# Patient Record
Sex: MALE | Race: WHITE | NOT HISPANIC OR LATINO | Employment: OTHER | URBAN - METROPOLITAN AREA
[De-identification: names, ages, dates, MRNs, and addresses within clinical notes are randomized per-mention and may not be internally consistent; named-entity substitution may affect disease eponyms.]

---

## 2022-06-25 ENCOUNTER — HOSPITAL ENCOUNTER (EMERGENCY)
Facility: HOSPITAL | Age: 77
Discharge: HOME | End: 2022-06-25
Attending: EMERGENCY MEDICINE | Admitting: EMERGENCY MEDICINE
Payer: COMMERCIAL

## 2022-06-25 ENCOUNTER — APPOINTMENT (EMERGENCY)
Dept: RADIOLOGY | Facility: HOSPITAL | Age: 77
End: 2022-06-25
Payer: COMMERCIAL

## 2022-06-25 VITALS
HEIGHT: 70 IN | SYSTOLIC BLOOD PRESSURE: 154 MMHG | DIASTOLIC BLOOD PRESSURE: 70 MMHG | RESPIRATION RATE: 17 BRPM | OXYGEN SATURATION: 100 % | HEART RATE: 61 BPM | BODY MASS INDEX: 34.36 KG/M2 | TEMPERATURE: 98.4 F | WEIGHT: 240 LBS

## 2022-06-25 DIAGNOSIS — M25.519 SHOULDER PAIN: Primary | ICD-10-CM

## 2022-06-25 PROCEDURE — 73030 X-RAY EXAM OF SHOULDER: CPT

## 2022-06-25 PROCEDURE — 99284 EMERGENCY DEPT VISIT MOD MDM: CPT | Performed by: EMERGENCY MEDICINE

## 2022-06-25 PROCEDURE — 99283 EMERGENCY DEPT VISIT LOW MDM: CPT

## 2022-06-25 RX ORDER — CYCLOBENZAPRINE HCL 10 MG
10 TABLET ORAL 2 TIMES DAILY PRN
Qty: 20 TABLET | Refills: 0 | Status: SHIPPED | OUTPATIENT
Start: 2022-06-25

## 2022-06-25 NOTE — ED PROVIDER NOTES
History  Chief Complaint   Patient presents with    Neck Pain     Pt c/o neck pain that radiates into left shoulder and arm      HPI  66 yo M presents with left shoulder pain that radiates down arm and into neck  Started about 3 weeks ago and has not improved  No weakness, numbers, fevers  Denies trauma  He reports unfortunately his son recently , so he was lifting heavy objects trying to clean out his son's house  Has tried tylenol and icyhot without improvement  None       Past Medical History:   Diagnosis Date    Diabetes mellitus ([address] )     Hypertension        History reviewed  No pertinent surgical history  History reviewed  No pertinent family history  I have reviewed and agree with the history as documented  E-Cigarette/Vaping     E-Cigarette/Vaping Substances     Social History     Tobacco Use    Smoking status: Former Smoker    Smokeless tobacco: Never Used       Review of Systems   Constitutional: Negative for chills and fever  HENT: Negative for dental problem and ear pain  Eyes: Negative for pain and redness  Respiratory: Negative for cough and shortness of breath  Cardiovascular: Negative for chest pain and palpitations  Gastrointestinal: Negative for abdominal pain and nausea  Endocrine: Negative for polydipsia and polyphagia  Genitourinary: Negative for dysuria and frequency  Musculoskeletal: Positive for arthralgias and neck pain  Negative for joint swelling  Skin: Negative for color change and rash  Neurological: Negative for dizziness and headaches  Psychiatric/Behavioral: Negative for behavioral problems and confusion  All other systems reviewed and are negative  Physical Exam  Physical Exam  Vitals and nursing note reviewed  Constitutional:       General: He is not in acute distress  HENT:      Head: Normocephalic and atraumatic        Right Ear: External ear normal       Left Ear: External ear normal       Nose: Nose normal    Eyes: General: No scleral icterus  Cardiovascular:      Rate and Rhythm: Normal rate  Pulmonary:      Effort: Pulmonary effort is normal  No respiratory distress  Abdominal:      General: There is no distension  Musculoskeletal:         General: No deformity  Normal range of motion  Comments: L trapezius TTP, decreased ROM L shoulder, n/v intact distally   Skin:     Findings: No rash  Neurological:      General: No focal deficit present  Mental Status: He is alert  Gait: Gait normal    Psychiatric:         Mood and Affect: Mood normal          Vital Signs  ED Triage Vitals [06/25/22 1246]   Temperature Pulse Respirations Blood Pressure SpO2   98 4 °F (36 9 °C) 61 17 154/70 100 %      Temp src Heart Rate Source Patient Position - Orthostatic VS BP Location FiO2 (%)   -- Monitor Sitting Left arm --      Pain Score       --           Vitals:    06/25/22 1246   BP: 154/70   Pulse: 61   Patient Position - Orthostatic VS: Sitting         Visual Acuity      ED Medications  Medications - No data to display    Diagnostic Studies  Results Reviewed     None                 XR shoulder 2+ views LEFT    (Results Pending)              Procedures  Procedures         ED Course                               SBIRT 20yo+    Flowsheet Row Most Recent Value   SBIRT (25 yo +)    In order to provide better care to our patients, we are screening all of our patients for alcohol and drug use  Would it be okay to ask you these screening questions? Yes Filed at: 06/25/2022 1311   Initial Alcohol Screen: US AUDIT-C     1  How often do you have a drink containing alcohol? 0 Filed at: 06/25/2022 1311   2  How many drinks containing alcohol do you have on a typical day you are drinking? 0 Filed at: 06/25/2022 1311   3a  Male UNDER 65: How often do you have five or more drinks on one occasion? 0 Filed at: 06/25/2022 1311   3b  FEMALE Any Age, or MALE 65+: How often do you have 4 or more drinks on one occassion?  0 Filed at: 06/25/2022 1311   Audit-C Score 0 Filed at: 06/25/2022 1311   ANDRE: How many times in the past year have you    Used an illegal drug or used a prescription medication for non-medical reasons? Never Filed at: 06/25/2022 1311                    [facility]  Patient presents with shoulder pain radiating to neck  XR shows no acute fracture or dislocation per my read  Discussed voltaren gel, muscle relaxant and orthopedic follow up  Patient declines sling  Disposition  Final diagnoses:   Shoulder pain     Time reflects when diagnosis was documented in both MDM as applicable and the Disposition within this note     Time User Action Codes Description Comment    6/25/2022  1:36 PM Theta Kenneth Add [R11 [id number]] Shoulder pain       ED Disposition     ED Disposition   Discharge    Condition   Stable    Date/Time   Sat Jun 25, 2022  1:36 PM    Comment   Nikki Bowers discharge to home/self care  Follow-up Information     Follow up With Specialties Details Why Contact Info Additional [id number] [facility] Schedule an appointment as soon as possible for a visit  for orthopedic follow up [address] [facility] [address], [address], [place], [place], [address] [facility]          Patient's Medications   Discharge Prescriptions    CYCLOBENZAPRINE (FLEXERIL) 10 MG TABLET    Take 1 tablet (10 mg total) by mouth 2 (two) times a day as needed for muscle spasms       Start Date: 6/25/2022 End Date: --       Order Dose: 10 mg       Quantity: 20 tablet    Refills: 0    DICLOFENAC SODIUM (VOLTAREN) 1 %    Apply 2 g topically 4 (four) times a day       Start Date: 6/25/2022 End Date: --       Order Dose: 2 g       Quantity: 50 g    Refills: 0       No discharge procedures on file      PDMP Review     None          ED Provider  Electronically Signed by           Tisha Schneider MD  06/25/22 3643

## 2024-03-28 ENCOUNTER — APPOINTMENT (EMERGENCY)
Dept: CT IMAGING | Facility: HOSPITAL | Age: 79
DRG: 291 | End: 2024-03-28
Payer: COMMERCIAL

## 2024-03-28 ENCOUNTER — APPOINTMENT (EMERGENCY)
Dept: RADIOLOGY | Facility: HOSPITAL | Age: 79
DRG: 291 | End: 2024-03-28
Payer: COMMERCIAL

## 2024-03-28 ENCOUNTER — APPOINTMENT (EMERGENCY)
Dept: VASCULAR ULTRASOUND | Facility: HOSPITAL | Age: 79
DRG: 291 | End: 2024-03-28
Payer: COMMERCIAL

## 2024-03-28 ENCOUNTER — HOSPITAL ENCOUNTER (INPATIENT)
Facility: HOSPITAL | Age: 79
LOS: 6 days | Discharge: HOME | DRG: 291 | End: 2024-04-03
Attending: EMERGENCY MEDICINE | Admitting: FAMILY MEDICINE
Payer: COMMERCIAL

## 2024-03-28 DIAGNOSIS — I50.9 ACUTE CHF (CONGESTIVE HEART FAILURE) (HCC): ICD-10-CM

## 2024-03-28 DIAGNOSIS — R06.00 ACUTE DYSPNEA: Primary | ICD-10-CM

## 2024-03-28 DIAGNOSIS — R09.02 HYPOXIA: ICD-10-CM

## 2024-03-28 DIAGNOSIS — N17.9 AKI (ACUTE KIDNEY INJURY) (HCC): ICD-10-CM

## 2024-03-28 DIAGNOSIS — I50.9 NEW ONSET OF CONGESTIVE HEART FAILURE (HCC): ICD-10-CM

## 2024-03-28 DIAGNOSIS — J90 BILATERAL PLEURAL EFFUSION: ICD-10-CM

## 2024-03-28 DIAGNOSIS — R74.8 CARDIAC ENZYMES ELEVATED: ICD-10-CM

## 2024-03-28 PROBLEM — J96.01 ACUTE RESPIRATORY FAILURE WITH HYPOXIA (HCC): Status: ACTIVE | Noted: 2024-03-28

## 2024-03-28 PROBLEM — Z79.4 TYPE 2 DIABETES MELLITUS, WITH LONG-TERM CURRENT USE OF INSULIN (HCC): Status: ACTIVE | Noted: 2024-03-28

## 2024-03-28 PROBLEM — I10 HTN (HYPERTENSION): Status: ACTIVE | Noted: 2024-03-28

## 2024-03-28 PROBLEM — E11.9 TYPE 2 DIABETES MELLITUS, WITH LONG-TERM CURRENT USE OF INSULIN (HCC): Status: ACTIVE | Noted: 2024-03-28

## 2024-03-28 PROBLEM — Z85.46 HISTORY OF PROSTATE CANCER: Status: ACTIVE | Noted: 2024-03-28

## 2024-03-28 PROBLEM — E03.9 HYPOTHYROID: Status: ACTIVE | Noted: 2024-03-28

## 2024-03-28 PROBLEM — F41.9 ANXIETY: Status: ACTIVE | Noted: 2024-03-28

## 2024-03-28 PROBLEM — R79.89 ELEVATED TROPONIN: Status: ACTIVE | Noted: 2024-03-28

## 2024-03-28 LAB
2HR DELTA HS TROPONIN: 6 NG/L
4HR DELTA HS TROPONIN: 9 NG/L
ALBUMIN SERPL BCP-MCNC: 4.2 G/DL (ref 3.5–5)
ALP SERPL-CCNC: 54 U/L (ref 34–104)
ALT SERPL W P-5'-P-CCNC: 16 U/L (ref 7–52)
ANION GAP SERPL CALCULATED.3IONS-SCNC: 9 MMOL/L (ref 4–13)
APTT PPP: 37 SECONDS (ref 23–37)
AST SERPL W P-5'-P-CCNC: 15 U/L (ref 13–39)
ATRIAL RATE: 70 BPM
ATRIAL RATE: 72 BPM
BASE EX.OXY STD BLDV CALC-SCNC: 53.1 % (ref 60–80)
BASE EXCESS BLDV CALC-SCNC: -2.4 MMOL/L
BASOPHILS # BLD AUTO: 0.01 THOUSANDS/ÂΜL (ref 0–0.1)
BASOPHILS NFR BLD AUTO: 0 % (ref 0–1)
BILIRUB DIRECT SERPL-MCNC: 0.11 MG/DL (ref 0–0.2)
BILIRUB SERPL-MCNC: 0.6 MG/DL (ref 0.2–1)
BNP SERPL-MCNC: 785 PG/ML (ref 0–100)
BUN SERPL-MCNC: 39 MG/DL (ref 5–25)
CALCIUM SERPL-MCNC: 9.4 MG/DL (ref 8.4–10.2)
CARDIAC TROPONIN I PNL SERPL HS: 89 NG/L
CARDIAC TROPONIN I PNL SERPL HS: 95 NG/L
CARDIAC TROPONIN I PNL SERPL HS: 98 NG/L
CHLORIDE SERPL-SCNC: 106 MMOL/L (ref 96–108)
CO2 SERPL-SCNC: 24 MMOL/L (ref 21–32)
CREAT SERPL-MCNC: 1.47 MG/DL (ref 0.6–1.3)
D DIMER PPP FEU-MCNC: 1.79 UG/ML FEU
EOSINOPHIL # BLD AUTO: 0.1 THOUSAND/ÂΜL (ref 0–0.61)
EOSINOPHIL NFR BLD AUTO: 1 % (ref 0–6)
ERYTHROCYTE [DISTWIDTH] IN BLOOD BY AUTOMATED COUNT: 15.8 % (ref 11.6–15.1)
FLUAV RNA RESP QL NAA+PROBE: NEGATIVE
FLUBV RNA RESP QL NAA+PROBE: NEGATIVE
GFR SERPL CREATININE-BSD FRML MDRD: 44 ML/MIN/1.73SQ M
GLUCOSE SERPL-MCNC: 175 MG/DL (ref 65–140)
GLUCOSE SERPL-MCNC: 179 MG/DL (ref 65–140)
GLUCOSE SERPL-MCNC: 208 MG/DL (ref 65–140)
HCO3 BLDV-SCNC: 23.6 MMOL/L (ref 24–30)
HCT VFR BLD AUTO: 34.5 % (ref 36.5–49.3)
HGB BLD-MCNC: 11.1 G/DL (ref 12–17)
IMM GRANULOCYTES # BLD AUTO: 0.02 THOUSAND/UL (ref 0–0.2)
IMM GRANULOCYTES NFR BLD AUTO: 0 % (ref 0–2)
INR PPP: 0.99 (ref 0.84–1.19)
LACTATE SERPL-SCNC: 1.4 MMOL/L (ref 0.5–2)
LYMPHOCYTES # BLD AUTO: 0.43 THOUSANDS/ÂΜL (ref 0.6–4.47)
LYMPHOCYTES NFR BLD AUTO: 6 % (ref 14–44)
MAGNESIUM SERPL-MCNC: 2.2 MG/DL (ref 1.9–2.7)
MCH RBC QN AUTO: 32 PG (ref 26.8–34.3)
MCHC RBC AUTO-ENTMCNC: 32.2 G/DL (ref 31.4–37.4)
MCV RBC AUTO: 99 FL (ref 82–98)
MONOCYTES # BLD AUTO: 0.53 THOUSAND/ÂΜL (ref 0.17–1.22)
MONOCYTES NFR BLD AUTO: 8 % (ref 4–12)
NEUTROPHILS # BLD AUTO: 5.89 THOUSANDS/ÂΜL (ref 1.85–7.62)
NEUTS SEG NFR BLD AUTO: 85 % (ref 43–75)
NRBC BLD AUTO-RTO: 0 /100 WBCS
O2 CT BLDV-SCNC: 9.1 ML/DL
P AXIS: 67 DEGREES
P AXIS: 74 DEGREES
PCO2 BLDV: 45.5 MM HG (ref 42–50)
PH BLDV: 7.33 [PH] (ref 7.3–7.4)
PLATELET # BLD AUTO: 344 THOUSANDS/UL (ref 149–390)
PMV BLD AUTO: 9.9 FL (ref 8.9–12.7)
PO2 BLDV: 30.7 MM HG (ref 35–45)
POTASSIUM SERPL-SCNC: 4.8 MMOL/L (ref 3.5–5.3)
PR INTERVAL: 292 MS
PR INTERVAL: 310 MS
PROCALCITONIN SERPL-MCNC: 0.14 NG/ML
PROT SERPL-MCNC: 7.4 G/DL (ref 6.4–8.4)
PROTHROMBIN TIME: 13.7 SECONDS (ref 11.6–14.5)
QRS AXIS: -11 DEGREES
QRS AXIS: -18 DEGREES
QRSD INTERVAL: 68 MS
QRSD INTERVAL: 88 MS
QT INTERVAL: 396 MS
QT INTERVAL: 406 MS
QTC INTERVAL: 433 MS
QTC INTERVAL: 438 MS
RBC # BLD AUTO: 3.47 MILLION/UL (ref 3.88–5.62)
RSV RNA RESP QL NAA+PROBE: NEGATIVE
SARS-COV-2 RNA RESP QL NAA+PROBE: NEGATIVE
SODIUM SERPL-SCNC: 139 MMOL/L (ref 135–147)
T WAVE AXIS: 78 DEGREES
T WAVE AXIS: 87 DEGREES
TSH SERPL DL<=0.05 MIU/L-ACNC: 4.01 UIU/ML (ref 0.45–4.5)
VENTRICULAR RATE: 70 BPM
VENTRICULAR RATE: 72 BPM
WBC # BLD AUTO: 6.98 THOUSAND/UL (ref 4.31–10.16)

## 2024-03-28 PROCEDURE — 71275 CT ANGIOGRAPHY CHEST: CPT

## 2024-03-28 PROCEDURE — 84145 PROCALCITONIN (PCT): CPT | Performed by: EMERGENCY MEDICINE

## 2024-03-28 PROCEDURE — 80048 BASIC METABOLIC PNL TOTAL CA: CPT | Performed by: EMERGENCY MEDICINE

## 2024-03-28 PROCEDURE — 80076 HEPATIC FUNCTION PANEL: CPT | Performed by: EMERGENCY MEDICINE

## 2024-03-28 PROCEDURE — 93005 ELECTROCARDIOGRAM TRACING: CPT

## 2024-03-28 PROCEDURE — 82948 REAGENT STRIP/BLOOD GLUCOSE: CPT

## 2024-03-28 PROCEDURE — 94640 AIRWAY INHALATION TREATMENT: CPT

## 2024-03-28 PROCEDURE — 85379 FIBRIN DEGRADATION QUANT: CPT | Performed by: EMERGENCY MEDICINE

## 2024-03-28 PROCEDURE — 84484 ASSAY OF TROPONIN QUANT: CPT | Performed by: EMERGENCY MEDICINE

## 2024-03-28 PROCEDURE — 83880 ASSAY OF NATRIURETIC PEPTIDE: CPT | Performed by: EMERGENCY MEDICINE

## 2024-03-28 PROCEDURE — 99285 EMERGENCY DEPT VISIT HI MDM: CPT | Performed by: EMERGENCY MEDICINE

## 2024-03-28 PROCEDURE — 85610 PROTHROMBIN TIME: CPT | Performed by: EMERGENCY MEDICINE

## 2024-03-28 PROCEDURE — 99285 EMERGENCY DEPT VISIT HI MDM: CPT

## 2024-03-28 PROCEDURE — 93971 EXTREMITY STUDY: CPT

## 2024-03-28 PROCEDURE — 99223 1ST HOSP IP/OBS HIGH 75: CPT | Performed by: FAMILY MEDICINE

## 2024-03-28 PROCEDURE — 87040 BLOOD CULTURE FOR BACTERIA: CPT | Performed by: EMERGENCY MEDICINE

## 2024-03-28 PROCEDURE — 83735 ASSAY OF MAGNESIUM: CPT | Performed by: EMERGENCY MEDICINE

## 2024-03-28 PROCEDURE — 0241U HB NFCT DS VIR RESP RNA 4 TRGT: CPT | Performed by: EMERGENCY MEDICINE

## 2024-03-28 PROCEDURE — 85730 THROMBOPLASTIN TIME PARTIAL: CPT | Performed by: EMERGENCY MEDICINE

## 2024-03-28 PROCEDURE — 82805 BLOOD GASES W/O2 SATURATION: CPT | Performed by: EMERGENCY MEDICINE

## 2024-03-28 PROCEDURE — 96374 THER/PROPH/DIAG INJ IV PUSH: CPT

## 2024-03-28 PROCEDURE — 36415 COLL VENOUS BLD VENIPUNCTURE: CPT | Performed by: EMERGENCY MEDICINE

## 2024-03-28 PROCEDURE — 71045 X-RAY EXAM CHEST 1 VIEW: CPT

## 2024-03-28 PROCEDURE — 93010 ELECTROCARDIOGRAM REPORT: CPT | Performed by: INTERNAL MEDICINE

## 2024-03-28 PROCEDURE — 93971 EXTREMITY STUDY: CPT | Performed by: SURGERY

## 2024-03-28 PROCEDURE — 84443 ASSAY THYROID STIM HORMONE: CPT | Performed by: FAMILY MEDICINE

## 2024-03-28 PROCEDURE — 83605 ASSAY OF LACTIC ACID: CPT | Performed by: EMERGENCY MEDICINE

## 2024-03-28 PROCEDURE — 85025 COMPLETE CBC W/AUTO DIFF WBC: CPT | Performed by: EMERGENCY MEDICINE

## 2024-03-28 RX ORDER — FUROSEMIDE 10 MG/ML
40 INJECTION INTRAMUSCULAR; INTRAVENOUS 2 TIMES DAILY
Status: DISCONTINUED | OUTPATIENT
Start: 2024-03-29 | End: 2024-03-30

## 2024-03-28 RX ORDER — PRAVASTATIN SODIUM 80 MG/1
80 TABLET ORAL
Status: DISCONTINUED | OUTPATIENT
Start: 2024-03-28 | End: 2024-03-28

## 2024-03-28 RX ORDER — PANTOPRAZOLE SODIUM 40 MG/1
40 TABLET, DELAYED RELEASE ORAL DAILY
COMMUNITY

## 2024-03-28 RX ORDER — INSULIN DEGLUDEC 100 U/ML
20 INJECTION, SOLUTION SUBCUTANEOUS DAILY
COMMUNITY

## 2024-03-28 RX ORDER — PANTOPRAZOLE SODIUM 40 MG/1
40 TABLET, DELAYED RELEASE ORAL
Status: DISCONTINUED | OUTPATIENT
Start: 2024-03-29 | End: 2024-04-03 | Stop reason: HOSPADM

## 2024-03-28 RX ORDER — POTASSIUM CHLORIDE 750 MG/1
10 TABLET, EXTENDED RELEASE ORAL DAILY
Status: DISCONTINUED | OUTPATIENT
Start: 2024-03-29 | End: 2024-03-29

## 2024-03-28 RX ORDER — LEVOTHYROXINE SODIUM 0.05 MG/1
50 TABLET ORAL
Status: DISCONTINUED | OUTPATIENT
Start: 2024-03-29 | End: 2024-04-03 | Stop reason: HOSPADM

## 2024-03-28 RX ORDER — ASPIRIN 81 MG/1
324 TABLET, CHEWABLE ORAL ONCE
Status: COMPLETED | OUTPATIENT
Start: 2024-03-28 | End: 2024-03-28

## 2024-03-28 RX ORDER — ASPIRIN 81 MG/1
81 TABLET, CHEWABLE ORAL DAILY
COMMUNITY

## 2024-03-28 RX ORDER — LOSARTAN POTASSIUM 50 MG/1
50 TABLET ORAL DAILY
COMMUNITY
End: 2024-04-03

## 2024-03-28 RX ORDER — HEPARIN SODIUM 5000 [USP'U]/ML
5000 INJECTION, SOLUTION INTRAVENOUS; SUBCUTANEOUS EVERY 8 HOURS SCHEDULED
Status: DISCONTINUED | OUTPATIENT
Start: 2024-03-28 | End: 2024-04-03 | Stop reason: HOSPADM

## 2024-03-28 RX ORDER — INSULIN LISPRO 100 [IU]/ML
1-6 INJECTION, SOLUTION INTRAVENOUS; SUBCUTANEOUS
Status: DISCONTINUED | OUTPATIENT
Start: 2024-03-28 | End: 2024-04-03 | Stop reason: HOSPADM

## 2024-03-28 RX ORDER — PIOGLITAZONEHYDROCHLORIDE 45 MG/1
45 TABLET ORAL
COMMUNITY

## 2024-03-28 RX ORDER — INSULIN LISPRO 100 [IU]/ML
1-5 INJECTION, SOLUTION INTRAVENOUS; SUBCUTANEOUS
Status: DISCONTINUED | OUTPATIENT
Start: 2024-03-28 | End: 2024-04-03 | Stop reason: HOSPADM

## 2024-03-28 RX ORDER — LATANOPROST 50 UG/ML
1 SOLUTION/ DROPS OPHTHALMIC
Status: DISCONTINUED | OUTPATIENT
Start: 2024-03-28 | End: 2024-04-03 | Stop reason: HOSPADM

## 2024-03-28 RX ORDER — METOPROLOL SUCCINATE 100 MG/1
100 TABLET, EXTENDED RELEASE ORAL 2 TIMES DAILY
COMMUNITY

## 2024-03-28 RX ORDER — GLIPIZIDE 10 MG/1
10 TABLET ORAL
COMMUNITY

## 2024-03-28 RX ORDER — AMLODIPINE BESYLATE 10 MG/1
10 TABLET ORAL DAILY
COMMUNITY
End: 2024-04-03

## 2024-03-28 RX ORDER — EZETIMIBE 10 MG/1
10 TABLET ORAL DAILY
Status: DISCONTINUED | OUTPATIENT
Start: 2024-03-29 | End: 2024-04-03 | Stop reason: HOSPADM

## 2024-03-28 RX ORDER — SIMVASTATIN 40 MG
40 TABLET ORAL
COMMUNITY

## 2024-03-28 RX ORDER — GABAPENTIN 300 MG/1
300 CAPSULE ORAL 3 TIMES DAILY
Status: DISCONTINUED | OUTPATIENT
Start: 2024-03-28 | End: 2024-03-30

## 2024-03-28 RX ORDER — FUROSEMIDE 10 MG/ML
40 INJECTION INTRAMUSCULAR; INTRAVENOUS ONCE
Status: COMPLETED | OUTPATIENT
Start: 2024-03-28 | End: 2024-03-28

## 2024-03-28 RX ORDER — PRAVASTATIN SODIUM 80 MG/1
80 TABLET ORAL
Status: DISCONTINUED | OUTPATIENT
Start: 2024-03-28 | End: 2024-04-03 | Stop reason: HOSPADM

## 2024-03-28 RX ORDER — LEVOTHYROXINE SODIUM 0.05 MG/1
50 TABLET ORAL DAILY
COMMUNITY

## 2024-03-28 RX ORDER — INSULIN GLARGINE 100 [IU]/ML
20 INJECTION, SOLUTION SUBCUTANEOUS EVERY MORNING
Status: DISCONTINUED | OUTPATIENT
Start: 2024-03-29 | End: 2024-04-03 | Stop reason: HOSPADM

## 2024-03-28 RX ORDER — EZETIMIBE 10 MG/1
10 TABLET ORAL DAILY
COMMUNITY

## 2024-03-28 RX ORDER — METOPROLOL TARTRATE 50 MG/1
50 TABLET, FILM COATED ORAL EVERY 12 HOURS SCHEDULED
Status: DISCONTINUED | OUTPATIENT
Start: 2024-03-28 | End: 2024-04-03 | Stop reason: HOSPADM

## 2024-03-28 RX ORDER — MELATONIN
1000 DAILY
COMMUNITY

## 2024-03-28 RX ORDER — LATANOPROST 50 UG/ML
1 SOLUTION/ DROPS OPHTHALMIC
COMMUNITY

## 2024-03-28 RX ORDER — ACETAMINOPHEN 325 MG/1
975 TABLET ORAL EVERY 8 HOURS PRN
Status: DISCONTINUED | OUTPATIENT
Start: 2024-03-28 | End: 2024-03-29

## 2024-03-28 RX ADMIN — METOPROLOL TARTRATE 50 MG: 50 TABLET, FILM COATED ORAL at 21:47

## 2024-03-28 RX ADMIN — HEPARIN SODIUM 5000 UNITS: 5000 INJECTION INTRAVENOUS; SUBCUTANEOUS at 21:47

## 2024-03-28 RX ADMIN — DICLOFENAC SODIUM 2 G: 10 GEL TOPICAL at 23:36

## 2024-03-28 RX ADMIN — ASPIRIN 324 MG: 81 TABLET, CHEWABLE ORAL at 16:58

## 2024-03-28 RX ADMIN — INSULIN LISPRO 1 UNITS: 100 INJECTION, SOLUTION INTRAVENOUS; SUBCUTANEOUS at 21:48

## 2024-03-28 RX ADMIN — PRAVASTATIN SODIUM 80 MG: 80 TABLET ORAL at 23:36

## 2024-03-28 RX ADMIN — FUROSEMIDE 40 MG: 10 INJECTION, SOLUTION INTRAMUSCULAR; INTRAVENOUS at 17:01

## 2024-03-28 RX ADMIN — IOHEXOL 100 ML: 350 INJECTION, SOLUTION INTRAVENOUS at 16:12

## 2024-03-28 RX ADMIN — LATANOPROST 1 DROP: 50 SOLUTION OPHTHALMIC at 23:36

## 2024-03-28 RX ADMIN — IPRATROPIUM BROMIDE 0.5 MG: 0.5 SOLUTION RESPIRATORY (INHALATION) at 17:00

## 2024-03-28 RX ADMIN — ALBUTEROL SULFATE 5 MG: 2.5 SOLUTION RESPIRATORY (INHALATION) at 17:00

## 2024-03-28 RX ADMIN — INSULIN LISPRO 1 UNITS: 100 INJECTION, SOLUTION INTRAVENOUS; SUBCUTANEOUS at 18:48

## 2024-03-28 NOTE — ED PROVIDER NOTES
History  Chief Complaint   Patient presents with    Anxiety     Pt presents with concerns that he has been having frequent panic attacks over the last 3 days, denies history of the same. Pt presents dyspneic and hypoxic at 83%, and tearful regarding the recent death of his son.     Shortness of Breath     Patient is an 79-year-old male with past medical history of hypertension, hyperlipidemia, hypothyroidism, type 2 diabetes, GERD, prostate cancer 5 years ago status post radiation treatment in remission, presents to the emergency department for several days of dyspnea and anxiety.  Patient states that over the past few days he has been feeling extremely short of breath especially with exertion.  He states he feels like he is hyperventilating and then becomes very anxious and panicked about this.  Patient denies ever feeling this way before.  He denies any recent illness such as coughing, URI symptoms, fevers or chills.  She denies any chest pain, palpitations, abdominal pain, back pain.  He reports occasional nausea but no vomiting.  Denies any diarrhea, constipation, blood per rectum or melena, urinary symptoms, skin rash or color change, focal neurologic deficits.  Patient does report about 1 week ago, injuring his left leg while getting out of a car and did notice some swelling and bruising to the left calf.  He states he feels as though both of his legs are more swollen than normal but slightly worse on the left side.  He denies any known history of asthma or COPD and reports he quit smoking 50 years ago.  He denies any known cardiac history such as ACS, CHF.  Denies history of venous thromboembolism.  Patient's son recently passed away and he does admit to some anxiety from that but reports the last few days were much worse than any anxiety he has ever felt.  Patient was noted to be hypoxic in triage at 83% as well as tachypneic.  He was immediately placed on nasal cannula oxygen and brought to exam room where  I evaluated him on arrival.      History provided by:  Patient and spouse   used: No    Anxiety  Associated symptoms: anxiety    Associated symptoms: no abdominal pain, no chest pain and no headaches    Shortness of Breath  Associated symptoms: no abdominal pain, no chest pain, no cough, no ear pain, no fever, no headaches, no neck pain, no rash, no sore throat, no vomiting and no wheezing        Prior to Admission Medications   Prescriptions Last Dose Informant Patient Reported? Taking?   Diclofenac Sodium (VOLTAREN) 1 %   No No   Sig: Apply 2 g topically 4 (four) times a day   cyclobenzaprine (FLEXERIL) 10 mg tablet   No No   Sig: Take 1 tablet (10 mg total) by mouth 2 (two) times a day as needed for muscle spasms      Facility-Administered Medications: None       Past Medical History:   Diagnosis Date    Diabetes mellitus (HCC)     Hypertension        History reviewed. No pertinent surgical history.    History reviewed. No pertinent family history.  I have reviewed and agree with the history as documented.    E-Cigarette/Vaping     E-Cigarette/Vaping Substances     Social History     Tobacco Use    Smoking status: Former    Smokeless tobacco: Never       Review of Systems   Constitutional:  Negative for chills and fever.   HENT:  Negative for congestion, ear pain, rhinorrhea and sore throat.    Respiratory:  Positive for shortness of breath. Negative for cough, chest tightness and wheezing.    Cardiovascular:  Positive for leg swelling. Negative for chest pain and palpitations.   Gastrointestinal:  Positive for nausea. Negative for abdominal pain, blood in stool, constipation, diarrhea and vomiting.   Genitourinary:  Negative for dysuria, flank pain, frequency and hematuria.   Musculoskeletal:  Negative for back pain, neck pain and neck stiffness.   Skin:  Negative for color change, pallor, rash and wound.   Allergic/Immunologic: Negative for immunocompromised state.   Neurological:  Negative  for dizziness, syncope, weakness, light-headedness, numbness and headaches.   Hematological:  Negative for adenopathy. Does not bruise/bleed easily.   Psychiatric/Behavioral:  Negative for confusion and decreased concentration. The patient is nervous/anxious.    All other systems reviewed and are negative.      Physical Exam  Physical Exam  Vitals and nursing note reviewed.   Constitutional:       General: He is in acute distress.      Appearance: Normal appearance. He is well-developed. He is obese. He is not ill-appearing, toxic-appearing or diaphoretic.   HENT:      Head: Normocephalic and atraumatic.      Right Ear: External ear normal.      Left Ear: External ear normal.      Nose: Nose normal.      Mouth/Throat:      Mouth: Mucous membranes are moist.      Pharynx: Oropharynx is clear. No oropharyngeal exudate.   Eyes:      Extraocular Movements: Extraocular movements intact.      Conjunctiva/sclera: Conjunctivae normal.   Neck:      Vascular: No JVD.   Cardiovascular:      Rate and Rhythm: Normal rate and regular rhythm.      Pulses: Normal pulses.      Heart sounds: Normal heart sounds. No murmur heard.     No friction rub. No gallop.   Pulmonary:      Breath sounds: No wheezing, rhonchi or rales.      Comments: O2 sat noted to be 83% in triage on room air.  Patient was tachypneic with accessory muscle use.  He was immediately placed on nasal cannula oxygen 2 L with improvement to the high 80s.  Patient increased to 4 L nasal cannula and O2 sat 92%.  Patient mildly tachypneic and mild accessory muscle use.  He has decreased air movement throughout.  He is able to speak in full sentences.  Abdominal:      General: There is no distension.      Palpations: Abdomen is soft.      Tenderness: There is no abdominal tenderness. There is no guarding or rebound.   Musculoskeletal:         General: Normal range of motion.      Cervical back: Normal range of motion and neck supple. No rigidity.      Right lower leg:  Edema present.      Left lower leg: Edema present.      Comments: 2+ pitting edema bilateral lower legs.  Mild left calf swelling and tenderness.   Skin:     General: Skin is warm and dry.      Coloration: Skin is not pale.      Findings: No erythema or rash.   Neurological:      General: No focal deficit present.      Mental Status: He is alert and oriented to person, place, and time.      Cranial Nerves: No cranial nerve deficit.      Sensory: No sensory deficit.      Motor: No weakness.   Psychiatric:         Mood and Affect: Mood normal.         Behavior: Behavior normal.         Vital Signs  ED Triage Vitals   Temperature Pulse Respirations Blood Pressure SpO2   03/28/24 1452 03/28/24 1451 03/28/24 1452 03/28/24 1451 03/28/24 1451   (!) 97.4 °F (36.3 °C) 75 22 164/70 (!) 83 %      Temp Source Heart Rate Source Patient Position - Orthostatic VS BP Location FiO2 (%)   03/28/24 1452 03/28/24 1451 03/28/24 1713 03/28/24 1451 --   Tympanic Monitor Sitting Left arm       Pain Score       03/28/24 1713       No Pain         Vitals:    03/28/24 1451 03/28/24 1452 03/28/24 1713 03/28/24 1801   BP: 164/70  116/59 146/70   BP Location: Left arm  Right arm Right arm   Pulse: 75  71 78   Resp:  22 20 18   Temp:  (!) 97.4 °F (36.3 °C)     TempSrc:  Tympanic     SpO2: (!) 83% 94% 94% 96%          Visual Acuity      ED Medications  Medications   furosemide (LASIX) injection 40 mg (has no administration in time range)   heparin (porcine) subcutaneous injection 5,000 Units (has no administration in time range)   potassium chloride (Klor-Con M10) CR tablet 10 mEq (has no administration in time range)   insulin lispro (HumALOG/ADMELOG) 100 units/mL subcutaneous injection 1-6 Units (has no administration in time range)   insulin lispro (HumALOG/ADMELOG) 100 units/mL subcutaneous injection 1-5 Units (has no administration in time range)   levothyroxine tablet 50 mcg (has no administration in time range)   metoprolol tartrate  (LOPRESSOR) tablet 50 mg (has no administration in time range)   pantoprazole (PROTONIX) EC tablet 40 mg (has no administration in time range)   aspirin (ECOTRIN LOW STRENGTH) EC tablet 81 mg (has no administration in time range)   ipratropium (ATROVENT) 0.02 % inhalation solution 0.5 mg (0.5 mg Nebulization Given 3/28/24 1700)   albuterol inhalation solution 5 mg (5 mg Nebulization Given 3/28/24 1700)   iohexol (OMNIPAQUE) 350 MG/ML injection (MULTI-DOSE) 100 mL (100 mL Intravenous Given 3/28/24 1612)   aspirin chewable tablet 324 mg (324 mg Oral Given 3/28/24 1658)   furosemide (LASIX) injection 40 mg (40 mg Intravenous Given 3/28/24 1701)       Diagnostic Studies  Results Reviewed       Procedure Component Value Units Date/Time    HS Troponin I 2hr [[id number]]  (Abnormal) Collected: 03/28/24 1722    Lab Status: Final result Specimen: Blood from Arm, Right Updated: 03/28/24 1757     hs TnI 2hr 95 ng/L      Delta 2hr hsTnI 6 ng/L     HS Troponin 0hr (reflex protocol) [[id number]]     Lab Status: No result Specimen: Blood     Platelet count [[id number]]     Lab Status: No result Specimen: Blood     TSH, 3rd generation with Free T4 reflex [[id number]]     Lab Status: No result Specimen: Blood     Blood culture #2 [[id number]] Collected: 03/28/24 1635    Lab Status: In process Specimen: Blood from Hand, Right Updated: 03/28/24 1637    FLU/RSV/COVID - if FLU/RSV clinically relevant [[id number]]  (Normal) Collected: 03/28/24 1540    Lab Status: Final result Specimen: Nares from Nose Updated: 03/28/24 1624     SARS-CoV-2 Negative     INFLUENZA A PCR Negative     INFLUENZA B PCR Negative     RSV PCR Negative    Narrative:      FOR PEDIATRIC PATIENTS - copy/paste COVID Guidelines URL to browser: [email or web address]    SARS-CoV-2 assay is a Nucleic Acid Amplification assay intended for the  qualitative detection of nucleic acid from SARS-CoV-2 in nasopharyngeal  swabs.  Results are for the presumptive identification of SARS-CoV-2 RNA.    Positive results are indicative of infection with SARS-CoV-2, the virus  causing COVID-19, but do not rule out bacterial infection or co-infection  with other viruses. Laboratories within the [place] and its  territories are required to report all positive results to the appropriate  public health authorities. Negative results do not preclude SARS-CoV-2  infection and should not be used as the sole basis for treatment or other  patient management decisions. Negative results must be combined with  clinical observations, patient history, and epidemiological information.  This test has not been FDA cleared or approved.    This test has been authorized by FDA under an Emergency Use Authorization  (EUA). This test is only authorized for the duration of time the  declaration that circumstances exist justifying the authorization of the  emergency use of an in vitro diagnostic tests for detection of SARS-CoV-2  virus and/or diagnosis of COVID-19 infection under section 564(b)(1) of  the Act, 21 U.S.C. 360bbb-3(b)(1), unless the authorization is terminated  or revoked sooner. The test has been validated but independent review by FDA  and CLIA is pending.    Test performed using [employer] GeneXpert: This RT-PCR assay targets N2,  a region unique to SARS-CoV-2. A conserved region in the E-gene was chosen  for pan-Sarbecovirus detection which includes SARS-CoV-2.    According to CMS-2020-01-R, this platform meets the definition of high-throughput technology.    Lactic acid, plasma (w/reflex if result > 2.0) [[id number]]  (Normal) Collected: 03/28/24 1554    Lab Status: Final result Specimen: Blood from Arm, Right Updated: 03/28/24 1618     LACTIC ACID 1.4 mmol/L     Narrative:      Result may be elevated if tourniquet was used during collection.    HS Troponin I 4hr [[id number]]     Lab Status: No result Specimen: Blood     Blood culture #1 [[id number]]  Collected: 03/28/24 1554    Lab Status: In process Specimen: Blood from Arm, Right Updated: 03/28/24 1558    Procalcitonin [[id number]]  (Normal) Collected: 03/28/24 1518    Lab Status: Final result Specimen: Blood from Arm, Left Updated: 03/28/24 1555     Procalcitonin 0.14 ng/ml     B-Type Natriuretic Peptide(BNP) [[id number]]  (Abnormal) Collected: 03/28/24 1518    Lab Status: Final result Specimen: Blood from Arm, Left Updated: 03/28/24 1553      pg/mL     HS Troponin 0hr (reflex protocol) [[id number]]  (Abnormal) Collected: 03/28/24 1518    Lab Status: Final result Specimen: Blood from Arm, Left Updated: 03/28/24 1552     hs TnI 0hr 89 ng/L     Basic metabolic panel [[id number]]  (Abnormal) Collected: 03/28/24 1518    Lab Status: Final result Specimen: Blood from Arm, Left Updated: 03/28/24 1547     Sodium 139 mmol/L      Potassium 4.8 mmol/L      Chloride 106 mmol/L      CO2 24 mmol/L      ANION GAP 9 mmol/L      BUN 39 mg/dL      Creatinine 1.47 mg/dL      Glucose 175 mg/dL      Calcium 9.4 mg/dL      eGFR 44 ml/min/1.73sq m     Narrative:      National Kidney Disease Foundation guidelines for Chronic Kidney Disease (CKD):     Stage 1 with normal or high GFR (GFR > 90 mL/min/1.73 square meters)    Stage 2 Mild CKD (GFR = 60-89 mL/min/1.73 square meters)    Stage 3A Moderate CKD (GFR = 45-59 mL/min/1.73 square meters)    Stage 3B Moderate CKD (GFR = 30-44 mL/min/1.73 square meters)    Stage 4 Severe CKD (GFR = 15-29 mL/min/1.73 square meters)    Stage 5 End Stage CKD (GFR <15 mL/min/1.73 square meters)  Note: GFR calculation is accurate only with a steady state creatinine    Hepatic function panel [[id number]]  (Normal) Collected: 03/28/24 1518    Lab Status: Final result Specimen: Blood from Arm, Left Updated: 03/28/24 1547     Total Bilirubin 0.60 mg/dL      Bilirubin, Direct 0.11 mg/dL      Alkaline Phosphatase 54 U/L      AST 15 U/L      ALT 16 U/L      Total Protein 7.4 g/dL      Albumin 4.2 g/dL      Magnesium [[id number]]  (Normal) Collected: 03/28/24 1518    Lab Status: Final result Specimen: Blood from Arm, Left Updated: 03/28/24 1547     Magnesium 2.2 mg/dL     D-Dimer [[id number]]  (Abnormal) Collected: 03/28/24 1518    Lab Status: Final result Specimen: Blood from Arm, Left Updated: 03/28/24 1544     D-Dimer, Quant 1.79 ug/ml FEU     Narrative:      In the evaluation for possible pulmonary embolism, in the appropriate (Well's Score of 4 or less) patient, the age adjusted d-dimer cutoff for this patient can be calculated as:    Age x 0.01 (in ug/mL) for Age-adjusted D-dimer exclusion threshold for a patient over 50 years.    Protime-INR [[id number]]  (Normal) Collected: 03/28/24 1518    Lab Status: Final result Specimen: Blood from Arm, Left Updated: 03/28/24 1541     Protime 13.7 seconds      INR 0.99    APTT [[id number]]  (Normal) Collected: 03/28/24 1518    Lab Status: Final result Specimen: Blood from Arm, Left Updated: 03/28/24 1541     PTT 37 seconds     Blood gas, venous [[id number]]  (Abnormal) Collected: 03/28/24 1518    Lab Status: Final result Specimen: Blood from Arm, Left Updated: 03/28/24 1537     pH, Kaden 7.333     pCO2, Kaden 45.5 mm Hg      pO2, Kaden 30.7 mm Hg      HCO3, Kaden 23.6 mmol/L      Base Excess, Kaden -2.4 mmol/L      O2 Content, Kaden 9.1 ml/dL      O2 HGB, VENOUS 53.1 %     CBC and differential [[id number]]  (Abnormal) Collected: 03/28/24 1518    Lab Status: Final result Specimen: Blood from Arm, Left Updated: 03/28/24 1523     WBC 6.98 Thousand/uL      RBC 3.47 Million/uL      Hemoglobin 11.1 g/dL      Hematocrit 34.5 %      MCV 99 fL      MCH 32.0 pg      MCHC 32.2 g/dL      RDW 15.8 %      MPV 9.9 fL      Platelets 344 Thousands/uL      nRBC 0 /100 WBCs      Neutrophils Relative 85 %      Immature Grans % 0 %      Lymphocytes Relative 6 %      Monocytes Relative 8 %      Eosinophils Relative 1 %      Basophils Relative 0 %      Neutrophils Absolute 5.89 Thousands/µL      Absolute Immature  Grans 0.02 Thousand/uL      Absolute Lymphocytes 0.43 Thousands/µL      Absolute Monocytes 0.53 Thousand/µL      Eosinophils Absolute 0.10 Thousand/µL      Basophils Absolute 0.01 Thousands/µL                    CTA ED chest PE study   Final Result by Nixon Liu MD (03/28 1635)      1.  No filling defect to suggest acute or chronic pulmonary embolism in the entire pulmonary arterial system.   2.  Small bilateral pleural effusions, right greater than left, with bibasilar atelectasis.      The study was marked in EPIC for immediate notification.      Workstation performed: [id number]         XR chest 1 view portable   ED Interpretation by Maritza Cummings DO (03/28 1531)   Small right pleural effusion.  Cardiomegaly.  Mild pulmonary vascular congestion.      VAS lower limb venous duplex study, unilateral/limited    (Results Pending)              Procedures  ECG 12 Lead Documentation Only    Date/Time: 3/28/2024 3:11 PM    Performed by: Maritza Cummings DO  Authorized by: Maritza Cummings DO    ECG reviewed by me, the ED Provider: yes    Patient location:  ED  Previous ECG:     Previous ECG:  Unavailable  Rate:     ECG rate:  70    ECG rate assessment: normal    Rhythm:     Rhythm: sinus rhythm and A-V block      Rhythm comment:  1st deg AV block  Ectopy:     Ectopy: none    QRS:     QRS axis:  Normal    QRS intervals:  Normal  Conduction:     Conduction: abnormal      Abnormal conduction: 1st degree    ST segments:     ST segments:  Normal  T waves:     T waves: normal    Comments:      Low voltage QRS complex.  ECG 12 Lead Documentation Only    Date/Time: 3/28/2024 5:16 PM    Performed by: Maritza Cummings DO  Authorized by: Maritza Cummings DO    ECG reviewed by me, the ED Provider: yes    Patient location:  ED  Previous ECG:     Previous ECG:  Compared to current    Similarity:  No change  Rate:     ECG rate:  73    ECG rate assessment: normal    Rhythm:      Rhythm: sinus rhythm and A-V block      Rhythm comment:  1st deg AV block  Ectopy:     Ectopy: none    QRS:     QRS axis:  Normal    QRS intervals:  Normal  Conduction:     Conduction: abnormal      Abnormal conduction: 1st degree    ST segments:     ST segments:  Normal  T waves:     T waves: normal    Comments:      Low voltage QRS complex.           ED Course  ED Course as of 03/28/24 1804   u Mar 28, 2024   1551 D-Dimer, Quant(!): 1.79   1554 hs TnI 0hr(!): 89   1554 BNP(!): 785   1554 Creatinine(!): 1.47   1554 GFR, Calculated: 44  Renal function stable from 1 month ago.    1600 Vascular tech reported that venous duplex was negative for acute DVT/SVT in left leg.   1642 hs TnI 0hr(!): 89  Patient denies chest pain and I suspect troponin elevation is likely due to demand ischemia, possibly from new onset CHF.  CTA chest shows bilateral pleural effusion but no PE.  Will recommend admission at this time given the dyspnea, new hypoxia and elevated cardiac enzymes.   1647 Updated patient and he is agreeable to workup thus far.  He did state that about 1 week ago he had a 20-minute episode of chest pain that had resolved and did not recur.  There is possibility patient did have an acute MI and now is in heart failure.  Patient will need further cardiac evaluation.             HEART Risk Score      Flowsheet Row Most Recent Value   Heart Score Risk Calculator    History 0 Filed at: 03/28/2024 1804   ECG 0 Filed at: 03/28/2024 1804   Age 2 Filed at: 03/28/2024 1804   Risk Factors 2 Filed at: 03/28/2024 1804   Troponin 2 Filed at: 03/28/2024 1804   HEART Score 6 Filed at: 03/28/2024 1804                  PERC Rule for PE      Flowsheet Row Most Recent Value   PERC Rule for PE    Age >=50 1 Filed at: 03/28/2024 1551   HR >=100 0 Filed at: 03/28/2024 1551   O2 Sat on room air < 95% 1 Filed at: 03/28/2024 1551   History of PE or DVT 0 Filed at: 03/28/2024 1551   Recent trauma or surgery 1 Filed at: 03/28/2024 1551    Hemoptysis 0 Filed at: 03/28/2024 1551   Exogenous estrogen 0 Filed at: 03/28/2024 1551   Unilateral leg swelling 1 Filed at: 03/28/2024 1551   PERC Rule for PE Results 4 Filed at: 03/28/2024 1551                    Wells' Criteria for PE      Flowsheet Row Most Recent Value   Wells' Criteria for PE    Clinical signs and symptoms of DVT 3 Filed at: 03/28/2024 1551   PE is primary diagnosis or equally likely 3 Filed at: 03/28/2024 1551   HR >100 0 Filed at: 03/28/2024 1551   Immobilization at least 3 days or Surgery in the previous 4 weeks 0 Filed at: 03/28/2024 1551   Previous, objectively diagnosed PE or DVT 0 Filed at: 03/28/2024 1551   Hemoptysis 0 Filed at: 03/28/2024 1551   Malignancy with treatment within 6 months or palliative 0 Filed at: 03/28/2024 1551   Walter' Criteria Total 6 Filed at: 03/28/2024 1551                  Medical Decision Making  79-year-old male presents to the ED for several days of anxiety, dyspnea.  Patient significantly hypoxic on arrival which was likely causing his dyspnea and thus his anxiety symptoms.  Differential includes new onset heart failure, pleural effusion, pneumothorax, acute PE, pneumonia.  Will evaluate with cardiac and infectious labs, D-dimer, chest x-ray.  Given decreased air movement throughout will trial albuterol/Atrovent breathing treatment to see if he gets symptomatic relief.  Will keep patient on nasal cannula oxygen at least 4 L to maintain O2 sat above 92%.    Amount and/or Complexity of Data Reviewed  Labs: ordered. Decision-making details documented in ED Course.  Radiology: ordered and independent interpretation performed. Decision-making details documented in ED Course.  ECG/medicine tests: ordered and independent interpretation performed. Decision-making details documented in ED Course.    Risk  OTC drugs.  Prescription drug management.  Decision regarding hospitalization.             Disposition  Final diagnoses:   Acute dyspnea   New onset of  congestive heart failure (HCC)   Cardiac enzymes elevated   Hypoxia   Bilateral pleural effusion     Time reflects when diagnosis was documented in both MDM as applicable and the Disposition within this note       Time User Action Codes Description Comment    3/28/2024  4:55 PM Maritza Cummings [R06.00] Acute dyspnea     3/28/2024  4:55 PM Maritza Cummings Add [I50.9] New onset of congestive heart failure (HCC)     3/28/2024  4:55 PM Maritza Cummings [R74.8] Cardiac enzymes elevated     3/28/2024  4:56 PM Maritza Cummings Add [R09.02] Hypoxia     3/28/2024  4:56 PM Maritza Cummings [J90] Bilateral pleural effusion           ED Disposition       ED Disposition   Admit    Condition   Stable    Date/Time   Thu Mar 28, 2024 4796    Comment   Case was discussed with CLAUDIA and the patient's admission status was agreed to be Admission Status: inpatient status to the service of Dr. Salomon .               Follow-up Information    None         Patient's Medications   Discharge Prescriptions    No medications on file       No discharge procedures on file.    PDMP Review       None            ED Provider  Electronically Signed by             Maritza Cummings DO  03/28/24 4223

## 2024-03-29 ENCOUNTER — APPOINTMENT (INPATIENT)
Dept: NON INVASIVE DIAGNOSTICS | Facility: HOSPITAL | Age: 79
DRG: 291 | End: 2024-03-29
Payer: COMMERCIAL

## 2024-03-29 LAB
ANION GAP SERPL CALCULATED.3IONS-SCNC: 7 MMOL/L (ref 4–13)
AORTIC ROOT: 3.8 CM
APICAL FOUR CHAMBER EJECTION FRACTION: 51 %
ASCENDING AORTA: 3.4 CM
ATRIAL RATE: 73 BPM
BASOPHILS # BLD AUTO: 0.02 THOUSANDS/ÂΜL (ref 0–0.1)
BASOPHILS NFR BLD AUTO: 0 % (ref 0–1)
BSA FOR ECHO PROCEDURE: 2.34 M2
BUN SERPL-MCNC: 39 MG/DL (ref 5–25)
CALCIUM SERPL-MCNC: 9 MG/DL (ref 8.4–10.2)
CHLORIDE SERPL-SCNC: 107 MMOL/L (ref 96–108)
CO2 SERPL-SCNC: 26 MMOL/L (ref 21–32)
CREAT SERPL-MCNC: 1.59 MG/DL (ref 0.6–1.3)
E WAVE DECELERATION TIME: 155 MS
E/A RATIO: 1.34
EOSINOPHIL # BLD AUTO: 0.11 THOUSAND/ÂΜL (ref 0–0.61)
EOSINOPHIL NFR BLD AUTO: 2 % (ref 0–6)
ERYTHROCYTE [DISTWIDTH] IN BLOOD BY AUTOMATED COUNT: 15.7 % (ref 11.6–15.1)
FRACTIONAL SHORTENING: 30 (ref 28–44)
GFR SERPL CREATININE-BSD FRML MDRD: 40 ML/MIN/1.73SQ M
GLUCOSE SERPL-MCNC: 104 MG/DL (ref 65–140)
GLUCOSE SERPL-MCNC: 119 MG/DL (ref 65–140)
GLUCOSE SERPL-MCNC: 123 MG/DL (ref 65–140)
GLUCOSE SERPL-MCNC: 151 MG/DL (ref 65–140)
GLUCOSE SERPL-MCNC: 200 MG/DL (ref 65–140)
HCT VFR BLD AUTO: 33.8 % (ref 36.5–49.3)
HGB BLD-MCNC: 10.8 G/DL (ref 12–17)
IMM GRANULOCYTES # BLD AUTO: 0.02 THOUSAND/UL (ref 0–0.2)
IMM GRANULOCYTES NFR BLD AUTO: 0 % (ref 0–2)
INTERVENTRICULAR SEPTUM IN DIASTOLE (PARASTERNAL SHORT AXIS VIEW): 1.2 CM
INTERVENTRICULAR SEPTUM: 1.2 CM (ref 0.6–1.1)
LA/AORTA RATIO 2D: 1.21
LAAS-AP2: 18.3 CM2
LAAS-AP4: 22.2 CM2
LEFT ATRIUM SIZE: 4.6 CM
LEFT ATRIUM VOLUME (MOD BIPLANE): 60 ML
LEFT ATRIUM VOLUME INDEX (MOD BIPLANE): 25.6 ML/M2
LEFT INTERNAL DIMENSION IN SYSTOLE: 3.5 CM (ref 2.1–4)
LEFT VENTRICULAR INTERNAL DIMENSION IN DIASTOLE: 5 CM (ref 3.5–6)
LEFT VENTRICULAR POSTERIOR WALL IN END DIASTOLE: 1.3 CM
LEFT VENTRICULAR STROKE VOLUME: 67 ML
LVSV (TEICH): 67 ML
LYMPHOCYTES # BLD AUTO: 0.4 THOUSANDS/ÂΜL (ref 0.6–4.47)
LYMPHOCYTES NFR BLD AUTO: 7 % (ref 14–44)
MAGNESIUM SERPL-MCNC: 2.3 MG/DL (ref 1.9–2.7)
MCH RBC QN AUTO: 32.3 PG (ref 26.8–34.3)
MCHC RBC AUTO-ENTMCNC: 32 G/DL (ref 31.4–37.4)
MCV RBC AUTO: 101 FL (ref 82–98)
MITRAL REGURGITATION PEAK VELOCITY: 4.76 M/S
MITRAL VALVE REGURGITANT PEAK GRADIENT: 91 MMHG
MONOCYTES # BLD AUTO: 0.6 THOUSAND/ÂΜL (ref 0.17–1.22)
MONOCYTES NFR BLD AUTO: 11 % (ref 4–12)
MV PEAK A VEL: 0.76 M/S
MV PEAK E VEL: 102 CM/S
MV STENOSIS PRESSURE HALF TIME: 45 MS
MV VALVE AREA P 1/2 METHOD: 4.89
NEUTROPHILS # BLD AUTO: 4.47 THOUSANDS/ÂΜL (ref 1.85–7.62)
NEUTS SEG NFR BLD AUTO: 80 % (ref 43–75)
NRBC BLD AUTO-RTO: 0 /100 WBCS
P AXIS: 69 DEGREES
PLATELET # BLD AUTO: 324 THOUSANDS/UL (ref 149–390)
PMV BLD AUTO: 9.7 FL (ref 8.9–12.7)
POTASSIUM SERPL-SCNC: 5 MMOL/L (ref 3.5–5.3)
PR INTERVAL: 338 MS
QRS AXIS: -21 DEGREES
QRSD INTERVAL: 74 MS
QT INTERVAL: 390 MS
QTC INTERVAL: 429 MS
RBC # BLD AUTO: 3.34 MILLION/UL (ref 3.88–5.62)
RIGHT VENTRICLE ID DIMENSION: 3.3 CM
SL CV LV EF: 55
SL CV PED ECHO LEFT VENTRICLE DIASTOLIC VOLUME (MOD BIPLANE) 2D: 119 ML
SL CV PED ECHO LEFT VENTRICLE SYSTOLIC VOLUME (MOD BIPLANE) 2D: 52 ML
SODIUM SERPL-SCNC: 140 MMOL/L (ref 135–147)
T WAVE AXIS: 80 DEGREES
TRICUSPID ANNULAR PLANE SYSTOLIC EXCURSION: 3 CM
VENTRICULAR RATE: 73 BPM
WBC # BLD AUTO: 5.62 THOUSAND/UL (ref 4.31–10.16)

## 2024-03-29 PROCEDURE — 85025 COMPLETE CBC W/AUTO DIFF WBC: CPT | Performed by: FAMILY MEDICINE

## 2024-03-29 PROCEDURE — C8929 TTE W OR WO FOL WCON,DOPPLER: HCPCS

## 2024-03-29 PROCEDURE — 93306 TTE W/DOPPLER COMPLETE: CPT | Performed by: INTERNAL MEDICINE

## 2024-03-29 PROCEDURE — 99233 SBSQ HOSP IP/OBS HIGH 50: CPT | Performed by: STUDENT IN AN ORGANIZED HEALTH CARE EDUCATION/TRAINING PROGRAM

## 2024-03-29 PROCEDURE — 80048 BASIC METABOLIC PNL TOTAL CA: CPT | Performed by: FAMILY MEDICINE

## 2024-03-29 PROCEDURE — 97167 OT EVAL HIGH COMPLEX 60 MIN: CPT

## 2024-03-29 PROCEDURE — 83735 ASSAY OF MAGNESIUM: CPT | Performed by: FAMILY MEDICINE

## 2024-03-29 PROCEDURE — 99222 1ST HOSP IP/OBS MODERATE 55: CPT | Performed by: INTERNAL MEDICINE

## 2024-03-29 PROCEDURE — 97163 PT EVAL HIGH COMPLEX 45 MIN: CPT

## 2024-03-29 PROCEDURE — 82948 REAGENT STRIP/BLOOD GLUCOSE: CPT

## 2024-03-29 PROCEDURE — 93010 ELECTROCARDIOGRAM REPORT: CPT | Performed by: INTERNAL MEDICINE

## 2024-03-29 RX ORDER — ACETAMINOPHEN 10 MG/ML
1000 INJECTION, SOLUTION INTRAVENOUS ONCE
Status: COMPLETED | OUTPATIENT
Start: 2024-03-29 | End: 2024-03-29

## 2024-03-29 RX ORDER — LIDOCAINE 50 MG/G
2 PATCH TOPICAL DAILY
Status: DISCONTINUED | OUTPATIENT
Start: 2024-03-29 | End: 2024-04-03 | Stop reason: HOSPADM

## 2024-03-29 RX ADMIN — METOPROLOL TARTRATE 50 MG: 50 TABLET, FILM COATED ORAL at 21:17

## 2024-03-29 RX ADMIN — ACETAMINOPHEN 1000 MG: 10 INJECTION INTRAVENOUS at 14:24

## 2024-03-29 RX ADMIN — LIDOCAINE 5% 2 PATCH: 700 PATCH TOPICAL at 14:55

## 2024-03-29 RX ADMIN — LATANOPROST 1 DROP: 50 SOLUTION OPHTHALMIC at 21:19

## 2024-03-29 RX ADMIN — INSULIN LISPRO 1 UNITS: 100 INJECTION, SOLUTION INTRAVENOUS; SUBCUTANEOUS at 21:18

## 2024-03-29 RX ADMIN — PERFLUTREN 0.6 ML/MIN: 6.52 INJECTION, SUSPENSION INTRAVENOUS at 11:45

## 2024-03-29 RX ADMIN — LEVOTHYROXINE SODIUM 50 MCG: 0.05 TABLET ORAL at 05:09

## 2024-03-29 RX ADMIN — FUROSEMIDE 40 MG: 10 INJECTION, SOLUTION INTRAMUSCULAR; INTRAVENOUS at 12:41

## 2024-03-29 RX ADMIN — HEPARIN SODIUM 5000 UNITS: 5000 INJECTION INTRAVENOUS; SUBCUTANEOUS at 21:18

## 2024-03-29 RX ADMIN — GABAPENTIN 300 MG: 300 CAPSULE ORAL at 21:17

## 2024-03-29 RX ADMIN — HEPARIN SODIUM 5000 UNITS: 5000 INJECTION INTRAVENOUS; SUBCUTANEOUS at 14:17

## 2024-03-29 RX ADMIN — INSULIN GLARGINE 10 UNITS: 100 INJECTION, SOLUTION SUBCUTANEOUS at 08:45

## 2024-03-29 RX ADMIN — PRAVASTATIN SODIUM 80 MG: 80 TABLET ORAL at 18:11

## 2024-03-29 RX ADMIN — PANTOPRAZOLE SODIUM 40 MG: 40 TABLET, DELAYED RELEASE ORAL at 18:14

## 2024-03-29 RX ADMIN — FUROSEMIDE 40 MG: 10 INJECTION, SOLUTION INTRAMUSCULAR; INTRAVENOUS at 22:36

## 2024-03-29 RX ADMIN — METOPROLOL TARTRATE 50 MG: 50 TABLET, FILM COATED ORAL at 08:44

## 2024-03-29 RX ADMIN — ASPIRIN 81 MG: 81 TABLET, COATED ORAL at 08:45

## 2024-03-29 RX ADMIN — GABAPENTIN 300 MG: 300 CAPSULE ORAL at 18:11

## 2024-03-29 RX ADMIN — HEPARIN SODIUM 5000 UNITS: 5000 INJECTION INTRAVENOUS; SUBCUTANEOUS at 05:09

## 2024-03-29 RX ADMIN — EZETIMIBE 10 MG: 10 TABLET ORAL at 08:45

## 2024-03-29 NOTE — H&P
"[facility]  H&P  Name: Trent Ocasio 79 y.o. male I MRN: [id number]  Unit/Bed#: ED 12 I Date of Admission: 3/28/2024   Date of Service: 3/28/2024 I Hospital Day: 0      Assessment/Plan   * Acute CHF (congestive heart failure) (HCC)  Assessment & Plan  Wt Readings from Last 3 Encounters:   03/28/24 118 kg (260 lb 6.4 oz)   06/25/22 109 kg (240 lb)     Patient presents with severe shortness of breath, leg edema.  Found to have elevated BNP, elevated troponin  Elevated D-dimer, CTA done PE ruled out  Status post 40 IV Lasix in the ER  Continue with IV 40 Lasix twice daily  Strict I's and O's  Echocardiogram to determine the type of CHF  Cardiology consultation  2 L fluid restriction, 2 g salt restriction  Nutrition consult          Acute respiratory failure with hypoxia (HCC)  Assessment & Plan  Patient found to be on 83% on room air, with started on 4 L in the ER and saturations increased in the 90s  VBG reviewed, does not show acidosis.  Bicarb is only mildly low which is not significant at this time  Continue to wean oxygen as possible  Underlying etiology is congestive heart failure exacerbation    Elevated troponin  Assessment & Plan  89-95-98, continue to trend to peak.  Trend appears flat.  No active chest pain  EKG shows Sinus rhythm, first-degree AV block with FL interval of 338, nonspecific T wave changes in V1  Cardiology has been consulted for further input    Type 2 diabetes mellitus, with long-term current use of insulin ([facility])  Assessment & Plan  Lab Results   Component Value Date    HGBA1C 7.3 (H) 02/16/2024       Recent Labs     03/28/24  1838   POCGLU 179*       Blood Sugar Average: Last 72 hrs:  (P) 179    Hold p.o. home meds  Sliding scale insulin  Carb controlled diet    Hypothyroid  Assessment & Plan  Check TSH and free T4  Continue Synthroid    HTN (hypertension)  Assessment & Plan  /67   Pulse 74   Temp (!) 97.4 °F (36.3 °C) (Tympanic)   Resp 17   Ht 5' 10\" " (1.778 m)   Wt 118 kg (260 lb 6.4 oz)   SpO2 91%   BMI 37.36 kg/m²   Stable pressures  Takes 100 mg metoprolol twice daily at home.  Reduced to 50 mg twice daily at this time  Continue IV Lasix 40 twice daily  Holding off on other blood pressure medications from his home medication list    Anxiety  Assessment & Plan  Currently stable  Atarax as needed    History of prostate cancer  Assessment & Plan  .  History of radiation treatment 5 years ago  Recommend outpatient follow-up         VTE Pharmacologic Prophylaxis:    Heparin  Code Status: Level 1 - Full Code   Discussion with family:  Discussed with patient's wife Awilda sitting at bedside.  Detailed assessment and plan and further planning has been discussed with patient's wife.  She verbalized understanding.     Anticipated Length of Stay: Patient will be admitted on an inpatient basis with an anticipated length of stay of greater than 2 midnights secondary to likely CHF exacerbation which will require further cardiology workup and IV medications.  Close cardiac monitoring.  Troponin monitoring as well.    Total Time Spent on Date of Encounter in care of patient: 75 mins. This time was spent on one or more of the following: performing physical exam; counseling and coordination of care; obtaining or reviewing history; documenting in the medical record; reviewing/ordering tests, medications or procedures; communicating with other healthcare professionals and discussing with patient's family/caregivers.    Chief Complaint: Severe shortness of breath, leg edema and fatigue    History of Present Illness:  Trent Ocasio is a 79 y.o. male with a PMH of prostate cancer, diabetes mellitus type 2, anxiety, hypertension, hypothyroidism who presents with worsening shortness of breath during the last 2 to 3 days, worsening lower extremity edema, denies orthopnea.  Found to be hypoxic in the ER 83% on room air.  Does complain of dyspnea on exertion and also at rest but  it resolves after some time of resting.  Denies any chest pain, nausea vomiting, diarrhea, abdominal pain.  Does have occasional lightheadedness.  No syncopal episodes.    Review of Systems:  Review of Systems   Constitutional:  Negative for chills and fever.   HENT:  Negative for ear pain and sore throat.    Eyes:  Negative for pain and visual disturbance.   Respiratory:  Positive for shortness of breath. Negative for cough.    Cardiovascular:  Positive for leg swelling. Negative for chest pain and palpitations.   Gastrointestinal:  Negative for abdominal pain and vomiting.   Genitourinary:  Negative for dysuria and hematuria.   Musculoskeletal:  Negative for arthralgias and back pain.   Skin:  Negative for color change and rash.   Neurological:  Positive for weakness and light-headedness. Negative for seizures and syncope.   Psychiatric/Behavioral:  Negative for agitation and behavioral problems. The patient is nervous/anxious.    All other systems reviewed and are negative.      Past Medical and Surgical History:   Past Medical History:   Diagnosis Date    Diabetes mellitus (HCC)     Hypertension        History reviewed. No pertinent surgical history.    Meds/Allergies:  Prior to Admission medications    Medication Sig Start Date End Date Taking? Authorizing Provider   cyclobenzaprine (FLEXERIL) 10 mg tablet Take 1 tablet (10 mg total) by mouth 2 (two) times a day as needed for muscle spasms 6/25/22   Dee Mendoza MD   Diclofenac Sodium (VOLTAREN) 1 % Apply 2 g topically 4 (four) times a day 6/25/22   Dee Mendoza MD     I have reviewed home medications with patient personally.    Allergies:   Allergies   Allergen Reactions    Canagliflozin GI Intolerance     Yeast infection  Yeast infection      Lisinopril Other (See Comments)    Lovastatin GI Intolerance    Pravastatin GI Intolerance       Social History:  Marital Status: /Civil Union     Substance Use History:   Social History     Substance and  "Sexual Activity   Alcohol Use None     Social History     Tobacco Use   Smoking Status Former   Smokeless Tobacco Never     Social History     Substance and Sexual Activity   Drug Use Not on file       Family History:  History reviewed. No pertinent family history.    Physical Exam:     Vitals:   Blood Pressure: 140/67 (03/28/24 1900)  Pulse: 74 (03/28/24 1900)  Temperature: (!) 97.4 °F (36.3 °C) (03/28/24 1452)  Temp Source: Tympanic (03/28/24 1452)  Respirations: 17 (03/28/24 1900)  Height: 5' 10\" (177.8 cm) (03/28/24 1845)  Weight - Scale: 118 kg (260 lb 6.4 oz) (03/28/24 1845)  SpO2: 91 % (03/28/24 1900)    Physical Exam General- Awake, alert and oriented x 3, looks comfortable  HEENT- Normocephalic, atraumatic, oral mucosa- moist  Neck- Supple, No carotid bruit, no JVD  CVS- Normal S1/ S2, Regular rate and rhythm, No murmur, +2 pitting pedal edema edema  Respiratory system-diminished bilateral ankles, no crackles audible  Abdomen- Soft, Non distended, no tenderness, Bowel sound- present 4 quads  Genitourinary- No suprapubic tenderness, No CVA tenderness  Skin- No new bruise or rash  Musculoskeletal- No gross deformity  Psych- No acute psychosis  CNS- CN II- XII grossly intact, No acute focal neurologic deficit noted      Additional Data:     Lab Results:  Results from last 7 days   Lab Units 03/28/24  1518   WBC Thousand/uL 6.98   HEMOGLOBIN g/dL 11.1*   HEMATOCRIT % 34.5*   PLATELETS Thousands/uL 344   NEUTROS PCT % 85*   LYMPHS PCT % 6*   MONOS PCT % 8   EOS PCT % 1     Results from last 7 days   Lab Units 03/28/24  1518   SODIUM mmol/L 139   POTASSIUM mmol/L 4.8   CHLORIDE mmol/L 106   CO2 mmol/L 24   BUN mg/dL 39*   CREATININE mg/dL 1.47*   ANION GAP mmol/L 9   CALCIUM mg/dL 9.4   ALBUMIN g/dL 4.2   TOTAL BILIRUBIN mg/dL 0.60   ALK PHOS U/L 54   ALT U/L 16   AST U/L 15   GLUCOSE RANDOM mg/dL 175*     Results from last 7 days   Lab Units 03/28/24  1518   INR  0.99     Results from last 7 days   Lab Units " 03/28/24  1838   POC GLUCOSE mg/dl 179*         Results from last 7 days   Lab Units 03/28/24  1554 03/28/24  1518   LACTIC ACID mmol/L 1.4  --    PROCALCITONIN ng/ml  --  0.14       Lines/Drains:  Invasive Devices       Peripheral Intravenous Line  Duration             Peripheral IV 03/28/24 Left Antecubital <1 day                        Imaging: Reviewed radiology reports from this admission including: CT AP  CTA ED chest PE study   Final Result by Nixon Liu MD (03/28 1635)      1.  No filling defect to suggest acute or chronic pulmonary embolism in the entire pulmonary arterial system.   2.  Small bilateral pleural effusions, right greater than left, with bibasilar atelectasis.      The study was marked in EPIC for immediate notification.      Workstation performed: [id number]         VAS lower limb venous duplex study, unilateral/limited   Final Result by Kalee You MD (03/28 1814)      XR chest 1 view portable   ED Interpretation by Maritza Cummings DO (03/28 1531)   Small right pleural effusion.  Cardiomegaly.  Mild pulmonary vascular congestion.          EKG and Other Studies Reviewed on Admission:   EKG:  Sinus rhythm, first-degree AV block, nonspecific T wave changes noted.    ** Please Note: This note has been constructed using a voice recognition system. **

## 2024-03-29 NOTE — NURSING NOTE
Pt resting between care. Currently on 6 L n.c. Continues with Dyspnea with exertion. Desaturates to the 80s with slight movement such as turning to the side. Unable to complete a standing weight on pt this AM due to GAONA.   Pt rhythm primarily NSR with 1 degree AV block. H/e pt was noted to have intermittent episodes of second degree type 1 and second degree type 2 heart block overnight while sleeping. Second degree type 2 would develop when HR would briefly dip into the 30s and 40s. Pt also noted to have intermittent episodes of a fib overnight. No prior hx of irregular HR per pt and wife. Metoprolol decreased to 50 mg this Admission but pt did take 100 mg metoprolol PO, 24 hour tablet, in the AM. On call provider, Arelis Gleason Made aware. Per provider, since pt is asymptomatic and HR does recover, continue to monitor on tele. Awaiting cardiology consult.

## 2024-03-29 NOTE — PLAN OF CARE
Problem: PAIN - ADULT  Goal: Verbalizes/displays adequate comfort level or baseline comfort level  Description: Interventions:  - Encourage patient to monitor pain and request assistance  - Assess pain using appropriate pain scale  - Administer analgesics based on type and severity of pain and evaluate response  - Implement non-pharmacological measures as appropriate and evaluate response  - Consider cultural and social influences on pain and pain management  - Notify physician/advanced practitioner if interventions unsuccessful or patient reports new pain  Outcome: Progressing     Problem: SAFETY ADULT  Goal: Patient will remain free of falls  Description: INTERVENTIONS:  - Educate patient/family on patient safety including physical limitations  - Instruct patient to call for assistance with activity   - Consult OT/PT to assist with strengthening/mobility   - Keep Call bell within reach  - Keep bed low and locked with side rails adjusted as appropriate  - Keep care items and personal belongings within reach  - Initiate and maintain comfort rounds  - Make Fall Risk Sign visible to staff  - Offer Toileting every 2 Hours, in advance of need  - Initiate/Maintain 2alarm  - Obtain necessary fall risk management equipment: 2  - Apply yellow socks and bracelet for high fall risk patients  - Consider moving patient to room near nurses station  Outcome: Progressing  Goal: Maintain or return to baseline ADL function  Description: INTERVENTIONS:  -  Assess patient's ability to carry out ADLs; assess patient's baseline for ADL function and identify physical deficits which impact ability to perform ADLs (bathing, care of mouth/teeth, toileting, grooming, dressing, etc.)  - Assess/evaluate cause of self-care deficits   - Assess range of motion  - Assess patient's mobility; develop plan if impaired  - Assess patient's need for assistive devices and provide as appropriate  - Encourage maximum independence but intervene and  supervise when necessary  - Involve family in performance of ADLs  - Assess for home care needs following discharge   - Consider OT consult to assist with ADL evaluation and planning for discharge  - Provide patient education as appropriate  Outcome: Progressing  Goal: Maintains/Returns to pre admission functional level  Description: INTERVENTIONS:  - Perform AM-PAC 6 Click Basic Mobility/ Daily Activity assessment daily.  - Set and communicate daily mobility goal to care team and patient/family/caregiver.   - Collaborate with rehabilitation services on mobility goals if consulted  - Perform Range of Motion 2 times a day.  - Reposition patient every 2 hours.  - Dangle patient 2 times a day  - Stand patient 2 times a day  - Ambulate patient 2 times a day  - Out of bed to chair 2 times a day   - Out of bed for meals 2 times a day  - Out of bed for toileting  - Record patient progress and toleration of activity level   Outcome: Progressing     Problem: DISCHARGE PLANNING  Goal: Discharge to home or other facility with appropriate resources  Description: INTERVENTIONS:  - Identify barriers to discharge w/patient and caregiver  - Arrange for needed discharge resources and transportation as appropriate  - Identify discharge learning needs (meds, wound care, etc.)  - Arrange for interpretive services to assist at discharge as needed  - Refer to Case Management Department for coordinating discharge planning if the patient needs post-hospital services based on physician/advanced practitioner order or complex needs related to functional status, cognitive ability, or social support system  Outcome: Progressing     Problem: Knowledge Deficit  Goal: Patient/family/caregiver demonstrates understanding of disease process, treatment plan, medications, and discharge instructions  Description: Complete learning assessment and assess knowledge base.  Interventions:  - Provide teaching at level of understanding  - Provide teaching via  preferred learning methods  Outcome: Progressing     Problem: CARDIOVASCULAR - ADULT  Goal: Maintains optimal cardiac output and hemodynamic stability  Description: INTERVENTIONS:  - Monitor I/O, vital signs and rhythm  - Monitor for S/S and trends of decreased cardiac output  - Administer and titrate ordered vasoactive medications to optimize hemodynamic stability  - Assess quality of pulses, skin color and temperature  - Assess for signs of decreased coronary artery perfusion  - Instruct patient to report change in severity of symptoms  Outcome: Progressing  Goal: Absence of cardiac dysrhythmias or at baseline rhythm  Description: INTERVENTIONS:  - Continuous cardiac monitoring, vital signs, obtain 12 lead EKG if ordered  - Administer antiarrhythmic and heart rate control medications as ordered  - Monitor electrolytes and administer replacement therapy as ordered  Outcome: Progressing     Problem: RESPIRATORY - ADULT  Goal: Achieves optimal ventilation and oxygenation  Description: INTERVENTIONS:  - Assess for changes in respiratory status  - Assess for changes in mentation and behavior  - Position to facilitate oxygenation and minimize respiratory effort  - Oxygen administered by appropriate delivery if ordered  - Initiate smoking cessation education as indicated  - Encourage broncho-pulmonary hygiene including cough, deep breathe, Incentive Spirometry  - Assess the need for suctioning and aspirate as needed  - Assess and instruct to report SOB or any respiratory difficulty  - Respiratory Therapy support as indicated  Outcome: Progressing     Problem: Prexisting or High Potential for Compromised Skin Integrity  Goal: Skin integrity is maintained or improved  Description: INTERVENTIONS:  - Identify patients at risk for skin breakdown  - Assess and monitor skin integrity  - Assess and monitor nutrition and hydration status  - Monitor labs   - Assess for incontinence   - Turn and reposition patient  - Assist with  mobility/ambulation  - Relieve pressure over bony prominences  - Avoid friction and shearing  - Provide appropriate hygiene as needed including keeping skin clean and dry  - Evaluate need for skin moisturizer/barrier cream  - Collaborate with interdisciplinary team   - Patient/family teaching  - Consider wound care consult   Outcome: Progressing     Problem: METABOLIC, FLUID AND ELECTROLYTES - ADULT  Goal: Electrolytes maintained within normal limits  Description: INTERVENTIONS:  - Monitor labs and assess patient for signs and symptoms of electrolyte imbalances  - Administer electrolyte replacement as ordered  - Monitor response to electrolyte replacements, including repeat lab results as appropriate  - Instruct patient on fluid and nutrition as appropriate  Outcome: Progressing  Goal: Fluid balance maintained  Description: INTERVENTIONS:  - Monitor labs   - Monitor I/O and WT  - Instruct patient on fluid and nutrition as appropriate  - Assess for signs & symptoms of volume excess or deficit  Outcome: Progressing  Goal: Glucose maintained within target range  Description: INTERVENTIONS:  - Monitor Blood Glucose as ordered  - Assess for signs and symptoms of hyperglycemia and hypoglycemia  - Administer ordered medications to maintain glucose within target range  - Assess nutritional intake and initiate nutrition service referral as needed  Outcome: Progressing

## 2024-03-29 NOTE — CASE MANAGEMENT
Case Management Assessment & Discharge Planning Note    Patient name Trent Ocasio  Location /-01 MRN [id number]  : 1945 Date 3/29/2024       Current Admission Date: 3/28/2024  Current Admission Diagnosis:Acute CHF (congestive heart failure) (HCC)   Patient Active Problem List    Diagnosis Date Noted    Acute CHF (congestive heart failure) (HCC) 2024    Acute respiratory failure with hypoxia (HCC) 2024    History of prostate cancer 2024    Anxiety 2024    HTN (hypertension) 2024    Hypothyroid 2024    Type 2 diabetes mellitus, with long-term current use of insulin (HCC) 2024    Elevated troponin 2024      LOS (days): 1  Geometric Mean LOS (GMLOS) (days): 3.9  Days to GMLOS:3.1     OBJECTIVE:    Risk of Unplanned Readmission Score: 14.79         Current admission status: Inpatient       Preferred Pharmacy:   [id number] - [place] - [address]  [address]  [place] [id number]  Phone: [phone] Fax: [phone]    Primary Care Provider: No primary care provider on file.    Primary Insurance: [facility]  Secondary Insurance:     ASSESSMENT:  Active Health Care Proxies    There are no active Health Care Proxies on file.                 Readmission Root Cause  30 Day Readmission: No    Patient Information  Admitted from:: Home  Mental Status: Alert  During Assessment patient was accompanied by: Spouse  Assessment information provided by:: Patient, Spouse  Primary Caregiver: Self  Support Systems: Spouse/significant other  County of Residence: [place]  What [facility] do you live in?: [place]  Home entry access options. Select all that apply.: Stairs  Number of steps to enter home.: 2  Do the steps have railings?: No  Type of Current Residence: [facility]  Living Arrangements: Lives w/ Spouse/significant other  Is patient a ?: Yes  Is patient active with [facility] ( [facility])?: No    Activities of  Daily Living Prior to Admission  Functional Status: Independent  Completes ADLs independently?: Yes  Ambulates independently?: Yes  Does patient use assisted devices?: No  Does patient currently own DME?: No  Does patient have a history of Outpatient Therapy (PT/OT)?: Yes  Does the patient have a history of Short-Term Rehab?: No  Does patient have a history of HHC?: No  Does patient currently have HHC?: No         Patient Information Continued  Income Source: SSI/SSD  Does patient have prescription coverage?: Yes  Does patient receive dialysis treatments?: No  Does patient have a history of substance abuse?: No  Does patient have a history of Mental Health Diagnosis?: No    PHQ 2/9 Screening   Reviewed PHQ 2/9 Depression Screening Score?: No    Means of Transportation  Means of Transport to Appts:: Drives Self      Social Determinants of Health (SDOH)      Flowsheet Row Most Recent Value   Housing Stability    In the last 12 months, was there a time when you were not able to pay the mortgage or rent on time? N   In the last 12 months, how many places have you lived? 1   In the last 12 months, was there a time when you did not have a steady place to sleep or slept in a shelter (including now)? N   Transportation Needs    In the past 12 months, has lack of transportation kept you from medical appointments or from getting medications? no   In the past 12 months, has lack of transportation kept you from meetings, work, or from getting things needed for daily living? No   Food Insecurity    Within the past 12 months, you worried that your food would run out before you got the money to buy more. Never true   Within the past 12 months, the food you bought just didn't last and you didn't have money to get more. Never true   Utilities    In the past 12 months has the electric, gas, oil, or water company threatened to shut off services in your home? No            DISCHARGE DETAILS:    Discharge planning discussed with::  Patient & spouse at the bedside  Freedom of Choice: Yes  Comments - Freedom of Choice: FOC maintained in discussion regarding discharge planning. Patient is alert oriented and competent to make decisions, wife at bedside with him. Introduced self and role, explained role of CM in arranging services such as DME, STR, HHC, and providing community resource information. Discussed current living situation and needs at discharge. Patient is IPTA. CM offered HHC, STR, DME, PCP, OP CM, community resources. Patient declined. Does not use DME. Pt is aware and encouraged to seek CM for any questions or concerns. CM continues to follow.  CM contacted family/caregiver?: Yes  Were Treatment Team discharge recommendations reviewed with patient/caregiver?: Yes  Did patient/caregiver verbalize understanding of patient care needs?: Yes  Were patient/caregiver advised of the risks associated with not following Treatment Team discharge recommendations?: Yes    Contacts  Patient Contacts: KAJAL CLEANING (Spouse)  [phone] (Mobile)  Relationship to Patient:: Family  Contact Method: In Person  Reason/Outcome: Continuity of Care, Emergency Contact, Discharge Planning    Requested Home Health Care         Is the patient interested in HHC at discharge?: No    DME Referral Provided  Referral made for DME?: No    Other Referral/Resources/Interventions Provided:  Interventions: Declines resources  Referral Comments: CM will continue to follow for needs.    Would you like to participate in our [facility] service program?  : No - Declined       Discharge Destination Plan:: Home  Transport at Discharge : Family, Self

## 2024-03-29 NOTE — PLAN OF CARE
Problem: OCCUPATIONAL THERAPY ADULT  Goal: Performs self-care activities at highest level of function for planned discharge setting.  See evaluation for individualized goals.  Description: Treatment Interventions: ADL retraining, Functional transfer training, UE strengthening/ROM, Endurance training, Patient/family training, Compensatory technique education, Activityengagement, Energy conservation          See flowsheet documentation for full assessment, interventions and recommendations.   Note: Limitation: Decreased ADL status, Decreased UE strength, Decreased endurance, Decreased self-care trans, Decreased high-level ADLs, Decreased Safe judgement during ADL  Prognosis: Good  Assessment: Patient is a 79 y.o. male seen for OT evaluation s/p admit to [facility]  on 3/28/2024 w/Acute CHF (congestive heart failure) (HCC). Commorbidities affecting patient's functional performance at time of assessment include: acute respiratory failure c hypoxia, anxiety, HTN, hypothyroid, DM2, and hx of prostate cancer. Orders placed for OT evaluation and treatment and up as tolerated. Performed at least two patient identifiers during session including name and wristband.  Prior to admission, Patient reporting being independent with ADLs, ambulatory with SPC, and lives with wife. Personal factors affecting patient at time of initial evaluation include: steps to enter, difficulty performing ADLs, and difficulty performing IADLs. Upon evaluation, patient requires supervision and minimal  assist for UB ADLs, moderate assist for LB ADLs, transfers and functional ambulation in room and bathroom with minimal  assist, with the use of Rolling Walker.  Patient is oriented x 4.  Occupational performance is affected by the following deficits: decreased functional reach, decreased muscle strength, dynamic sit/ stand balance deficit with poor standing tolerance time for self care and functional mobility, decreased activity  tolerance, decreased safety awareness, and delayed righting and equilibrium reactions. Patient to benefit from continued Occupational Therapy treatment while in the hospital to address deficits as defined above and maximize level of functional independence with ADLs and functional mobility. Occupational Performance areas to address include: grooming , bathing/ shower, dressing, toilet hygiene, transfer to all surfaces, functional ambulation, functional mobility, medication routine/ management, IADLS: Household maintenance, IADLs: safety procedures, and IADLs: meal prep/ clean up. From OT standpoint, recommendation at time of d/c would be Level II (moderate resource intensity).     Rehab Resource Intensity Level, OT: II (Moderate Resource Intensity)     Coretta LAO, OTR/L

## 2024-03-29 NOTE — ED NOTES
Pts wife alerted this RN about patients O2 saturation. Upon arrival to patients room pts O2 saturation at 82%. Nasal canula not placed in pts nose appropriately and pt was in low in bed. Cannula fixed and pt boosted and repositioned in bed. O2 increased to 5L. Pt directed to take deep breaths. Respiratory distress not apparent. Provider and floor charge made aware. Pt O2 increased to 92%     Poornima Guzman RN  03/28/24 2035

## 2024-03-29 NOTE — OCCUPATIONAL THERAPY NOTE
Occupational Therapy Evaluation      Trent Ocasio    3/29/2024    Patient Active Problem List   Diagnosis    Acute CHF (congestive heart failure) (HCC)    Acute respiratory failure with hypoxia (HCC)    History of prostate cancer    Anxiety    HTN (hypertension)    Hypothyroid    Type 2 diabetes mellitus, with long-term current use of insulin (HCC)    Elevated troponin       Past Medical History:   Diagnosis Date    Diabetes mellitus (HCC)     Hypertension         03/29/24 0759   OT Last Visit   OT Visit Date 03/29/24   Note Type   Note type Evaluation   Additional Comments Pt agreeable to OT eval. Upon arrival pt supine in bed with HOB elevated   Pain Assessment   Pain Assessment Tool 0-10   Pain Score No Pain   Restrictions/Precautions   Weight Bearing Precautions Per Order No   Other Precautions O2;Fall Risk;Chair Alarm;Bed Alarm;Telemetry  (5 L O2 via NC- not used at baseline)   Home Living   Type of Home House   Home Layout Two level;Bed/bath upstairs;Stairs to enter without rails  (2 SHIVAM via garage; 13 steps to 2nd floor)   Bathroom Shower/Tub Walk-in shower   Bathroom Toilet Standard   Bathroom Equipment   (no DME reported)   Bathroom Accessibility Accessible   Home Equipment Cane  (utilizes SPC in house)   Prior Function   Level of [place] Independent with ADLs;Independent with functional mobility;Needs assistance with IADLS   Lives With Spouse   Receives Help From Family   IADLs Family/Friend/Other provides meals;Independent with driving;Independent with medication management   Falls in the last 6 months 0   Vocational Retired   Lifestyle   Autonomy Patient reporting being independent with ADLs, ambulatory with SPC, and lives with wife   Reciprocal Relationships Supportive wife   Service to Others Retired   General   Family/Caregiver Present Yes  (Wife)   ADL   Eating Assistance 6  Modified independent   Grooming Assistance 5  Supervision/Setup   UB Bathing Assistance 5  Supervision/Setup   LB  "Bathing Assistance 3  Moderate Assistance   UB Dressing Assistance 4  Minimal Assistance   LB Dressing Assistance 3  Moderate Assistance   Toileting Assistance  3  Moderate Assistance   Additional Comments ADL levels based on functional performance during OT eval.   Bed Mobility   Supine to Sit 5  Supervision   Additional items Assist x 1;HOB elevated;Bedrails;Increased time required;Verbal cues   Sit to Supine   (DNT: pt seated OOB in recliner at end of session)   Additional Comments Pt reported mild \"whooziness\" with transitional movements. Provided increase rest before progression   Transfers   Sit to Stand 4  Minimal assistance   Additional items Assist x 1;Bedrails;Increased time required;Verbal cues   Stand to Sit 4  Minimal assistance   Additional items Assist x 1;Armrests;Increased time required;Verbal cues   Functional Mobility   Functional Mobility 4  Minimal assistance   Additional Comments Pt ambulated to recliner. Pt grossly unsteady and SpO2 decreased to 86% on 5 L O2. Encouraged PLB.   Additional items Rolling walker   Balance   Static Sitting Fair +   Dynamic Sitting Fair   Static Standing Fair -   Dynamic Standing Poor +   Activity Tolerance   Activity Tolerance Patient limited by fatigue   Medical Staff Made Aware Pt seen as a co-eval with PT due to the patient's co-morbidities and clinically unstable presentation indicated by chart review.   RUE Assessment   RUE Assessment WFL  (grossly 4-/5 MMT)   LUE Assessment   LUE Assessment WFL  (grossly 4-/5 MMT)   Hand Function   Gross Motor Coordination Functional   Fine Motor Coordination Functional   Cognition   Overall Cognitive Status WFL   Arousal/Participation Alert;Responsive;Cooperative   Attention Within functional limits   Orientation Level Oriented X4   Memory Within functional limits   Following Commands Follows all commands and directions without difficulty   Comments Pt noted to have impaired insight to deficits and decreased judgment/safety " awareness   Assessment   Limitation Decreased ADL status;Decreased UE strength;Decreased endurance;Decreased self-care trans;Decreased high-level ADLs;Decreased Safe judgement during ADL   Prognosis Good   Assessment Patient is a 79 y.o. male seen for OT evaluation s/p admit to [facility]  on 3/28/2024 w/Acute CHF (congestive heart failure) (HCC). Commorbidities affecting patient's functional performance at time of assessment include: acute respiratory failure c hypoxia, anxiety, HTN, hypothyroid, DM2, and hx of prostate cancer. Orders placed for OT evaluation and treatment and up as tolerated. Performed at least two patient identifiers during session including name and wristband.  Prior to admission, Patient reporting being independent with ADLs, ambulatory with SPC, and lives with wife. Personal factors affecting patient at time of initial evaluation include: steps to enter, difficulty performing ADLs, and difficulty performing IADLs. Upon evaluation, patient requires supervision and minimal  assist for UB ADLs, moderate assist for LB ADLs, transfers and functional ambulation in room and bathroom with minimal  assist, with the use of Rolling Walker.  Patient is oriented x 4.  Occupational performance is affected by the following deficits: decreased functional reach, decreased muscle strength, dynamic sit/ stand balance deficit with poor standing tolerance time for self care and functional mobility, decreased activity tolerance, decreased safety awareness, and delayed righting and equilibrium reactions. Patient to benefit from continued Occupational Therapy treatment while in the hospital to address deficits as defined above and maximize level of functional independence with ADLs and functional mobility. Occupational Performance areas to address include: grooming , bathing/ shower, dressing, toilet hygiene, transfer to all surfaces, functional ambulation, functional mobility, medication routine/  management, IADLS: Household maintenance, IADLs: safety procedures, and IADLs: meal prep/ clean up. From OT standpoint, recommendation at time of d/c would be Level II (moderate resource intensity).   Goals   Patient Goals to go home   Plan   Treatment Interventions ADL retraining;Functional transfer training;UE strengthening/ROM;Endurance training;Patient/family training;Compensatory technique education;Activityengagement;Energy conservation   Goal Expiration Date 04/13/24   OT Treatment Day 0   OT Frequency 3-5x/wk   Discharge Recommendation   Rehab Resource Intensity Level, OT II (Moderate Resource Intensity)   AM-PAC Daily Activity Inpatient   Lower Body Dressing 2   Bathing 2   Toileting 2   Upper Body Dressing 3   Grooming 3   Eating 4   Daily Activity Raw Score 16   Daily Activity Standardized Score (Calc for Raw Score >=11) 35.96   AM-PAC Applied Cognition Inpatient   Following a Speech/Presentation 4   Understanding Ordinary Conversation 4   Taking Medications 3   Remembering Where Things Are Placed or Put Away 3   Remembering List of 4-5 Errands 3   Taking Care of Complicated Tasks 3   Applied Cognition Raw Score 20   Applied Cognition Standardized Score 41.76   Modified Flynn Scale   Modified [place] Scale 4   End of Consult   Patient Position at End of Consult Bedside chair;Bed/Chair alarm activated;All needs within reach   Nurse Communication Nurse aware of consult     GOALS:    *ADL transfers with (I) for inc'd independence with ADLs/purposeful tasks    *UB ADL with (I) for inc'd independence with self cares    *LB ADL with (I) using AE prn for inc'd independence with self cares    *Toileting with (I) for clothing management and hygiene for return to PLOF with personal care    *Increase stand tolerance x 5  m for inc'd tolerance with standing purposeful tasks    *Participate in 10m UE therex to increase overall stamina/activity tolerance for purposeful tasks    *Bed mobility- (I) for inc'd independence  to manage own comfort and initiate EOB & OOB purposeful tasks    *Patient will verbalize 3 safety awareness/ principles to prevent falls in the home setting.     *Patient will verbalize and demonstrate use of energy conservation/deep breathing techniques and work simplification skills during functional activities with no verbal cues.     *Patient will increase OOB/sitting tolerance to 2-4 hours per day to increase participation in self-care and leisure tasks with no s/s of exertion.      *Pt will demonstrate use of long handled AE during 100% of tx sessions for increased ADL safety and independence following D/C     Coretta Mas, TASHIA, OTR/L

## 2024-03-29 NOTE — CONSULTS
Consultation - Cardiology   Trent Ocasio 79 y.o. male MRN: [id number]  Unit/Bed#: -01 Encounter: [id number]  03/29/24  9:06 AM    Assessment/ Plan:    Acute HFpEF  Echo pending.  Last echo 3/2022 with EF 60 to 65%, grade 1 DD.  BNP: 785  CXR: Small to moderate bilateral layered pleural effusions.   Intake/Output: + None recorded /-1130: -1130  Weight: 262 pounds ( Bed Scale). Dry weight: Approximately 253 pounds per chart review  Appears hypervolemic on exam  Medication Regiment Includes:   Diuretic: Continue Lasix 40 mg IV twice daily (not on p.o. diuretic at home)  Recommend low-sodium diet, daily weights, strict I's and O's.  Recommend continuing to monitor and replete electrolytes as needed.  Nutrition consult placed  Patient will likely need further ischemic evaluation with Pharmacologic MPI    2.  Acute hypoxic respiratory failure    3.  Elevated troponin  Troponins 89-95-98  EKG without acute ischemic changes, patient denies chest pain.  Likely nonischemic myocardial injury in the setting of acute HFpEF    4.  Documented history of CAD  Per cardiology notes in 2003.  Unable to view details recent documentation of the same  Continue aspirin, Zetia, statin, Lopressor    5. Hypertension  Continue Lopressor.  Home amlodipine and losartan currently on hold, BP stable.  Continue to monitor BPs closely.    6.  Hyperlipidemia  Continue Zetia, statin    7.  Type II DM    8.  CKD    9.  History of CVA    10.  Anemia    History of Present Illness   Physician Requesting Consult: Rose Marie Sánchez DO    Reason for Consult / Principal Problem: Acute dyspnea    HPI: Trent Ocasio is a 79 y.o. year old male with past medical history of remote documentation of CAD, hypertension, hyperlipidemia, type II DM, CKD, obesity, former smoker, hypothyroidism, history of CVA, history of prostate cancer, history of rheumatic fever as a child who presents with shortness of breath.  States he has had baseline stable dyspnea  on exertion that occurs when climbing on steps or walking uphill, but over the past 3 to 4 days his shortness of breath has worsened.  He additionally endorses anxiety, panic attacks, weakness, fatigue over the past 3 to 4 days.  States the worsening of his shortness of breath was the first symptom he noticed.  He additionally endorses lower extremity edema over the past few days.  He denies orthopnea or PND.  He denies chest pain/pressure.  He states he occasionally eats cold cuts, but he denies any recent increase in salt intake.    He was noted to be hypoxic to 83% upon presentation to the ER.  He has been started on IV Lasix 40 mg twice daily.  States he is starting to feel better.  He has been placed on nasal cannula and has been maintaining sats in the 90s.    He does have a documented history of CAD, however patient does not recall this diagnosis.  States he believes he had a stress test over 20 years ago that he was told was okay at that time.  He endorses history of rheumatic fever as a child.    He does endorse a history of smoking and states he quit about 50 years ago.  Per chart review, does not follow with cardiology outpatient.    Inpatient consult to Cardiology  Consult performed by: Paris Redman PA-C  Consult ordered by: Chai Salomon MD          EKG: Sinus with first-degree AV block, low voltage QRS  Tele: Episodic bradycardia overnight    Review of Systems   Constitutional:  Positive for fatigue. Negative for diaphoresis and fever.   HENT:  Negative for ear pain and sore throat.    Eyes:  Negative for pain and redness.   Respiratory:  Positive for shortness of breath. Negative for cough.    Cardiovascular:  Positive for leg swelling. Negative for chest pain and palpitations.   Gastrointestinal:  Negative for abdominal pain, nausea and vomiting.   Genitourinary:  Negative for dysuria.   Skin:  Negative for color change and rash.   Neurological:  Positive for weakness. Negative for dizziness,  "syncope and light-headedness.   Hematological:  Does not bruise/bleed easily.   Psychiatric/Behavioral:  Negative for agitation. The patient is nervous/anxious.    All other systems reviewed and are negative.      Historical Information   Past Medical History:   Diagnosis Date    Diabetes mellitus (HCC)     Hypertension      History reviewed. No pertinent surgical history.  Social History     Substance and Sexual Activity   Alcohol Use Not Currently     Social History     Substance and Sexual Activity   Drug Use Never     Social History     Tobacco Use   Smoking Status Former   Smokeless Tobacco Never       Family History: History reviewed. No pertinent family history.    Meds/Allergies   all current active meds have been reviewed  Allergies   Allergen Reactions    Canagliflozin GI Intolerance     Yeast infection  Yeast infection      Lisinopril Other (See Comments)    Lovastatin GI Intolerance       Objective   Vitals: Blood pressure 150/77, pulse 82, temperature (!) 97.3 °F (36.3 °C), resp. rate 18, height 5' 10\" (1.778 m), weight 119 kg (262 lb 5.6 oz), SpO2 92%., Body mass index is 37.64 kg/m².,   Orthostatic Blood Pressures      Flowsheet Row Most Recent Value   Blood Pressure 150/77 filed at 2024 0813   Patient Position - Orthostatic VS Sitting filed at 2024 0813            Systolic (24hrs), Av , Min:116 , Max:164     Diastolic (24hrs), Av, Min:59, Max:78        Intake/Output Summary (Last 24 hours) at 3/29/2024 0906  Last data filed at 3/29/2024 0226  Gross per 24 hour   Intake --   Output 1130 ml   Net -1130 ml       Invasive Devices       Peripheral Intravenous Line  Duration             Peripheral IV 24 Left Antecubital <1 day                        Physical Exam:    GEN: Alert and oriented x 3, in no acute distress.  Well appearing and well nourished.   HEENT: Sclera anicteric, conjunctivae pink, mucous membranes moist. Oropharynx clear.   NECK: Supple, no significant JVD. " Trachea midline, no thyromegaly.   HEART: Regular rhythm, normal S1 and S2, no murmurs, clicks, gallops or rubs. PMI nondisplaced, no thrills.   LUNGS: Breathing comfortably on NC. Decreased breath sounds to B/L bases.  No wheezes, rales, rhonchi appreciated.  No increased work of breathing or signs of respiratory distress.   ABDOMEN: Soft, nontender, non-distended.   EXTREMITIES: 2+ pitting edema to B/L LE. Skin warm and well perfused, no clubbing, cyanosis.  NEURO: No focal findings. Normal speech. Mood and affect normal.   SKIN: Normal without suspicious lesions on exposed skin.      Lab Results:     Troponins:   Results from last 7 days   Lab Units 03/28/24  1903 03/28/24  1722 03/28/24  1518   HS TNI 0HR ng/L  --   --  89*   HS TNI 2HR ng/L  --  95*  --    HS TNI 4HR ng/L 98*  --   --    HSTNI D4 ng/L 9  --   --        CBC with diff:   Results from last 7 days   Lab Units 03/29/24  0432 03/28/24  1518   WBC Thousand/uL 5.62 6.98   HEMOGLOBIN g/dL 10.8* 11.1*   HEMATOCRIT % 33.8* 34.5*   MCV fL 101* 99*   PLATELETS Thousands/uL 324 344   RBC Million/uL 3.34* 3.47*   MCH pg 32.3 32.0   MCHC g/dL 32.0 32.2   RDW % 15.7* 15.8*   MPV fL 9.7 9.9   NRBC AUTO /100 WBCs 0 0         CMP:   Results from last 7 days   Lab Units 03/29/24  0432 03/28/24  1518   POTASSIUM mmol/L 5.0 4.8   CHLORIDE mmol/L 107 106   CO2 mmol/L 26 24   BUN mg/dL 39* 39*   CREATININE mg/dL 1.59* 1.47*   CALCIUM mg/dL 9.0 9.4   AST U/L  --  15   ALT U/L  --  16   ALK PHOS U/L  --  54   EGFR ml/min/1.73sq m 40 44

## 2024-03-29 NOTE — UTILIZATION REVIEW
Initial Clinical Review    Admission: Date/Time/Statement:   Admission Orders (From admission, onward)       Ordered        03/28/24 1714  INPATIENT ADMISSION  Once                          Orders Placed This Encounter   Procedures    INPATIENT ADMISSION     Standing Status:   Standing     Number of Occurrences:   1     Order Specific Question:   Level of Care     Answer:   Med Surg [16]     Order Specific Question:   Bed request comments     Answer:   tele     Order Specific Question:   Estimated length of stay     Answer:   More than 2 Midnights     Order Specific Question:   Certification     Answer:   I certify that inpatient services are medically necessary for this patient for a duration of greater than two midnights. See H&P and MD Progress Notes for additional information about the patient's course of treatment.     ED Arrival Information       Expected   -    Arrival   3/28/2024 14:43    Acuity   Emergent              Means of arrival   Walk-In    Escorted by   Spouse    Service   Hospitalist    Admission type   Emergency              Arrival complaint   Panic Attacks             Chief Complaint   Patient presents with    Anxiety     Pt presents with concerns that he has been having frequent panic attacks over the last 3 days, denies history of the same. Pt presents dyspneic and hypoxic at 83%, and tearful regarding the recent death of his son.     Shortness of Breath       Initial Presentation: 79 y.o. male to the ED from home with complaints of anxiety, frequent panic attacks, dyspnea. Admitted to inpatient for acute CHF, acute respiratory failure with hypoxia, elevated troponin. H/O prostate cancer, diabetes mellitus type 2, anxiety, hypertension, hypothyroidism.  Arrives tachypneic, hypoxic with pulse ox 83 % on room air.  Placed on 4 LNC.  VBG show only mildly low bicarb.  Noted to have D-dimer 1.79.  Given IV lasix in the ED.  CTA ruled out PED.  Cardiology consult.  Check ECHO.     Date: 3/29   Day  2: Desaturation with exertion in to the 80s on 6LNC.  NSR with episodes of second degree heart block while sleeping, heart rate in 30s-40s.  Also with intermittent episodes of afib overnight. Asymptomatic.     ED Triage Vitals   Temperature Pulse Respirations Blood Pressure SpO2   03/28/24 1452 03/28/24 1451 03/28/24 1452 03/28/24 1451 03/28/24 1451   (!) 97.4 °F (36.3 °C) 75 22 164/70 (!) 83 %      Temp Source Heart Rate Source Patient Position - Orthostatic VS BP Location FiO2 (%)   03/28/24 1452 03/28/24 1451 03/28/24 1713 03/28/24 1451 --   Tympanic Monitor Sitting Left arm       Pain Score       03/28/24 1713       No Pain          Wt Readings from Last 1 Encounters:   03/29/24 119 kg (262 lb)     Additional Vital Signs: ital Signs (last 2 days)    Date/Time Temp Pulse Resp BP MAP (mmHg) SpO2 Calculated FIO2 (%) - Nasal Cannula Nasal Cannula O2 Flow Rate (L/min) O2 Device Patient Position - Orthostatic VS   03/29/24 12:44:46 -- 76 -- 139/85 103 93 % 42 5.5 L/min Nasal cannula Sitting   03/29/24 1100 -- 64 18 119/63 82 93 % 42 5.5 L/min Nasal cannula Sitting   03/29/24 10:46:37 98.2 °F (36.8 °C) 66 -- 119/63 82 97 % -- -- -- --   03/29/24 0900 -- 73 18 126/60 -- -- -- -- -- Sitting   03/29/24 08:13:56 -- 82 18 150/77 101 92 % 42 5.5 L/min Nasal cannula Sitting   03/29/24 07:43:53 97.3 °F (36.3 °C) Abnormal  73 -- 149/69 96 92 % -- -- -- --   03/29/24 02:26:40 -- 69 18 149/64 92 94 % -- -- -- --   03/28/24 21:51:11 97.9 °F (36.6 °C) 84 22 146/78 101 90 % -- -- -- --   03/28/24 2000 -- 76 24 Abnormal  -- -- 92 % 40 5 L/min Nasal cannula --   03/28/24 1900 -- 74 17 140/67 97 91 % 32 3 L/min Nasal cannula --   03/28/24 1859 -- 77 20 145/65 94 91 % 36 4 L/min Nasal cannula Lying   03/28/24 1845 -- 84 33 Abnormal  -- -- 86 % Abnormal   -- -- Nasal cannula --   SpO2: Pt. had O2 on was moving from sitting to edge of bed and then standing to get wt. then back to sitting at edge of bed. at 03/28/24 1845 03/28/24 1830 --  72 22 145/65 94 97 % 32 3 L/min Nasal cannula Sitting   03/28/24 1801 -- 78 18 146/70 101 96 % -- -- None (Room air) Sitting   03/28/24 1713 -- 71 20 116/59 79 94 % 32 3 L/min Nasal cannula Sitting   03/28/24 1600 -- -- -- -- -- -- -- -- Nasal cannula --   03/28/24 1452 97.4 °F (36.3 °C) Abnormal  -- 22 -- -- 94 % 28 2 L/min Nasal cannula --   03/28/24 1451 -- 75 -- 164/70 -- 83 % Abnormal  -- -- None (Room air) --     Pertinent Labs/Diagnostic Test Results:   3/29 ECHO:   Interpretation Summary         Left Ventricle: Left ventricular cavity size is normal. Wall thickness is mildly increased. The left ventricular ejection fraction is 55%. Systolic function is normal. Wall motion is normal. Diastolic function is normal.    Right Ventricle: Right ventricular cavity size is normal. Systolic function is normal.    Pericardium: There is a trivial pericardial effusion.  3/28 EKG: Narrative & Impression    Sinus rhythm with 1st degree A-V block  Low voltage QRS  Cannot rule out Anterior infarct (cited on or before 28-MAR-2024)  Abnormal ECG     CTA ED chest PE study   Final Result by Nixon Liu MD (03/28 1635)      1.  No filling defect to suggest acute or chronic pulmonary embolism in the entire pulmonary arterial system.   2.  Small bilateral pleural effusions, right greater than left, with bibasilar atelectasis.      The study was marked in EPIC for immediate notification.      Workstation performed: [id number]         VAS lower limb venous duplex study, unilateral/limited   mpression:  RIGHT LOWER LIMB LIMITED:  Evaluation shows no evidence of thrombus in the common femoral vein.  Doppler evaluation shows a normal response to augmentation maneuvers.     LEFT LOWER LIMB:  No evidence of acute or chronic deep vein thrombosis  No evidence of superficial thrombophlebitis noted.  Doppler evaluation shows a normal response to augmentation maneuvers.  Popliteal, posterior tibial and anterior tibial arterial  Doppler waveform's are  triphasic.    (03/28 1814)      XR chest 1 view portable   ED Interpretation by Maritza Cummings DO (03/28 1531)   Small right pleural effusion.  Cardiomegaly.  Mild pulmonary vascular congestion.      Final Result by Dar Andres MD (03/29 0807)      Small to moderate bilateral layered pleural effusions.      Enlarged cardiac silhouette.      Findings concur with the preliminary report by the referring clinician already in PACS and/or our electronic record EPIC.      Workstation performed: [id number]           Results from last 7 days   Lab Units 03/28/24  1540   SARS-COV-2  Negative     Results from last 7 days   Lab Units 03/29/24  0432 03/28/24  1518   WBC Thousand/uL 5.62 6.98   HEMOGLOBIN g/dL 10.8* 11.1*   HEMATOCRIT % 33.8* 34.5*   PLATELETS Thousands/uL 324 344   NEUTROS ABS Thousands/µL 4.47 5.89         Results from last 7 days   Lab Units 03/29/24  0432 03/28/24  1518   SODIUM mmol/L 140 139   POTASSIUM mmol/L 5.0 4.8   CHLORIDE mmol/L 107 106   CO2 mmol/L 26 24   ANION GAP mmol/L 7 9   BUN mg/dL 39* 39*   CREATININE mg/dL 1.59* 1.47*   EGFR ml/min/1.73sq m 40 44   CALCIUM mg/dL 9.0 9.4   MAGNESIUM mg/dL 2.3 2.2     Results from last 7 days   Lab Units 03/28/24  1518   AST U/L 15   ALT U/L 16   ALK PHOS U/L 54   TOTAL PROTEIN g/dL 7.4   ALBUMIN g/dL 4.2   TOTAL BILIRUBIN mg/dL 0.60   BILIRUBIN DIRECT mg/dL 0.11     Results from last 7 days   Lab Units 03/29/24  1104 03/29/24  0745 03/28/24  2144 03/28/24  1838   POC GLUCOSE mg/dl 151* 104 208* 179*     Results from last 7 days   Lab Units 03/29/24  0432 03/28/24  1518   GLUCOSE RANDOM mg/dL 119 175*     Results from last 7 days   Lab Units 03/28/24  1518   PH GIUSEPPE  7.333   PCO2 GIUSEPPE mm Hg 45.5   PO2 GIUSEPPE mm Hg 30.7*   HCO3 GIUSEPPE mmol/L 23.6*   BASE EXC GIUSEPPE mmol/L -2.4   O2 CONTENT GIUSEPPE ml/dL 9.1   O2 HGB, VENOUS % 53.1*             Results from last 7 days   Lab Units 03/28/24  1903 03/28/24  1722 03/28/24  1518   HS TNI  0HR ng/L  --   --  89*   HS TNI 2HR ng/L  --  95*  --    HSTNI D2 ng/L  --  6  --    HS TNI 4HR ng/L 98*  --   --    HSTNI D4 ng/L 9  --   --      Results from last 7 days   Lab Units 03/28/24  1518   D-DIMER QUANTITATIVE ug/ml FEU 1.79*     Results from last 7 days   Lab Units 03/28/24  1518   PROTIME seconds 13.7   INR  0.99   PTT seconds 37     Results from last 7 days   Lab Units 03/28/24  1518   TSH 3RD GENERATON uIU/mL 4.007     Results from last 7 days   Lab Units 03/28/24  1518   PROCALCITONIN ng/ml 0.14     Results from last 7 days   Lab Units 03/28/24  1554   LACTIC ACID mmol/L 1.4             Results from last 7 days   Lab Units 03/28/24  1518   BNP pg/mL 785*     Results from last 7 days   Lab Units 03/28/24  1540   INFLUENZA A PCR  Negative   INFLUENZA B PCR  Negative   RSV PCR  Negative     Results from last 7 days   Lab Units 03/28/24  1635 03/28/24  1554   BLOOD CULTURE  Received in Microbiology Lab. Culture in Progress. Received in Microbiology Lab. Culture in Progress.       ED Treatment:   Medication Administration from 03/28/2024 1443 to 03/28/2024 2019         Date/Time Order Dose Route Action Comments     03/28/2024 1700 EDT ipratropium (ATROVENT) 0.02 % inhalation solution 0.5 mg 0.5 mg Nebulization Given --     03/28/2024 1700 EDT albuterol inhalation solution 5 mg 5 mg Nebulization Given --     03/28/2024 1658 EDT aspirin chewable tablet 324 mg 324 mg Oral Given --     03/28/2024 1701 EDT furosemide (LASIX) injection 40 mg 40 mg Intravenous Given --     03/28/2024 1848 EDT insulin lispro (HumALOG/ADMELOG) 100 units/mL subcutaneous injection 1-6 Units 1 Units Subcutaneous Given --          Past Medical History:   Diagnosis Date    Diabetes mellitus (HCC)     Hypertension          Admitting Diagnosis: Panic attack [F41.0]  Cardiac enzymes elevated [R74.8]  Hypoxia [R09.02]  Bilateral pleural effusion [J90]  Acute dyspnea [R06.00]  New onset of congestive heart failure (HCC) [I50.9]  Age/Sex:  79 y.o. male  Admission Orders:  Scheduled Medications:  acetaminophen, 1,000 mg, Intravenous, Once  aspirin, 81 mg, Oral, Daily  ezetimibe, 10 mg, Oral, Daily  furosemide, 40 mg, Intravenous, BID  gabapentin, 300 mg, Oral, TID  heparin (porcine), 5,000 Units, Subcutaneous, Q8H VALERIE  insulin glargine, 20 Units, Subcutaneous, QAM  insulin lispro, 1-5 Units, Subcutaneous, HS  insulin lispro, 1-6 Units, Subcutaneous, TID AC  latanoprost, 1 drop, Both Eyes, HS  levothyroxine, 50 mcg, Oral, Early Morning  lidocaine, 2 patch, Topical, Daily  metoprolol tartrate, 50 mg, Oral, Q12H VALERIE  pantoprazole, 40 mg, Oral, Early Morning  pravastatin, 80 mg, Oral, Daily With Dinner      Continuous IV Infusions:     PRN Meds:  Diclofenac Sodium, 2 g, Topical, TID PRN        IP CONSULT TO NUTRITION SERVICES  IP CONSULT TO CARDIOLOGY  IP CONSULT TO NUTRITION SERVICES    Network Utilization Review Department  ATTENTION: Please call with any questions or concerns to [phone] and carefully listen to the prompts so that you are directed to the right person. All voicemails are confidential.   For Discharge needs, contact Care Management DC Support Team at [phone] opt. [phone]  Send all requests for admission clinical reviews, approved or denied determinations and any other requests to dedicated fax number below belonging to the campus where the patient is receiving treatment. List of dedicated fax numbers for the Facilities:  FACILITY NAME UR FAX NUMBER   ADMISSION DENIALS (Administrative/Medical Necessity) [phone]   DISCHARGE SUPPORT TEAM (NETWORK) [phone]   PARENT CHILD HEALTH (Maternity/NICU/Pediatrics) [phone]   [facility] [phone]   [facility] [phone]   [facility] [phone]   [facility] [phone]   [facility] [phone]   [facility] [phone]    [facility] [phone]   [facility] [phone]   [facility] [phone]   [facility] [phone]   [facility] [phone]   [facility] [phone]

## 2024-03-29 NOTE — PROGRESS NOTES
"[facility]  Progress Note  Name: Trent Ocasio I  MRN: [id number]  Unit/Bed#: [facility] I Date of Admission: 3/28/2024   Date of Service: 3/29/2024 I Hospital Day: 1    Assessment/Plan   Elevated troponin  Assessment & Plan  89-95-98, continue to trend to peak.  Trend appears flat.  No active chest pain, suspect 2/2 to acute CHF  EKG shows Sinus rhythm, first-degree AV block with KY interval of 338, nonspecific T wave changes in V1  Cardiology consulted, appreciate recommendations    Type 2 diabetes mellitus, with long-term current use of insulin (HCC)  Assessment & Plan  Lab Results   Component Value Date    HGBA1C 7.3 (H) 02/16/2024       Recent Labs     03/28/24  1838 03/28/24  2144 03/29/24  0745   POCGLU 179* 208* 104         Blood Sugar Average: Last 72 hrs:  (P) [id number]    Hold p.o. home meds  Continue home insulin regimen of lantus 20 U qhs  Sliding scale insulin  Carb controlled diet    Hypothyroid  Assessment & Plan  TSH WNL  Continue Synthroid    HTN (hypertension)  Assessment & Plan  /77   Pulse 82   Temp (!) 97.3 °F (36.3 °C)   Resp 18   Ht 5' 10\" (1.778 m)   Wt 119 kg (262 lb 5.6 oz) Comment: Pt sob and unable to stand  SpO2 92%   BMI 37.64 kg/m²   Stable pressures  Takes 100 mg metoprolol twice daily at home.  Reduced to 50 mg twice daily at this time  Continue IV Lasix 40 twice daily  Holding off on other blood pressure medications from his home medication list    Anxiety  Assessment & Plan  Currently stable  Atarax as needed    History of prostate cancer  Assessment & Plan  .  History of radiation treatment 5 years ago  Recommend outpatient follow-up    Acute respiratory failure with hypoxia (HCC)  Assessment & Plan  Patient found to be on 83% on room air, with started on 4 L in the ER and saturations increased in the 90s, currently on 5 L NC  VBG reviewed, does not show acidosis.  Bicarb is only mildly low which is not significant at this " time  Continue to wean oxygen as possible  Underlying etiology is congestive heart failure exacerbation    * Acute CHF (congestive heart failure) (HCC)  Assessment & Plan  Wt Readings from Last 3 Encounters:   03/29/24 119 kg (262 lb 5.6 oz)   06/25/22 109 kg (240 lb)     Patient presents with severe shortness of breath, leg edema.  Found to have elevated BNP, elevated troponin  Elevated D-dimer, CTA done PE ruled out  Status post 40 IV Lasix in the ER  Continue with IV 40 Lasix twice daily  Strict I's and O's  Echocardiogram pending  Cardiology consultation  2 L fluid restriction  Nutrition consult                     VTE Pharmacologic Prophylaxis:   Moderate Risk (Score 3-4) - Pharmacological DVT Prophylaxis Ordered: heparin.    Mobility:   Basic Mobility Inpatient Raw Score: 17  JH-HLM Goal: 5: Stand one or more mins  JH-HLM Achieved: 4: Move to chair/commode  JH-HLM Goal NOT achieved. Continue with multidisciplinary rounding and encourage appropriate mobility to improve upon JH-HLM goals.    Patient Centered Rounds: I performed bedside rounds with nursing staff today.   Discussions with Specialists or Other Care Team Provider: CM    Education and Discussions with Family / Patient:  PT.     Total Time Spent on Date of Encounter in care of patient: 60 mins. This time was spent on one or more of the following: performing physical exam; counseling and coordination of care; obtaining or reviewing history; documenting in the medical record; reviewing/ordering tests, medications or procedures; communicating with other healthcare professionals and discussing with patient's family/caregivers.    Current Length of Stay: 1 day(s)  Current Patient Status: Inpatient   Certification Statement: The patient will continue to require additional inpatient hospital stay due to CHF exacerbation, cardiology consultation  Discharge Plan: Anticipate discharge in 48-72 hrs to discharge location to be determined pending rehab  evaluations.    Code Status: Level 1 - Full Code    Subjective:   Patient reports ongoing shortness of breath, does feel a lot better compared to when he came in.  Denies nausea, vomiting.  Has noted increased lower extremity swelling for the past couple days.    Objective:     Vitals:   Temp (24hrs), Av.7 °F (36.5 °C), Min:97.3 °F (36.3 °C), Max:98.2 °F (36.8 °C)    Temp:  [97.3 °F (36.3 °C)-98.2 °F (36.8 °C)] 98.2 °F (36.8 °C)  HR:  [64-84] 64  Resp:  [17-33] 18  BP: (116-164)/(59-78) 119/63  SpO2:  [83 %-97 %] 93 %  Body mass index is 37.59 kg/m².     Input and Output Summary (last 24 hours):     Intake/Output Summary (Last 24 hours) at 3/29/2024 1213  Last data filed at 3/29/2024 0226  Gross per 24 hour   Intake --   Output 1130 ml   Net -1130 ml       Physical Exam:   Physical Exam  Constitutional:       General: He is not in acute distress.     Appearance: He is well-developed. He is obese. He is not diaphoretic.   HENT:      Head: Normocephalic and atraumatic.      Mouth/Throat:      Pharynx: No oropharyngeal exudate.   Eyes:      General: No scleral icterus.     Extraocular Movements: Extraocular movements intact.      Conjunctiva/sclera: Conjunctivae normal.   Neck:      Vascular: No JVD.      Trachea: No tracheal deviation.   Cardiovascular:      Rate and Rhythm: Normal rate and regular rhythm.      Heart sounds: No murmur heard.     No friction rub. No gallop.   Pulmonary:      Effort: No respiratory distress.      Breath sounds: No stridor. No wheezing.      Comments: 5L   Abdominal:      General: There is no distension.      Palpations: Abdomen is soft. There is no mass.      Tenderness: There is no abdominal tenderness. There is no right CVA tenderness or left CVA tenderness.   Musculoskeletal:         General: No tenderness.      Right lower leg: Edema present.      Left lower leg: Edema present.      Comments: L > R   Skin:     General: Skin is warm and dry.      Coloration: Skin is not pale.       Findings: No erythema.   Neurological:      Mental Status: He is oriented to person, place, and time.   Psychiatric:         Behavior: Behavior normal.         Thought Content: Thought content normal.          Additional Data:     Labs:  Results from last 7 days   Lab Units 03/29/24  0432   WBC Thousand/uL 5.62   HEMOGLOBIN g/dL 10.8*   HEMATOCRIT % 33.8*   PLATELETS Thousands/uL 324   NEUTROS PCT % 80*   LYMPHS PCT % 7*   MONOS PCT % 11   EOS PCT % 2     Results from last 7 days   Lab Units 03/29/24  0432 03/28/24  1518   SODIUM mmol/L 140 139   POTASSIUM mmol/L 5.0 4.8   CHLORIDE mmol/L 107 106   CO2 mmol/L 26 24   BUN mg/dL 39* 39*   CREATININE mg/dL 1.59* 1.47*   ANION GAP mmol/L 7 9   CALCIUM mg/dL 9.0 9.4   ALBUMIN g/dL  --  4.2   TOTAL BILIRUBIN mg/dL  --  0.60   ALK PHOS U/L  --  54   ALT U/L  --  16   AST U/L  --  15   GLUCOSE RANDOM mg/dL 119 175*     Results from last 7 days   Lab Units 03/28/24  1518   INR  0.99     Results from last 7 days   Lab Units 03/29/24  1104 03/29/24  0745 03/28/24  2144 03/28/24  1838   POC GLUCOSE mg/dl 151* 104 208* 179*         Results from last 7 days   Lab Units 03/28/24  1554 03/28/24  1518   LACTIC ACID mmol/L 1.4  --    PROCALCITONIN ng/ml  --  0.14       Lines/Drains:  Invasive Devices       Peripheral Intravenous Line  Duration             Peripheral IV 03/28/24 Left Antecubital <1 day                      Telemetry:  Telemetry Orders (From admission, onward)               24 Hour Telemetry Monitoring  Continuous x 24 Hours (Telem)        Question:  Reason for 24 Hour Telemetry  Answer:  Decompensated CHF- and any one of the following: continuous diuretic infusion or total diuretic dose >200 mg daily, associated electrolyte derangement (I.e. K < 3.0), ionotropic drip (continuous infusion), hx of ventricular arrhythmia, or new EF < 35%                     Telemetry Reviewed: Normal Sinus Rhythm  Indication for Continued Telemetry Use: No indication for continued use.  Will discontinue.              Imaging: No pertinent imaging reviewed.    Recent Cultures (last 7 days):   Results from last 7 days   Lab Units 03/28/24  1635 03/28/24  1554   BLOOD CULTURE  Received in Microbiology Lab. Culture in Progress. Received in Microbiology Lab. Culture in Progress.       Last 24 Hours Medication List:   Current Facility-Administered Medications   Medication Dose Route Frequency Provider Last Rate    acetaminophen  1,000 mg Intravenous Once Rose Marie Sánchez DO      aspirin  81 mg Oral Daily Chai Salomon MD      Diclofenac Sodium  2 g Topical TID PRN Arelis Gleason PA-C      ezetimibe  10 mg Oral Daily Arelis Gleason PA-C      furosemide  40 mg Intravenous BID Chai Salomon MD      gabapentin  300 mg Oral TID Arelis Gleason PA-C      heparin (porcine)  5,000 Units Subcutaneous Q8H VALERIE Chai Salomon MD      insulin glargine  20 Units Subcutaneous QAM rAelis Gleason PA-C      insulin lispro  1-5 Units Subcutaneous HS Chai Salomon MD      insulin lispro  1-6 Units Subcutaneous TID AC Chai Salomon MD      latanoprost  1 drop Both Eyes HS Arelis Gleason PA-C      levothyroxine  50 mcg Oral Early Morning Chai Salomon MD      lidocaine  2 patch Topical Daily Rose Marie Sánchez DO      metoprolol tartrate  50 mg Oral Q12H [facility] Chai Salomon MD      pantoprazole  40 mg Oral Early Morning Chai Salomon MD      potassium chloride  10 mEq Oral Daily Chai Salomon MD      pravastatin  80 mg Oral Daily With Dinner Arelis Gleason PA-C          Today, Patient Was Seen By: Rose Marie Sánchez DO    **Please Note: This note may have been constructed using a voice recognition system.**

## 2024-03-29 NOTE — PLAN OF CARE
Problem: CARDIOVASCULAR - ADULT  Goal: Maintains optimal cardiac output and hemodynamic stability  Description: INTERVENTIONS:  - Monitor I/O, vital signs and rhythm  - Monitor for S/S and trends of decreased cardiac output  - Administer and titrate ordered vasoactive medications to optimize hemodynamic stability  - Assess quality of pulses, skin color and temperature  - Assess for signs of decreased coronary artery perfusion  - Instruct patient to report change in severity of symptoms  Outcome: Progressing  Goal: Absence of cardiac dysrhythmias or at baseline rhythm  Description: INTERVENTIONS:  - Continuous cardiac monitoring, vital signs, obtain 12 lead EKG if ordered  - Administer antiarrhythmic and heart rate control medications as ordered  - Monitor electrolytes and administer replacement therapy as ordered  Outcome: Progressing     Problem: RESPIRATORY - ADULT  Goal: Achieves optimal ventilation and oxygenation  Description: INTERVENTIONS:  - Assess for changes in respiratory status  - Assess for changes in mentation and behavior  - Position to facilitate oxygenation and minimize respiratory effort  - Oxygen administered by appropriate delivery if ordered  - Initiate smoking cessation education as indicated  - Encourage broncho-pulmonary hygiene including cough, deep breathe, Incentive Spirometry  - Assess the need for suctioning and aspirate as needed  - Assess and instruct to report SOB or any respiratory difficulty  - Respiratory Therapy support as indicated  Outcome: Progressing     Problem: PAIN - ADULT  Goal: Verbalizes/displays adequate comfort level or baseline comfort level  Description: Interventions:  - Encourage patient to monitor pain and request assistance  - Assess pain using appropriate pain scale  - Administer analgesics based on type and severity of pain and evaluate response  - Implement non-pharmacological measures as appropriate and evaluate response  - Consider cultural and social  influences on pain and pain management  - Notify physician/advanced practitioner if interventions unsuccessful or patient reports new pain  Outcome: Progressing     Problem: DISCHARGE PLANNING  Goal: Discharge to home or other facility with appropriate resources  Description: INTERVENTIONS:  - Identify barriers to discharge w/patient and caregiver  - Arrange for needed discharge resources and transportation as appropriate  - Identify discharge learning needs (meds, wound care, etc.)  - Arrange for interpretive services to assist at discharge as needed  - Refer to Case Management Department for coordinating discharge planning if the patient needs post-hospital services based on physician/advanced practitioner order or complex needs related to functional status, cognitive ability, or social support system  Outcome: Progressing     Problem: Knowledge Deficit  Goal: Patient/family/caregiver demonstrates understanding of disease process, treatment plan, medications, and discharge instructions  Description: Complete learning assessment and assess knowledge base.  Interventions:  - Provide teaching at level of understanding  - Provide teaching via preferred learning methods  Outcome: Progressing

## 2024-03-29 NOTE — PHYSICAL THERAPY NOTE
PT Evaluation (16min)  (8:00-8:16)    Past Medical History:   Diagnosis Date    Diabetes mellitus (HCC)     Hypertension          03/29/24 0816   PT Last Visit   PT Visit Date 03/29/24   Note Type   Note type Evaluation   Pain Assessment   Pain Assessment Tool 0-10   Pain Score No Pain   Restrictions/Precautions   Weight Bearing Precautions Per Order No   Other Precautions Chair Alarm;Bed Alarm;Telemetry;O2;Fall Risk   Home Living   Type of Home House   Home Layout Two level;Bed/bath upstairs;Stairs to enter without rails  (2 SHIVAM via garage; 13 steps to 2nd floor)   Bathroom Shower/Tub Walk-in shower   Bathroom Toilet Standard   Bathroom Accessibility Accessible   Home Equipment Cane   Prior Function   Level of [place] Independent with ADLs;Independent with functional mobility;Needs assistance with IADLS  (ambulates c SPC prn)   Lives With Spouse   Receives Help From Family   IADLs Family/Friend/Other provides meals;Independent with driving;Independent with medication management   Falls in the last 6 months 0   Vocational Retired   General   Additional Pertinent History pt presents to [place] c CHF. PT consulted for mobility + d/c planning.   Family/Caregiver Present Yes  (pt's wife)   Cognition   Orientation Level Oriented X4   RUE Assessment   RUE Assessment WFL   LUE Assessment   LUE Assessment WFL   RLE Assessment   RLE Assessment WFL  (4-/5)   LLE Assessment   LLE Assessment WFL  (4-/5)   Coordination   Sensation WFL   Bed Mobility   Supine to Sit 5  Supervision   Additional items Assist x 1;HOB elevated;Bedrails;Increased time required;Verbal cues   Transfers   Sit to Stand 4  Minimal assistance   Additional items Assist x 1;Verbal cues;Increased time required   Stand to Sit 4  Minimal assistance   Additional items Assist x 1;Armrests;Verbal cues;Increased time required   Ambulation/Elevation   Gait pattern Forward Flexion;Narrow BILLIE;Decreased foot clearance   Gait Assistance 4  Minimal assist  "  Additional items Assist x 1;Verbal cues   Assistive Device Rolling walker   Distance 5' bed>chair; limited 2* fatigue/SOB   Stair Management Assistance Not tested   Balance   Static Sitting Fair +   Dynamic Sitting Fair   Static Standing Fair -   Dynamic Standing Poor +   Ambulatory Poor +   Endurance Deficit   Endurance Deficit Yes   Endurance Deficit Description SpO2 86-90% throughout session on 5L O2.   Activity Tolerance   Activity Tolerance Patient limited by fatigue   Medical Staff Made Aware OT-Coretta (co-eval 2* pt's multiple co-morbidities + mobility deficits requiring (A) to complete mobility tasks safely).   Nurse Made Aware RN Katy   Assessment   Prognosis Good   Problem List Decreased strength;Decreased endurance;Impaired balance;Decreased mobility   Assessment pt is an 78y/o m who presents to [facility] c CHF. PMH significant for DM + HTN. at baseline, pt mod (I) c functional mobility c SPC. resides c spouse in 2 story home c 2 SHIVAM + 13 steps to 2nd floor. currently requires min (A)x1 to complete OOB mobility tasks 2* deficits in strength, balance, gait quality, + activity tolerance noted in PT exam above. Barthel Index 55/100, Modified [place] 4. ambulated 5' bed>chair c RW min (A)x1 limited 2* fatigue/SOB. SpO2 86-90% on 5L O2 throughout session c pt in no apparent distress. would benefit from skilled PT to maximize functional mobility + improve quality of life. PT eval of high complexity 2* unstable med status c pt requiring ongoing medical management 2* CHF. pt c multiple co-morbidities + mobility deficits requiring (A) to complete mobility tasks safely. resides in 2 story home c 13 steps to access bedroom. pt currently on 5L O2 which pt does not use at baseline.   Barriers to Discharge Inaccessible home environment   Goals   Patient Goals \"to get up the stairs at home\".   STG Expiration Date 04/08/24   Short Term Goal #1 1. increase strength 1/2 grade to improve overall functional mobility, 2. perform " bed mobility mod (I) to decrease caregiver burden, 2. perform transfers mod (I) to safely perform ADLs, 3. ambulate 150' mod (I) c least restrictive AD to safely navigate home environment + SpO2 >92% on RA, 5. negotiate 13 stairs mod (I) to safely access 2nd floor of home c SpO2 >92% on RA   Plan   Treatment/Interventions Functional transfer training;LE strengthening/ROM;Elevations;Therapeutic exercise;Endurance training;Patient/family training;Equipment eval/education;Bed mobility;Gait training;Spoke to nursing;OT   PT Frequency 3-5x/wk   Discharge Recommendation   Rehab Resource Intensity Level, PT II (Moderate Resource Intensity)   Equipment Recommended Walker   Walker Package Recommended Wheeled walker   Change/add to Walker Package? No   AM-PAC Basic Mobility Inpatient   Turning in Flat Bed Without Bedrails 3   Lying on Back to Sitting on Edge of Flat Bed Without Bedrails 3   Moving Bed to Chair 3   Standing Up From Chair Using Arms 3   Walk in Room 3   Climb 3-5 Stairs With Railing 2   Basic Mobility Inpatient Raw Score 17   Basic Mobility Standardized Score 39.67   [facility] Highest Level Of Mobility   -[facility] Goal 5: Stand one or more mins   -[facility] Achieved 4: Move to chair/commode   Modified [place] Scale   Modified [place] Scale 4   Barthel Index   Feeding 10   Bathing 0   Grooming Score 5   Dressing Score 5   Bladder Score 10   Bowels Score 10   Toilet Use Score 5   Transfers (Bed/Chair) Score 10   Mobility (Level Surface) Score 0   Stairs Score 0   Barthel Index Score 55   End of Consult   Patient Position at End of Consult Bedside chair;Bed/Chair alarm activated;All needs within reach     Breanne Estrada DPT

## 2024-03-29 NOTE — PLAN OF CARE
Problem: PHYSICAL THERAPY ADULT  Goal: Performs mobility at highest level of function for planned discharge setting.  See evaluation for individualized goals.  Description: Treatment/Interventions: Functional transfer training, LE strengthening/ROM, Elevations, Therapeutic exercise, Endurance training, Patient/family training, Equipment eval/education, Bed mobility, Gait training, Spoke to nursing, OT  Equipment Recommended: Walker       See flowsheet documentation for full assessment, interventions and recommendations.  Note: Prognosis: Good  Problem List: Decreased strength, Decreased endurance, Impaired balance, Decreased mobility  Assessment: pt is an 80y/o m who presents to [facility] c CHF. PMH significant for DM + HTN. at baseline, pt mod (I) c functional mobility c SPC. resides c spouse in 2 story home c 2 SHIVAM + 13 steps to 2nd floor. currently requires min (A)x1 to complete OOB mobility tasks 2* deficits in strength, balance, gait quality, + activity tolerance noted in PT exam above. Barthel Index 55/100, Modified [place] 4. ambulated 5' bed>chair c RW min (A)x1 limited 2* fatigue/SOB. SpO2 86-90% on 5L O2 throughout session c pt in no apparent distress. would benefit from skilled PT to maximize functional mobility + improve quality of life. PT eval of high complexity 2* unstable med status c pt requiring ongoing medical management 2* CHF. pt c multiple co-morbidities + mobility deficits requiring (A) to complete mobility tasks safely. resides in 2 story home c 13 steps to access bedroom. pt currently on 5L O2 which pt does not use at baseline.  Barriers to Discharge: Inaccessible home environment     Rehab Resource Intensity Level, PT: II (Moderate Resource Intensity)    See flowsheet documentation for full assessment.

## 2024-03-30 ENCOUNTER — APPOINTMENT (INPATIENT)
Dept: RADIOLOGY | Facility: HOSPITAL | Age: 79
DRG: 291 | End: 2024-03-30
Payer: COMMERCIAL

## 2024-03-30 PROBLEM — N17.9 ACUTE-ON-CHRONIC KIDNEY INJURY  (HCC): Status: ACTIVE | Noted: 2024-03-30

## 2024-03-30 PROBLEM — N18.9 ACUTE-ON-CHRONIC KIDNEY INJURY  (HCC): Status: ACTIVE | Noted: 2024-03-30

## 2024-03-30 LAB
ANION GAP SERPL CALCULATED.3IONS-SCNC: 9 MMOL/L (ref 4–13)
ANION GAP SERPL CALCULATED.3IONS-SCNC: 9 MMOL/L (ref 4–13)
BACTERIA UR QL AUTO: ABNORMAL /HPF
BASE EX.OXY STD BLDV CALC-SCNC: 52.2 % (ref 60–80)
BASE EX.OXY STD BLDV CALC-SCNC: 55.3 % (ref 60–80)
BASE EX.OXY STD BLDV CALC-SCNC: 73.4 % (ref 60–80)
BASE EXCESS BLDV CALC-SCNC: -0.7 MMOL/L
BASE EXCESS BLDV CALC-SCNC: -1.9 MMOL/L
BASE EXCESS BLDV CALC-SCNC: -4.8 MMOL/L
BILIRUB UR QL STRIP: NEGATIVE
BUN SERPL-MCNC: 49 MG/DL (ref 5–25)
BUN SERPL-MCNC: 55 MG/DL (ref 5–25)
CALCIUM SERPL-MCNC: 9.4 MG/DL (ref 8.4–10.2)
CALCIUM SERPL-MCNC: 9.8 MG/DL (ref 8.4–10.2)
CHLORIDE SERPL-SCNC: 101 MMOL/L (ref 96–108)
CHLORIDE SERPL-SCNC: 105 MMOL/L (ref 96–108)
CLARITY UR: CLEAR
CO2 SERPL-SCNC: 26 MMOL/L (ref 21–32)
CO2 SERPL-SCNC: 28 MMOL/L (ref 21–32)
COLOR UR: ABNORMAL
CREAT SERPL-MCNC: 1.98 MG/DL (ref 0.6–1.3)
CREAT SERPL-MCNC: 2.07 MG/DL (ref 0.6–1.3)
ERYTHROCYTE [DISTWIDTH] IN BLOOD BY AUTOMATED COUNT: 15.7 % (ref 11.6–15.1)
GFR SERPL CREATININE-BSD FRML MDRD: 29 ML/MIN/1.73SQ M
GFR SERPL CREATININE-BSD FRML MDRD: 30 ML/MIN/1.73SQ M
GLUCOSE SERPL-MCNC: 133 MG/DL (ref 65–140)
GLUCOSE SERPL-MCNC: 135 MG/DL (ref 65–140)
GLUCOSE SERPL-MCNC: 136 MG/DL (ref 65–140)
GLUCOSE SERPL-MCNC: 182 MG/DL (ref 65–140)
GLUCOSE SERPL-MCNC: 208 MG/DL (ref 65–140)
GLUCOSE SERPL-MCNC: 225 MG/DL (ref 65–140)
GLUCOSE UR STRIP-MCNC: NEGATIVE MG/DL
HCO3 BLDV-SCNC: 23.4 MMOL/L (ref 24–30)
HCO3 BLDV-SCNC: 26.8 MMOL/L (ref 24–30)
HCO3 BLDV-SCNC: 27.4 MMOL/L (ref 24–30)
HCT VFR BLD AUTO: 36.4 % (ref 36.5–49.3)
HGB BLD-MCNC: 11.3 G/DL (ref 12–17)
HGB UR QL STRIP.AUTO: NEGATIVE
KETONES UR STRIP-MCNC: NEGATIVE MG/DL
LEUKOCYTE ESTERASE UR QL STRIP: ABNORMAL
MAGNESIUM SERPL-MCNC: 2.4 MG/DL (ref 1.9–2.7)
MCH RBC QN AUTO: 32.2 PG (ref 26.8–34.3)
MCHC RBC AUTO-ENTMCNC: 31 G/DL (ref 31.4–37.4)
MCV RBC AUTO: 104 FL (ref 82–98)
NITRITE UR QL STRIP: NEGATIVE
NON-SQ EPI CELLS URNS QL MICRO: ABNORMAL /HPF
O2 CT BLDV-SCNC: 11.8 ML/DL
O2 CT BLDV-SCNC: 8.6 ML/DL
O2 CT BLDV-SCNC: 9.2 ML/DL
PCO2 BLDV: 58 MM HG (ref 42–50)
PCO2 BLDV: 62.3 MM HG (ref 42–50)
PCO2 BLDV: 66.6 MM HG (ref 42–50)
PH BLDV: 7.22 [PH] (ref 7.3–7.4)
PH BLDV: 7.22 [PH] (ref 7.3–7.4)
PH BLDV: 7.26 [PH] (ref 7.3–7.4)
PH UR STRIP.AUTO: 5 [PH]
PLATELET # BLD AUTO: 316 THOUSANDS/UL (ref 149–390)
PMV BLD AUTO: 9.7 FL (ref 8.9–12.7)
PO2 BLDV: 30.3 MM HG (ref 35–45)
PO2 BLDV: 30.8 MM HG (ref 35–45)
PO2 BLDV: 44.8 MM HG (ref 35–45)
POTASSIUM SERPL-SCNC: 4.9 MMOL/L (ref 3.5–5.3)
POTASSIUM SERPL-SCNC: 5.1 MMOL/L (ref 3.5–5.3)
PROT UR STRIP-MCNC: ABNORMAL MG/DL
RBC # BLD AUTO: 3.51 MILLION/UL (ref 3.88–5.62)
RBC #/AREA URNS AUTO: ABNORMAL /HPF
SODIUM SERPL-SCNC: 138 MMOL/L (ref 135–147)
SODIUM SERPL-SCNC: 140 MMOL/L (ref 135–147)
SP GR UR STRIP.AUTO: 1.01 (ref 1–1.03)
UROBILINOGEN UR STRIP-ACNC: <2 MG/DL
WBC # BLD AUTO: 5.61 THOUSAND/UL (ref 4.31–10.16)
WBC #/AREA URNS AUTO: ABNORMAL /HPF

## 2024-03-30 PROCEDURE — 71045 X-RAY EXAM CHEST 1 VIEW: CPT

## 2024-03-30 PROCEDURE — 99232 SBSQ HOSP IP/OBS MODERATE 35: CPT | Performed by: STUDENT IN AN ORGANIZED HEALTH CARE EDUCATION/TRAINING PROGRAM

## 2024-03-30 PROCEDURE — 99232 SBSQ HOSP IP/OBS MODERATE 35: CPT | Performed by: INTERNAL MEDICINE

## 2024-03-30 PROCEDURE — 80048 BASIC METABOLIC PNL TOTAL CA: CPT | Performed by: STUDENT IN AN ORGANIZED HEALTH CARE EDUCATION/TRAINING PROGRAM

## 2024-03-30 PROCEDURE — 81001 URINALYSIS AUTO W/SCOPE: CPT | Performed by: STUDENT IN AN ORGANIZED HEALTH CARE EDUCATION/TRAINING PROGRAM

## 2024-03-30 PROCEDURE — 94760 N-INVAS EAR/PLS OXIMETRY 1: CPT

## 2024-03-30 PROCEDURE — 82570 ASSAY OF URINE CREATININE: CPT | Performed by: INTERNAL MEDICINE

## 2024-03-30 PROCEDURE — 82948 REAGENT STRIP/BLOOD GLUCOSE: CPT

## 2024-03-30 PROCEDURE — 83735 ASSAY OF MAGNESIUM: CPT | Performed by: STUDENT IN AN ORGANIZED HEALTH CARE EDUCATION/TRAINING PROGRAM

## 2024-03-30 PROCEDURE — 82805 BLOOD GASES W/O2 SATURATION: CPT | Performed by: STUDENT IN AN ORGANIZED HEALTH CARE EDUCATION/TRAINING PROGRAM

## 2024-03-30 PROCEDURE — 84156 ASSAY OF PROTEIN URINE: CPT | Performed by: INTERNAL MEDICINE

## 2024-03-30 PROCEDURE — 85027 COMPLETE CBC AUTOMATED: CPT | Performed by: STUDENT IN AN ORGANIZED HEALTH CARE EDUCATION/TRAINING PROGRAM

## 2024-03-30 PROCEDURE — 94660 CPAP INITIATION&MGMT: CPT

## 2024-03-30 RX ORDER — FUROSEMIDE 10 MG/ML
40 INJECTION INTRAMUSCULAR; INTRAVENOUS DAILY
Status: DISCONTINUED | OUTPATIENT
Start: 2024-03-30 | End: 2024-03-31

## 2024-03-30 RX ADMIN — LEVOTHYROXINE SODIUM 50 MCG: 0.05 TABLET ORAL at 05:06

## 2024-03-30 RX ADMIN — PANTOPRAZOLE SODIUM 40 MG: 40 TABLET, DELAYED RELEASE ORAL at 17:46

## 2024-03-30 RX ADMIN — HEPARIN SODIUM 5000 UNITS: 5000 INJECTION INTRAVENOUS; SUBCUTANEOUS at 21:23

## 2024-03-30 RX ADMIN — HEPARIN SODIUM 5000 UNITS: 5000 INJECTION INTRAVENOUS; SUBCUTANEOUS at 05:06

## 2024-03-30 RX ADMIN — FUROSEMIDE 40 MG: 10 INJECTION, SOLUTION INTRAMUSCULAR; INTRAVENOUS at 10:30

## 2024-03-30 RX ADMIN — ASPIRIN 81 MG: 81 TABLET, COATED ORAL at 08:57

## 2024-03-30 RX ADMIN — INSULIN LISPRO 1 UNITS: 100 INJECTION, SOLUTION INTRAVENOUS; SUBCUTANEOUS at 17:46

## 2024-03-30 RX ADMIN — GABAPENTIN 300 MG: 300 CAPSULE ORAL at 08:57

## 2024-03-30 RX ADMIN — PRAVASTATIN SODIUM 80 MG: 80 TABLET ORAL at 17:46

## 2024-03-30 RX ADMIN — INSULIN GLARGINE 20 UNITS: 100 INJECTION, SOLUTION SUBCUTANEOUS at 08:57

## 2024-03-30 RX ADMIN — INSULIN LISPRO 2 UNITS: 100 INJECTION, SOLUTION INTRAVENOUS; SUBCUTANEOUS at 21:26

## 2024-03-30 RX ADMIN — EZETIMIBE 10 MG: 10 TABLET ORAL at 08:57

## 2024-03-30 RX ADMIN — LIDOCAINE 5% 2 PATCH: 700 PATCH TOPICAL at 08:57

## 2024-03-30 RX ADMIN — METOPROLOL TARTRATE 50 MG: 50 TABLET, FILM COATED ORAL at 21:23

## 2024-03-30 RX ADMIN — HEPARIN SODIUM 5000 UNITS: 5000 INJECTION INTRAVENOUS; SUBCUTANEOUS at 14:40

## 2024-03-30 RX ADMIN — LATANOPROST 1 DROP: 50 SOLUTION OPHTHALMIC at 21:26

## 2024-03-30 RX ADMIN — METOPROLOL TARTRATE 50 MG: 50 TABLET, FILM COATED ORAL at 08:57

## 2024-03-30 NOTE — PROGRESS NOTES
"Progress Note - Cardiology     Trent Ocasio 79 y.o. male MRN: [id number]  Unit/Bed#: -01 Encounter: [id number]      Assessment and Plan:  Acute HFpEF.  Volume status improved. Continue IV furosemide (BD -> OD), Lopressor.  No SGLT2 inhibitor given CKD.  Low-salt diet.  Daily weights.  Accurate intake output charting     Mildly elevated flat troponins (89-95-98) likely secondary to acute CHF.  Plan for stress test on Monday     Documented history and CAD in a note from 2003.  Patient does not remember any details.  Continue aspirin, statin, Zetia, Lopressor.  Plan for stress test on Monday     Hypertension.  BP stable.  Continue current medications.  Continue to monitor vitals    Hyperlipidemia.  Continue statin, Zetia, diet control    History of CVA.  Continue aspirin, statin, Zetia    CKD    DM type II      Subjective:   Less SOB. Patient denies chest pain, palpitations, lightheadedness.      REVIEW OF SYSTEMS:  Constitutional: Denies fever or chills  Eyes: Denies eye discharge or change in visual acuity   HENT: Denies sneezing, nasal congestion or sore throat   Respiratory: Denies cough, expectoration   Cardiovascular: Denies chest pain, palpitations   GI: Denies abdominal pain, nausea, vomiting   : Denies dysuria, difficulty in micturition or nocturia  Musculoskeletal: Denies back pain  Neurologic: Denies syncope, headache, focal weakness or sensory changes   Endocrine: Denies polydipsia   Lymphatic: Denies swollen glands   Psychiatric: Denies depression, anxiety or panic       Objective:   Vitals:   Blood pressure 127/64, pulse 70, temperature 98.8 °F (37.1 °C), resp. rate 18, height 5' 10\" (1.778 m), weight 121 kg (265 lb 10.5 oz), SpO2 91%.    Body mass index is 38.12 kg/m².      Intake/Output Summary (Last 24 hours) at 3/30/2024 1120  Last data filed at 3/30/2024 0553  Gross per 24 hour   Intake 460 ml   Output 1925 ml   Net -1465 ml       Weight (last 2 days)       Date/Time Weight    03/30/24 " 0600 121 (265.65)     Weight: pt unable to stand due to sob at 03/30/24 0600    03/29/24 1100 119 (262)    03/29/24 0600 119 (262.35)     Weight: Pt sob and unable to stand at 03/29/24 0600    03/29/24 0430 119 (262.35)    03/28/24 1845 118 (260.4)            PHYSICAL EXAM:  General: Patient is not in acute distress. Laying comfortably in the bed. Awake, alert, responding to commands  Head: Normocephalic. Atraumatic  HEENT: Both pupils normal sized, round and reactive to light. Nonicteric  Neck: JVP not raised. Trachea central. No thyromegaly  Respiratory: Bilateral bronchovascular breath sounds with no crackles or rhonchi  Cardiovascular: RRR. S1 and S2 normal. No murmur, rub or gallop  GI: Abdomen soft, nontender. No guarding or rigidity. Liver and spleen not palpable. Bowel sounds present  Neurologic: Patient is awake, alert, responding to command. Moving all extremities  Integumentary:  No skin rash  Lymphatic: No cervical lymphadenopathy  Extremities: No clubbing, cyanosis or edema      LABORATORY RESULTS:      CBC with diff:   Results from last 7 days   Lab Units 03/30/24  0515 03/29/24  0432 03/28/24  1518   WBC Thousand/uL 5.61 5.62 6.98   HEMOGLOBIN g/dL 11.3* 10.8* 11.1*   HEMATOCRIT % 36.4* 33.8* 34.5*   MCV fL 104* 101* 99*   PLATELETS Thousands/uL 316 324 344   RBC Million/uL 3.51* 3.34* 3.47*   MCH pg 32.2 32.3 32.0   MCHC g/dL 31.0* 32.0 32.2   RDW % 15.7* 15.7* 15.8*   MPV fL 9.7 9.7 9.9   NRBC AUTO /100 WBCs  --  0 0       CMP:  Results from last 7 days   Lab Units 03/30/24  0515 03/29/24  0432 03/28/24  1518   POTASSIUM mmol/L 5.1 5.0 4.8   CHLORIDE mmol/L 105 107 106   CO2 mmol/L 26 26 24   BUN mg/dL 49* 39* 39*   CREATININE mg/dL 2.07* 1.59* 1.47*   CALCIUM mg/dL 9.4 9.0 9.4   AST U/L  --   --  15   ALT U/L  --   --  16   ALK PHOS U/L  --   --  54   EGFR ml/min/1.73sq m 29 40 44       BMP:  Results from last 7 days   Lab Units 03/30/24  0515 03/29/24  0432 03/28/24  1518   POTASSIUM mmol/L 5.1  "5.0 4.8   CHLORIDE mmol/L 105 107 106   CO2 mmol/L 26 26 24   BUN mg/dL 49* 39* 39*   CREATININE mg/dL 2.07* 1.59* 1.47*   CALCIUM mg/dL 9.4 9.0 9.4              Results from last 7 days   Lab Units 03/30/24  0515 03/29/24  0432 03/28/24  1518   MAGNESIUM mg/dL 2.4 2.3 2.2             Results from last 7 days   Lab Units 03/28/24  1518   TSH 3RD GENERATON uIU/mL 4.007       Results from last 7 days   Lab Units 03/28/24  1518   INR  0.99       Lipid Profile:   No results found for: \"CHOL\"  No results found for: \"HDL\"  No results found for: \"LDLCALC\"  No results found for: \"TRIG\"    Troponin:  Results from last 7 days   Lab Units 03/28/24  1903 03/28/24  1722 03/28/24  1518   HS TNI 0HR ng/L  --   --  89*   HS TNI 2HR ng/L  --  95*  --    HSTNI D2 ng/L  --  6  --    HS TNI 4HR ng/L 98*  --   --    HSTNI D4 ng/L 9  --   --        Imaging: I have personally reviewed pertinent reports.    Procedure: Echo complete    Result Date: 3/29/2024  Narrative:   Left Ventricle: Left ventricular cavity size is normal. Wall thickness is mildly increased. The left ventricular ejection fraction is 55%. Systolic function is normal. Wall motion is normal. Diastolic function is normal.   Right Ventricle: Right ventricular cavity size is normal. Systolic function is normal.   Pericardium: There is a trivial pericardial effusion.     Procedure: XR chest 1 view portable    Result Date: 3/29/2024  Narrative: XR CHEST PORTABLE INDICATION: SOB, hypoxia. COMPARISON: None FINDINGS: Study limited by apical lordotic positioning. Clear lungs. Small to moderate bilateral layered pleural effusions. No pneumothorax. Enlarged cardiac silhouette. Bones are unremarkable for age. Normal upper abdomen.     Impression: Small to moderate bilateral layered pleural effusions. Enlarged cardiac silhouette. Findings concur with the preliminary report by the referring clinician already in PACS and/or our electronic record EPIC. Workstation performed: [id number] "     Procedure: VAS lower limb venous duplex study, unilateral/limited    Result Date: 3/28/2024  Narrative:  [facility] REPORT CLINICAL: Indications: Patient presents with left lower extremity edema x one week. Operative History: no prior cardiovascular surgeries documented Risk Factors The patient has history of Obesity, HTN, Diabetes (IDDM), Hyperlipidemia, CKD and previous smoking (quit >10yrs ago).   CONCLUSION:  Impression: RIGHT LOWER LIMB LIMITED: Evaluation shows no evidence of thrombus in the common femoral vein. Doppler evaluation shows a normal response to augmentation maneuvers.  LEFT LOWER LIMB: No evidence of acute or chronic deep vein thrombosis No evidence of superficial thrombophlebitis noted. Doppler evaluation shows a normal response to augmentation maneuvers. Popliteal, posterior tibial and anterior tibial arterial Doppler waveform's are triphasic.  Technical findings were given to Maritza Cummings on the floor  SIGNATURE: Electronically Signed by: MANUEL CARIAS MD on 2024-03-28 06:14:40 PM    Procedure: CTA ED chest PE study    Result Date: 3/28/2024  Narrative: CTA - CHEST WITH IV CONTRAST - PULMONARY ANGIOGRAM INDICATION:   acute SOB, hypoxia. COMPARISON: None. TECHNIQUE: CTA examination of the chest was performed using angiographic technique according to a protocol specifically tailored to evaluate for pulmonary embolism.  Axial, sagittal, and coronal 2D reformatted images were created from the source data and  submitted for interpretation.  In addition, coronal and axial 3D MIP postprocessing was performed on the acquisition scanner. Radiation dose length product (DLP) for this visit:  563 mGy-cm .  This examination, like all CT scans performed in the [facility], was performed utilizing techniques to minimize radiation dose exposure, including the use of iterative reconstruction and automated exposure control. IV Contrast:  100 mL of iohexol (OMNIPAQUE) CHEST:  PULMONARY ARTERIAL TREE: No filling defects to suggest acute or chronic pulmonary embolism in the entire pulmonary arterial system, noting limited evaluation of the subsegmental branches due to respiratory motion artifact. Normal caliber main pulmonary artery. LARGE AIRWAYS: Large airways are clear with no tracheal or endobronchial lesion. LUNGS:   Bibasilar passive atelectasis secondary to effusions; the atelectatic lung remains perfused. Limited evaluation of the lungs due to respiratory motion artifact however no evidence of focal consolidation. PLEURA: No pneumothorax. Small right and trace left pleural effusions. HEART: Normal cardiac size and morphology. Mild coronary artery calcification. No pericardial effusion or thickening. VESSELS: Normal caliber thoracic aorta with no detectable atherosclerotic plaque. MEDIASTINUM AND JOHN: Within normal limits. No lymphadenopathy. No pneumothorax. Small right and trace left pleural effusions. CHEST WALL AND LOWER NECK:   Mild bilateral gynecomastia. VISUALIZED STRUCTURES IN THE UPPER ABDOMEN: No acute findings in the abdomen. BONES: Severe multilevel degenerative changes in the spine. Vertebral body height is maintained. No acute fracture or destructive osseous lesion.     Impression: 1.  No filling defect to suggest acute or chronic pulmonary embolism in the entire pulmonary arterial system. 2.  Small bilateral pleural effusions, right greater than left, with bibasilar atelectasis. The study was marked in EPIC for immediate notification. Workstation performed: [id number]       Meds/Allergies   current meds:   Current Facility-Administered Medications   Medication Dose Route Frequency    aspirin (ECOTRIN LOW STRENGTH) EC tablet 81 mg  81 mg Oral Daily    Diclofenac Sodium (VOLTAREN) 1 % topical gel 2 g  2 g Topical TID PRN    ezetimibe (ZETIA) tablet 10 mg  10 mg Oral Daily    furosemide (LASIX) injection 40 mg  40 mg Intravenous Daily    heparin (porcine)  subcutaneous injection 5,000 Units  5,000 Units Subcutaneous Q8H VALERIE    insulin glargine (LANTUS) subcutaneous injection 20 Units 0.2 mL  20 Units Subcutaneous QAM    insulin lispro (HumALOG/ADMELOG) 100 units/mL subcutaneous injection 1-5 Units  1-5 Units Subcutaneous HS    insulin lispro (HumALOG/ADMELOG) 100 units/mL subcutaneous injection 1-6 Units  1-6 Units Subcutaneous TID AC    latanoprost (XALATAN) 0.005 % ophthalmic solution 1 drop  1 drop Both Eyes HS    levothyroxine tablet 50 mcg  50 mcg Oral Early Morning    lidocaine (LIDODERM) 5 % patch 2 patch  2 patch Topical Daily    metoprolol tartrate (LOPRESSOR) tablet 50 mg  50 mg Oral Q12H VALERIE    pantoprazole (PROTONIX) EC tablet 40 mg  40 mg Oral Early Morning    pravastatin (PRAVACHOL) tablet 80 mg  80 mg Oral Daily With Dinner     Medications Prior to Admission   Medication    amLODIPine (NORVASC) 10 mg tablet    aspirin 81 mg chewable tablet    cholecalciferol 25 MCG (1000 UT) tablet    ezetimibe (ZETIA) 10 mg tablet    glipiZIDE (GLUCOTROL) 10 mg tablet    insulin degludec (Tresiba FlexTouch) 100 units/mL injection pen    latanoprost (XALATAN) 0.005 % ophthalmic solution    levothyroxine 50 mcg tablet    losartan (COZAAR) 50 mg tablet    metFORMIN (GLUCOPHAGE) 500 mg tablet    metoprolol succinate (TOPROL-XL) 100 mg 24 hr tablet    pantoprazole (PROTONIX) 40 mg tablet    pioglitazone (ACTOS) 45 mg tablet    simvastatin (ZOCOR) 40 mg tablet          Jay Taylor MD  3/30/2024,11:20 AM      Portions of the record may have been created with voice recognition software. Occasional wrong words or sound alike substitutions may have occurred due to the inherent limitations of voice recognition software. Please read the chart carefully and recognize, using context, where substitutions may have occurred.

## 2024-03-30 NOTE — UTILIZATION REVIEW
SEE INITIAL REVIEW AT BOTTOM    Continued Stay Review    Date: 3/30/2024                        Current Patient Class: inpatient  Current Level of Care: med/surg  HPI:79 y.o. male initially admitted on 3/28    Assessment/Plan: Pt remains volume-loaded on exam continue IV diuresis per cardiology. Cr 2.07 this AM, avoid nephrotoxic agents. Put out 2L urine in las 24 hrs. Nephrology consulted. Repeat VBG pH 7.223, pCO2 66.3, oxygen level low.  Does have tachypnea, chest x-ray with worsening vascular congestion, official read pending. Will plan to trial 2 hours of BiPAP while with repeat VBG    Cardiology consult -- Acute HFpEF.  Volume status improved. Continue IV furosemide (BD -> OD), Lopressor.  No SGLT2 inhibitor given CKD.  Low-salt diet.  Daily weights.  Accurate I/Os.    Mildly elevated flat troponins (89-95-98) likely 2/2 acute CHF.  Plan for stress test on Monday (4/1)  H/o CAD - continue aspirin, statin, Zetia, Lopressor.     Hypertension.  BP stable.  Continue current medications.  Continue to monitor vitals   Hyperlipidemia.  Continue statin, Zetia, diet control     Vital Signs:   Date/Time Temp Pulse Resp BP MAP (mmHg) SpO2 Calculated FIO2 (%) - Nasal Cannula Nasal Cannula O2 Flow Rate (L/min) O2 Device O2 Interface Device   03/30/24 15:41:52 -- 69 -- 156/74 101 95 % -- -- -- --   03/30/24 1408 -- -- -- -- -- 99 % -- -- -- Face mask   03/30/24 12:14:51 97.6 °F (36.4 °C) 65 -- 123/66 85 90 % -- -- -- --   03/30/24 1031 -- -- -- -- -- 91 % 44 6 L/min -- --   03/30/24 1030 -- -- -- -- -- 86 % Abnormal  44 6 L/min -- --   03/30/24 07:17:01 -- 70 -- 127/64 85 92 % -- -- -- --   03/30/24 03:00:32 -- 65 18 138/63 88 90 % -- -- -- --       Pertinent Labs/Diagnostic Results:   Results from last 7 days   Lab Units 03/30/24  0515 03/29/24  0432 03/28/24  1518   WBC Thousand/uL 5.61 5.62 6.98   HEMOGLOBIN g/dL 11.3* 10.8* 11.1*   HEMATOCRIT % 36.4* 33.8* 34.5*   PLATELETS Thousands/uL 316 324 344   NEUTROS ABS  Thousands/µL  --  4.47 5.89     Results from last 7 days   Lab Units 03/30/24  0515 03/29/24  0432 03/28/24  1518   SODIUM mmol/L 140 140 139   POTASSIUM mmol/L 5.1 5.0 4.8   CHLORIDE mmol/L 105 107 106   CO2 mmol/L 26 26 24   ANION GAP mmol/L 9 7 9   BUN mg/dL 49* 39* 39*   CREATININE mg/dL 2.07* 1.59* 1.47*   EGFR ml/min/1.73sq m 29 40 44   CALCIUM mg/dL 9.4 9.0 9.4   MAGNESIUM mg/dL 2.4 2.3 2.2     Results from last 7 days   Lab Units 03/30/24  1118 03/30/24  0714 03/29/24  2102 03/29/24  1725 03/29/24  1104 03/29/24  0745 03/28/24  2144 03/28/24  1838   POC GLUCOSE mg/dl 133 135 200* 123 151* 104 208* 179*     Results from last 7 days   Lab Units 03/30/24  0515 03/29/24  0432 03/28/24  1518   GLUCOSE RANDOM mg/dL 136 119 175*     Results from last 7 days   Lab Units 03/30/24  0954 03/28/24  1518   PH GIUSEPPE  7.223* 7.333   PCO2 GIUSEPPE mm Hg 66.6* 45.5   PO2 GIUSEPPE mm Hg 30.3* 30.7*   HCO3 GIUSEPPE mmol/L 26.8 23.6*   BASE EXC GIUSEPPE mmol/L -1.9 -2.4   O2 CONTENT GIUSEPPE ml/dL 8.6 9.1   O2 HGB, VENOUS % 52.2* 53.1*     Results from last 7 days   Lab Units 03/30/24  1440   CLARITY UA  Clear   COLOR UA  Light Yellow   SPEC GRAV UA  1.015   PH UA  5.0   GLUCOSE UA mg/dl Negative   KETONES UA mg/dl Negative   BLOOD UA  Negative   PROTEIN UA mg/dl Trace*   NITRITE UA  Negative   BILIRUBIN UA  Negative   UROBILINOGEN UA (BE) mg/dl <2.0   LEUKOCYTES UA  Small*   WBC UA /hpf 20-30*   RBC UA /hpf 1-2   BACTERIA UA /hpf None Seen   EPITHELIAL CELLS WET PREP /hpf None Seen     Results from last 7 days   Lab Units 03/28/24  1540   INFLUENZA A PCR  Negative   INFLUENZA B PCR  Negative   RSV PCR  Negative     Results from last 7 days   Lab Units 03/28/24  1635 03/28/24  1554   BLOOD CULTURE  No Growth at 24 hrs. No Growth at 24 hrs.       Medications:   Scheduled Medications:  aspirin, 81 mg, Oral, Daily  ezetimibe, 10 mg, Oral, Daily  furosemide, 40 mg, Intravenous, Daily  heparin (porcine), 5,000 Units, Subcutaneous, Q8H VALERIE  insulin glargine,  20 Units, Subcutaneous, QAM  insulin lispro, 1-5 Units, Subcutaneous, HS  insulin lispro, 1-6 Units, Subcutaneous, TID AC  latanoprost, 1 drop, Both Eyes, HS  levothyroxine, 50 mcg, Oral, Early Morning  lidocaine, 2 patch, Topical, Daily  metoprolol tartrate, 50 mg, Oral, Q12H VALERIE  pantoprazole, 40 mg, Oral, Early Morning  pravastatin, 80 mg, Oral, Daily With Dinner    PRN Meds:  Diclofenac Sodium, 2 g, Topical, TID PRN      Discharge Plan: TBD    Network Utilization Review Department  ATTENTION: Please call with any questions or concerns to [phone] and carefully listen to the prompts so that you are directed to the right person. All voicemails are confidential.   For Discharge needs, contact Care Management DC Support Team at [phone] opt. [phone]  Send all requests for admission clinical reviews, approved or denied determinations and any other requests to dedicated fax number below belonging to the [facility] where the patient is receiving treatment. List of dedicated fax numbers for the Facilities:  FACILITY NAME UR FAX NUMBER   ADMISSION DENIALS (Administrative/Medical Necessity) [phone]   DISCHARGE SUPPORT TEAM (NETWORK) [phone]   PARENT CHILD HEALTH (Maternity/NICU/Pediatrics) [phone]   [facility] [phone]   [facility] [phone]   [facility] [phone]   [facility] [phone]   [facility] [phone]   [facility] [phone]   [facility] [phone]   [facility] [phone]   [facility] [phone]   [facility] [phone]   [facility] [phone]   [facility] [phone]

## 2024-03-30 NOTE — PROGRESS NOTES
"[facility]  Progress Note  Name: Trent Ocasio I  MRN: [id number]  Unit/Bed#: [facility] Date of Admission: 3/28/2024   Date of Service: 3/30/2024 I Hospital Day: 2    Assessment/Plan   Acute-on-chronic kidney injury   Assessment & Plan  Lab Results   Component Value Date    EGFR 29 03/30/2024    EGFR 40 03/29/2024    EGFR 44 03/28/2024    CREATININE 2.07 (H) 03/30/2024    CREATININE 1.59 (H) 03/29/2024    CREATININE 1.47 (H) 03/28/2024   Baseline creatinine 1.5-1.7, 2.07 today  Patient remains volume overloaded on exam  Continue with IV diuresis per cardiology  Avoid nephrotoxic agents  Put out 2 L urine in the last 24 hours  UA pending, urinary retention protocol  Nephrology consulted, appreciate recommendations    Elevated troponin  Assessment & Plan  89-95-98, continue to trend to peak.  Trend appears flat.  No active chest pain, suspect 2/2 to acute CHF  EKG shows Sinus rhythm, first-degree AV block with SC interval of 338, nonspecific T wave changes in V1  Cardiology consulted, appreciate recommendations  Plan for stress test on monday    Type 2 diabetes mellitus, with long-term current use of insulin (HCC)  Assessment & Plan  Lab Results   Component Value Date    HGBA1C 7.3 (H) 02/16/2024       Recent Labs     03/29/24  1725 03/29/24  2102 03/30/24  0714 03/30/24  1118   POCGLU 123 200* 135 133         Blood Sugar Average: Last 72 hrs:  (P) 154.125    Hold p.o. home meds  Continue home insulin regimen of lantus 20 U qhs  Sliding scale insulin  Carb controlled diet    Hypothyroid  Assessment & Plan  TSH WNL  Continue Synthroid    HTN (hypertension)  Assessment & Plan  /66   Pulse 65   Temp 97.6 °F (36.4 °C)   Resp 18   Ht 5' 10\" (1.778 m)   Wt 121 kg (265 lb 10.5 oz) Comment: pt unable to stand due to sob  SpO2 90%   BMI 38.12 kg/m²   Stable pressures  Takes 100 mg metoprolol twice daily at home.  Reduced to 50 mg twice daily at this time  Continue IV Lasix 40 twice " daily  Holding off on other blood pressure medications from his home medication list    Anxiety  Assessment & Plan  Currently stable  Atarax as needed    History of prostate cancer  Assessment & Plan  .  History of radiation treatment 5 years ago  Recommend outpatient follow-up    Acute respiratory failure with hypoxia (HCC)  Assessment & Plan  Patient found to be on 83% on room air, with started on 4 L in the ER and saturations increased in the 90s, currently on 6 L NC  Repeat VBG pH 7.223, pCO2 66.3, oxygen level low.  Does have tachypnea, chest x-ray with worsening vascular congestion, official read pending.  Will plan to trial 2 hours of BiPAP while with repeat VBG  If no improvement will discuss case with ICU  Continue to wean oxygen as possible  Underlying etiology is congestive heart failure exacerbation    * Acute CHF (congestive heart failure) (HCC)  Assessment & Plan  Wt Readings from Last 3 Encounters:   03/30/24 121 kg (265 lb 10.5 oz)   06/25/22 109 kg (240 lb)     Patient presents with severe shortness of breath, leg edema.  Found to have elevated BNP, elevated troponin  Elevated D-dimer, CTA done PE ruled out  Status post 40 IV Lasix in the ER  Continue with IV 40 Lasix twice daily  Strict I's and O's  Echocardiogram pending  Cardiology consultation  2 L fluid restriction  Nutrition consult                     VTE Pharmacologic Prophylaxis:   Low Risk (Score 0-2) - Encourage Ambulation.    Mobility:   Basic Mobility Inpatient Raw Score: 17  JH-HLM Goal: 5: Stand one or more mins  JH-HLM Achieved: 4: Move to chair/commode  JH-HLM Goal NOT achieved. Continue with multidisciplinary rounding and encourage appropriate mobility to improve upon JH-HLM goals.    Patient Centered Rounds: I performed bedside rounds with nursing staff today.   Discussions with Specialists or Other Care Team Provider: JUAN MIGUEL    Education and Discussions with Family / Patient:  PT. updated wife at bedside    Total Time Spent on Date  of Encounter in care of patient: 60 mins. This time was spent on one or more of the following: performing physical exam; counseling and coordination of care; obtaining or reviewing history; documenting in the medical record; reviewing/ordering tests, medications or procedures; communicating with other healthcare professionals and discussing with patient's family/caregivers.    Current Length of Stay: 2 day(s)  Current Patient Status: Inpatient   Certification Statement: The patient will continue to require additional inpatient hospital stay due to CHF exacerbation  Discharge Plan: Anticipate discharge in 24-48 hrs to discharge location to be determined pending rehab evaluations.    Code Status: Level 1 - Full Code    Subjective:   Patient reports some shortness of breath today.  Felt very out of it this morning which he thought was due to his gabapentin, now mental status is improving.  Reports good urinary output.  Denies nausea, vomiting, chest pain.    Objective:     Vitals:   Temp (24hrs), Av.1 °F (36.7 °C), Min:97.6 °F (36.4 °C), Max:98.8 °F (37.1 °C)    Temp:  [97.6 °F (36.4 °C)-98.8 °F (37.1 °C)] 97.6 °F (36.4 °C)  HR:  [65-78] 65  Resp:  [18-19] 18  BP: (114-150)/(54-67) 123/66  SpO2:  [86 %-94 %] 90 %  Body mass index is 38.12 kg/m².     Input and Output Summary (last 24 hours):     Intake/Output Summary (Last 24 hours) at 3/30/2024 1334  Last data filed at 3/30/2024 0553  Gross per 24 hour   Intake 240 ml   Output 1925 ml   Net -1685 ml       Physical Exam:   Physical Exam  Constitutional:       General: He is not in acute distress.     Appearance: He is well-developed. He is not diaphoretic.   HENT:      Head: Normocephalic and atraumatic.      Mouth/Throat:      Pharynx: No oropharyngeal exudate.   Eyes:      General: No scleral icterus.     Extraocular Movements: Extraocular movements intact.      Conjunctiva/sclera: Conjunctivae normal.   Neck:      Vascular: No JVD.      Trachea: No tracheal  deviation.   Cardiovascular:      Rate and Rhythm: Normal rate and regular rhythm.      Heart sounds: No murmur heard.     No friction rub. No gallop.   Pulmonary:      Effort: No respiratory distress.      Breath sounds: No stridor. Rhonchi and rales present. No wheezing.      Comments: 6 L NC satting 92%  Abdominal:      General: There is no distension.      Palpations: Abdomen is soft. There is no mass.      Tenderness: There is no abdominal tenderness. There is no right CVA tenderness or left CVA tenderness.   Musculoskeletal:         General: No tenderness. Normal range of motion.      Right lower leg: Edema present.      Left lower leg: Edema present.   Skin:     General: Skin is warm.      Coloration: Skin is pale.      Findings: No erythema.   Neurological:      Mental Status: He is oriented to person, place, and time.   Psychiatric:         Behavior: Behavior normal.         Thought Content: Thought content normal.          Additional Data:     Labs:  Results from last 7 days   Lab Units 03/30/24  0515 03/29/24  0432   WBC Thousand/uL 5.61 5.62   HEMOGLOBIN g/dL 11.3* 10.8*   HEMATOCRIT % 36.4* 33.8*   PLATELETS Thousands/uL 316 324   NEUTROS PCT %  --  80*   LYMPHS PCT %  --  7*   MONOS PCT %  --  11   EOS PCT %  --  2     Results from last 7 days   Lab Units 03/30/24  0515 03/29/24  0432 03/28/24  1518   SODIUM mmol/L 140   < > 139   POTASSIUM mmol/L 5.1   < > 4.8   CHLORIDE mmol/L 105   < > 106   CO2 mmol/L 26   < > 24   BUN mg/dL 49*   < > 39*   CREATININE mg/dL 2.07*   < > 1.47*   ANION GAP mmol/L 9   < > 9   CALCIUM mg/dL 9.4   < > 9.4   ALBUMIN g/dL  --   --  4.2   TOTAL BILIRUBIN mg/dL  --   --  0.60   ALK PHOS U/L  --   --  54   ALT U/L  --   --  16   AST U/L  --   --  15   GLUCOSE RANDOM mg/dL 136   < > 175*    < > = values in this interval not displayed.     Results from last 7 days   Lab Units 03/28/24  1518   INR  0.99     Results from last 7 days   Lab Units 03/30/24  1118 03/30/24  0714  03/29/24  2102 03/29/24  1725 03/29/24  1104 03/29/24  0745 03/28/24  2144 03/28/24  1838   POC GLUCOSE mg/dl 133 135 200* 123 151* 104 208* 179*         Results from last 7 days   Lab Units 03/28/24  1554 03/28/24  1518   LACTIC ACID mmol/L 1.4  --    PROCALCITONIN ng/ml  --  0.14       Lines/Drains:  Invasive Devices       Peripheral Intravenous Line  Duration             Peripheral IV 03/28/24 Left Antecubital 1 day    Peripheral IV 03/29/24 Left;Ventral (anterior) Forearm <1 day                      Telemetry:  Telemetry Orders (From admission, onward)               24 Hour Telemetry Monitoring  Continuous x 24 Hours (Telem)        Question:  Reason for 24 Hour Telemetry  Answer:  Decompensated CHF- and any one of the following: continuous diuretic infusion or total diuretic dose >200 mg daily, associated electrolyte derangement (I.e. K < 3.0), ionotropic drip (continuous infusion), hx of ventricular arrhythmia, or new EF < 35%                     Telemetry Reviewed: Normal Sinus Rhythm  Indication for Continued Telemetry Use: Arrthymias requiring medical therapy             Imaging: No pertinent imaging reviewed.    Recent Cultures (last 7 days):   Results from last 7 days   Lab Units 03/28/24  1635 03/28/24  1554   BLOOD CULTURE  No Growth at 24 hrs. No Growth at 24 hrs.       Last 24 Hours Medication List:   Current Facility-Administered Medications   Medication Dose Route Frequency Provider Last Rate    aspirin  81 mg Oral Daily Chai Salomon MD      Diclofenac Sodium  2 g Topical TID PRN Arelis Gleason PA-C      ezetimibe  10 mg Oral Daily Arelis Gleason PA-C      furosemide  40 mg Intravenous Daily ANNY Fowler      heparin (porcine)  5,000 Units Subcutaneous Q8H VALERIE Chai Salomon MD      insulin glargine  20 Units Subcutaneous QAM Arelis Gleason PA-C      insulin lispro  1-5 Units Subcutaneous HS Chai Salomon MD      insulin lispro  1-6 Units Subcutaneous TID AC Chai Salomon MD      latanoprost  1  drop Both Eyes HS Arelis Gleason PA-C      levothyroxine  50 mcg Oral Early Morning Chai Salomon MD      lidocaine  2 patch Topical Daily Rose Marie Sánchez DO      metoprolol tartrate  50 mg Oral Q12H [facility] Chai Salomon MD      pantoprazole  40 mg Oral Early Morning Chai Salomon MD      pravastatin  80 mg Oral Daily With Dinner Arelis Gleason PA-C          Today, Patient Was Seen By: Rose Marie Sánchez DO    **Please Note: This note may have been constructed using a voice recognition system.**

## 2024-03-30 NOTE — RESPIRATORY THERAPY NOTE
03/30/24 1408   Respiratory Assessment   Assessment Type Assess only   General Appearance Alert;Awake   Respiratory Pattern Assisted   Chest Assessment Chest expansion symmetrical   Bilateral Breath Sounds Diminished   Resp Comments placed on Bipap per SLIM for vbg resulted from 10am, patient awake and alert respirations even and non labored, tolerating well, spo2 99%   Non-Invasive Information   O2 Interface Device Face mask   Non-Invasive Ventilation Mode BiPAP   SpO2 99 %   $ Pulse Oximetry Spot Check Charge Completed   Non-Invasive Settings   IPAP (cm) 12 cm   EPAP (cm) 6 cm   Rate (Set) 10   FiO2 (%) 40   Pressure Support (cm H2O) 6   Rise Time 2   Inspiratory Time (Set) 1   Non-Invasive Readings   Skin Intervention Skin intact   Total Rate 24   MV (Mech) 11.5   Peak Pressure (Obs) 13   Spontaneous Vt (mL) 491   Leak (lpm) 0   Non-Invasive Alarms   Insp Pressure High (cm H20) 25   Insp Pressure Low (cm H20) 5   Low Insp Pressure Time (sec) 20 sec   MV Low (L/min) 3   Vt High (mL) 1200   Vt Low (mL) 200   High Resp Rate (BPM) 40 BPM   Low Resp Rate (BPM) 8 BPM

## 2024-03-31 PROBLEM — E87.29 RESPIRATORY ACIDOSIS: Status: ACTIVE | Noted: 2024-03-31

## 2024-03-31 LAB
ALBUMIN SERPL BCP-MCNC: 3.7 G/DL (ref 3.5–5)
ALP SERPL-CCNC: 52 U/L (ref 34–104)
ALT SERPL W P-5'-P-CCNC: 13 U/L (ref 7–52)
ANION GAP SERPL CALCULATED.3IONS-SCNC: 6 MMOL/L (ref 4–13)
AST SERPL W P-5'-P-CCNC: 13 U/L (ref 13–39)
BASE EX.OXY STD BLDV CALC-SCNC: 94.5 % (ref 60–80)
BASE EXCESS BLDV CALC-SCNC: -2.4 MMOL/L
BILIRUB SERPL-MCNC: 0.33 MG/DL (ref 0.2–1)
BUN SERPL-MCNC: 56 MG/DL (ref 5–25)
CALCIUM SERPL-MCNC: 9.3 MG/DL (ref 8.4–10.2)
CHLORIDE SERPL-SCNC: 105 MMOL/L (ref 96–108)
CO2 SERPL-SCNC: 28 MMOL/L (ref 21–32)
CREAT SERPL-MCNC: 1.98 MG/DL (ref 0.6–1.3)
CREAT UR-MCNC: <1 MG/DL
ERYTHROCYTE [DISTWIDTH] IN BLOOD BY AUTOMATED COUNT: 15.2 % (ref 11.6–15.1)
GFR SERPL CREATININE-BSD FRML MDRD: 30 ML/MIN/1.73SQ M
GLUCOSE SERPL-MCNC: 119 MG/DL (ref 65–140)
GLUCOSE SERPL-MCNC: 151 MG/DL (ref 65–140)
GLUCOSE SERPL-MCNC: 161 MG/DL (ref 65–140)
GLUCOSE SERPL-MCNC: 212 MG/DL (ref 65–140)
GLUCOSE SERPL-MCNC: 220 MG/DL (ref 65–140)
HCO3 BLDV-SCNC: 24.7 MMOL/L (ref 24–30)
HCT VFR BLD AUTO: 33.9 % (ref 36.5–49.3)
HGB BLD-MCNC: 10.5 G/DL (ref 12–17)
MCH RBC QN AUTO: 31.7 PG (ref 26.8–34.3)
MCHC RBC AUTO-ENTMCNC: 31 G/DL (ref 31.4–37.4)
MCV RBC AUTO: 102 FL (ref 82–98)
O2 CT BLDV-SCNC: 14.9 ML/DL
PCO2 BLDV: 52.7 MM HG (ref 42–50)
PH BLDV: 7.29 [PH] (ref 7.3–7.4)
PLATELET # BLD AUTO: 315 THOUSANDS/UL (ref 149–390)
PMV BLD AUTO: 9.5 FL (ref 8.9–12.7)
PO2 BLDV: 95.4 MM HG (ref 35–45)
POTASSIUM SERPL-SCNC: 5.1 MMOL/L (ref 3.5–5.3)
PROCALCITONIN SERPL-MCNC: 0.12 NG/ML
PROT SERPL-MCNC: 6.8 G/DL (ref 6.4–8.4)
PROT UR-MCNC: <4 MG/DL
RBC # BLD AUTO: 3.31 MILLION/UL (ref 3.88–5.62)
SODIUM SERPL-SCNC: 139 MMOL/L (ref 135–147)
WBC # BLD AUTO: 4.24 THOUSAND/UL (ref 4.31–10.16)

## 2024-03-31 PROCEDURE — 80053 COMPREHEN METABOLIC PANEL: CPT | Performed by: STUDENT IN AN ORGANIZED HEALTH CARE EDUCATION/TRAINING PROGRAM

## 2024-03-31 PROCEDURE — 99233 SBSQ HOSP IP/OBS HIGH 50: CPT | Performed by: STUDENT IN AN ORGANIZED HEALTH CARE EDUCATION/TRAINING PROGRAM

## 2024-03-31 PROCEDURE — 99232 SBSQ HOSP IP/OBS MODERATE 35: CPT | Performed by: INTERNAL MEDICINE

## 2024-03-31 PROCEDURE — 82306 VITAMIN D 25 HYDROXY: CPT | Performed by: INTERNAL MEDICINE

## 2024-03-31 PROCEDURE — 85027 COMPLETE CBC AUTOMATED: CPT | Performed by: STUDENT IN AN ORGANIZED HEALTH CARE EDUCATION/TRAINING PROGRAM

## 2024-03-31 PROCEDURE — 94660 CPAP INITIATION&MGMT: CPT

## 2024-03-31 PROCEDURE — 83970 ASSAY OF PARATHORMONE: CPT | Performed by: INTERNAL MEDICINE

## 2024-03-31 PROCEDURE — 82948 REAGENT STRIP/BLOOD GLUCOSE: CPT

## 2024-03-31 PROCEDURE — 84145 PROCALCITONIN (PCT): CPT | Performed by: STUDENT IN AN ORGANIZED HEALTH CARE EDUCATION/TRAINING PROGRAM

## 2024-03-31 PROCEDURE — 83521 IG LIGHT CHAINS FREE EACH: CPT | Performed by: INTERNAL MEDICINE

## 2024-03-31 PROCEDURE — 84165 PROTEIN E-PHORESIS SERUM: CPT | Performed by: INTERNAL MEDICINE

## 2024-03-31 PROCEDURE — 99223 1ST HOSP IP/OBS HIGH 75: CPT | Performed by: INTERNAL MEDICINE

## 2024-03-31 PROCEDURE — 94760 N-INVAS EAR/PLS OXIMETRY 1: CPT

## 2024-03-31 PROCEDURE — 82805 BLOOD GASES W/O2 SATURATION: CPT

## 2024-03-31 RX ORDER — FUROSEMIDE 10 MG/ML
40 INJECTION INTRAMUSCULAR; INTRAVENOUS
Status: DISCONTINUED | OUTPATIENT
Start: 2024-03-31 | End: 2024-04-03

## 2024-03-31 RX ORDER — AMOXICILLIN 250 MG
1 CAPSULE ORAL 2 TIMES DAILY
Status: DISCONTINUED | OUTPATIENT
Start: 2024-03-31 | End: 2024-04-03 | Stop reason: HOSPADM

## 2024-03-31 RX ADMIN — INSULIN LISPRO 2 UNITS: 100 INJECTION, SOLUTION INTRAVENOUS; SUBCUTANEOUS at 21:51

## 2024-03-31 RX ADMIN — EZETIMIBE 10 MG: 10 TABLET ORAL at 08:53

## 2024-03-31 RX ADMIN — METOPROLOL TARTRATE 50 MG: 50 TABLET, FILM COATED ORAL at 08:53

## 2024-03-31 RX ADMIN — HEPARIN SODIUM 5000 UNITS: 5000 INJECTION INTRAVENOUS; SUBCUTANEOUS at 21:50

## 2024-03-31 RX ADMIN — INSULIN GLARGINE 20 UNITS: 100 INJECTION, SOLUTION SUBCUTANEOUS at 08:52

## 2024-03-31 RX ADMIN — INSULIN LISPRO 2 UNITS: 100 INJECTION, SOLUTION INTRAVENOUS; SUBCUTANEOUS at 12:44

## 2024-03-31 RX ADMIN — PANTOPRAZOLE SODIUM 40 MG: 40 TABLET, DELAYED RELEASE ORAL at 18:18

## 2024-03-31 RX ADMIN — LIDOCAINE 5% 2 PATCH: 700 PATCH TOPICAL at 08:54

## 2024-03-31 RX ADMIN — FUROSEMIDE 40 MG: 10 INJECTION, SOLUTION INTRAMUSCULAR; INTRAVENOUS at 10:59

## 2024-03-31 RX ADMIN — FUROSEMIDE 40 MG: 10 INJECTION, SOLUTION INTRAMUSCULAR; INTRAVENOUS at 08:53

## 2024-03-31 RX ADMIN — SENNOSIDES, DOCUSATE SODIUM 1 TABLET: 8.6; 5 TABLET ORAL at 14:36

## 2024-03-31 RX ADMIN — ASPIRIN 81 MG: 81 TABLET, COATED ORAL at 08:53

## 2024-03-31 RX ADMIN — FUROSEMIDE 40 MG: 10 INJECTION, SOLUTION INTRAMUSCULAR; INTRAVENOUS at 18:17

## 2024-03-31 RX ADMIN — LEVOTHYROXINE SODIUM 50 MCG: 0.05 TABLET ORAL at 05:20

## 2024-03-31 RX ADMIN — CHLOROTHIAZIDE SODIUM 500 MG: 500 INJECTION, POWDER, LYOPHILIZED, FOR SOLUTION INTRAVENOUS at 12:41

## 2024-03-31 RX ADMIN — HEPARIN SODIUM 5000 UNITS: 5000 INJECTION INTRAVENOUS; SUBCUTANEOUS at 12:41

## 2024-03-31 RX ADMIN — METOPROLOL TARTRATE 50 MG: 50 TABLET, FILM COATED ORAL at 21:50

## 2024-03-31 RX ADMIN — HEPARIN SODIUM 5000 UNITS: 5000 INJECTION INTRAVENOUS; SUBCUTANEOUS at 05:20

## 2024-03-31 RX ADMIN — PRAVASTATIN SODIUM 80 MG: 80 TABLET ORAL at 18:17

## 2024-03-31 RX ADMIN — LATANOPROST 1 DROP: 50 SOLUTION OPHTHALMIC at 21:50

## 2024-03-31 RX ADMIN — INSULIN LISPRO 1 UNITS: 100 INJECTION, SOLUTION INTRAVENOUS; SUBCUTANEOUS at 08:47

## 2024-03-31 NOTE — PROGRESS NOTES
[facility]  Progress Note  Name: Trent Ocasio I  MRN: [id number]  Unit/Bed#: -01 I Date of Admission: 3/28/2024   Date of Service: 3/31/2024 I Hospital Day: 3    Assessment/Plan   Respiratory acidosis  Assessment & Plan  Ph 7.22 with co2 68, improved with 2 hours of bipap and bipap qhs to Ph 7.28 and Co2 51 this am. Suspect 2/2 to volume overload.   IV diuresis as above  Respiratory status has improved compared to yesterday, now on 3 L NC  Continue with bipap qhs  Repeat VBG in am    Acute-on-chronic kidney injury   Assessment & Plan  Lab Results   Component Value Date    EGFR 30 03/31/2024    EGFR 30 03/30/2024    EGFR 29 03/30/2024    CREATININE 1.98 (H) 03/31/2024    CREATININE 1.98 (H) 03/30/2024    CREATININE 2.07 (H) 03/30/2024   Baseline creatinine 1.5-1.7, 2.07 today  Patient remains volume overloaded on exam  Continue with IV diuresis per cardiology and nephrology  Avoid nephrotoxic agents  Put out 2 L urine in the last 24 hours  Nephrology consulted, appreciate recommendations    Elevated troponin  Assessment & Plan  89-95-98, continue to trend to peak.  Trend appears flat.  No active chest pain, suspect 2/2 to acute CHF  EKG shows Sinus rhythm, first-degree AV block with ND interval of 338, nonspecific T wave changes in V1  Cardiology consulted, appreciate recommendations  Plan for stress test on monday    Type 2 diabetes mellitus, with long-term current use of insulin (HCC)  Assessment & Plan  Lab Results   Component Value Date    HGBA1C 7.3 (H) 02/16/2024       Recent Labs     03/30/24  1118 03/30/24  1642 03/30/24  2050 03/31/24  0750   POCGLU 133 182* 225* 151*         Blood Sugar Average: Last 72 hrs:  (P) [id number]    Hold p.o. home meds  Continue home insulin regimen of lantus 20 U qhs  Sliding scale insulin  Carb controlled diet    Hypothyroid  Assessment & Plan  TSH WNL  Continue Synthroid    HTN (hypertension)  Assessment & Plan  /65   Pulse  "79   Temp 98.5 °F (36.9 °C)   Resp 20   Ht 5' 10\" (1.778 m)   Wt 122 kg (268 lb 1.3 oz)   SpO2 95%   BMI 38.47 kg/m²   Stable pressures  Takes 100 mg metoprolol twice daily at home.  Reduced to 50 mg twice daily at this time  increased IV Lasix 40 twice TID  Holding off on other blood pressure medications from his home medication list    Anxiety  Assessment & Plan  Currently stable  Atarax as needed    History of prostate cancer  Assessment & Plan  .  History of radiation treatment 5 years ago  Recommend outpatient follow-up    Acute respiratory failure with hypoxia (HCC)  Assessment & Plan  Patient found to be on 83% on room air, with started on 4 L in the ER and saturations increased in the 90s, currently on  3L NC, with improvement in respiratory status  Repeat VBG pH 7.223, pCO2 66.3, oxygen level low. Improved with bipap   Suspect 2/2 to volume overload  Continue to wean oxygen as possible  Underlying etiology is congestive heart failure exacerbation    * Acute CHF (congestive heart failure) (HCC)  Assessment & Plan  Wt Readings from Last 3 Encounters:   03/31/24 122 kg (268 lb 1.3 oz)   06/25/22 109 kg (240 lb)     Patient presents with severe shortness of breath, leg edema.  Found to have elevated BNP, elevated troponin  Elevated D-dimer, CTA done PE ruled out  Status post 40 IV Lasix in the ER  Increase to IV 40 Lasix TID  Strict I's and O's  Echocardiogram pending  Cardiology and nephrology following  2 L fluid restriction  Nutrition consult                 VTE Pharmacologic Prophylaxis:   Moderate Risk (Score 3-4) - Pharmacological DVT Prophylaxis Ordered: heparin.    Mobility:   Basic Mobility Inpatient Raw Score: 17  JH-HLM Goal: 5: Stand one or more mins  JH-HLM Achieved: 4: Move to chair/commode  JH-HLM Goal NOT achieved. Continue with multidisciplinary rounding and encourage appropriate mobility to improve upon JH-HLM goals.    Patient Centered Rounds: I performed bedside rounds with nursing " staff today.   Discussions with Specialists or Other Care Team Provider: CM    Education and Discussions with Family / Patient:  PT.     Total Time Spent on Date of Encounter in care of patient: 60 mins. This time was spent on one or more of the following: performing physical exam; counseling and coordination of care; obtaining or reviewing history; documenting in the medical record; reviewing/ordering tests, medications or procedures; communicating with other healthcare professionals and discussing with patient's family/caregivers.    Current Length of Stay: 3 day(s)  Current Patient Status: Inpatient   Certification Statement: The patient will continue to require additional inpatient hospital stay due to IV diuresis  Discharge Plan: Anticipate discharge in 48 hrs to discharge location to be determined pending rehab evaluations.    Code Status: Level 1 - Full Code    Subjective:   Pt has no acute complaints today. Sob has improved.     Objective:     Vitals:   Temp (24hrs), Av °F (36.7 °C), Min:97.6 °F (36.4 °C), Max:98.5 °F (36.9 °C)    Temp:  [97.6 °F (36.4 °C)-98.5 °F (36.9 °C)] 98.5 °F (36.9 °C)  HR:  [65-84] 77  Resp:  [20] 20  BP: (123-166)/(65-74) 140/65  SpO2:  [85 %-99 %] 95 %  Body mass index is 38.47 kg/m².     Input and Output Summary (last 24 hours):     Intake/Output Summary (Last 24 hours) at 3/31/2024 1104  Last data filed at 3/31/2024 1103  Gross per 24 hour   Intake 565 ml   Output 1775 ml   Net -1210 ml       Physical Exam:   Physical Exam  Constitutional:       General: He is not in acute distress.     Appearance: He is well-developed. He is obese. He is not diaphoretic.   HENT:      Head: Normocephalic and atraumatic.      Mouth/Throat:      Pharynx: No oropharyngeal exudate.   Eyes:      General: No scleral icterus.     Extraocular Movements: Extraocular movements intact.      Conjunctiva/sclera: Conjunctivae normal.   Neck:      Vascular: No JVD.      Trachea: No tracheal deviation.    Cardiovascular:      Rate and Rhythm: Normal rate and regular rhythm.      Heart sounds: No murmur heard.     No friction rub. No gallop.   Pulmonary:      Effort: Pulmonary effort is normal. No respiratory distress.      Breath sounds: No stridor. Rales present. No wheezing.      Comments: 3 L NC  Abdominal:      General: There is no distension.      Palpations: Abdomen is soft. There is no mass.      Tenderness: There is no abdominal tenderness. There is no right CVA tenderness or left CVA tenderness.   Musculoskeletal:         General: No tenderness. Normal range of motion.      Right lower leg: Edema present.      Left lower leg: Edema present.   Skin:     General: Skin is warm and dry.      Coloration: Skin is not pale.      Findings: No erythema.   Neurological:      Mental Status: He is oriented to person, place, and time.   Psychiatric:         Behavior: Behavior normal.         Thought Content: Thought content normal.          Additional Data:     Labs:  Results from last 7 days   Lab Units 03/31/24  0448 03/30/24  0515 03/29/24  0432   WBC Thousand/uL 4.24*   < > 5.62   HEMOGLOBIN g/dL 10.5*   < > 10.8*   HEMATOCRIT % 33.9*   < > 33.8*   PLATELETS Thousands/uL 315   < > 324   NEUTROS PCT %  --   --  80*   LYMPHS PCT %  --   --  7*   MONOS PCT %  --   --  11   EOS PCT %  --   --  2    < > = values in this interval not displayed.     Results from last 7 days   Lab Units 03/31/24  0448   SODIUM mmol/L 139   POTASSIUM mmol/L 5.1   CHLORIDE mmol/L 105   CO2 mmol/L 28   BUN mg/dL 56*   CREATININE mg/dL 1.98*   ANION GAP mmol/L 6   CALCIUM mg/dL 9.3   ALBUMIN g/dL 3.7   TOTAL BILIRUBIN mg/dL 0.33   ALK PHOS U/L 52   ALT U/L 13   AST U/L 13   GLUCOSE RANDOM mg/dL 161*     Results from last 7 days   Lab Units 03/28/24  1518   INR  0.99     Results from last 7 days   Lab Units 03/31/24  0750 03/30/24  2050 03/30/24  1642 03/30/24  1118 03/30/24  0714 03/29/24  2102 03/29/24  1725 03/29/24  1104 03/29/24  0745  03/28/24  2144 03/28/24  1838   POC GLUCOSE mg/dl 151* 225* 182* 133 135 200* 123 151* 104 208* 179*         Results from last 7 days   Lab Units 03/31/24  0448 03/28/24  1554 03/28/24  1518   LACTIC ACID mmol/L  --  1.4  --    PROCALCITONIN ng/ml 0.12  --  0.14       Lines/Drains:  Invasive Devices       Peripheral Intravenous Line  Duration             Peripheral IV 03/28/24 Left Antecubital 2 days    Peripheral IV 03/29/24 Left;Ventral (anterior) Forearm 1 day                      Telemetry:  Telemetry Orders (From admission, onward)               24 Hour Telemetry Monitoring  Continuous x 24 Hours (Telem)        Expiring   Question:  Reason for 24 Hour Telemetry  Answer:  Decompensated CHF- and any one of the following: continuous diuretic infusion or total diuretic dose >200 mg daily, associated electrolyte derangement (I.e. K < 3.0), ionotropic drip (continuous infusion), hx of ventricular arrhythmia, or new EF < 35%                     Telemetry Reviewed: Normal Sinus Rhythm  Indication for Continued Telemetry Use: Awaiting PCI/EP Study/CABG             Imaging: No pertinent imaging reviewed.    Recent Cultures (last 7 days):   Results from last 7 days   Lab Units 03/28/24  1635 03/28/24  1554   BLOOD CULTURE  No Growth at 48 hrs. No Growth at 48 hrs.       Last 24 Hours Medication List:   Current Facility-Administered Medications   Medication Dose Route Frequency Provider Last Rate    aspirin  81 mg Oral Daily Chai Salomon MD      chlorothiazide  500 mg Intravenous Once Nikki Gilbert MD      Diclofenac Sodium  2 g Topical TID PRN Arelis Gleason PA-C      ezetimibe  10 mg Oral Daily Arelis Gleason PA-C      furosemide  40 mg Intravenous TID (diuretic) Nikki Gilbert MD      heparin (porcine)  5,000 Units Subcutaneous Q8H VALERIE Chai Salomon MD      insulin glargine  20 Units Subcutaneous QAM Arelis Gleason PA-C      insulin lispro  1-5 Units Subcutaneous HS Chai Salomon MD      insulin lispro  1-6 Units  Subcutaneous TID AC Chai Salomon MD      latanoprost  1 drop Both Eyes HS Arelis Gleason PA-C      levothyroxine  50 mcg Oral Early Morning Chai Salomon MD      lidocaine  2 patch Topical Daily Rose Marie Sánchez DO      metoprolol tartrate  50 mg Oral Q12H VALERIE Chai Salomon MD      pantoprazole  40 mg Oral Early Morning Chai Salomon MD      pravastatin  80 mg Oral Daily With Dinner Arelis Gleason PA-C          Today, Patient Was Seen By: Rose Marie Sánchez DO    **Please Note: This note may have been constructed using a voice recognition system.**

## 2024-03-31 NOTE — PLAN OF CARE
Problem: PAIN - ADULT  Goal: Verbalizes/displays adequate comfort level or baseline comfort level  Description: Interventions:  - Encourage patient to monitor pain and request assistance  - Assess pain using appropriate pain scale  - Administer analgesics based on type and severity of pain and evaluate response  - Implement non-pharmacological measures as appropriate and evaluate response  - Consider cultural and social influences on pain and pain management  - Notify physician/advanced practitioner if interventions unsuccessful or patient reports new pain  Outcome: Progressing     Problem: DISCHARGE PLANNING  Goal: Discharge to home or other facility with appropriate resources  Description: INTERVENTIONS:  - Identify barriers to discharge w/patient and caregiver  - Arrange for needed discharge resources and transportation as appropriate  - Identify discharge learning needs (meds, wound care, etc.)  - Arrange for interpretive services to assist at discharge as needed  - Refer to Case Management Department for coordinating discharge planning if the patient needs post-hospital services based on physician/advanced practitioner order or complex needs related to functional status, cognitive ability, or social support system  Outcome: Progressing     Problem: Knowledge Deficit  Goal: Patient/family/caregiver demonstrates understanding of disease process, treatment plan, medications, and discharge instructions  Description: Complete learning assessment and assess knowledge base.  Interventions:  - Provide teaching at level of understanding  - Provide teaching via preferred learning methods  Outcome: Progressing     Problem: RESPIRATORY - ADULT  Goal: Achieves optimal ventilation and oxygenation  Description: INTERVENTIONS:  - Assess for changes in respiratory status  - Assess for changes in mentation and behavior  - Position to facilitate oxygenation and minimize respiratory effort  - Oxygen administered by appropriate  delivery if ordered  - Initiate smoking cessation education as indicated  - Encourage broncho-pulmonary hygiene including cough, deep breathe, Incentive Spirometry  - Assess the need for suctioning and aspirate as needed  - Assess and instruct to report SOB or any respiratory difficulty  - Respiratory Therapy support as indicated  Outcome: Progressing     Problem: CARDIOVASCULAR - ADULT  Goal: Maintains optimal cardiac output and hemodynamic stability  Description: INTERVENTIONS:  - Monitor I/O, vital signs and rhythm  - Monitor for S/S and trends of decreased cardiac output  - Administer and titrate ordered vasoactive medications to optimize hemodynamic stability  - Assess quality of pulses, skin color and temperature  - Assess for signs of decreased coronary artery perfusion  - Instruct patient to report change in severity of symptoms  Outcome: Progressing     Problem: CARDIOVASCULAR - ADULT  Goal: Absence of cardiac dysrhythmias or at baseline rhythm  Description: INTERVENTIONS:  - Continuous cardiac monitoring, vital signs, obtain 12 lead EKG if ordered  - Administer antiarrhythmic and heart rate control medications as ordered  - Monitor electrolytes and administer replacement therapy as ordered  Outcome: Progressing

## 2024-03-31 NOTE — PROGRESS NOTES
"Progress Note - Cardiology     Trent Ocasio 79 y.o. male MRN: [id number]  Unit/Bed#: -01 Encounter: [id number]      Assessment and Plan:  Acute HFpEF.  Continue IV furosemide, Lopressor.  No SGLT2 inhibitor given CKD.  Low-salt diet.  Daily weights.  Accurate intake output charting     Mildly elevated flat troponins (89-95-98) likely secondary to acute CHF.  Plan for stress test on Monday due to h/o CAD with no recent ischemic w/u     Documented history and CAD in a note from 2003.  Patient does not remember any details.  Continue aspirin, statin, Zetia, Lopressor.  Plan for stress test on Monday     Hypertension.  BP stable.  Continue current medications.  Continue to monitor vitals     Hyperlipidemia.  Continue statin, Zetia, diet control     History of CVA.  Continue aspirin, statin, Zetia     CKD. Nephrology following     DM type II      Subjective:   SOB+ Leg swelling + Patient denies chest pain, palpitations, lightheadedness.      REVIEW OF SYSTEMS:  Constitutional: Denies fever or chills  Eyes: Denies eye discharge or change in visual acuity   HENT: Denies sneezing, nasal congestion or sore throat   Respiratory: Denies cough, expectoration. +shortness of breath   Cardiovascular: Denies chest pain, palpitations. +lower extremity swelling   GI: Denies abdominal pain, nausea, vomiting   : +dysuria  Musculoskeletal: Denies back pain or joint pain   Neurologic: Denies syncope, headache, focal weakness or sensory changes   Endocrine: Denies polyuria or polydipsia   Lymphatic: Denies swollen glands   Psychiatric: Denies depression, anxiety or panic       Objective:   Vitals:   Blood pressure 129/62, pulse 70, temperature 98.5 °F (36.9 °C), resp. rate 20, height 5' 10\" (1.778 m), weight 122 kg (268 lb 1.3 oz), SpO2 93%.    Body mass index is 38.47 kg/m².      Intake/Output Summary (Last 24 hours) at 3/31/2024 1251  Last data filed at 3/31/2024 1103  Gross per 24 hour   Intake 565 ml   Output 1775 ml   Net " -1210 ml       Weight (last 2 days)       Date/Time Weight    03/31/24 0600 122 (268.08)    03/30/24 0600 121 (265.65)     Weight: pt unable to stand due to sob at 03/30/24 0600    03/29/24 1100 119 (262)    03/29/24 0600 119 (262.35)     Weight: Pt sob and unable to stand at 03/29/24 0600    03/29/24 0430 119 (262.35)            PHYSICAL EXAM:  General: Patient is not in acute distress. Laying comfortably in the bed. Awake, alert, responding to commands  Head: Normocephalic. Atraumatic  HEENT: Both pupils normal sized, round and reactive to light. Nonicteric  Neck: JVP not raised. Trachea central. No thyromegaly  Respiratory: Bilateral bronchovascular breath sounds  Cardiovascular: RRR. S1 and S2 normal. No murmur, rub or gallop  GI: Abdomen soft, nontender. No guarding or rigidity. Bowel sounds present  Neurologic: Patient is awake, alert, responding to command. Moving all extremities  Integumentary:  No skin rash  Lymphatic: No cervical lymphadenopathy  Extremities: No clubbing, cyanosis. B/L LE edema+      LABORATORY RESULTS:      CBC with diff:   Results from last 7 days   Lab Units 03/31/24 0448 03/30/24  0515 03/29/24  0432 03/28/24  1518   WBC Thousand/uL 4.24* 5.61 5.62 6.98   HEMOGLOBIN g/dL 10.5* 11.3* 10.8* 11.1*   HEMATOCRIT % 33.9* 36.4* 33.8* 34.5*   MCV fL 102* 104* 101* 99*   PLATELETS Thousands/uL 315 316 324 344   RBC Million/uL 3.31* 3.51* 3.34* 3.47*   MCH pg 31.7 32.2 32.3 32.0   MCHC g/dL 31.0* 31.0* 32.0 32.2   RDW % 15.2* 15.7* 15.7* 15.8*   MPV fL 9.5 9.7 9.7 9.9   NRBC AUTO /100 WBCs  --   --  0 0       CMP:  Results from last 7 days   Lab Units 03/31/24 0448 03/30/24  2139 03/30/24  0515 03/29/24  0432 03/28/24  1518   POTASSIUM mmol/L 5.1 4.9 5.1   < > 4.8   CHLORIDE mmol/L 105 101 105   < > 106   CO2 mmol/L 28 28 26   < > 24   BUN mg/dL 56* 55* 49*   < > 39*   CREATININE mg/dL 1.98* 1.98* 2.07*   < > 1.47*   CALCIUM mg/dL 9.3 9.8 9.4   < > 9.4   AST U/L 13  --   --   --  15   ALT U/L  "13  --   --   --  16   ALK PHOS U/L 52  --   --   --  54   EGFR ml/min/1.73sq m 30 30 29   < > 44    < > = values in this interval not displayed.       BMP:  Results from last 7 days   Lab Units 03/31/24  0448 03/30/24  2139 03/30/24  0515   POTASSIUM mmol/L 5.1 4.9 5.1   CHLORIDE mmol/L 105 101 105   CO2 mmol/L 28 28 26   BUN mg/dL 56* 55* 49*   CREATININE mg/dL 1.98* 1.98* 2.07*   CALCIUM mg/dL 9.3 9.8 9.4              Results from last 7 days   Lab Units 03/30/24  0515 03/29/24  0432 03/28/24  1518   MAGNESIUM mg/dL 2.4 2.3 2.2             Results from last 7 days   Lab Units 03/28/24  1518   TSH 3RD GENERATON uIU/mL 4.007       Results from last 7 days   Lab Units 03/28/24  1518   INR  0.99       Lipid Profile:   No results found for: \"CHOL\"  No results found for: \"HDL\"  No results found for: \"LDLCALC\"  No results found for: \"TRIG\"    Troponin:  Results from last 7 days   Lab Units 03/28/24  1903 03/28/24  1722 03/28/24  1518   HS TNI 0HR ng/L  --   --  89*   HS TNI 2HR ng/L  --  95*  --    HSTNI D2 ng/L  --  6  --    HS TNI 4HR ng/L 98*  --   --    HSTNI D4 ng/L 9  --   --          Imaging: I have personally reviewed pertinent reports.    Procedure: XR chest portable    Result Date: 3/31/2024  Narrative: XR CHEST PORTABLE INDICATION: persistent hypoxia. COMPARISON: CXR and chest CT 3/28/2024. FINDINGS: Left retrocardiac opacity. If persistent, the pleural effusions on the CT are not visible. Mild cardiomegaly. Bones are unremarkable for age. Normal upper abdomen.     Impression: Left retrocardiac opacity, likely left lower lobe atelectasis. Pneumonia not excluded in the appropriate clinical setting. Workstation performed: [id number]     Procedure: Echo complete    Result Date: 3/29/2024  Narrative:   Left Ventricle: Left ventricular cavity size is normal. Wall thickness is mildly increased. The left ventricular ejection fraction is 55%. Systolic function is normal. Wall motion is normal. Diastolic function is " normal.   Right Ventricle: Right ventricular cavity size is normal. Systolic function is normal.   Pericardium: There is a trivial pericardial effusion.     Procedure: XR chest 1 view portable    Result Date: 3/29/2024  Narrative: XR CHEST PORTABLE INDICATION: SOB, hypoxia. COMPARISON: None FINDINGS: Study limited by apical lordotic positioning. Clear lungs. Small to moderate bilateral layered pleural effusions. No pneumothorax. Enlarged cardiac silhouette. Bones are unremarkable for age. Normal upper abdomen.     Impression: Small to moderate bilateral layered pleural effusions. Enlarged cardiac silhouette. Findings concur with the preliminary report by the referring clinician already in PACS and/or our electronic record EPIC. Workstation performed: [id number]     Procedure: VAS lower limb venous duplex study, unilateral/limited    Result Date: 3/28/2024  Narrative:  [facility] REPORT CLINICAL: Indications: Patient presents with left lower extremity edema x one week. Operative History: no prior cardiovascular surgeries documented Risk Factors The patient has history of Obesity, HTN, Diabetes (IDDM), Hyperlipidemia, CKD and previous smoking (quit >10yrs ago).   CONCLUSION:  Impression: RIGHT LOWER LIMB LIMITED: Evaluation shows no evidence of thrombus in the common femoral vein. Doppler evaluation shows a normal response to augmentation maneuvers.  LEFT LOWER LIMB: No evidence of acute or chronic deep vein thrombosis No evidence of superficial thrombophlebitis noted. Doppler evaluation shows a normal response to augmentation maneuvers. Popliteal, posterior tibial and anterior tibial arterial Doppler waveform's are triphasic.  Technical findings were given to Maritza Cummings on the floor  SIGNATURE: Electronically Signed by: MANUEL CARIAS MD on 2024-03-28 06:14:40 PM    Procedure: CTA ED chest PE study    Result Date: 3/28/2024  Narrative: CTA - CHEST WITH IV CONTRAST - PULMONARY ANGIOGRAM INDICATION:   acute  SOB, hypoxia. COMPARISON: None. TECHNIQUE: CTA examination of the chest was performed using angiographic technique according to a protocol specifically tailored to evaluate for pulmonary embolism.  Axial, sagittal, and coronal 2D reformatted images were created from the source data and  submitted for interpretation.  In addition, coronal and axial 3D MIP postprocessing was performed on the acquisition scanner. Radiation dose length product (DLP) for this visit:  563 mGy-cm .  This examination, like all CT scans performed in the [facility], was performed utilizing techniques to minimize radiation dose exposure, including the use of iterative reconstruction and automated exposure control. IV Contrast:  100 mL of iohexol (OMNIPAQUE) CHEST: PULMONARY ARTERIAL TREE: No filling defects to suggest acute or chronic pulmonary embolism in the entire pulmonary arterial system, noting limited evaluation of the subsegmental branches due to respiratory motion artifact. Normal caliber main pulmonary artery. LARGE AIRWAYS: Large airways are clear with no tracheal or endobronchial lesion. LUNGS:   Bibasilar passive atelectasis secondary to effusions; the atelectatic lung remains perfused. Limited evaluation of the lungs due to respiratory motion artifact however no evidence of focal consolidation. PLEURA: No pneumothorax. Small right and trace left pleural effusions. HEART: Normal cardiac size and morphology. Mild coronary artery calcification. No pericardial effusion or thickening. VESSELS: Normal caliber thoracic aorta with no detectable atherosclerotic plaque. MEDIASTINUM AND JOHN: Within normal limits. No lymphadenopathy. No pneumothorax. Small right and trace left pleural effusions. CHEST WALL AND LOWER NECK:   Mild bilateral gynecomastia. VISUALIZED STRUCTURES IN THE UPPER ABDOMEN: No acute findings in the abdomen. BONES: Severe multilevel degenerative changes in the spine. Vertebral body height is maintained.  No acute fracture or destructive osseous lesion.     Impression: 1.  No filling defect to suggest acute or chronic pulmonary embolism in the entire pulmonary arterial system. 2.  Small bilateral pleural effusions, right greater than left, with bibasilar atelectasis. The study was marked in EPIC for immediate notification. Workstation performed: [id number]     Meds/Allergies   current meds:   Current Facility-Administered Medications   Medication Dose Route Frequency    aspirin (ECOTRIN LOW STRENGTH) EC tablet 81 mg  81 mg Oral Daily    chlorothiazide (DIURIL) 500 mg in sodium chloride 0.9 % 100 mL IV  500 mg Intravenous Once    Diclofenac Sodium (VOLTAREN) 1 % topical gel 2 g  2 g Topical TID PRN    ezetimibe (ZETIA) tablet 10 mg  10 mg Oral Daily    furosemide (LASIX) injection 40 mg  40 mg Intravenous TID (diuretic)    heparin (porcine) subcutaneous injection 5,000 Units  5,000 Units Subcutaneous Q8H VALERIE    insulin glargine (LANTUS) subcutaneous injection 20 Units 0.2 mL  20 Units Subcutaneous QAM    insulin lispro (HumALOG/ADMELOG) 100 units/mL subcutaneous injection 1-5 Units  1-5 Units Subcutaneous HS    insulin lispro (HumALOG/ADMELOG) 100 units/mL subcutaneous injection 1-6 Units  1-6 Units Subcutaneous TID AC    latanoprost (XALATAN) 0.005 % ophthalmic solution 1 drop  1 drop Both Eyes HS    levothyroxine tablet 50 mcg  50 mcg Oral Early Morning    lidocaine (LIDODERM) 5 % patch 2 patch  2 patch Topical Daily    metoprolol tartrate (LOPRESSOR) tablet 50 mg  50 mg Oral Q12H VALERIE    pantoprazole (PROTONIX) EC tablet 40 mg  40 mg Oral Early Morning    pravastatin (PRAVACHOL) tablet 80 mg  80 mg Oral Daily With Dinner     Medications Prior to Admission   Medication    amLODIPine (NORVASC) 10 mg tablet    aspirin 81 mg chewable tablet    cholecalciferol 25 MCG (1000 UT) tablet    ezetimibe (ZETIA) 10 mg tablet    glipiZIDE (GLUCOTROL) 10 mg tablet    insulin degludec (Tresiba FlexTouch) 100 units/mL injection  pen    latanoprost (XALATAN) 0.005 % ophthalmic solution    levothyroxine 50 mcg tablet    losartan (COZAAR) 50 mg tablet    metFORMIN (GLUCOPHAGE) 500 mg tablet    metoprolol succinate (TOPROL-XL) 100 mg 24 hr tablet    pantoprazole (PROTONIX) 40 mg tablet    pioglitazone (ACTOS) 45 mg tablet    simvastatin (ZOCOR) 40 mg tablet          Jay Taylor MD  3/31/2024,12:51 PM      Portions of the record may have been created with voice recognition software. Occasional wrong words or sound alike substitutions may have occurred due to the inherent limitations of voice recognition software. Please read the chart carefully and recognize, using context, where substitutions may have occurred.

## 2024-03-31 NOTE — CONSULTS
NEPHROLOGY CONSULTATION NOTE    Patient: Trent Ocasio               Sex: male          DOA: 3/28/2024  2:53 PM   YOB: 1945        Age:  79 y.o.        LOS:  LOS: 3 days     REFERRING PHYSICIAN: Dr. Sánchez     REASON FOR THE REFERRAL / CONSULTATION: Acute kidney injury on chronic disease stage IIIb    DATE OF CONSULTATION / SERVICE: 3/31/2024    ADMISSION DIAGNOSIS: Acute CHF (congestive heart failure) (HCC)     CHIEF COMPLAINT     Shortness of breath and lower extremity edema    HPI     This is a 79 years old male with past medical history of chronic kidney disease stage IIIb, hypertension, diabetes mellitus type 2, proteinuria, prostate cancer status post radiation therapy, obesity, CVA who presented to our facility with progressive shortness of breath, weight gain and lower extremity edema.  Patient underwent CT scan of chest PE protocol which revealed presence of effusion and absence of pulmonary embolism.  Patient has underlying history of CKD stage IIIb with baseline creatinine that ranges between 1.3-1.7.  Patient recalls seeing a nephrologist several years ago.  Admits to poor control of blood glucose.  Patient was initiated on IV Lasix at a dose of 40 mg twice daily and noted to be -4 L and fluid balance.  Nephrology called as patient serum creatinine noted to have peaked to a value of 2.0 mg/dL and worse yesterday.  Currently patient is sitting up in chair and denies any shortness of breath.     Currently patient denies nausea, vomiting, headache, dizziness, abdominal pain, constipation or rash.    PAST MEDICAL HISTORY     Past Medical History:   Diagnosis Date    Diabetes mellitus (HCC)     Hypertension        PAST SURGICAL HISTORY     History reviewed. No pertinent surgical history.    ALLERGIES     Allergies   Allergen Reactions    Canagliflozin GI Intolerance     Yeast infection  Yeast infection      Lisinopril Other (See Comments)    Lovastatin GI Intolerance       SOCIAL HISTORY      Social History     Substance and Sexual Activity   Alcohol Use Not Currently     Social History     Substance and Sexual Activity   Drug Use Never     Social History     Tobacco Use   Smoking Status Former   Smokeless Tobacco Never       FAMILY HISTORY     History reviewed. No pertinent family history.    CURRENT MEDICATIONS       Current Facility-Administered Medications:     aspirin (ECOTRIN LOW STRENGTH) EC tablet 81 mg, 81 mg, Oral, Daily, Chai Salomon MD, 81 mg at 03/31/24 0853    Diclofenac Sodium (VOLTAREN) 1 % topical gel 2 g, 2 g, Topical, TID PRN, Arelis Gleason PA-C, 2 g at 03/28/24 2336    ezetimibe (ZETIA) tablet 10 mg, 10 mg, Oral, Daily, Arelis Gleason PA-C, 10 mg at 03/31/24 0853    furosemide (LASIX) injection 40 mg, 40 mg, Intravenous, Daily, Radha Del Rio, CRNP, 40 mg at 03/31/24 0853    heparin (porcine) subcutaneous injection 5,000 Units, 5,000 Units, Subcutaneous, Q8H VALERIE, 5,000 Units at 03/31/24 0520 **AND** [CANCELED] Platelet count, , , Once, Chai Salomon MD    insulin glargine (LANTUS) subcutaneous injection 20 Units 0.2 mL, 20 Units, Subcutaneous, QAM, Arelis Gleason PA-C, 20 Units at 03/31/24 0852    insulin lispro (HumALOG/ADMELOG) 100 units/mL subcutaneous injection 1-5 Units, 1-5 Units, Subcutaneous, HS, Chai Salomon MD, 2 Units at 03/30/24 2126    insulin lispro (HumALOG/ADMELOG) 100 units/mL subcutaneous injection 1-6 Units, 1-6 Units, Subcutaneous, TID AC, 1 Units at 03/31/24 0847 **AND** Fingerstick Glucose (POCT), , , TID AC, Chai Salomon MD    latanoprost (XALATAN) 0.005 % ophthalmic solution 1 drop, 1 drop, Both Eyes, HS, Arelis Gleason PA-C, 1 drop at 03/30/24 2126    levothyroxine tablet 50 mcg, 50 mcg, Oral, Early Morning, Chai Salomon MD, 50 mcg at 03/31/24 0520    lidocaine (LIDODERM) 5 % patch 2 patch, 2 patch, Topical, Daily, Rose Marie Sánchez DO, 2 patch at 03/31/24 0854    metoprolol tartrate (LOPRESSOR) tablet 50 mg, 50 mg, Oral, Q12H VALERIE, Chai Salomon MD, 50 mg  at 03/31/24 0853    pantoprazole (PROTONIX) EC tablet 40 mg, 40 mg, Oral, Early Morning, Chai Salomon MD, 40 mg at 03/30/24 1746    pravastatin (PRAVACHOL) tablet 80 mg, 80 mg, Oral, Daily With Dinner, Arelis Gleason PA-C, 80 mg at 03/30/24 1746    REVIEW OF SYSTEMS     Review of Systems   Constitutional: Negative.    HENT: Negative.     Eyes: Negative.    Respiratory: Negative.     Cardiovascular:  Positive for leg swelling.   Gastrointestinal: Negative.    Endocrine: Negative.    Genitourinary: Negative.    Musculoskeletal: Negative.    Skin: Negative.    Allergic/Immunologic: Negative.    Neurological: Negative.    Hematological: Negative.    All other systems reviewed and are negative.        OBJECTIVE     Current Weight: Weight - Scale: 122 kg (268 lb 1.3 oz)  Vitals:    03/31/24 0800   BP: 140/65   Pulse: 77   Resp: 20   Temp:    SpO2: 95%     Body mass index is 38.47 kg/m².    Intake/Output Summary (Last 24 hours) at 3/31/2024 1011  Last data filed at 3/31/2024 0600  Gross per 24 hour   Intake 240 ml   Output 1500 ml   Net -1260 ml       PHYSICAL EXAMINATION     Physical Exam  HENT:      Head: Normocephalic and atraumatic.   Eyes:      Pupils: Pupils are equal, round, and reactive to light.   Neck:      Vascular: No JVD.   Cardiovascular:      Rate and Rhythm: Normal rate and regular rhythm.      Heart sounds: Murmur heard.      No friction rub.   Pulmonary:      Effort: Pulmonary effort is normal.      Breath sounds: Rales present.   Abdominal:      General: Bowel sounds are normal. There is no distension.      Palpations: Abdomen is soft.      Tenderness: There is no abdominal tenderness. There is no rebound.   Musculoskeletal:         General: No tenderness.      Cervical back: Neck supple.      Right lower leg: Edema present.      Left lower leg: Edema present.   Skin:     General: Skin is dry.      Findings: No rash.   Neurological:      Mental Status: He is alert and oriented to person, place, and time.            LAB RESULTS        Results from last 7 days   Lab Units 03/31/24  0448 03/30/24  2139 03/30/24  0515 03/29/24  0432 03/28/24  1518   WBC Thousand/uL 4.24*  --  5.61 5.62 6.98   HEMOGLOBIN g/dL 10.5*  --  11.3* 10.8* 11.1*   HEMATOCRIT % 33.9*  --  36.4* 33.8* 34.5*   PLATELETS Thousands/uL 315  --  316 324 344   POTASSIUM mmol/L 5.1 4.9 5.1 5.0 4.8   CHLORIDE mmol/L 105 101 105 107 106   CO2 mmol/L 28 28 26 26 24   BUN mg/dL 56* 55* 49* 39* 39*   CREATININE mg/dL 1.98* 1.98* 2.07* 1.59* 1.47*   EGFR ml/min/1.73sq m 30 30 29 40 44   CALCIUM mg/dL 9.3 9.8 9.4 9.0 9.4   MAGNESIUM mg/dL  --   --  2.4 2.3 2.2       I have personally reviewed the old medical records and patient's previously known baseline creatinine level is ~1.3-1.7    RADIOLOGY RESULTS       IMPRESSION:     1.  No filling defect to suggest acute or chronic pulmonary embolism in the entire pulmonary arterial system.  2.  Small bilateral pleural effusions, right greater than left, with bibasilar atelectasis.    PLAN / RECOMMENDATIONS      79 years old male with past medical history of prostate cancer s/p radiation therapy, chronic kidney disease stage IIIb, hypertension, diabetes mellitus type 2, CVA who presented to our facility with progressive shortness of breath with worsening lower extremity edema.    1.  Acute kidney injury on chronic kidney disease stage IIIb: Baseline serum creatinine of 1.3-1.7  Presented at his baseline kidney function which appears to have worsened during hospitalization.  Serum creatinine did peak up to 2.0 mg/dL.  Current creatinine is 1.9 mg/dL and stable compared to yesterday.  Etiology likely contrast-induced nephropathy versus appropriate rise in creatinine with diuresis being achieved.  Urinalysis revealed presence of pyuria and trace protein.    2.  Volume overload: Presented with worsening dyspnea and lower extremity edema with imaging revealing effusions.  Remains volume overloaded.  Patient is in need of  aggressive diuresis and will therefore increase the Teddy of Lasix to 3 times daily dosing and add Diuril 500 mg IV daily to his regimen.  Echo performed revealed only mild wall thickness otherwise normal RV and LV function.    3.  Respiratory acidosis: Venous blood gas persistently revealing respiratory acidosis acute.  Likely secondary to underlying volume overload.    4.  Hypertension: Blood pressure is 140/65 and acceptable.    5.  Diabetes mellitus type 2: Blood glucose within acceptable range.    6.  Secondary hyperparathyroidism of renal region: Obtain PTH intact and 25-hydroxy vitamin D levels.    #7 proteinuria: Noted presence of albuminuria.  Will obtain total urine protein creatinine ratio and rule him out for paraproteinemia.    Thank you for the consultation to participate in patient's care. I have personally discussed my plan with the referring physician.     Nikki Gilbert MD    3/31/2024

## 2024-04-01 ENCOUNTER — APPOINTMENT (INPATIENT)
Dept: NUCLEAR MEDICINE | Facility: HOSPITAL | Age: 79
DRG: 291 | End: 2024-04-01
Payer: COMMERCIAL

## 2024-04-01 ENCOUNTER — APPOINTMENT (INPATIENT)
Dept: NON INVASIVE DIAGNOSTICS | Facility: HOSPITAL | Age: 79
DRG: 291 | End: 2024-04-01
Payer: COMMERCIAL

## 2024-04-01 LAB
25(OH)D3 SERPL-MCNC: 54.5 NG/ML (ref 30–100)
ANION GAP SERPL CALCULATED.3IONS-SCNC: 10 MMOL/L (ref 4–13)
BUN SERPL-MCNC: 58 MG/DL (ref 5–25)
CALCIUM SERPL-MCNC: 9.2 MG/DL (ref 8.4–10.2)
CHEST PAIN STATEMENT: NORMAL
CHLORIDE SERPL-SCNC: 102 MMOL/L (ref 96–108)
CO2 SERPL-SCNC: 28 MMOL/L (ref 21–32)
CREAT SERPL-MCNC: 2.08 MG/DL (ref 0.6–1.3)
ERYTHROCYTE [DISTWIDTH] IN BLOOD BY AUTOMATED COUNT: 15 % (ref 11.6–15.1)
GFR SERPL CREATININE-BSD FRML MDRD: 29 ML/MIN/1.73SQ M
GLUCOSE SERPL-MCNC: 143 MG/DL (ref 65–140)
GLUCOSE SERPL-MCNC: 148 MG/DL (ref 65–140)
GLUCOSE SERPL-MCNC: 173 MG/DL (ref 65–140)
GLUCOSE SERPL-MCNC: 252 MG/DL (ref 65–140)
GLUCOSE SERPL-MCNC: 340 MG/DL (ref 65–140)
HCT VFR BLD AUTO: 31.3 % (ref 36.5–49.3)
HGB BLD-MCNC: 10.2 G/DL (ref 12–17)
MAX DIASTOLIC BP: 66 MMHG
MAX PREDICTED HEART RATE: 140 BPM
MCH RBC QN AUTO: 32.6 PG (ref 26.8–34.3)
MCHC RBC AUTO-ENTMCNC: 32.6 G/DL (ref 31.4–37.4)
MCV RBC AUTO: 100 FL (ref 82–98)
NUC STRESS EJECTION FRACTION: 53 %
PLATELET # BLD AUTO: 289 THOUSANDS/UL (ref 149–390)
PMV BLD AUTO: 9.7 FL (ref 8.9–12.7)
POTASSIUM SERPL-SCNC: 4.1 MMOL/L (ref 3.5–5.3)
PROTOCOL NAME: NORMAL
PTH-INTACT SERPL-MCNC: 115.8 PG/ML (ref 12–88)
RATE PRESSURE PRODUCT: NORMAL
RBC # BLD AUTO: 3.13 MILLION/UL (ref 3.88–5.62)
REASON FOR TERMINATION: NORMAL
SL CV REST NUCLEAR ISOTOPE DOSE: 15.8 MCI
SL CV STRESS NUCLEAR ISOTOPE DOSE: 42.7 MCI
SL CV STRESS RECOVERY BP: NORMAL MMHG
SL CV STRESS RECOVERY HR: 75 BPM
SL CV STRESS RECOVERY O2 SAT: 97 %
SODIUM SERPL-SCNC: 140 MMOL/L (ref 135–147)
STRESS ANGINA INDEX: 0
STRESS BASELINE BP: NORMAL MMHG
STRESS BASELINE HR: 64 BPM
STRESS O2 SAT REST: 97 %
STRESS PEAK HR: 78 BPM
STRESS POST EXERCISE DUR MIN: 3 MIN
STRESS POST EXERCISE DUR SEC: 0 SEC
STRESS POST O2 SAT PEAK: 98 %
STRESS POST PEAK BP: 146 MMHG
STRESS POST PEAK HR: 93 BPM
STRESS POST PEAK SYSTOLIC BP: 146 MMHG
TARGET HR FORMULA: NORMAL
TEST INDICATION: NORMAL
WBC # BLD AUTO: 4.61 THOUSAND/UL (ref 4.31–10.16)

## 2024-04-01 PROCEDURE — 82948 REAGENT STRIP/BLOOD GLUCOSE: CPT

## 2024-04-01 PROCEDURE — 78452 HT MUSCLE IMAGE SPECT MULT: CPT

## 2024-04-01 PROCEDURE — 80048 BASIC METABOLIC PNL TOTAL CA: CPT | Performed by: STUDENT IN AN ORGANIZED HEALTH CARE EDUCATION/TRAINING PROGRAM

## 2024-04-01 PROCEDURE — 99233 SBSQ HOSP IP/OBS HIGH 50: CPT | Performed by: FAMILY MEDICINE

## 2024-04-01 PROCEDURE — 85027 COMPLETE CBC AUTOMATED: CPT | Performed by: STUDENT IN AN ORGANIZED HEALTH CARE EDUCATION/TRAINING PROGRAM

## 2024-04-01 PROCEDURE — 99232 SBSQ HOSP IP/OBS MODERATE 35: CPT | Performed by: INTERNAL MEDICINE

## 2024-04-01 PROCEDURE — 78452 HT MUSCLE IMAGE SPECT MULT: CPT | Performed by: INTERNAL MEDICINE

## 2024-04-01 PROCEDURE — 93016 CV STRESS TEST SUPVJ ONLY: CPT | Performed by: INTERNAL MEDICINE

## 2024-04-01 PROCEDURE — A9502 TC99M TETROFOSMIN: HCPCS

## 2024-04-01 PROCEDURE — 94760 N-INVAS EAR/PLS OXIMETRY 1: CPT

## 2024-04-01 PROCEDURE — 93018 CV STRESS TEST I&R ONLY: CPT | Performed by: INTERNAL MEDICINE

## 2024-04-01 PROCEDURE — 93017 CV STRESS TEST TRACING ONLY: CPT

## 2024-04-01 RX ORDER — REGADENOSON 0.08 MG/ML
0.4 INJECTION, SOLUTION INTRAVENOUS ONCE
Status: COMPLETED | OUTPATIENT
Start: 2024-04-01 | End: 2024-04-01

## 2024-04-01 RX ORDER — INSULIN LISPRO 100 [IU]/ML
5 INJECTION, SOLUTION INTRAVENOUS; SUBCUTANEOUS
Status: DISCONTINUED | OUTPATIENT
Start: 2024-04-01 | End: 2024-04-03 | Stop reason: HOSPADM

## 2024-04-01 RX ADMIN — LIDOCAINE 5% 2 PATCH: 700 PATCH TOPICAL at 09:08

## 2024-04-01 RX ADMIN — LATANOPROST 1 DROP: 50 SOLUTION OPHTHALMIC at 21:51

## 2024-04-01 RX ADMIN — PANTOPRAZOLE SODIUM 40 MG: 40 TABLET, DELAYED RELEASE ORAL at 19:37

## 2024-04-01 RX ADMIN — HEPARIN SODIUM 5000 UNITS: 5000 INJECTION INTRAVENOUS; SUBCUTANEOUS at 21:52

## 2024-04-01 RX ADMIN — FUROSEMIDE 40 MG: 10 INJECTION, SOLUTION INTRAMUSCULAR; INTRAVENOUS at 05:02

## 2024-04-01 RX ADMIN — FUROSEMIDE 40 MG: 10 INJECTION, SOLUTION INTRAMUSCULAR; INTRAVENOUS at 12:36

## 2024-04-01 RX ADMIN — ASPIRIN 81 MG: 81 TABLET, COATED ORAL at 09:08

## 2024-04-01 RX ADMIN — HEPARIN SODIUM 5000 UNITS: 5000 INJECTION INTRAVENOUS; SUBCUTANEOUS at 05:02

## 2024-04-01 RX ADMIN — INSULIN LISPRO 5 UNITS: 100 INJECTION, SOLUTION INTRAVENOUS; SUBCUTANEOUS at 12:00

## 2024-04-01 RX ADMIN — INSULIN GLARGINE 20 UNITS: 100 INJECTION, SOLUTION SUBCUTANEOUS at 09:08

## 2024-04-01 RX ADMIN — HEPARIN SODIUM 5000 UNITS: 5000 INJECTION INTRAVENOUS; SUBCUTANEOUS at 17:18

## 2024-04-01 RX ADMIN — INSULIN LISPRO 3 UNITS: 100 INJECTION, SOLUTION INTRAVENOUS; SUBCUTANEOUS at 17:18

## 2024-04-01 RX ADMIN — REGADENOSON 0.4 MG: 0.08 INJECTION, SOLUTION INTRAVENOUS at 10:25

## 2024-04-01 RX ADMIN — METOPROLOL TARTRATE 50 MG: 50 TABLET, FILM COATED ORAL at 09:08

## 2024-04-01 RX ADMIN — INSULIN LISPRO 5 UNITS: 100 INJECTION, SOLUTION INTRAVENOUS; SUBCUTANEOUS at 17:26

## 2024-04-01 RX ADMIN — EZETIMIBE 10 MG: 10 TABLET ORAL at 09:08

## 2024-04-01 RX ADMIN — FUROSEMIDE 40 MG: 10 INJECTION, SOLUTION INTRAMUSCULAR; INTRAVENOUS at 17:17

## 2024-04-01 RX ADMIN — PRAVASTATIN SODIUM 80 MG: 80 TABLET ORAL at 17:17

## 2024-04-01 RX ADMIN — METOPROLOL TARTRATE 50 MG: 50 TABLET, FILM COATED ORAL at 21:52

## 2024-04-01 RX ADMIN — INSULIN LISPRO 1 UNITS: 100 INJECTION, SOLUTION INTRAVENOUS; SUBCUTANEOUS at 21:52

## 2024-04-01 RX ADMIN — SENNOSIDES, DOCUSATE SODIUM 1 TABLET: 8.6; 5 TABLET ORAL at 09:08

## 2024-04-01 RX ADMIN — LEVOTHYROXINE SODIUM 50 MCG: 0.05 TABLET ORAL at 05:02

## 2024-04-01 RX ADMIN — CHLOROTHIAZIDE SODIUM 500 MG: 500 INJECTION, POWDER, LYOPHILIZED, FOR SOLUTION INTRAVENOUS at 12:36

## 2024-04-01 RX ADMIN — SENNOSIDES, DOCUSATE SODIUM 1 TABLET: 8.6; 5 TABLET ORAL at 17:17

## 2024-04-01 NOTE — RESPIRATORY THERAPY NOTE
03/31/24 0083   Respiratory Assessment   Assessment Type Assess only   General Appearance Awake   Respiratory Pattern Normal   Chest Assessment Chest expansion symmetrical   Bilateral Breath Sounds Diminished   Resp Comments patient placed on BiPAP for hours of sleep, adjusted settings to attempt to blow off more CO2   O2 Device V60   Non-Invasive Information   O2 Interface Device Face mask   Non-Invasive Ventilation Mode BiPAP   $ Intermittent NIV Yes   $ Pulse Oximetry Spot Check Charge Completed   Non-Invasive Settings   IPAP (cm) (S)  14 cm   EPAP (cm) (S)  4 cm   Rate (Set) 10   FiO2 (%) 40   Pressure Support (cm H2O) 10   Rise Time 2   Inspiratory Time (Set) 1   Non-Invasive Readings   Skin Intervention Skin intact   Total Rate 17   MV (Mech) 12.3   Peak Pressure (Obs) 15   Spontaneous Vt (mL) 746   Leak (lpm) 5   Non-Invasive Alarms   Insp Pressure High (cm H20) 25   Insp Pressure Low (cm H20) 5   Low Insp Pressure Time (sec) 20 sec   MV Low (L/min) 3   Vt High (mL) 1500   Vt Low (mL) 200   High Resp Rate (BPM) 40 BPM   Low Resp Rate (BPM) 8 BPM

## 2024-04-01 NOTE — PLAN OF CARE
Problem: DISCHARGE PLANNING  Goal: Discharge to home or other facility with appropriate resources  Description: INTERVENTIONS:  - Identify barriers to discharge w/patient and caregiver  - Arrange for needed discharge resources and transportation as appropriate  - Identify discharge learning needs (meds, wound care, etc.)  - Arrange for interpretive services to assist at discharge as needed  - Refer to Case Management Department for coordinating discharge planning if the patient needs post-hospital services based on physician/advanced practitioner order or complex needs related to functional status, cognitive ability, or social support system  Outcome: Progressing     Problem: Knowledge Deficit  Goal: Patient/family/caregiver demonstrates understanding of disease process, treatment plan, medications, and discharge instructions  Description: Complete learning assessment and assess knowledge base.  Interventions:  - Provide teaching at level of understanding  - Provide teaching via preferred learning methods  Outcome: Progressing     Problem: CARDIOVASCULAR - ADULT  Goal: Maintains optimal cardiac output and hemodynamic stability  Description: INTERVENTIONS:  - Monitor I/O, vital signs and rhythm  - Monitor for S/S and trends of decreased cardiac output  - Administer and titrate ordered vasoactive medications to optimize hemodynamic stability  - Assess quality of pulses, skin color and temperature  - Assess for signs of decreased coronary artery perfusion  - Instruct patient to report change in severity of symptoms  Outcome: Progressing

## 2024-04-01 NOTE — CASE MANAGEMENT
Case Management Progress Note    Patient name Trent Ocasio  Location /-01 MRN [id number]  : 1945 Date 2024       LOS (days): 4  Geometric Mean LOS (GMLOS) (days): 3.9  Days to GMLOS:0.1        OBJECTIVE:        Current admission status: Inpatient  Preferred Pharmacy:   [id number] - [place] - [id number]  [id number]  [place] [id number]  Phone: [phone] Fax: [phone]    Primary Care Provider: No primary care provider on file.    Primary Insurance: [facility]  Secondary Insurance:     PROGRESS NOTE:  Discussed patient's case in interdisciplinary rounds this morning with SLIM provider. Patient is open for stress test, continuing IV diuresis, weaning down O2. CM will continue to follow for needs.

## 2024-04-01 NOTE — PROGRESS NOTES
NEPHROLOGY PROGRESS NOTE    Patient: Trent Ocasio               Sex: male          DOA: 3/28/2024  2:53 PM   YOB: 1945        Age:  79 y.o.        LOS:  LOS: 4 days   4/1/2024    REASON FOR THE CONSULTATION:      Acute kidney injury on chronic kidney disease stage IIIb    SUBJECTIVE     Patient is seen and examined next to the bedside.  Laying in bed and admits to improved urination.    CURRENT MEDICATIONS       Current Facility-Administered Medications:     aspirin (ECOTRIN LOW STRENGTH) EC tablet 81 mg, 81 mg, Oral, Daily, Chai Salomon MD, 81 mg at 04/01/24 0908    chlorothiazide (DIURIL) 500 mg in sodium chloride 0.9 % 100 mL IV, 500 mg, Intravenous, Once, Nikki Gilbert MD    Diclofenac Sodium (VOLTAREN) 1 % topical gel 2 g, 2 g, Topical, TID PRN, Arelis Gleason PA-C, 2 g at 03/28/24 2336    ezetimibe (ZETIA) tablet 10 mg, 10 mg, Oral, Daily, Arelis Gleason PA-C, 10 mg at 04/01/24 0908    furosemide (LASIX) injection 40 mg, 40 mg, Intravenous, TID (diuretic), Nikki Gilbert MD, 40 mg at 04/01/24 0502    heparin (porcine) subcutaneous injection 5,000 Units, 5,000 Units, Subcutaneous, Q8H VALERIE, 5,000 Units at 04/01/24 0502 **AND** [CANCELED] Platelet count, , , Once, Chai Salomon MD    insulin glargine (LANTUS) subcutaneous injection 20 Units 0.2 mL, 20 Units, Subcutaneous, QAM, Arelis Gleason PA-C, 20 Units at 04/01/24 0908    insulin lispro (HumALOG/ADMELOG) 100 units/mL subcutaneous injection 1-5 Units, 1-5 Units, Subcutaneous, HS, Chai Salomon MD, 2 Units at 03/31/24 2151    insulin lispro (HumALOG/ADMELOG) 100 units/mL subcutaneous injection 1-6 Units, 1-6 Units, Subcutaneous, TID AC, 2 Units at 03/31/24 1244 **AND** Fingerstick Glucose (POCT), , , TID AC, Chai Salomon MD    latanoprost (XALATAN) 0.005 % ophthalmic solution 1 drop, 1 drop, Both Eyes, HS, Arelis Gleason PA-C, 1 drop at 03/31/24 2150    levothyroxine tablet 50 mcg, 50 mcg, Oral, Early Morning, Chai Salomon MD, 50 mcg at 04/01/24  0502    lidocaine (LIDODERM) 5 % patch 2 patch, 2 patch, Topical, Daily, Rose Marieandree Laraya, DO, 2 patch at 04/01/24 0908    metoprolol tartrate (LOPRESSOR) tablet 50 mg, 50 mg, Oral, Q12H VALERIE, Chai Salomon MD, 50 mg at 04/01/24 0908    pantoprazole (PROTONIX) EC tablet 40 mg, 40 mg, Oral, Early Morning, Chai Salomon MD, 40 mg at 03/31/24 1818    pravastatin (PRAVACHOL) tablet 80 mg, 80 mg, Oral, Daily With Dinner, Arelis Gleason PA-C, 80 mg at 03/31/24 1817    senna-docusate sodium (SENOKOT S) 8.6-50 mg per tablet 1 tablet, 1 tablet, Oral, BID, Rose Marie Paulinagerardo Morenojalen, DO, 1 tablet at 04/01/24 0908    REVIEW OF SYSTEMS     Review of Systems   Constitutional: Negative.    HENT: Negative.     Eyes: Negative.    Respiratory: Negative.     Cardiovascular:  Positive for leg swelling.   Gastrointestinal: Negative.    Endocrine: Negative.    Genitourinary: Negative.    Musculoskeletal: Negative.    Skin: Negative.    Allergic/Immunologic: Negative.    Neurological: Negative.    Hematological: Negative.    All other systems reviewed and are negative.      OBJECTIVE     Current Weight: Weight - Scale: 118 kg (259 lb 7.7 oz)  Vitals:    04/01/24 0713   BP: 133/67   Pulse: 80   Resp: 19   Temp: 97.9 °F (36.6 °C)   SpO2: 91%     Body mass index is 37.23 kg/m².    Intake/Output Summary (Last 24 hours) at 4/1/2024 0955  Last data filed at 4/1/2024 0904  Gross per 24 hour   Intake 425 ml   Output 3500 ml   Net -3075 ml       PHYSICAL EXAMINATION     Physical Exam  HENT:      Head: Normocephalic and atraumatic.   Eyes:      Pupils: Pupils are equal, round, and reactive to light.   Neck:      Vascular: No JVD.   Cardiovascular:      Rate and Rhythm: Normal rate and regular rhythm.      Heart sounds: Murmur heard.      No friction rub.   Pulmonary:      Effort: Pulmonary effort is normal.      Breath sounds: Rales present.   Abdominal:      General: Bowel sounds are normal. There is no distension.      Palpations: Abdomen is soft.       Tenderness: There is no abdominal tenderness. There is no rebound.   Musculoskeletal:         General: No tenderness.      Cervical back: Neck supple.      Right lower leg: Edema present.      Left lower leg: Edema present.   Skin:     General: Skin is dry.      Findings: No rash.   Neurological:      Mental Status: He is alert and oriented to person, place, and time.           LAB RESULTS     Results from last 7 days   Lab Units 04/01/24  0530 03/31/24  0448 03/30/24  2139 03/30/24  0515 03/29/24  0432 03/28/24  1518   WBC Thousand/uL 4.61 4.24*  --  5.61 5.62 6.98   HEMOGLOBIN g/dL 10.2* 10.5*  --  11.3* 10.8* 11.1*   HEMATOCRIT % 31.3* 33.9*  --  36.4* 33.8* 34.5*   PLATELETS Thousands/uL 289 315  --  316 324 344   POTASSIUM mmol/L 4.1 5.1 4.9 5.1 5.0 4.8   CHLORIDE mmol/L 102 105 101 105 107 106   CO2 mmol/L 28 28 28 26 26 24   BUN mg/dL 58* 56* 55* 49* 39* 39*   CREATININE mg/dL 2.08* 1.98* 1.98* 2.07* 1.59* 1.47*   EGFR ml/min/1.73sq m 29 30 30 29 40 44   CALCIUM mg/dL 9.2 9.3 9.8 9.4 9.0 9.4   MAGNESIUM mg/dL  --   --   --  2.4 2.3 2.2           RADIOLOGY RESULTS      IMPRESSION:     Left retrocardiac opacity, likely left lower lobe atelectasis. Pneumonia not excluded in the appropriate clinical setting.       ASSESSMENT/PLAN     79 years old male with past medical history of prostate cancer status post radiation therapy, chronic kidney disease stage IIIb, hypertension, diabetes mellitus type 2, CVA who presented to our facility with progressive shortness of breath, weight gain and worsening lower extremity edema.    1.  Acute kidney injury on chronic kidney disease stage IIIb: Baseline serum creatinine of 1.3-1.7  Presented to our facility at his baseline kidney function however noted creatinine to have peaked to 2.0.  Etiology likely contrast-induced nephropathy versus appropriate rise in creatinine as diuresis is being achieved.  Urinalysis revealing presence of pyuria and trace protein.    2.  Volume  overload: Patient was seen yesterday and noted to be significantly edematous with Rales in lung and hence Lasix dose was increased to 3 times daily dosing along with addition of Diuril with excellent diuresis in the past 24 hours.  Will continue with Diuril 500 mg IV daily along with Lasix 40 mg IV 3 times daily today.    3.  Hypertension: Blood pressure within acceptable range    4.  Secondary hyperparathyroidism of renal origin: PTH intact slightly above target 115.  Normal 25-hydroxy vitamin D levels noted.    5.  Anemia due to CKD: Hemoglobin is 10.2 g/dL and acceptable.          Nikki Gilbert MD  Nephrology  4/1/2024

## 2024-04-01 NOTE — PROGRESS NOTES
"[facility]  Progress Note  Name: Trent Ocasio I  MRN: [id number]  Unit/Bed#: [facility] I Date of Admission: 3/28/2024   Date of Service: 4/1/2024 I Hospital Day: 4    Assessment/Plan   Acute-on-chronic kidney injury  (HCC)  Assessment & Plan  Lab Results   Component Value Date    EGFR 29 04/01/2024    EGFR 30 03/31/2024    EGFR 30 03/30/2024    CREATININE 2.08 (H) 04/01/2024    CREATININE 1.98 (H) 03/31/2024    CREATININE 1.98 (H) 03/30/2024   Baseline creatinine 1.5-1.7, 2.08 today  Patient remains volume overloaded on exam  Continue with IV diuresis per cardiology and nephrology  Continue to monitor I's and O's    Elevated troponin  Assessment & Plan  89-95-98, continue to trend to peak.  Trend appears flat.  No active chest pain, suspect 2/2 to acute CHF  EKG shows Sinus rhythm, first-degree AV block with GA interval of 338, nonspecific T wave changes in V1  Cardiology consulted, appreciate recommendations  Stress test today    Type 2 diabetes mellitus, with long-term current use of insulin ([facility])  Assessment & Plan  Lab Results   Component Value Date    HGBA1C 7.3 (H) 02/16/2024       Recent Labs     03/31/24  1636 03/31/24  2106 04/01/24  0715 04/01/24  1215   POCGLU 119 220* 148* 340*         Blood Sugar Average: Last 72 hrs:  (P) 174.5    Hold p.o. home meds  Continue home insulin regimen of lantus 20 U qhs  Sliding scale insulin  Carb controlled diet  Schedule short acting with meals    Hypothyroid  Assessment & Plan  TSH WNL  Continue Synthroid    HTN (hypertension)  Assessment & Plan  /69   Pulse 62   Temp 97.9 °F (36.6 °C) (Oral)   Resp 19   Ht 5' 10\" (1.778 m)   Wt 118 kg (259 lb 7.7 oz)   SpO2 92%   BMI 37.23 kg/m²   Stable pressures  Takes 100 mg metoprolol twice daily at home.  Reduced to 50 mg twice daily at this time  increased IV Lasix 40 twice TID.  Will get another IV dose of Diuril  Holding off on other blood pressure medications from his home medication " list    Anxiety  Assessment & Plan  Currently stable  Atarax as needed    History of prostate cancer  Assessment & Plan  .  History of radiation treatment 5 years ago  Recommend outpatient follow-up    Acute respiratory failure with hypoxia (HCC)  Assessment & Plan  Patient found to be on 83% on room air, with started on 4 L in the ER and saturations increased in the 90s, currently on  3L NC, with improvement in respiratory status  Repeat VBG pH 7.223, pCO2 66.3, oxygen level low. Improved with bipap   Suspect 2/2 to volume overload  Continue to wean oxygen as possible.  Patient will likely need a ambulatory pulse oximetry prior to discharge  Underlying etiology is congestive heart failure exacerbation    * Acute CHF (congestive heart failure) (HCC)  Assessment & Plan  Wt Readings from Last 3 Encounters:   04/01/24 118 kg (259 lb 7.7 oz)   06/25/22 109 kg (240 lb)     Patient presents with severe shortness of breath, leg edema.  Found to have elevated BNP, elevated troponin  Elevated D-dimer, CTA done PE ruled out  Status post 40 IV Lasix in the ER  Increase to IV 40 Lasix TID  Strict I's and O's  Echo done.  Continue the Lasix as well as the diuretic per nephrology's recommendations.  Creatinine is relatively stable.  Cardiology and nephrology following  Stress test today                 VTE Pharmacologic Prophylaxis:   Moderate Risk (Score 3-4) - Pharmacological DVT Prophylaxis Ordered: heparin.    Mobility:   Basic Mobility Inpatient Raw Score: 17  JH-HLM Goal: 5: Stand one or more mins  JH-HLM Achieved: 1: Laying in bed  JH-HLM Goal NOT achieved. Continue with multidisciplinary rounding and encourage appropriate mobility to improve upon JH-HLM goals.    Patient Centered Rounds: I performed bedside rounds with nursing staff today.       Education and Discussions with Family / Patient: Patient declined call to .     Total Time Spent on Date of Encounter in care of patient: 35 mins. This time was  spent on one or more of the following: performing physical exam; counseling and coordination of care; obtaining or reviewing history; documenting in the medical record; reviewing/ordering tests, medications or procedures; communicating with other healthcare professionals and discussing with patient's family/caregivers.    Current Length of Stay: 4 day(s)  Current Patient Status: Inpatient   Certification Statement: The patient will continue to require additional inpatient hospital stay due to needing IV diuresis and ischemic workup  Discharge Plan: Anticipate discharge in 48-72 hrs to home.    Code Status: Level 1 - Full Code    Subjective:   Patient seen and examined.  Doing okay.  States he is feeling a little better on all    Objective:     Vitals:   Temp (24hrs), Av.2 °F (36.8 °C), Min:97.9 °F (36.6 °C), Max:98.4 °F (36.9 °C)    Temp:  [97.9 °F (36.6 °C)-98.4 °F (36.9 °C)] 97.9 °F (36.6 °C)  HR:  [62-93] 62  Resp:  [18-21] 19  BP: (120-152)/(67-94) 133/69  SpO2:  [89 %-100 %] 92 %  Body mass index is 37.23 kg/m².     Input and Output Summary (last 24 hours):     Intake/Output Summary (Last 24 hours) at 2024 1223  Last data filed at 2024 0904  Gross per 24 hour   Intake 100 ml   Output 3225 ml   Net -3125 ml       Physical Exam:   Physical Exam   General Appearance:    Alert, cooperative, no distress, appears stated age                               Lungs:   Rales noted       Heart:    Regular rate and rhythm, S1 and S2 normal, no murmur, rub    or gallop   Abdomen:     Soft, non-tender, bowel sounds active all four quadrants,     no masses, no organomegaly           Extremities: 2-3+ edema                         Additional Data:     Labs:  Results from last 7 days   Lab Units 24  0530 24  0515 24  0432   WBC Thousand/uL 4.61   < > 5.62   HEMOGLOBIN g/dL 10.2*   < > 10.8*   HEMATOCRIT % 31.3*   < > 33.8*   PLATELETS Thousands/uL 289   < > 324   NEUTROS PCT %  --   --  80*   LYMPHS  PCT %  --   --  7*   MONOS PCT %  --   --  11   EOS PCT %  --   --  2    < > = values in this interval not displayed.     Results from last 7 days   Lab Units 24  0530 24  0448   SODIUM mmol/L 140 139   POTASSIUM mmol/L 4.1 5.1   CHLORIDE mmol/L 102 105   CO2 mmol/L 28    BUN mg/dL 58* 56*   CREATININE mg/dL 2.08* 1.98*   ANION GAP mmol/L 10 6   CALCIUM mg/dL 9.2 9.3   ALBUMIN g/dL  --  3.7   TOTAL BILIRUBIN mg/dL  --  0.33   ALK PHOS U/L  --  52   ALT U/L  --  13   AST U/L  --  13   GLUCOSE RANDOM mg/dL 143* 161*     Results from last 7 days   Lab Units 24  1518   INR  0.99     Results from last 7 days   Lab Units 24  1215 24  0715 24  2106 24  1636 24  1128 24  0750 24  2050 24  1642 24  1118 24  0714 24  2102 24  1725   POC GLUCOSE mg/dl 340* 148* 220* 119 212* 151* 225* 182* 133 135 200* 123         Results from last 7 days   Lab Units 24  0448 24  1554 24  1518   LACTIC ACID mmol/L  --  1.4  --    PROCALCITONIN ng/ml 0.12  --  0.14       Lines/Drains:  Invasive Devices       Peripheral Intravenous Line  Duration             Peripheral IV 24 Left Antecubital 3 days    Peripheral IV 24 Right Antecubital <1 day                      Telemetry:  Telemetry Orders (From admission, onward)               24 Hour Telemetry Monitoring  Continuous x 24 Hours (Telem)           Question:  Reason for 24 Hour Telemetry  Answer:  Decompensated CHF- and any one of the following: continuous diuretic infusion or total diuretic dose >200 mg daily, associated electrolyte derangement (I.e. K < 3.0), ionotropic drip (continuous infusion), hx of ventricular arrhythmia, or new EF < 35%                     Telemetry Reviewed: Normal Sinus Rhythm  Indication for Continued Telemetry Use: No indication for continued use. Will discontinue.              Imaging: No pertinent imaging reviewed.    Recent Cultures  (last 7 days):   Results from last 7 days   Lab Units 03/28/24  1635 03/28/24  1554   BLOOD CULTURE  No Growth at 72 hrs. No Growth at 72 hrs.       Last 24 Hours Medication List:   Current Facility-Administered Medications   Medication Dose Route Frequency Provider Last Rate    aspirin  81 mg Oral Daily Chai Salomon MD      chlorothiazide  500 mg Intravenous Once Nikki Gilbert MD      Diclofenac Sodium  2 g Topical TID PRN Arelis Gleason PA-C      ezetimibe  10 mg Oral Daily Arelis Gleason PA-C      furosemide  40 mg Intravenous TID (diuretic) Nikki Gilbert MD      heparin (porcine)  5,000 Units Subcutaneous Q8H VALERIE Chai Salomon MD      insulin glargine  20 Units Subcutaneous QAM Arelis Gleason PA-C      insulin lispro  1-5 Units Subcutaneous HS Chai Salomon MD      insulin lispro  1-6 Units Subcutaneous TID AC Chai Salomon MD      insulin lispro  5 Units Subcutaneous TID With Meals Thomas Salomon MD      latanoprost  1 drop Both Eyes HS Arelis Gleason PA-C      levothyroxine  50 mcg Oral Early Morning Chai Salomon MD      lidocaine  2 patch Topical Daily Rose Marie Paulina Saraiya, DO      metoprolol tartrate  50 mg Oral Q12H VALERIE Chai Salomon MD      pantoprazole  40 mg Oral Early Morning Chai Salomon MD      pravastatin  80 mg Oral Daily With Dinner Arelis Gleason PA-C      senna-docusate sodium  1 tablet Oral BID Rose Marie Paulina Saraiya, DO          Today, Patient Was Seen By: Thomas Salomon MD    **Please Note: This note may have been constructed using a voice recognition system.**

## 2024-04-01 NOTE — PLAN OF CARE
Problem: PAIN - ADULT  Goal: Verbalizes/displays adequate comfort level or baseline comfort level  Description: Interventions:  - Encourage patient to monitor pain and request assistance  - Assess pain using appropriate pain scale  - Administer analgesics based on type and severity of pain and evaluate response  - Implement non-pharmacological measures as appropriate and evaluate response  - Consider cultural and social influences on pain and pain management  - Notify physician/advanced practitioner if interventions unsuccessful or patient reports new pain  Outcome: Progressing     Problem: SAFETY ADULT  Goal: Patient will remain free of falls  Description: INTERVENTIONS:  - Educate patient/family on patient safety including physical limitations  - Instruct patient to call for assistance with activity   - Consult OT/PT to assist with strengthening/mobility   - Keep Call bell within reach  - Keep bed low and locked with side rails adjusted as appropriate  - Keep care items and personal belongings within reach  - Initiate and maintain comfort rounds  - Make Fall Risk Sign visible to staff  - Offer Toileting every    Hours, in advance of need  - Initiate/Maintain   alarm  - Obtain necessary fall risk management equipment:     - Apply yellow socks and bracelet for high fall risk patients  - Consider moving patient to room near nurses station  Outcome: Progressing  Goal: Maintain or return to baseline ADL function  Description: INTERVENTIONS:  -  Assess patient's ability to carry out ADLs; assess patient's baseline for ADL function and identify physical deficits which impact ability to perform ADLs (bathing, care of mouth/teeth, toileting, grooming, dressing, etc.)  - Assess/evaluate cause of self-care deficits   - Assess range of motion  - Assess patient's mobility; develop plan if impaired  - Assess patient's need for assistive devices and provide as appropriate  - Encourage maximum independence but intervene and  supervise when necessary  - Involve family in performance of ADLs  - Assess for home care needs following discharge   - Consider OT consult to assist with ADL evaluation and planning for discharge  - Provide patient education as appropriate  Outcome: Progressing  Goal: Maintains/Returns to pre admission functional level  Description: INTERVENTIONS:  - Perform AM-PAC 6 Click Basic Mobility/ Daily Activity assessment daily.  - Set and communicate daily mobility goal to care team and patient/family/caregiver.   - Collaborate with rehabilitation services on mobility goals if consulted  - Perform Range of Motion    times a day.  - Reposition patient every      hours.  - Dangle patient    times a day  - Stand patient    times a day  - Ambulate patient    times a day  - Out of bed to chair      times a day   - Out of bed for meals  times a day  - Out of bed for toileting  - Record patient progress and toleration of activity level   Outcome: Progressing     Problem: DISCHARGE PLANNING  Goal: Discharge to home or other facility with appropriate resources  Description: INTERVENTIONS:  - Identify barriers to discharge w/patient and caregiver  - Arrange for needed discharge resources and transportation as appropriate  - Identify discharge learning needs (meds, wound care, etc.)  - Arrange for interpretive services to assist at discharge as needed  - Refer to Case Management Department for coordinating discharge planning if the patient needs post-hospital services based on physician/advanced practitioner order or complex needs related to functional status, cognitive ability, or social support system  Outcome: Progressing     Problem: Knowledge Deficit  Goal: Patient/family/caregiver demonstrates understanding of disease process, treatment plan, medications, and discharge instructions  Description: Complete learning assessment and assess knowledge base.  Interventions:  - Provide teaching at level of understanding  - Provide  teaching via preferred learning methods  Outcome: Progressing     Problem: CARDIOVASCULAR - ADULT  Goal: Maintains optimal cardiac output and hemodynamic stability  Description: INTERVENTIONS:  - Monitor I/O, vital signs and rhythm  - Monitor for S/S and trends of decreased cardiac output  - Administer and titrate ordered vasoactive medications to optimize hemodynamic stability  - Assess quality of pulses, skin color and temperature  - Assess for signs of decreased coronary artery perfusion  - Instruct patient to report change in severity of symptoms  Outcome: Progressing  Goal: Absence of cardiac dysrhythmias or at baseline rhythm  Description: INTERVENTIONS:  - Continuous cardiac monitoring, vital signs, obtain 12 lead EKG if ordered  - Administer antiarrhythmic and heart rate control medications as ordered  - Monitor electrolytes and administer replacement therapy as ordered  Outcome: Progressing     Problem: RESPIRATORY - ADULT  Goal: Achieves optimal ventilation and oxygenation  Description: INTERVENTIONS:  - Assess for changes in respiratory status  - Assess for changes in mentation and behavior  - Position to facilitate oxygenation and minimize respiratory effort  - Oxygen administered by appropriate delivery if ordered  - Initiate smoking cessation education as indicated  - Encourage broncho-pulmonary hygiene including cough, deep breathe, Incentive Spirometry  - Assess the need for suctioning and aspirate as needed  - Assess and instruct to report SOB or any respiratory difficulty  - Respiratory Therapy support as indicated  Outcome: Progressing     Problem: Prexisting or High Potential for Compromised Skin Integrity  Goal: Skin integrity is maintained or improved  Description: INTERVENTIONS:  - Identify patients at risk for skin breakdown  - Assess and monitor skin integrity  - Assess and monitor nutrition and hydration status  - Monitor labs   - Assess for incontinence   - Turn and reposition patient  -  Assist with mobility/ambulation  - Relieve pressure over bony prominences  - Avoid friction and shearing  - Provide appropriate hygiene as needed including keeping skin clean and dry  - Evaluate need for skin moisturizer/barrier cream  - Collaborate with interdisciplinary team   - Patient/family teaching  - Consider wound care consult   Outcome: Progressing     Problem: METABOLIC, FLUID AND ELECTROLYTES - ADULT  Goal: Electrolytes maintained within normal limits  Description: INTERVENTIONS:  - Monitor labs and assess patient for signs and symptoms of electrolyte imbalances  - Administer electrolyte replacement as ordered  - Monitor response to electrolyte replacements, including repeat lab results as appropriate  - Instruct patient on fluid and nutrition as appropriate  Outcome: Progressing  Goal: Fluid balance maintained  Description: INTERVENTIONS:  - Monitor labs   - Monitor I/O and WT  - Instruct patient on fluid and nutrition as appropriate  - Assess for signs & symptoms of volume excess or deficit  Outcome: Progressing  Goal: Glucose maintained within target range  Description: INTERVENTIONS:  - Monitor Blood Glucose as ordered  - Assess for signs and symptoms of hyperglycemia and hypoglycemia  - Administer ordered medications to maintain glucose within target range  - Assess nutritional intake and initiate nutrition service referral as needed  Outcome: Progressing

## 2024-04-01 NOTE — PROGRESS NOTES
"Cardiology Progress Note - Trent Ocasio 79 y.o. male MRN: [id number]    Unit/Bed#: -01 Encounter: [id number]      Assessment/Plan:  1.  Acute HFpEF, still mildly overloaded.  Continue with current diuretic dose.  Daily weights.  Salt restriction.  Strict I's and O's.  Continue aspirin, Zetia, metoprolol, pravastatin.  Net -7.9 L.    2.  Nonischemic myocardial injury, peak troponin 95 secondary to acute heart failure.  Stress test ordered and pending.    3.  History of CAD in 2003, no further details noted.  Continue on medications.    4.  Hypertension, stable continue Toprol, Lasix    5.  History of CVA, continue aspirin, statins, Zetia    6.  Hyperlipidemia on statins and Zetia    7.  CKD, creatinine today 2.0.  Continue to monitor      Subjective:   Patient seen and examined.  No significant events overnight. Im feeling good. Denies cp    Objective:     Vitals: Blood pressure 138/62, pulse 76, temperature (!) 97.4 °F (36.3 °C), temperature source Oral, resp. rate 18, height 5' 10\" (1.778 m), weight 118 kg (259 lb 7.7 oz), SpO2 95%., Body mass index is 37.23 kg/m².,   Orthostatic Blood Pressures      Flowsheet Row Most Recent Value   Blood Pressure 138/62 filed at 04/01/2024 1513   Patient Position - Orthostatic VS Lying filed at 04/01/2024 1513              Intake/Output Summary (Last 24 hours) at 4/1/2024 1638  Last data filed at 4/1/2024 1513  Gross per 24 hour   Intake --   Output 3700 ml   Net -3700 ml         Physical Exam:  GEN: Alert and oriented x 3, in no acute distress.  Well appearing and well nourished.   HEENT: Sclera anicteric, conjunctivae pink, mucous membranes moist. Oropharynx clear.   NECK: Supple, no carotid bruits, no significant JVD. Trachea midline, no thyromegaly.   HEART: Regular rhythm, normal S1 and S2, no murmurs, clicks, gallops or rubs. PMI nondisplaced, no thrills.   LUNGS: decreased breath sounds bilaterally; no wheezes, rales, or rhonchi. No increased work of breathing or " signs of respiratory distress.   ABDOMEN: Soft, nontender, nondistended, normoactive bowel sounds.   EXTREMITIES: +1 edema, Skin warm and well perfused, no clubbing, cyanosis  NEURO: No focal findings. Normal speech. Mood and affect normal.   SKIN: Normal without suspicious lesions on exposed skin.      Medications:      Current Facility-Administered Medications:     aspirin (ECOTRIN LOW STRENGTH) EC tablet 81 mg, 81 mg, Oral, Daily, Chai Salomon MD, 81 mg at 04/01/24 0908    Diclofenac Sodium (VOLTAREN) 1 % topical gel 2 g, 2 g, Topical, TID PRN, Arelis Gleason PA-C, 2 g at 03/28/24 2336    ezetimibe (ZETIA) tablet 10 mg, 10 mg, Oral, Daily, Arelis Gleason PA-C, 10 mg at 04/01/24 0908    furosemide (LASIX) injection 40 mg, 40 mg, Intravenous, TID (diuretic), Nikki Gilbert MD, 40 mg at 04/01/24 1236    heparin (porcine) subcutaneous injection 5,000 Units, 5,000 Units, Subcutaneous, Q8H VALERIE, 5,000 Units at 04/01/24 0502 **AND** [CANCELED] Platelet count, , , Once, Chai Salomon MD    insulin glargine (LANTUS) subcutaneous injection 20 Units 0.2 mL, 20 Units, Subcutaneous, QAM, Arelis Gleason PA-C, 20 Units at 04/01/24 0908    insulin lispro (HumALOG/ADMELOG) 100 units/mL subcutaneous injection 1-5 Units, 1-5 Units, Subcutaneous, HS, Chai Salomon MD, 2 Units at 03/31/24 2151    insulin lispro (HumALOG/ADMELOG) 100 units/mL subcutaneous injection 1-6 Units, 1-6 Units, Subcutaneous, TID AC, 5 Units at 04/01/24 1200 **AND** Fingerstick Glucose (POCT), , , TID AC, Chai Salomon MD    insulin lispro (HumALOG/ADMELOG) 100 units/mL subcutaneous injection 5 Units, 5 Units, Subcutaneous, TID With Meals, Thomas Salomon MD, 5 Units at 04/01/24 1200    latanoprost (XALATAN) 0.005 % ophthalmic solution 1 drop, 1 drop, Both Eyes, HS, Arelis Gleason PA-C, 1 drop at 03/31/24 2150    levothyroxine tablet 50 mcg, 50 mcg, Oral, Early Morning, Chai Salomon MD, 50 mcg at 04/01/24 0502    lidocaine (LIDODERM) 5 % patch 2 patch, 2 patch,  Topical, Daily, Rose Marie Sánchez DO, 2 patch at 04/01/24 0908    metoprolol tartrate (LOPRESSOR) tablet 50 mg, 50 mg, Oral, Q12H VALERIE, Chai Salomon MD, 50 mg at 04/01/24 0908    pantoprazole (PROTONIX) EC tablet 40 mg, 40 mg, Oral, Early Morning, Chai Salomon MD, 40 mg at 03/31/24 1818    pravastatin (PRAVACHOL) tablet 80 mg, 80 mg, Oral, Daily With Dinner, Arelis Gleason PA-C, 80 mg at 03/31/24 1817    senna-docusate sodium (SENOKOT S) 8.6-50 mg per tablet 1 tablet, 1 tablet, Oral, BID, Rose Marie Sánchez DO, 1 tablet at 04/01/24 0908     Labs & Results:    Results from last 7 days   Lab Units 03/28/24  1903 03/28/24  1722 03/28/24  1518   HS TNI 0HR ng/L  --   --  89*   HS TNI 2HR ng/L  --  95*  --    HS TNI 4HR ng/L 98*  --   --    HSTNI D4 ng/L 9  --   --      Results from last 7 days   Lab Units 04/01/24  0530 03/31/24  0448 03/30/24  0515   WBC Thousand/uL 4.61 4.24* 5.61   HEMOGLOBIN g/dL 10.2* 10.5* 11.3*   HEMATOCRIT % 31.3* 33.9* 36.4*   PLATELETS Thousands/uL 289 315 316         Results from last 7 days   Lab Units 04/01/24  0530 03/31/24  0448 03/30/24  2139 03/29/24  0432 03/28/24  1518   POTASSIUM mmol/L 4.1 5.1 4.9   < > 4.8   CHLORIDE mmol/L 102 105 101   < > 106   CO2 mmol/L 28 28 28   < > 24   BUN mg/dL 58* 56* 55*   < > 39*   CREATININE mg/dL 2.08* 1.98* 1.98*   < > 1.47*   CALCIUM mg/dL 9.2 9.3 9.8   < > 9.4   ALK PHOS U/L  --  52  --   --  54   ALT U/L  --  13  --   --  16   AST U/L  --  13  --   --  15    < > = values in this interval not displayed.     Results from last 7 days   Lab Units 03/28/24  1518   INR  0.99   PTT seconds 37     Results from last 7 days   Lab Units 03/30/24  0515 03/29/24  0432 03/28/24  1518   MAGNESIUM mg/dL 2.4 2.3 2.2

## 2024-04-02 LAB
ALBUMIN SERPL ELPH-MCNC: 3.62 G/DL (ref 3.2–5.1)
ALBUMIN SERPL ELPH-MCNC: 54 % (ref 48–70)
ALPHA1 GLOB SERPL ELPH-MCNC: 0.44 G/DL (ref 0.15–0.47)
ALPHA1 GLOB SERPL ELPH-MCNC: 6.5 % (ref 1.8–7)
ALPHA2 GLOB SERPL ELPH-MCNC: 1.09 G/DL (ref 0.42–1.04)
ALPHA2 GLOB SERPL ELPH-MCNC: 16.2 % (ref 5.9–14.9)
ANION GAP SERPL CALCULATED.3IONS-SCNC: 11 MMOL/L (ref 4–13)
ATRIAL RATE: 72 BPM
BACTERIA BLD CULT: NORMAL
BACTERIA BLD CULT: NORMAL
BETA GLOB ABNORMAL SERPL ELPH-MCNC: 0.41 G/DL (ref 0.31–0.57)
BETA1 GLOB SERPL ELPH-MCNC: 6.1 % (ref 4.7–7.7)
BETA2 GLOB SERPL ELPH-MCNC: 6.7 % (ref 3.1–7.9)
BETA2+GAMMA GLOB SERPL ELPH-MCNC: 0.45 G/DL (ref 0.2–0.58)
BUN SERPL-MCNC: 52 MG/DL (ref 5–25)
CALCIUM SERPL-MCNC: 9.5 MG/DL (ref 8.4–10.2)
CHLORIDE SERPL-SCNC: 99 MMOL/L (ref 96–108)
CO2 SERPL-SCNC: 31 MMOL/L (ref 21–32)
CREAT SERPL-MCNC: 1.92 MG/DL (ref 0.6–1.3)
ERYTHROCYTE [DISTWIDTH] IN BLOOD BY AUTOMATED COUNT: 14.6 % (ref 11.6–15.1)
GAMMA GLOB ABNORMAL SERPL ELPH-MCNC: 0.7 G/DL (ref 0.4–1.66)
GAMMA GLOB SERPL ELPH-MCNC: 10.5 % (ref 6.9–22.3)
GFR SERPL CREATININE-BSD FRML MDRD: 32 ML/MIN/1.73SQ M
GLUCOSE SERPL-MCNC: 103 MG/DL (ref 65–140)
GLUCOSE SERPL-MCNC: 122 MG/DL (ref 65–140)
GLUCOSE SERPL-MCNC: 216 MG/DL (ref 65–140)
GLUCOSE SERPL-MCNC: 93 MG/DL (ref 65–140)
GLUCOSE SERPL-MCNC: 93 MG/DL (ref 65–140)
HCT VFR BLD AUTO: 32.7 % (ref 36.5–49.3)
HGB BLD-MCNC: 10.7 G/DL (ref 12–17)
IGG/ALB SER: 1.17 {RATIO} (ref 1.1–1.8)
KAPPA LC FREE SER-MCNC: 32.7 MG/L (ref 3.3–19.4)
KAPPA LC FREE/LAMBDA FREE SER: 1.45 {RATIO} (ref 0.26–1.65)
LAMBDA LC FREE SERPL-MCNC: 22.6 MG/L (ref 5.7–26.3)
MAGNESIUM SERPL-MCNC: 2.3 MG/DL (ref 1.9–2.7)
MCH RBC QN AUTO: 32.2 PG (ref 26.8–34.3)
MCHC RBC AUTO-ENTMCNC: 32.7 G/DL (ref 31.4–37.4)
MCV RBC AUTO: 99 FL (ref 82–98)
P AXIS: 74 DEGREES
PLATELET # BLD AUTO: 286 THOUSANDS/UL (ref 149–390)
PMV BLD AUTO: 9.6 FL (ref 8.9–12.7)
POTASSIUM SERPL-SCNC: 3.7 MMOL/L (ref 3.5–5.3)
PR INTERVAL: 310 MS
PROT PATTERN SERPL ELPH-IMP: ABNORMAL
PROT SERPL-MCNC: 6.7 G/DL (ref 6.4–8.2)
QRS AXIS: -11 DEGREES
QRSD INTERVAL: 68 MS
QT INTERVAL: 396 MS
QTC INTERVAL: 433 MS
RBC # BLD AUTO: 3.32 MILLION/UL (ref 3.88–5.62)
SODIUM SERPL-SCNC: 141 MMOL/L (ref 135–147)
T WAVE AXIS: 87 DEGREES
VENTRICULAR RATE: 72 BPM
WBC # BLD AUTO: 4.88 THOUSAND/UL (ref 4.31–10.16)

## 2024-04-02 PROCEDURE — 82948 REAGENT STRIP/BLOOD GLUCOSE: CPT

## 2024-04-02 PROCEDURE — 99232 SBSQ HOSP IP/OBS MODERATE 35: CPT | Performed by: INTERNAL MEDICINE

## 2024-04-02 PROCEDURE — 99232 SBSQ HOSP IP/OBS MODERATE 35: CPT | Performed by: STUDENT IN AN ORGANIZED HEALTH CARE EDUCATION/TRAINING PROGRAM

## 2024-04-02 PROCEDURE — 83735 ASSAY OF MAGNESIUM: CPT | Performed by: FAMILY MEDICINE

## 2024-04-02 PROCEDURE — 80048 BASIC METABOLIC PNL TOTAL CA: CPT | Performed by: FAMILY MEDICINE

## 2024-04-02 PROCEDURE — 93010 ELECTROCARDIOGRAM REPORT: CPT | Performed by: INTERNAL MEDICINE

## 2024-04-02 PROCEDURE — 85027 COMPLETE CBC AUTOMATED: CPT | Performed by: FAMILY MEDICINE

## 2024-04-02 PROCEDURE — 84165 PROTEIN E-PHORESIS SERUM: CPT | Performed by: STUDENT IN AN ORGANIZED HEALTH CARE EDUCATION/TRAINING PROGRAM

## 2024-04-02 RX ADMIN — HEPARIN SODIUM 5000 UNITS: 5000 INJECTION INTRAVENOUS; SUBCUTANEOUS at 05:20

## 2024-04-02 RX ADMIN — FUROSEMIDE 40 MG: 10 INJECTION, SOLUTION INTRAMUSCULAR; INTRAVENOUS at 17:19

## 2024-04-02 RX ADMIN — FUROSEMIDE 40 MG: 10 INJECTION, SOLUTION INTRAMUSCULAR; INTRAVENOUS at 05:20

## 2024-04-02 RX ADMIN — EZETIMIBE 10 MG: 10 TABLET ORAL at 09:24

## 2024-04-02 RX ADMIN — INSULIN LISPRO 5 UNITS: 100 INJECTION, SOLUTION INTRAVENOUS; SUBCUTANEOUS at 08:00

## 2024-04-02 RX ADMIN — METOPROLOL TARTRATE 50 MG: 50 TABLET, FILM COATED ORAL at 09:24

## 2024-04-02 RX ADMIN — INSULIN LISPRO 5 UNITS: 100 INJECTION, SOLUTION INTRAVENOUS; SUBCUTANEOUS at 17:00

## 2024-04-02 RX ADMIN — LATANOPROST 1 DROP: 50 SOLUTION OPHTHALMIC at 21:32

## 2024-04-02 RX ADMIN — PRAVASTATIN SODIUM 80 MG: 80 TABLET ORAL at 17:19

## 2024-04-02 RX ADMIN — INSULIN LISPRO 2 UNITS: 100 INJECTION, SOLUTION INTRAVENOUS; SUBCUTANEOUS at 12:00

## 2024-04-02 RX ADMIN — SENNOSIDES, DOCUSATE SODIUM 1 TABLET: 8.6; 5 TABLET ORAL at 09:24

## 2024-04-02 RX ADMIN — HEPARIN SODIUM 5000 UNITS: 5000 INJECTION INTRAVENOUS; SUBCUTANEOUS at 21:33

## 2024-04-02 RX ADMIN — CHLOROTHIAZIDE SODIUM 500 MG: 500 INJECTION, POWDER, LYOPHILIZED, FOR SOLUTION INTRAVENOUS at 09:24

## 2024-04-02 RX ADMIN — ASPIRIN 81 MG: 81 TABLET, COATED ORAL at 09:24

## 2024-04-02 RX ADMIN — SENNOSIDES, DOCUSATE SODIUM 1 TABLET: 8.6; 5 TABLET ORAL at 17:19

## 2024-04-02 RX ADMIN — HEPARIN SODIUM 5000 UNITS: 5000 INJECTION INTRAVENOUS; SUBCUTANEOUS at 13:15

## 2024-04-02 RX ADMIN — LEVOTHYROXINE SODIUM 50 MCG: 0.05 TABLET ORAL at 05:20

## 2024-04-02 RX ADMIN — FUROSEMIDE 40 MG: 10 INJECTION, SOLUTION INTRAMUSCULAR; INTRAVENOUS at 13:00

## 2024-04-02 RX ADMIN — INSULIN GLARGINE 20 UNITS: 100 INJECTION, SOLUTION SUBCUTANEOUS at 09:24

## 2024-04-02 RX ADMIN — INSULIN LISPRO 5 UNITS: 100 INJECTION, SOLUTION INTRAVENOUS; SUBCUTANEOUS at 12:00

## 2024-04-02 RX ADMIN — METOPROLOL TARTRATE 50 MG: 50 TABLET, FILM COATED ORAL at 21:30

## 2024-04-02 NOTE — PROGRESS NOTES
NEPHROLOGY PROGRESS NOTE    Patient: Trent Ocasio               Sex: male          DOA: 3/28/2024  2:53 PM   YOB: 1945        Age:  79 y.o.        LOS:  LOS: 5 days   4/2/2024    REASON FOR THE CONSULTATION:      Acute kidney injury on chronic kidney disease stage IIIb    SUBJECTIVE     Patient is seen and examined next to the bedside.  Improvement in breathing noted by the patient.  Underwent nuclear stress test which appears to be abnormal.    CURRENT MEDICATIONS       Current Facility-Administered Medications:     aspirin (ECOTRIN LOW STRENGTH) EC tablet 81 mg, 81 mg, Oral, Daily, Chai Salomon MD, 81 mg at 04/02/24 0924    chlorothiazide (DIURIL) 500 mg in sodium chloride 0.9 % 100 mL IV, 500 mg, Intravenous, Once, Nikki Gilbert MD, Last Rate: 200 mL/hr at 04/02/24 0924, 500 mg at 04/02/24 0924    Diclofenac Sodium (VOLTAREN) 1 % topical gel 2 g, 2 g, Topical, TID PRN, Arelis Gleason PA-C, 2 g at 03/28/24 2336    ezetimibe (ZETIA) tablet 10 mg, 10 mg, Oral, Daily, Arelis Gleason PA-C, 10 mg at 04/02/24 0924    furosemide (LASIX) injection 40 mg, 40 mg, Intravenous, TID (diuretic), Nikki Gilbert MD, 40 mg at 04/02/24 0520    heparin (porcine) subcutaneous injection 5,000 Units, 5,000 Units, Subcutaneous, Q8H VALERIE, 5,000 Units at 04/02/24 0520 **AND** [CANCELED] Platelet count, , , Once, Chai Salomon MD    insulin glargine (LANTUS) subcutaneous injection 20 Units 0.2 mL, 20 Units, Subcutaneous, QAM, Arelis Gleason PA-C, 20 Units at 04/02/24 0924    insulin lispro (HumALOG/ADMELOG) 100 units/mL subcutaneous injection 1-5 Units, 1-5 Units, Subcutaneous, HS, Chai Salomon MD, 1 Units at 04/01/24 2152    insulin lispro (HumALOG/ADMELOG) 100 units/mL subcutaneous injection 1-6 Units, 1-6 Units, Subcutaneous, TID AC, 3 Units at 04/01/24 1718 **AND** Fingerstick Glucose (POCT), , , TID AC, Chai Salomon MD    insulin lispro (HumALOG/ADMELOG) 100 units/mL subcutaneous injection 5 Units, 5 Units, Subcutaneous,  TID With Meals, Thomas Wm Salomon MD, 5 Units at 04/02/24 0800    latanoprost (XALATAN) 0.005 % ophthalmic solution 1 drop, 1 drop, Both Eyes, HS, Arelis Gleason PA-C, 1 drop at 04/01/24 2151    levothyroxine tablet 50 mcg, 50 mcg, Oral, Early Morning, Chai Salomon MD, 50 mcg at 04/02/24 0520    lidocaine (LIDODERM) 5 % patch 2 patch, 2 patch, Topical, Daily, Rose Marie Sánchez DO, 2 patch at 04/01/24 0908    metoprolol tartrate (LOPRESSOR) tablet 50 mg, 50 mg, Oral, Q12H VALERIE, Chai Salomon MD, 50 mg at 04/02/24 0924    pantoprazole (PROTONIX) EC tablet 40 mg, 40 mg, Oral, Early Morning, Chai Salomon MD, 40 mg at 04/01/24 1937    pravastatin (PRAVACHOL) tablet 80 mg, 80 mg, Oral, Daily With Dinner, Areils Gleason PA-C, 80 mg at 04/01/24 1717    senna-docusate sodium (SENOKOT S) 8.6-50 mg per tablet 1 tablet, 1 tablet, Oral, BID, Rose Marie Sánchez DO, 1 tablet at 04/02/24 0924    REVIEW OF SYSTEMS     Review of Systems   Constitutional: Negative.    HENT: Negative.     Eyes: Negative.    Respiratory: Negative.     Cardiovascular: Negative.    Gastrointestinal: Negative.    Endocrine: Negative.    Genitourinary: Negative.    Musculoskeletal: Negative.    Skin: Negative.    Allergic/Immunologic: Negative.    Neurological: Negative.    Hematological: Negative.    All other systems reviewed and are negative.      OBJECTIVE     Current Weight: Weight - Scale: 111 kg (244 lb 15.3 oz)  Vitals:    04/02/24 0704   BP: 140/78   Pulse: 79   Resp: 19   Temp: 97.5 °F (36.4 °C)   SpO2: 92%     Body mass index is 35.15 kg/m².    Intake/Output Summary (Last 24 hours) at 4/2/2024 0936  Last data filed at 4/2/2024 0302  Gross per 24 hour   Intake --   Output 2400 ml   Net -2400 ml       PHYSICAL EXAMINATION     Physical Exam  HENT:      Head: Normocephalic and atraumatic.   Eyes:      Pupils: Pupils are equal, round, and reactive to light.   Neck:      Vascular: No JVD.   Cardiovascular:      Rate and Rhythm: Normal rate and  regular rhythm.      Heart sounds: Normal heart sounds. No murmur heard.     No friction rub.   Pulmonary:      Effort: Pulmonary effort is normal.      Breath sounds: Normal breath sounds.   Abdominal:      General: Bowel sounds are normal. There is no distension.      Palpations: Abdomen is soft.      Tenderness: There is no abdominal tenderness. There is no rebound.   Musculoskeletal:         General: No tenderness.      Cervical back: Neck supple.      Right lower leg: Edema present.      Left lower leg: Edema present.   Skin:     General: Skin is dry.      Findings: No rash.   Neurological:      Mental Status: He is alert and oriented to person, place, and time.           LAB RESULTS     Results from last 7 days   Lab Units 04/02/24  0500 04/01/24  0530 03/31/24  0448 03/30/24  2139 03/30/24  0515 03/29/24  0432 03/28/24  1518   WBC Thousand/uL 4.88 4.61 4.24*  --  5.61 5.62 6.98   HEMOGLOBIN g/dL 10.7* 10.2* 10.5*  --  11.3* 10.8* 11.1*   HEMATOCRIT % 32.7* 31.3* 33.9*  --  36.4* 33.8* 34.5*   PLATELETS Thousands/uL 286 289 315  --  316 324 344   POTASSIUM mmol/L 3.7 4.1 5.1 4.9 5.1 5.0 4.8   CHLORIDE mmol/L 99 102 105 101 105 107 106   CO2 mmol/L 31 28 28 28 26 26 24   BUN mg/dL 52* 58* 56* 55* 49* 39* 39*   CREATININE mg/dL 1.92* 2.08* 1.98* 1.98* 2.07* 1.59* 1.47*   EGFR ml/min/1.73sq m 32 29 30 30 29 40 44   CALCIUM mg/dL 9.5 9.2 9.3 9.8 9.4 9.0 9.4   MAGNESIUM mg/dL 2.3  --   --   --  2.4 2.3 2.2           RADIOLOGY RESULTS      IMPRESSION:     Left retrocardiac opacity, likely left lower lobe atelectasis. Pneumonia not excluded in the appropriate clinical setting.       ASSESSMENT/PLAN     79 years old male with past medical history of prostate cancer status post radiation therapy, chronic kidney disease stage IIIb, hypertension, diabetes mellitus type 2, CVA who presented to our facility with progressive shortness of breath, weight gain and worsening lower extremity edema.    1.  Acute kidney injury on  chronic kidney disease stage IIIb: Baseline serum creatinine of 1.3-1.7  Presented to our facility at his baseline kidney function however noted creatinine to have peaked to 2.0.  Etiology likely contrast-induced nephropathy versus appropriate rise in creatinine as diuresis is being achieved.  Urinalysis revealing presence of pyuria and trace protein.  Current serum creatinine is 1.9 mg/dL and slightly improved despite undergoing aggressive diuresis.    2.  Volume overload: Initiated on Lasix 40 mg IV 3 times daily along with Diuril 500 mg IV daily.  Patient has lost approximately 14 pounds since admission with significant improvement in overall status of edema.  Plan to switch to oral diuretics from a.m.    3.  Hypertension: Blood pressure within acceptable range    4.  Secondary hyperparathyroidism of renal origin: PTH intact slightly above target 115.  Normal 25-hydroxy vitamin D levels noted.    5.  Anemia due to CKD: Hemoglobin is 10.7 g/dL and acceptable.    #6: Abnormal stress test: Underwent nuclear stress test yesterday which revealed reversible ischemia.  Patient may require cardiac catheterization though need to be determined by cardiology.      Nikik Gilbert MD  Nephrology  4/2/2024

## 2024-04-02 NOTE — CASE MANAGEMENT
Case Management Discharge Planning Note    Patient name Trent Ocasio  Location /-01 MRN [id number]  : 1945 Date 2024       Current Admission Date: 3/28/2024  Current Admission Diagnosis:Acute CHF (congestive heart failure) (HCC)   Patient Active Problem List    Diagnosis Date Noted    Respiratory acidosis 2024    Acute-on-chronic kidney injury  (HCC) 2024    Acute CHF (congestive heart failure) (HCC) 2024    Acute respiratory failure with hypoxia (HCC) 2024    History of prostate cancer 2024    Anxiety 2024    HTN (hypertension) 2024    Hypothyroid 2024    Type 2 diabetes mellitus, with long-term current use of insulin (HCC) 2024    Elevated troponin 2024      LOS (days): 5  Geometric Mean LOS (GMLOS) (days): 3.9  Days to GMLOS:-1.1     OBJECTIVE:  Risk of Unplanned Readmission Score: 15.99         Current admission status: Inpatient   Preferred Pharmacy:   [id number] - [place] - [id number]  [id number]  [place] [id number]  Phone: [phone] Fax: [phone]    Primary Care Provider: No primary care provider on file.    Primary Insurance: [facility]  Secondary Insurance:     DISCHARGE DETAILS:                                          Other Referral/Resources/Interventions Provided:  Referral Comments: Per SLIM rounds this AM, pt d/c anticipated in 48 hrs.  Level II recommended by PT/OT;  SNF and HH referrals pending.  Pt remains on 3L O2 and IV lasix TID.         Treatment Team Recommendation: Short Term Rehab, SNF, Home with Home Health Care  Discharge Destination Plan:: Home with Home Health Care, SNF, Short Term Rehab  Transport at Discharge : Other (Comment) (TBD)

## 2024-04-02 NOTE — PROGRESS NOTES
"Cardiology Progress Note - Trent Ocasio 79 y.o. male MRN: [id number]    Unit/Bed#: -01 Encounter: [id number]      Assessment/Plan:  1.  Acute HFpEF  Mildly hypervolemic on exam.  I/O net -9.6 L, creatinine stable.  Diuresis per nephrology.  Strict I/O's and daily weights; recommend sodium restriction.    2.  Abnormal stress test  Pharmacological MPI stress test revealed a predominantly fixed medium to large sized LV perfusion defect in the apex, apical lateral, apical septal, and inferior walls.  There was also a small reversible apical anterior defect visualized.  Would be reasonable to consider further ischemic evaluation when optimized from a renal standpoint.  Patient follows with Dr. Hylton at [facility], and wishes to discuss this further with him.    3.  Elevated troponin  Troponins mildly elevated at 98.  EKG without acute ischemic abnormalities.  Likely secondary to acute HFpEF, however stress test was found to be abnormal.  See plan above.    4.  Remote hx of CAD (2003)  Unfortunately, further details are unknown.  Continue ASA, pravastatin, Zetia, and Lopressor.    5.  Hypertension  BP is reasonably well-controlled.  Continue Lopressor and Lasix.    6.  Hyperlipidemia  Continue pravastatin and Zetia.    7.  CHE on CKD  8.  Hx of CVA      Cardiology will sign off at this time, please reach out as needed.  Patient plans to discuss consideration for cardiac catheterization further as an outpatient with Dr. Hylton at [facility].        Subjective:   Patient seen and examined.  No significant events overnight.  Patient resting in bed comfortably.  He continues to note mild LE edema.  Denies any additional complaints at this time.  All questions were answered.    Objective:     Vitals: Blood pressure 142/84, pulse 71, temperature 97.5 °F (36.4 °C), temperature source Oral, resp. rate 19, height 5' 10\" (1.778 m), weight 111 kg (244 lb 15.3 oz), SpO2 98%., Body mass index is 35.15 kg/m².,   Orthostatic Blood " Pressures      Flowsheet Row Most Recent Value   Blood Pressure 142/84 filed at 04/02/2024 1056   Patient Position - Orthostatic VS Sitting filed at 04/02/2024 0704              Intake/Output Summary (Last 24 hours) at 4/2/2024 1116  Last data filed at 4/2/2024 0302  Gross per 24 hour   Intake --   Output 2400 ml   Net -2400 ml         Physical Exam:  GEN: Alert and oriented x 3, in no acute distress.  Well appearing and well nourished.   HEENT: Sclera anicteric, conjunctivae pink, mucous membranes moist. Oropharynx clear.   NECK: Supple, no carotid bruits, no significant JVD. Trachea midline, no thyromegaly.   HEART: Regular rhythm, normal S1 and S2, no murmurs, clicks, gallops or rubs. PMI nondisplaced, no thrills.   LUNGS: Clear to auscultation bilaterally; no wheezes, rales, or rhonchi. No increased work of breathing or signs of respiratory distress.   ABDOMEN: Soft, nontender, nondistended, normoactive bowel sounds.   EXTREMITIES: Skin warm and well perfused, no clubbing or cyanosis.  Trace BL LE edema.  NEURO: No focal findings. Normal speech. Mood and affect normal.   SKIN: Normal without suspicious lesions on exposed skin.        Medications:      Current Facility-Administered Medications:     aspirin (ECOTRIN LOW STRENGTH) EC tablet 81 mg, 81 mg, Oral, Daily, Chai Salomon MD, 81 mg at 04/02/24 0924    Diclofenac Sodium (VOLTAREN) 1 % topical gel 2 g, 2 g, Topical, TID PRN, Arelis Gleason PA-C, 2 g at 03/28/24 2336    ezetimibe (ZETIA) tablet 10 mg, 10 mg, Oral, Daily, Arelis Gleason PA-C, 10 mg at 04/02/24 0924    furosemide (LASIX) injection 40 mg, 40 mg, Intravenous, TID (diuretic), Nikki Gilbert MD, 40 mg at 04/02/24 0520    heparin (porcine) subcutaneous injection 5,000 Units, 5,000 Units, Subcutaneous, Q8H VALERIE, 5,000 Units at 04/02/24 0520 **AND** [CANCELED] Platelet count, , , Once, Chai Salomon MD    insulin glargine (LANTUS) subcutaneous injection 20 Units 0.2 mL, 20 Units, Subcutaneous, KAMI, Arelis  DESTINY Gleason PA-C, 20 Units at 04/02/24 0924    insulin lispro (HumALOG/ADMELOG) 100 units/mL subcutaneous injection 1-5 Units, 1-5 Units, Subcutaneous, HS, Chai Salomon MD, 1 Units at 04/01/24 2152    insulin lispro (HumALOG/ADMELOG) 100 units/mL subcutaneous injection 1-6 Units, 1-6 Units, Subcutaneous, TID AC, 3 Units at 04/01/24 1718 **AND** Fingerstick Glucose (POCT), , , TID AC, Chai Salomon MD    insulin lispro (HumALOG/ADMELOG) 100 units/mL subcutaneous injection 5 Units, 5 Units, Subcutaneous, TID With Meals, Thomas Salomon MD, 5 Units at 04/02/24 0800    latanoprost (XALATAN) 0.005 % ophthalmic solution 1 drop, 1 drop, Both Eyes, HS, Arelis Gleason PA-C, 1 drop at 04/01/24 2151    levothyroxine tablet 50 mcg, 50 mcg, Oral, Early Morning, Chai Salomon MD, 50 mcg at 04/02/24 0520    lidocaine (LIDODERM) 5 % patch 2 patch, 2 patch, Topical, Daily, Rose Marie Sánchez DO, 2 patch at 04/01/24 0908    metoprolol tartrate (LOPRESSOR) tablet 50 mg, 50 mg, Oral, Q12H VALERIE, Chai Salomon MD, 50 mg at 04/02/24 0924    pantoprazole (PROTONIX) EC tablet 40 mg, 40 mg, Oral, Early Morning, Chai Salomon MD, 40 mg at 04/01/24 1937    pravastatin (PRAVACHOL) tablet 80 mg, 80 mg, Oral, Daily With Dinner, Arelis Gleason PA-C, 80 mg at 04/01/24 1717    senna-docusate sodium (SENOKOT S) 8.6-50 mg per tablet 1 tablet, 1 tablet, Oral, BID, Rose Marie Sánchez DO, 1 tablet at 04/02/24 0924     Labs & Results:     Results from last 7 days   Lab Units 03/28/24  1903 03/28/24  1722 03/28/24  1518   HS TNI 0HR ng/L  --   --  89*   HS TNI 2HR ng/L  --  95*  --    HSTNI D2 ng/L  --  6  --    HS TNI 4HR ng/L 98*  --   --    HSTNI D4 ng/L 9  --   --      Results from last 7 days   Lab Units 04/02/24  0500 04/01/24  0530 03/31/24  0448   WBC Thousand/uL 4.88 4.61 4.24*   HEMOGLOBIN g/dL 10.7* 10.2* 10.5*   HEMATOCRIT % 32.7* 31.3* 33.9*   PLATELETS Thousands/uL 286 289 315         Results from last 7 days   Lab Units 04/02/24  0500  "24  0530 24  0448 24  0432 24  1518   POTASSIUM mmol/L 3.7 4.1 5.1   < > 4.8   CHLORIDE mmol/L 99 102 105   < > 106   CO2 mmol/L 31 28 28   < > 24   BUN mg/dL 52* 58* 56*   < > 39*   CREATININE mg/dL 1.92* 2.08* 1.98*   < > 1.47*   CALCIUM mg/dL 9.5 9.2 9.3   < > 9.4   ALK PHOS U/L  --   --  52  --  54   ALT U/L  --   --  13  --  16   AST U/L  --   --  13  --  15    < > = values in this interval not displayed.     Results from last 7 days   Lab Units 24  1518   INR  0.99   PTT seconds 37     Results from last 7 days   Lab Units 24  0500 24  0515 24  0432   MAGNESIUM mg/dL 2.3 2.4 2.3       Vitals: Blood pressure 142/84, pulse 71, temperature 97.5 °F (36.4 °C), temperature source Oral, resp. rate 19, height 5' 10\" (1.778 m), weight 111 kg (244 lb 15.3 oz), SpO2 98%., Body mass index is 35.15 kg/m².,   Orthostatic Blood Pressures      Flowsheet Row Most Recent Value   Blood Pressure 142/84 filed at 2024 1056   Patient Position - Orthostatic VS Sitting filed at 2024 0704            Systolic (24hrs), Av , Min:133 , Max:156     Diastolic (24hrs), Av, Min:62, Max:84        Intake/Output Summary (Last 24 hours) at 2024 1116  Last data filed at 2024 0302  Gross per 24 hour   Intake --   Output 2400 ml   Net -2400 ml       Invasive Devices       Peripheral Intravenous Line  Duration             Peripheral IV 24 Dorsal (posterior);Left Forearm <1 day                     Telemetry:  Telemetry Orders (From admission, onward)               24 Hour Telemetry Monitoring  Continuous x 24 Hours (Telem)        Expiring   Question:  Reason for 24 Hour Telemetry  Answer:  Decompensated CHF- and any one of the following: continuous diuretic infusion or total diuretic dose >200 mg daily, associated electrolyte derangement (I.e. K < 3.0), ionotropic drip (continuous infusion), hx of ventricular arrhythmia, or new EF < 35%                       BP Readings " from Last 3 Encounters:   04/02/24 142/84   06/25/22 154/70      Wt Readings from Last 3 Encounters:   04/02/24 111 kg (244 lb 15.3 oz)   06/25/22 109 kg (240 lb)

## 2024-04-02 NOTE — PROGRESS NOTES
[facility]  Progress Note  Name: Trent Ocasio I  MRN: [id number]  Unit/Bed#: -01 I Date of Admission: 3/28/2024   Date of Service: 4/2/2024 I Hospital Day: 5    Assessment/Plan   Acute-on-chronic kidney injury  (HCC)  Assessment & Plan  Lab Results   Component Value Date    EGFR 32 04/02/2024    EGFR 29 04/01/2024    EGFR 30 03/31/2024    CREATININE 1.92 (H) 04/02/2024    CREATININE 2.08 (H) 04/01/2024    CREATININE 1.98 (H) 03/31/2024   Baseline creatinine 1.5-1.7, 1.92 today  Volume status improving  Continue with IV diuresis per cardiology and nephrology  Continue to monitor I's and O's    Elevated troponin  Assessment & Plan  89-95-98, continue to trend to peak.  Trend appears flat.  No active chest pain, suspect 2/2 to acute CHF  EKG shows Sinus rhythm, first-degree AV block with CA interval of 338, nonspecific T wave changes in V1  Cardiology consulted, appreciate recommendations  Pharmacological MPI stress test revealed a predominantly fixed medium to large sized LV perfusion defect in the apex, apical lateral, apical septal, and inferior walls. There was also a small reversible apical anterior defect noted. Per cardiology pt would benefit from cardiac catheterization once medically and renally optimized, will plan for outpatient cardiac cath. Follows with [facility] cardiology and would like to continue following with them.     Type 2 diabetes mellitus, with long-term current use of insulin ([facility])  Assessment & Plan  Lab Results   Component Value Date    HGBA1C 7.3 (H) 02/16/2024       Recent Labs     04/01/24  1620 04/01/24  2039 04/02/24  0702 04/02/24  1055   POCGLU 252* 173* 103 216*         Blood Sugar Average: Last 72 hrs:  (P) [id number]    Hold p.o. home meds  Continue home insulin regimen of lantus 20 U qhs  Sliding scale insulin  Carb controlled diet  Schedule short acting with meals    Hypothyroid  Assessment & Plan  TSH WNL  Continue Synthroid    HTN  "(hypertension)  Assessment & Plan  /84 (BP Location: Right arm)   Pulse 71   Temp 97.5 °F (36.4 °C) (Oral)   Resp 19   Ht 5' 10\" (1.778 m)   Wt 111 kg (244 lb 15.3 oz)   SpO2 98%   BMI 35.15 kg/m²   Stable pressures  Takes 100 mg metoprolol twice daily at home.  Reduced to 50 mg twice daily at this time  increased IV Lasix 40 twice TID.  Will get another IV dose of Diuril  Holding off on other blood pressure medications from his home medication list    Anxiety  Assessment & Plan  Currently stable  Atarax as needed    History of prostate cancer  Assessment & Plan  .  History of radiation treatment 5 years ago  Recommend outpatient follow-up    Acute respiratory failure with hypoxia (HCC)  Assessment & Plan  Patient found to be on 83% on room air, with started on 4 L in the ER and saturations increased in the 90s, currently on  3L NC, with improvement in respiratory status.   Repeat VBG pH 7.223, pCO2 66.3, oxygen level low. Improved with bipap   Suspect 2/2 to volume overload  Continue to wean oxygen as possible.  Patient will likely need a ambulatory pulse oximetry prior to discharge  Underlying etiology is congestive heart failure exacerbation    * Acute CHF (congestive heart failure) (HCC)  Assessment & Plan  Wt Readings from Last 3 Encounters:   04/02/24 111 kg (244 lb 15.3 oz)   06/25/22 109 kg (240 lb)     Patient presents with severe shortness of breath, leg edema.  Found to have elevated BNP, elevated troponin  Elevated D-dimer, CTA done PE ruled out  Status post 40 IV Lasix in the ER  Increase to IV 40 Lasix TID  Strict I's and O's  Plan to transition to po lasix tomorrow  Cardiology and nephrology following                     VTE Pharmacologic Prophylaxis:   Moderate Risk (Score 3-4) - Pharmacological DVT Prophylaxis Ordered: heparin.    Mobility:   Basic Mobility Inpatient Raw Score: 17  JH-HLM Goal: 5: Stand one or more mins  JH-HLM Achieved: 3: Sit at edge of bed  JH-HLM Goal achieved. " Continue to encourage appropriate mobility.    Patient Centered Rounds: I performed bedside rounds with nursing staff today.   Discussions with Specialists or Other Care Team Provider: JUAN MIGUEL    Education and Discussions with Family / Patient:  PT.     Total Time Spent on Date of Encounter in care of patient: 50 mins. This time was spent on one or more of the following: performing physical exam; counseling and coordination of care; obtaining or reviewing history; documenting in the medical record; reviewing/ordering tests, medications or procedures; communicating with other healthcare professionals and discussing with patient's family/caregivers.    Current Length of Stay: 5 day(s)  Current Patient Status: Inpatient   Certification Statement: The patient will continue to require additional inpatient hospital stay due to IV diuresis  Discharge Plan: Anticipate discharge tomorrow to home with home services.    Code Status: Level 1 - Full Code    Subjective:   Pt reports improvement in sob and swelling today.     Objective:     Vitals:   Temp (24hrs), Av.7 °F (36.5 °C), Min:97.3 °F (36.3 °C), Max:98.3 °F (36.8 °C)    Temp:  [97.3 °F (36.3 °C)-98.3 °F (36.8 °C)] 97.5 °F (36.4 °C)  HR:  [71-85] 71  Resp:  [18-19] 19  BP: (138-156)/(62-84) 142/84  SpO2:  [92 %-98 %] 98 %  Body mass index is 35.15 kg/m².     Input and Output Summary (last 24 hours):     Intake/Output Summary (Last 24 hours) at 2024 1319  Last data filed at 2024 1056  Gross per 24 hour   Intake --   Output 2800 ml   Net -2800 ml       Physical Exam:   Physical Exam  Constitutional:       General: He is not in acute distress.     Appearance: He is well-developed. He is not diaphoretic.   HENT:      Head: Normocephalic and atraumatic.      Mouth/Throat:      Pharynx: No oropharyngeal exudate.   Eyes:      General: No scleral icterus.     Extraocular Movements: Extraocular movements intact.      Conjunctiva/sclera: Conjunctivae normal.   Neck:       Vascular: No JVD.      Trachea: No tracheal deviation.   Cardiovascular:      Rate and Rhythm: Normal rate and regular rhythm.      Heart sounds: No murmur heard.     No friction rub. No gallop.   Pulmonary:      Effort: Pulmonary effort is normal. No respiratory distress.      Breath sounds: No stridor. No wheezing.      Comments: On 2 LNC  Abdominal:      General: There is no distension.      Palpations: Abdomen is soft. There is no mass.      Tenderness: There is no abdominal tenderness. There is no right CVA tenderness or left CVA tenderness.   Musculoskeletal:         General: No tenderness.      Right lower leg: Edema present.      Left lower leg: Edema present.   Skin:     General: Skin is warm and dry.      Coloration: Skin is not pale.      Findings: No erythema.   Neurological:      Mental Status: He is oriented to person, place, and time.   Psychiatric:         Behavior: Behavior normal.         Thought Content: Thought content normal.          Additional Data:     Labs:  Results from last 7 days   Lab Units 04/02/24  0500 03/30/24  0515 03/29/24  0432   WBC Thousand/uL 4.88   < > 5.62   HEMOGLOBIN g/dL 10.7*   < > 10.8*   HEMATOCRIT % 32.7*   < > 33.8*   PLATELETS Thousands/uL 286   < > 324   NEUTROS PCT %  --   --  80*   LYMPHS PCT %  --   --  7*   MONOS PCT %  --   --  11   EOS PCT %  --   --  2    < > = values in this interval not displayed.     Results from last 7 days   Lab Units 04/02/24  0500 04/01/24  0530 03/31/24  0448   SODIUM mmol/L 141   < > 139   POTASSIUM mmol/L 3.7   < > 5.1   CHLORIDE mmol/L 99   < > 105   CO2 mmol/L 31   < > 28   BUN mg/dL 52*   < > 56*   CREATININE mg/dL 1.92*   < > 1.98*   ANION GAP mmol/L 11   < > 6   CALCIUM mg/dL 9.5   < > 9.3   ALBUMIN g/dL  --   --  3.7   TOTAL BILIRUBIN mg/dL  --   --  0.33   ALK PHOS U/L  --   --  52   ALT U/L  --   --  13   AST U/L  --   --  13   GLUCOSE RANDOM mg/dL 93   < > 161*    < > = values in this interval not displayed.     Results  from last 7 days   Lab Units 03/28/24  1518   INR  0.99     Results from last 7 days   Lab Units 04/02/24  1055 04/02/24  0702 04/01/24  2039 04/01/24  1620 04/01/24  1215 04/01/24  0715 03/31/24  2106 03/31/24  1636 03/31/24  1128 03/31/24  0750 03/30/24  2050 03/30/24  1642   POC GLUCOSE mg/dl 216* 103 173* 252* 340* 148* 220* 119 212* 151* 225* 182*         Results from last 7 days   Lab Units 03/31/24  0448 03/28/24  1554 03/28/24  1518   LACTIC ACID mmol/L  --  1.4  --    PROCALCITONIN ng/ml 0.12  --  0.14       Lines/Drains:  Invasive Devices       Peripheral Intravenous Line  Duration             Peripheral IV 04/02/24 Dorsal (posterior);Left Forearm <1 day                          Imaging: No pertinent imaging reviewed.    Recent Cultures (last 7 days):   Results from last 7 days   Lab Units 03/28/24  1635 03/28/24  1554   BLOOD CULTURE  No Growth After 4 Days. No Growth After 4 Days.       Last 24 Hours Medication List:   Current Facility-Administered Medications   Medication Dose Route Frequency Provider Last Rate    aspirin  81 mg Oral Daily Chai Salomon MD      Diclofenac Sodium  2 g Topical TID PRN Arelis Gleason PA-C      ezetimibe  10 mg Oral Daily Arelis Gleason PA-C      furosemide  40 mg Intravenous TID (diuretic) Nikki Gilbert MD      heparin (porcine)  5,000 Units Subcutaneous Q8H [facility] Chai Salomon MD      insulin glargine  20 Units Subcutaneous QAM Arelis Gleason PA-C      insulin lispro  1-5 Units Subcutaneous HS Chai Salomon MD      insulin lispro  1-6 Units Subcutaneous TID AC Chai Salomon MD      insulin lispro  5 Units Subcutaneous TID With Meals Thomas Salomon MD      latanoprost  1 drop Both Eyes HS Arelis Gleason PA-C      levothyroxine  50 mcg Oral Early Morning Chai Salomon MD      lidocaine  2 patch Topical Daily Rose Marie Sánchez DO      metoprolol tartrate  50 mg Oral Q12H [facility] Chai Salomon MD      pantoprazole  40 mg Oral Early Morning Chai Salomon MD      pravastatin  80 mg Oral  Daily With Dinner Arelis Gleason PA-C      senna-docusate sodium  1 tablet Oral BID Rose Marie Sánchez DO          Today, Patient Was Seen By: Rose Marie Sánchez DO    **Please Note: This note may have been constructed using a voice recognition system.**

## 2024-04-03 ENCOUNTER — TELEPHONE (OUTPATIENT)
Dept: CARDIOLOGY CLINIC | Facility: CLINIC | Age: 79
End: 2024-04-03

## 2024-04-03 VITALS
HEIGHT: 70 IN | WEIGHT: 248.02 LBS | BODY MASS INDEX: 35.51 KG/M2 | SYSTOLIC BLOOD PRESSURE: 136 MMHG | TEMPERATURE: 98 F | OXYGEN SATURATION: 94 % | RESPIRATION RATE: 19 BRPM | DIASTOLIC BLOOD PRESSURE: 71 MMHG | HEART RATE: 85 BPM

## 2024-04-03 PROBLEM — E87.29 RESPIRATORY ACIDOSIS: Status: RESOLVED | Noted: 2024-03-31 | Resolved: 2024-04-03

## 2024-04-03 PROBLEM — J96.01 ACUTE RESPIRATORY FAILURE WITH HYPOXIA (HCC): Status: RESOLVED | Noted: 2024-03-28 | Resolved: 2024-04-03

## 2024-04-03 LAB
ANION GAP SERPL CALCULATED.3IONS-SCNC: 11 MMOL/L (ref 4–13)
BUN SERPL-MCNC: 50 MG/DL (ref 5–25)
CALCIUM SERPL-MCNC: 9.4 MG/DL (ref 8.4–10.2)
CHLORIDE SERPL-SCNC: 93 MMOL/L (ref 96–108)
CO2 SERPL-SCNC: 34 MMOL/L (ref 21–32)
CREAT SERPL-MCNC: 2.03 MG/DL (ref 0.6–1.3)
GFR SERPL CREATININE-BSD FRML MDRD: 30 ML/MIN/1.73SQ M
GLUCOSE SERPL-MCNC: 123 MG/DL (ref 65–140)
GLUCOSE SERPL-MCNC: 142 MG/DL (ref 65–140)
GLUCOSE SERPL-MCNC: 259 MG/DL (ref 65–140)
POTASSIUM SERPL-SCNC: 4 MMOL/L (ref 3.5–5.3)
SODIUM SERPL-SCNC: 138 MMOL/L (ref 135–147)

## 2024-04-03 PROCEDURE — 99239 HOSP IP/OBS DSCHRG MGMT >30: CPT | Performed by: STUDENT IN AN ORGANIZED HEALTH CARE EDUCATION/TRAINING PROGRAM

## 2024-04-03 PROCEDURE — 80048 BASIC METABOLIC PNL TOTAL CA: CPT | Performed by: STUDENT IN AN ORGANIZED HEALTH CARE EDUCATION/TRAINING PROGRAM

## 2024-04-03 PROCEDURE — 82948 REAGENT STRIP/BLOOD GLUCOSE: CPT

## 2024-04-03 PROCEDURE — 99232 SBSQ HOSP IP/OBS MODERATE 35: CPT | Performed by: INTERNAL MEDICINE

## 2024-04-03 RX ORDER — TORSEMIDE 20 MG/1
40 TABLET ORAL DAILY
Qty: 60 TABLET | Refills: 0 | Status: SHIPPED | OUTPATIENT
Start: 2024-04-03

## 2024-04-03 RX ADMIN — LEVOTHYROXINE SODIUM 50 MCG: 0.05 TABLET ORAL at 05:05

## 2024-04-03 RX ADMIN — ASPIRIN 81 MG: 81 TABLET, COATED ORAL at 09:09

## 2024-04-03 RX ADMIN — INSULIN LISPRO 5 UNITS: 100 INJECTION, SOLUTION INTRAVENOUS; SUBCUTANEOUS at 09:14

## 2024-04-03 RX ADMIN — METOPROLOL TARTRATE 50 MG: 50 TABLET, FILM COATED ORAL at 09:09

## 2024-04-03 RX ADMIN — FUROSEMIDE 40 MG: 10 INJECTION, SOLUTION INTRAMUSCULAR; INTRAVENOUS at 05:05

## 2024-04-03 RX ADMIN — EZETIMIBE 10 MG: 10 TABLET ORAL at 09:09

## 2024-04-03 RX ADMIN — INSULIN GLARGINE 20 UNITS: 100 INJECTION, SOLUTION SUBCUTANEOUS at 09:08

## 2024-04-03 RX ADMIN — HEPARIN SODIUM 5000 UNITS: 5000 INJECTION INTRAVENOUS; SUBCUTANEOUS at 05:05

## 2024-04-03 NOTE — ASSESSMENT & PLAN NOTE
"/65   Pulse 79   Temp 98.5 °F (36.9 °C)   Resp 20   Ht 5' 10\" (1.778 m)   Wt 122 kg (268 lb 1.3 oz)   SpO2 95%   BMI 38.47 kg/m²   Stable pressures  Takes 100 mg metoprolol twice daily at home.  Reduced to 50 mg twice daily at this time  increased IV Lasix 40 twice TID  Holding off on other blood pressure medications from his home medication list  "
"/66   Pulse 65   Temp 97.6 °F (36.4 °C)   Resp 18   Ht 5' 10\" (1.778 m)   Wt 121 kg (265 lb 10.5 oz) Comment: pt unable to stand due to sob  SpO2 90%   BMI 38.12 kg/m²   Stable pressures  Takes 100 mg metoprolol twice daily at home.  Reduced to 50 mg twice daily at this time  Continue IV Lasix 40 twice daily  Holding off on other blood pressure medications from his home medication list  "
"/67   Pulse 74   Temp (!) 97.4 °F (36.3 °C) (Tympanic)   Resp 17   Ht 5' 10\" (1.778 m)   Wt 118 kg (260 lb 6.4 oz)   SpO2 91%   BMI 37.36 kg/m²   Stable pressures  Takes 100 mg metoprolol twice daily at home.  Reduced to 50 mg twice daily at this time  Continue IV Lasix 40 twice daily  Holding off on other blood pressure medications from his home medication list  "
"/69   Pulse 62   Temp 97.9 °F (36.6 °C) (Oral)   Resp 19   Ht 5' 10\" (1.778 m)   Wt 118 kg (259 lb 7.7 oz)   SpO2 92%   BMI 37.23 kg/m²   Stable pressures  Takes 100 mg metoprolol twice daily at home.  Reduced to 50 mg twice daily at this time  increased IV Lasix 40 twice TID.  Will get another IV dose of Diuril  Holding off on other blood pressure medications from his home medication list  "
"/71   Pulse 85   Temp 98 °F (36.7 °C) (Oral)   Resp 19   Ht 5' 10\" (1.778 m)   Wt 113 kg (248 lb 0.3 oz)   SpO2 94%   BMI 35.59 kg/m²   Stable pressures  Takes 100 mg metoprolol twice daily at home  Will continue home dose metoprolol succinate 100 mg twice daily and new medication of torsemide 40 mg daily  "
"/77   Pulse 82   Temp (!) 97.3 °F (36.3 °C)   Resp 18   Ht 5' 10\" (1.778 m)   Wt 119 kg (262 lb 5.6 oz) Comment: Pt sob and unable to stand  SpO2 92%   BMI 37.64 kg/m²   Stable pressures  Takes 100 mg metoprolol twice daily at home.  Reduced to 50 mg twice daily at this time  Continue IV Lasix 40 twice daily  Holding off on other blood pressure medications from his home medication list  "
"/84 (BP Location: Right arm)   Pulse 71   Temp 97.5 °F (36.4 °C) (Oral)   Resp 19   Ht 5' 10\" (1.778 m)   Wt 111 kg (244 lb 15.3 oz)   SpO2 98%   BMI 35.15 kg/m²   Stable pressures  Takes 100 mg metoprolol twice daily at home.  Reduced to 50 mg twice daily at this time  increased IV Lasix 40 twice TID.  Will get another IV dose of Diuril  Holding off on other blood pressure medications from his home medication list  "
.  History of radiation treatment 5 years ago  Recommend outpatient follow-up  
89-95-98, continue to trend to peak.  Trend appears flat.  No active chest pain  EKG shows Sinus rhythm, first-degree AV block with ID interval of 338, nonspecific T wave changes in V1  Cardiology has been consulted for further input  
89-95-98, continue to trend to peak.  Trend appears flat.  No active chest pain, suspect 2/2 to acute CHF  EKG shows Sinus rhythm, first-degree AV block with CT interval of 338, nonspecific T wave changes in V1  Cardiology consulted, appreciate recommendations  Stress test today  
89-95-98, continue to trend to peak.  Trend appears flat.  No active chest pain, suspect 2/2 to acute CHF  EKG shows Sinus rhythm, first-degree AV block with MA interval of 338, nonspecific T wave changes in V1  Cardiology consulted, appreciate recommendations  Plan for stress test on monday  
89-95-98, continue to trend to peak.  Trend appears flat.  No active chest pain, suspect 2/2 to acute CHF  EKG shows Sinus rhythm, first-degree AV block with UT interval of 338, nonspecific T wave changes in V1  Cardiology consulted, appreciate recommendations  Pharmacological MPI stress test revealed a predominantly fixed medium to large sized LV perfusion defect in the apex, apical lateral, apical septal, and inferior walls. There was also a small reversible apical anterior defect noted. Per cardiology pt would benefit from cardiac catheterization once medically and renally optimized, will plan for outpatient cardiac cath. [facility] and would like to continue following with them.   
89-95-98, continue to trend to peak.  Trend appears flat.  No active chest pain, suspect 2/2 to acute CHF  EKG shows Sinus rhythm, first-degree AV block with WA interval of 338, nonspecific T wave changes in V1  Cardiology consulted, appreciate recommendations  
89-95-98, continue to trend to peak.  Trend appears flat.  No active chest pain, suspect 2/2 to acute CHF  EKG shows Sinus rhythm, first-degree AV block with WA interval of 338, nonspecific T wave changes in V1  Cardiology consulted, appreciate recommendations  Plan for stress test on monday  
89-95-98, continue to trend to peak.  Trend appears flat.  No active chest pain, suspect 2/2 to acute CHF  EKG shows Sinus rhythm, first-degree AV block with W[place] interval of 338, nonspecific T wave changes in V1  Cardiology consulted, appreciate recommendations  Pharmacological MPI stress test revealed a predominantly fixed medium to large sized LV perfusion defect in the apex, apical lateral, apical septal, and inferior walls. There was also a small reversible apical anterior defect noted. Per cardiology pt would benefit from cardiac catheterization once medically and renally optimized, will plan for outpatient cardiac cath. Follows with [facility] cardiology   
Check TSH and free T4  Continue Synthroid  
Currently stable  Atarax as needed  
Lab Results   Component Value Date    EGFR 29 03/30/2024    EGFR 40 03/29/2024    EGFR 44 03/28/2024    CREATININE 2.07 (H) 03/30/2024    CREATININE 1.59 (H) 03/29/2024    CREATININE 1.47 (H) 03/28/2024   Baseline creatinine 1.5-1.7, 2.07 today  Patient remains volume overloaded on exam  Continue with IV diuresis per cardiology  Avoid nephrotoxic agents  Put out 2 L urine in the last 24 hours  UA pending, urinary retention protocol  Nephrology consulted, appreciate recommendations  
Lab Results   Component Value Date    EGFR 29 04/01/2024    EGFR 30 03/31/2024    EGFR 30 03/30/2024    CREATININE 2.08 (H) 04/01/2024    CREATININE 1.98 (H) 03/31/2024    CREATININE 1.98 (H) 03/30/2024   Baseline creatinine 1.5-1.7, 2.08 today  Patient remains volume overloaded on exam  Continue with IV diuresis per cardiology and nephrology  Continue to monitor I's and O's  
Lab Results   Component Value Date    EGFR 30 03/31/2024    EGFR 30 03/30/2024    EGFR 29 03/30/2024    CREATININE 1.98 (H) 03/31/2024    CREATININE 1.98 (H) 03/30/2024    CREATININE 2.07 (H) 03/30/2024   Baseline creatinine 1.5-1.7, 2.07 today  Patient remains volume overloaded on exam  Continue with IV diuresis per cardiology and nephrology  Avoid nephrotoxic agents  Put out 2 L urine in the last 24 hours  Nephrology consulted, appreciate recommendations  
Lab Results   Component Value Date    EGFR 30 04/03/2024    EGFR 32 04/02/2024    EGFR 29 04/01/2024    CREATININE 2.03 (H) 04/03/2024    CREATININE 1.92 (H) 04/02/2024    CREATININE 2.08 (H) 04/01/2024   Baseline creatinine 1.5-1.7, and has stabilized around 2  Net -12 L  Plan to discharge on p.o. torsemide 40 mg daily  Continue to monitor I's and O's  
Lab Results   Component Value Date    EGFR 32 04/02/2024    EGFR 29 04/01/2024    EGFR 30 03/31/2024    CREATININE 1.92 (H) 04/02/2024    CREATININE 2.08 (H) 04/01/2024    CREATININE 1.98 (H) 03/31/2024   Baseline creatinine 1.5-1.7, 1.92 today  Volume status improving  Continue with IV diuresis per cardiology and nephrology  Continue to monitor I's and O's  
Lab Results   Component Value Date    HGBA1C 7.3 (H) 02/16/2024       Recent Labs     03/28/24  1838   POCGLU 179*       Blood Sugar Average: Last 72 hrs:  (P) 179    Hold p.o. home meds  Sliding scale insulin  Carb controlled diet  
Lab Results   Component Value Date    HGBA1C 7.3 (H) 02/16/2024       Recent Labs     03/28/24  1838 03/28/24  2144 03/29/24  0745   POCGLU 179* 208* 104         Blood Sugar Average: Last 72 hrs:  (P) 163.5284156638436222    Hold p.o. home meds  Continue home insulin regimen of lantus 20 U qhs  Sliding scale insulin  Carb controlled diet  
Lab Results   Component Value Date    HGBA1C 7.3 (H) 02/16/2024       Recent Labs     03/29/24  1725 03/29/24  2102 03/30/24  0714 03/30/24  1118   POCGLU 123 200* 135 133         Blood Sugar Average: Last 72 hrs:  (P) 154.125    Hold p.o. home meds  Continue home insulin regimen of lantus 20 U qhs  Sliding scale insulin  Carb controlled diet  
Lab Results   Component Value Date    HGBA1C 7.3 (H) 02/16/2024       Recent Labs     03/30/24  1118 03/30/24  1642 03/30/24 2050 03/31/24  0750   POCGLU 133 182* 225* 151*         Blood Sugar Average: Last 72 hrs:  (P) 162.2670043975511405    Hold p.o. home meds  Continue home insulin regimen of lantus 20 U qhs  Sliding scale insulin  Carb controlled diet  
Lab Results   Component Value Date    HGBA1C 7.3 (H) 02/16/2024       Recent Labs     03/31/24  1636 03/31/24  2106 04/01/24  0715 04/01/24  1215   POCGLU 119 220* 148* 340*         Blood Sugar Average: Last 72 hrs:  (P) 174.5    Hold p.o. home meds  Continue home insulin regimen of lantus 20 U qhs  Sliding scale insulin  Carb controlled diet  Schedule short acting with meals  
Lab Results   Component Value Date    HGBA1C 7.3 (H) 02/16/2024       Recent Labs     04/01/24  1620 04/01/24 2039 04/02/24  0702 04/02/24  1055   POCGLU 252* 173* 103 216*         Blood Sugar Average: Last 72 hrs:  (P) 186.7394223763567999    Hold p.o. home meds  Continue home insulin regimen of lantus 20 U qhs  Sliding scale insulin  Carb controlled diet  Schedule short acting with meals  
Lab Results   Component Value Date    HGBA1C 7.3 (H) 02/16/2024       Recent Labs     04/02/24  1607 04/02/24 2039 04/03/24  0715 04/03/24  1052   POCGLU 93 122 142* 259*         Blood Sugar Average: Last 72 hrs:  (P) 182.5856815848748665    Hold p.o. home meds  Continue home insulin regimen of lantus 20 U qhs  Sliding scale insulin  Carb controlled diet  Schedule short acting with meals  
Patient found to be on 83% on room air, with started on 4 L in the ER and saturations increased in the 90s  VBG reviewed, does not show acidosis.  Bicarb is only mildly low which is not significant at this time  Continue to wean oxygen as possible  Underlying etiology is congestive heart failure exacerbation  
Patient found to be on 83% on room air, with started on 4 L in the ER and saturations increased in the 90s, currently on  3L NC, with improvement in respiratory status  Repeat VBG pH 7.223, pCO2 66.3, oxygen level low. Improved with bipap   Suspect 2/2 to volume overload  Continue to wean oxygen as possible  Underlying etiology is congestive heart failure exacerbation  
Patient found to be on 83% on room air, with started on 4 L in the ER and saturations increased in the 90s, currently on  3L NC, with improvement in respiratory status  Repeat VBG pH 7.223, pCO2 66.3, oxygen level low. Improved with bipap   Suspect 2/2 to volume overload  Continue to wean oxygen as possible.  Patient will likely need a ambulatory pulse oximetry prior to discharge  Underlying etiology is congestive heart failure exacerbation  
Patient found to be on 83% on room air, with started on 4 L in the ER and saturations increased in the 90s, currently on  3L NC, with improvement in respiratory status.   Repeat VBG pH 7.223, pCO2 66.3, oxygen level low. Improved with bipap   Suspect 2/2 to volume overload  Continue to wean oxygen as possible.  Patient will likely need a ambulatory pulse oximetry prior to discharge  Underlying etiology is congestive heart failure exacerbation  
Patient found to be on 83% on room air, with started on 4 L in the ER and saturations increased in the 90s, currently on  3L NC, with improvement in respiratory status.  Now stable on room air  Repeat VBG pH 7.223, pCO2 66.3, oxygen level low. Improved with bipap   Suspect 2/2 to volume overload  Ambulatory pulse ox completed 4/2, did not desaturate with ambulation.  Stable on room air.  Does not qualify for home oxygen.  Underlying etiology is congestive heart failure exacerbation  
Patient found to be on 83% on room air, with started on 4 L in the ER and saturations increased in the 90s, currently on 5 L NC  VBG reviewed, does not show acidosis.  Bicarb is only mildly low which is not significant at this time  Continue to wean oxygen as possible  Underlying etiology is congestive heart failure exacerbation  
Patient found to be on 83% on room air, with started on 4 L in the ER and saturations increased in the 90s, currently on 6 L NC  Repeat VBG pH 7.223, pCO2 66.3, oxygen level low.  Does have tachypnea, chest x-ray with worsening vascular congestion, official read pending.  Will plan to trial 2 hours of BiPAP while with repeat VBG  If no improvement will discuss case with ICU  Continue to wean oxygen as possible  Underlying etiology is congestive heart failure exacerbation  
Ph 7.22 with co2 68, improved with 2 hours of bipap and bipap qhs to Ph 7.28 and Co2 51 this am. Suspect 2/2 to volume overload.   IV diuresis as above  Respiratory status has improved compared to yesterday, now on 3 L NC  Continue with bipap qhs  Repeat VBG in am  
Ph 7.22 with co2 68, improved with 2 hours of bipap and bipap qhs to Ph 7.28 and Co2 51 this am. Suspect 2/2 to volume overload.   IV diuresis as above  Respiratory status has improved compared to yesterday, now stable on RA  Continue with bipap qhs  Repeat VBG in am  
TSH WNL  Continue Synthroid  
Wt Readings from Last 3 Encounters:   03/28/24 118 kg (260 lb 6.4 oz)   06/25/22 109 kg (240 lb)     Patient presents with severe shortness of breath, leg edema.  Found to have elevated BNP, elevated troponin  Elevated D-dimer, CTA done PE ruled out  Status post 40 IV Lasix in the ER  Continue with IV 40 Lasix twice daily  Strict I's and O's  Echocardiogram to determine the type of CHF  Cardiology consultation  2 L fluid restriction, 2 g salt restriction  Nutrition consult        
Wt Readings from Last 3 Encounters:   03/29/24 119 kg (262 lb 5.6 oz)   06/25/22 109 kg (240 lb)     Patient presents with severe shortness of breath, leg edema.  Found to have elevated BNP, elevated troponin  Elevated D-dimer, CTA done PE ruled out  Status post 40 IV Lasix in the ER  Continue with IV 40 Lasix twice daily  Strict I's and O's  Echocardiogram pending  Cardiology consultation  2 L fluid restriction  Nutrition consult        
Wt Readings from Last 3 Encounters:   03/30/24 121 kg (265 lb 10.5 oz)   06/25/22 109 kg (240 lb)     Patient presents with severe shortness of breath, leg edema.  Found to have elevated BNP, elevated troponin  Elevated D-dimer, CTA done PE ruled out  Status post 40 IV Lasix in the ER  Continue with IV 40 Lasix twice daily  Strict I's and O's  Echocardiogram pending  Cardiology consultation  2 L fluid restriction  Nutrition consult        
Wt Readings from Last 3 Encounters:   03/31/24 122 kg (268 lb 1.3 oz)   06/25/22 109 kg (240 lb)     Patient presents with severe shortness of breath, leg edema.  Found to have elevated BNP, elevated troponin  Elevated D-dimer, CTA done PE ruled out  Status post 40 IV Lasix in the ER  Increase to IV 40 Lasix TID  Strict I's and O's  Echocardiogram pending  Cardiology and nephrology following  2 L fluid restriction  Nutrition consult        
Wt Readings from Last 3 Encounters:   04/01/24 118 kg (259 lb 7.7 oz)   06/25/22 109 kg (240 lb)     Patient presents with severe shortness of breath, leg edema.  Found to have elevated BNP, elevated troponin  Elevated D-dimer, CTA done PE ruled out  Status post 40 IV Lasix in the ER  Increase to IV 40 Lasix TID  Strict I's and O's  Echo done.  Continue the Lasix as well as the diuretic per nephrology's recommendations.  Creatinine is relatively stable.  Cardiology and nephrology following  Stress test today        
Wt Readings from Last 3 Encounters:   04/02/24 111 kg (244 lb 15.3 oz)   06/25/22 109 kg (240 lb)     Patient presents with severe shortness of breath, leg edema.  Found to have elevated BNP, elevated troponin  Elevated D-dimer, CTA done PE ruled out  Status post 40 IV Lasix in the ER  Increase to IV 40 Lasix TID  Strict I's and O's  Plan to transition to po lasix tomorrow  Cardiology and nephrology following        
Wt Readings from Last 3 Encounters:   04/03/24 113 kg (248 lb 0.3 oz)   06/25/22 109 kg (240 lb)     Patient presents with severe shortness of breath, leg edema.  Found to have elevated BNP, elevated troponin  Elevated D-dimer, CTA done PE ruled out  Status post 40 IV Lasix in the ER  Increase to IV 40 Lasix TID, patient has had great diuresis, net -12 L  Plan to discharge on p.o. torsemide 40 mg daily with outpatient follow-up with cardiology and nephrology  Strict I's and O's  Cardiology and nephrology following        
Attending and PA/NP shared services statement (NON-critical care):

## 2024-04-03 NOTE — PLAN OF CARE
Problem: PAIN - ADULT  Goal: Verbalizes/displays adequate comfort level or baseline comfort level  Description: Interventions:  - Encourage patient to monitor pain and request assistance  - Assess pain using appropriate pain scale  - Administer analgesics based on type and severity of pain and evaluate response  - Implement non-pharmacological measures as appropriate and evaluate response  - Consider cultural and social influences on pain and pain management  - Notify physician/advanced practitioner if interventions unsuccessful or patient reports new pain  Outcome: Progressing     Problem: SAFETY ADULT  Goal: Patient will remain free of falls  Description: INTERVENTIONS:  - Educate patient/family on patient safety including physical limitations  - Instruct patient to call for assistance with activity   - Consult OT/PT to assist with strengthening/mobility   - Keep Call bell within reach  - Keep bed low and locked with side rails adjusted as appropriate  - Keep care items and personal belongings within reach  - Initiate and maintain comfort rounds  - Make Fall Risk Sign visible to staff  - Offer Toileting every 2 Hours, in advance of need  - Initiate/Maintain bed alarm  - Obtain necessary fall risk management equipment  - Apply yellow socks and bracelet for high fall risk patients  - Consider moving patient to room near nurses station  Outcome: Progressing  Goal: Maintain or return to baseline ADL function  Description: INTERVENTIONS:  -  Assess patient's ability to carry out ADLs; assess patient's baseline for ADL function and identify physical deficits which impact ability to perform ADLs (bathing, care of mouth/teeth, toileting, grooming, dressing, etc.)  - Assess/evaluate cause of self-care deficits   - Assess range of motion  - Assess patient's mobility; develop plan if impaired  - Assess patient's need for assistive devices and provide as appropriate  - Encourage maximum independence but intervene and  supervise when necessary  - Involve family in performance of ADLs  - Assess for home care needs following discharge   - Consider OT consult to assist with ADL evaluation and planning for discharge  - Provide patient education as appropriate  Outcome: Progressing  Goal: Maintains/Returns to pre admission functional level  Description: INTERVENTIONS:  - Perform AM-PAC 6 Click Basic Mobility/ Daily Activity assessment daily.  - Set and communicate daily mobility goal to care team and patient/family/caregiver.   - Collaborate with rehabilitation services on mobility goals if consulted  - Perform Range of Motion 3 times a day.  - Reposition patient every 2 hours.  - Dangle patient 3 times a day  - Stand patient 3 times a day  - Ambulate patient 3 times a day  - Out of bed to chair 3 times a day   - Out of bed for meals 3 times a day  - Out of bed for toileting  - Record patient progress and toleration of activity level   Outcome: Progressing     Problem: DISCHARGE PLANNING  Goal: Discharge to home or other facility with appropriate resources  Description: INTERVENTIONS:  - Identify barriers to discharge w/patient and caregiver  - Arrange for needed discharge resources and transportation as appropriate  - Identify discharge learning needs (meds, wound care, etc.)  - Arrange for interpretive services to assist at discharge as needed  - Refer to Case Management Department for coordinating discharge planning if the patient needs post-hospital services based on physician/advanced practitioner order or complex needs related to functional status, cognitive ability, or social support system  Outcome: Progressing     Problem: Knowledge Deficit  Goal: Patient/family/caregiver demonstrates understanding of disease process, treatment plan, medications, and discharge instructions  Description: Complete learning assessment and assess knowledge base.  Interventions:  - Provide teaching at level of understanding  - Provide teaching via  preferred learning methods  Outcome: Progressing     Problem: CARDIOVASCULAR - ADULT  Goal: Maintains optimal cardiac output and hemodynamic stability  Description: INTERVENTIONS:  - Monitor I/O, vital signs and rhythm  - Monitor for S/S and trends of decreased cardiac output  - Administer and titrate ordered vasoactive medications to optimize hemodynamic stability  - Assess quality of pulses, skin color and temperature  - Assess for signs of decreased coronary artery perfusion  - Instruct patient to report change in severity of symptoms  Outcome: Progressing  Goal: Absence of cardiac dysrhythmias or at baseline rhythm  Description: INTERVENTIONS:  - Continuous cardiac monitoring, vital signs, obtain 12 lead EKG if ordered  - Administer antiarrhythmic and heart rate control medications as ordered  - Monitor electrolytes and administer replacement therapy as ordered  Outcome: Progressing     Problem: RESPIRATORY - ADULT  Goal: Achieves optimal ventilation and oxygenation  Description: INTERVENTIONS:  - Assess for changes in respiratory status  - Assess for changes in mentation and behavior  - Position to facilitate oxygenation and minimize respiratory effort  - Oxygen administered by appropriate delivery if ordered  - Initiate smoking cessation education as indicated  - Encourage broncho-pulmonary hygiene including cough, deep breathe, Incentive Spirometry  - Assess the need for suctioning and aspirate as needed  - Assess and instruct to report SOB or any respiratory difficulty  - Respiratory Therapy support as indicated  Outcome: Progressing     Problem: Prexisting or High Potential for Compromised Skin Integrity  Goal: Skin integrity is maintained or improved  Description: INTERVENTIONS:  - Identify patients at risk for skin breakdown  - Assess and monitor skin integrity  - Assess and monitor nutrition and hydration status  - Monitor labs   - Assess for incontinence   - Turn and reposition patient  - Assist with  mobility/ambulation  - Relieve pressure over bony prominences  - Avoid friction and shearing  - Provide appropriate hygiene as needed including keeping skin clean and dry  - Evaluate need for skin moisturizer/barrier cream  - Collaborate with interdisciplinary team   - Patient/family teaching  - Consider wound care consult   Outcome: Progressing     Problem: METABOLIC, FLUID AND ELECTROLYTES - ADULT  Goal: Electrolytes maintained within normal limits  Description: INTERVENTIONS:  - Monitor labs and assess patient for signs and symptoms of electrolyte imbalances  - Administer electrolyte replacement as ordered  - Monitor response to electrolyte replacements, including repeat lab results as appropriate  - Instruct patient on fluid and nutrition as appropriate  Outcome: Progressing  Goal: Fluid balance maintained  Description: INTERVENTIONS:  - Monitor labs   - Monitor I/O and WT  - Instruct patient on fluid and nutrition as appropriate  - Assess for signs & symptoms of volume excess or deficit  Outcome: Progressing  Goal: Glucose maintained within target range  Description: INTERVENTIONS:  - Monitor Blood Glucose as ordered  - Assess for signs and symptoms of hyperglycemia and hypoglycemia  - Administer ordered medications to maintain glucose within target range  - Assess nutritional intake and initiate nutrition service referral as needed  Outcome: Progressing

## 2024-04-03 NOTE — PROGRESS NOTES
NEPHROLOGY PROGRESS NOTE    Patient: Trent Ocasio               Sex: male          DOA: 3/28/2024  2:53 PM   YOB: 1945        Age:  79 y.o.        LOS:  LOS: 6 days   4/3/2024    REASON FOR THE CONSULTATION:      Acute kidney injury on chronic kidney disease stage IIIb    SUBJECTIVE     Patient is seen and examined next to the bedside.  He offers no complaints.    CURRENT MEDICATIONS       Current Facility-Administered Medications:     aspirin (ECOTRIN LOW STRENGTH) EC tablet 81 mg, 81 mg, Oral, Daily, Chai Salomon MD, 81 mg at 04/03/24 0909    Diclofenac Sodium (VOLTAREN) 1 % topical gel 2 g, 2 g, Topical, TID PRN, Arelis Gleason PA-C, 2 g at 03/28/24 2336    ezetimibe (ZETIA) tablet 10 mg, 10 mg, Oral, Daily, Arelis Gleason PA-C, 10 mg at 04/03/24 0909    heparin (porcine) subcutaneous injection 5,000 Units, 5,000 Units, Subcutaneous, Q8H VALERIE, 5,000 Units at 04/03/24 0505 **AND** [CANCELED] Platelet count, , , Once, Chai Salomon MD    insulin glargine (LANTUS) subcutaneous injection 20 Units 0.2 mL, 20 Units, Subcutaneous, QAM, Arelis Gleason PA-C, 20 Units at 04/03/24 0908    insulin lispro (HumALOG/ADMELOG) 100 units/mL subcutaneous injection 1-5 Units, 1-5 Units, Subcutaneous, HS, Chai Salomon MD, 1 Units at 04/01/24 2152    insulin lispro (HumALOG/ADMELOG) 100 units/mL subcutaneous injection 1-6 Units, 1-6 Units, Subcutaneous, TID AC, 2 Units at 04/02/24 1200 **AND** Fingerstick Glucose (POCT), , , TID AC, Chai Salomon MD    insulin lispro (HumALOG/ADMELOG) 100 units/mL subcutaneous injection 5 Units, 5 Units, Subcutaneous, TID With Meals, Thomas Salomon MD, 5 Units at 04/03/24 0914    latanoprost (XALATAN) 0.005 % ophthalmic solution 1 drop, 1 drop, Both Eyes, HS, Arelis Gleason PA-C, 1 drop at 04/02/24 2132    levothyroxine tablet 50 mcg, 50 mcg, Oral, Early Morning, Chai Salomon MD, 50 mcg at 04/03/24 0505    lidocaine (LIDODERM) 5 % patch 2 patch, 2 patch, Topical, Daily, Rose Marie Gallardo  DO Gonzalo, 2 patch at 04/01/24 0908    metoprolol tartrate (LOPRESSOR) tablet 50 mg, 50 mg, Oral, Q12H VALERIE, Chai Salomon MD, 50 mg at 04/03/24 0909    pantoprazole (PROTONIX) EC tablet 40 mg, 40 mg, Oral, Early Morning, Chai Salomon MD, 40 mg at 04/01/24 1937    pravastatin (PRAVACHOL) tablet 80 mg, 80 mg, Oral, Daily With Dinner, Arelis Gleason PA-C, 80 mg at 04/02/24 1719    senna-docusate sodium (SENOKOT S) 8.6-50 mg per tablet 1 tablet, 1 tablet, Oral, BID, Rose Marie Sánchez DO, 1 tablet at 04/02/24 1719    REVIEW OF SYSTEMS     Review of Systems   Constitutional: Negative.    HENT: Negative.     Eyes: Negative.    Respiratory: Negative.     Cardiovascular: Negative.    Gastrointestinal: Negative.    Endocrine: Negative.    Genitourinary: Negative.    Musculoskeletal: Negative.    Skin: Negative.    Allergic/Immunologic: Negative.    Neurological: Negative.    Hematological: Negative.    All other systems reviewed and are negative.      OBJECTIVE     Current Weight: Weight - Scale: 113 kg (248 lb 0.3 oz)  Vitals:    04/03/24 0912   BP: 136/71   Pulse: 85   Resp:    Temp:    SpO2: 94%     Body mass index is 35.59 kg/m².    Intake/Output Summary (Last 24 hours) at 4/3/2024 1012  Last data filed at 4/3/2024 0915  Gross per 24 hour   Intake --   Output 2930 ml   Net -2930 ml       PHYSICAL EXAMINATION     Physical Exam  HENT:      Head: Normocephalic and atraumatic.   Eyes:      Pupils: Pupils are equal, round, and reactive to light.   Neck:      Vascular: No JVD.   Cardiovascular:      Rate and Rhythm: Normal rate and regular rhythm.      Heart sounds: Murmur heard.      No friction rub.   Pulmonary:      Effort: Pulmonary effort is normal.      Breath sounds: Normal breath sounds.   Abdominal:      General: Bowel sounds are normal. There is no distension.      Palpations: Abdomen is soft.      Tenderness: There is no abdominal tenderness. There is no rebound.   Musculoskeletal:         General: No tenderness.       Cervical back: Neck supple.   Skin:     General: Skin is dry.      Findings: No rash.   Neurological:      Mental Status: He is alert and oriented to person, place, and time.           LAB RESULTS     Results from last 7 days   Lab Units 04/03/24  0504 04/02/24  0500 04/01/24  0530 03/31/24  0448 03/30/24  2139 03/30/24  0515 03/29/24  0432 03/28/24  1518   WBC Thousand/uL  --  4.88 4.61 4.24*  --  5.61 5.62 6.98   HEMOGLOBIN g/dL  --  10.7* 10.2* 10.5*  --  11.3* 10.8* 11.1*   HEMATOCRIT %  --  32.7* 31.3* 33.9*  --  36.4* 33.8* 34.5*   PLATELETS Thousands/uL  --  286 289 315  --  316 324 344   POTASSIUM mmol/L 4.0 3.7 4.1 5.1 4.9 5.1 5.0 4.8   CHLORIDE mmol/L 93* 99 102 105 101 105 107 106   CO2 mmol/L 34* 31 28 28 28 26 26 24   BUN mg/dL 50* 52* 58* 56* 55* 49* 39* 39*   CREATININE mg/dL 2.03* 1.92* 2.08* 1.98* 1.98* 2.07* 1.59* 1.47*   EGFR ml/min/1.73sq m 30 32 29 30 30 29 40 44   CALCIUM mg/dL 9.4 9.5 9.2 9.3 9.8 9.4 9.0 9.4   MAGNESIUM mg/dL  --  2.3  --   --   --  2.4 2.3 2.2           RADIOLOGY RESULTS      IMPRESSION:     Left retrocardiac opacity, likely left lower lobe atelectasis. Pneumonia not excluded in the appropriate clinical setting.       ASSESSMENT/PLAN     79 years old male with past medical history of prostate cancer status post radiation therapy, chronic kidney disease stage IIIb, hypertension, diabetes mellitus type 2, CVA who presented to our facility with progressive shortness of breath, weight gain and worsening lower extremity edema.    1.  Acute kidney injury on chronic kidney disease stage IIIb: Baseline serum creatinine of 1.3-1.7  Presented to our facility at his baseline kidney function however noted creatinine to have peaked to 2.0 milligrams per deciliter during this admission.  Etiology likely contrast-induced nephropathy versus appropriate rise in creatinine as diuresis is being achieved.  Urinalysis revealing presence of pyuria and trace protein.  Creatinine is 2.0 and  stable.    2.  Volume overload: Initiated on Lasix 40 mg IV 3 times daily along with Diuril 500 mg IV daily.  Patient has lost approximately 12 pounds since admission   Given patient is diuretic naïve, will initiate patient on torsemide 40 mg daily upon discharge    3.  Hypertension: Blood pressure within acceptable range    4.  Secondary hyperparathyroidism of renal origin: PTH intact slightly above target 115.  Normal 25-hydroxy vitamin D levels noted.    5.  Anemia due to CKD: Hemoglobin is 10.7 g/dL noted yesterday and acceptable.          Nikki Gilbert MD  Nephrology  4/3/2024

## 2024-04-03 NOTE — DISCHARGE SUMMARY
[facility]  DISCHARGE SUMMARY  Name: Trent Ocasio I  MRN: [id number]  Unit/Bed#: [facility] I Date of Admission: 3/28/2024   Date of Service: 4/3/2024 I Hospital Day: 6    Assessment/Plan   Acute-on-chronic kidney injury  (HCC)  Assessment & Plan  Lab Results   Component Value Date    EGFR 30 04/03/2024    EGFR 32 04/02/2024    EGFR 29 04/01/2024    CREATININE 2.03 (H) 04/03/2024    CREATININE 1.92 (H) 04/02/2024    CREATININE 2.08 (H) 04/01/2024   Baseline creatinine 1.5-1.7, and has stabilized around 2  Net -12 L  Plan to discharge on p.o. torsemide 40 mg daily  Continue to monitor I's and O's    Elevated troponin  Assessment & Plan  89-95-98, continue to trend to peak.  Trend appears flat.  No active chest pain, suspect 2/2 to acute CHF  EKG shows Sinus rhythm, first-degree AV block with RI interval of 338, nonspecific T wave changes in V1  Cardiology consulted, appreciate recommendations  Pharmacological MPI stress test revealed a predominantly fixed medium to large sized LV perfusion defect in the apex, apical lateral, apical septal, and inferior walls. There was also a small reversible apical anterior defect noted. Per cardiology pt would benefit from cardiac catheterization once medically and renally optimized, will plan for outpatient cardiac cath. Follows with [facility] cardiology     Type 2 diabetes mellitus, with long-term current use of insulin ([facility])  Assessment & Plan  Lab Results   Component Value Date    HGBA1C 7.3 (H) 02/16/2024       Recent Labs     04/02/24  1607 04/02/24  2039 04/03/24  0715 04/03/24  1052   POCGLU 93 122 142* 259*         Blood Sugar Average: Last 72 hrs:  (P) [id number]    Hold p.o. home meds  Continue home insulin regimen of lantus 20 U qhs  Sliding scale insulin  Carb controlled diet  Schedule short acting with meals    Hypothyroid  Assessment & Plan  TSH WNL  Continue Synthroid    HTN (hypertension)  Assessment & Plan  /71   Pulse 85    "Temp 98 °F (36.7 °C) (Oral)   Resp 19   Ht 5' 10\" (1.778 m)   Wt 113 kg (248 lb 0.3 oz)   SpO2 94%   BMI 35.59 kg/m²   Stable pressures  Takes 100 mg metoprolol twice daily at home  Will continue home dose metoprolol succinate 100 mg twice daily and new medication of torsemide 40 mg daily    Anxiety  Assessment & Plan  Currently stable  Atarax as needed    History of prostate cancer  Assessment & Plan  .  History of radiation treatment 5 years ago  Recommend outpatient follow-up    * Acute CHF (congestive heart failure) (HCC)  Assessment & Plan  Wt Readings from Last 3 Encounters:   04/03/24 113 kg (248 lb 0.3 oz)   06/25/22 109 kg (240 lb)     Patient presents with severe shortness of breath, leg edema.  Found to have elevated BNP, elevated troponin  Elevated D-dimer, CTA done PE ruled out  Status post 40 IV Lasix in the ER  Increase to IV 40 Lasix TID, patient has had great diuresis, net -12 L  Plan to discharge on p.o. torsemide 40 mg daily with outpatient follow-up with cardiology and nephrology  Strict I's and O's  Cardiology and nephrology following          Respiratory acidosis-resolved as of 4/3/2024  Assessment & Plan  Ph 7.22 with co2 68, improved with 2 hours of bipap and bipap qhs to Ph 7.28 and Co2 51 this am. Suspect 2/2 to volume overload.   IV diuresis as above  Respiratory status has improved compared to yesterday, now stable on RA  Continue with bipap qhs  Repeat VBG in am    Acute respiratory failure with hypoxia (HCC)-resolved as of 4/3/2024  Assessment & Plan  Patient found to be on 83% on room air, with started on 4 L in the ER and saturations increased in the 90s, currently on  3L NC, with improvement in respiratory status.  Now stable on room air  Repeat VBG pH 7.223, pCO2 66.3, oxygen level low. Improved with bipap   Suspect 2/2 to volume overload  Ambulatory pulse ox completed 4/2, did not desaturate with ambulation.  Stable on room air.  Does not qualify for home oxygen.  Underlying " etiology is congestive heart failure exacerbation         Medical Problems       Resolved Problems  Date Reviewed: 4/2/2024            Resolved    Acute respiratory failure with hypoxia (HCC) 4/3/2024     Resolved by  Rose Marie Sánchez DO    Respiratory acidosis 4/3/2024     Resolved by  Rose Marie Sánchez DO        Discharging Physician / Practitioner: Rose Marie Sánchez DO  PCP: No primary care provider on file.  Admission Date:   Admission Orders (From admission, onward)       Ordered        03/28/24 1714  INPATIENT ADMISSION  Once                          Discharge Date: 04/03/24    Consultations During Hospital Stay:  Cardiology  Nephrology    Procedures Performed:   None    Significant Findings / Test Results:   CTA negative for PE, small bilateral pleural effusion right greater than left, with bibasilar atelectasis  Nuclear stress test with left ventricular perfusion defect that is medium to large in size and predominantly fixed in apex, appears to be small reversible apical anterior defect.  EF 53%.  Left ventricular perfusion is abnormal.    Incidental Findings:   N/a    Test Results Pending at Discharge (will require follow up):     na    Outpatient Tests Requested:  Repeat BMP 1 week  Outpatient ischemic evaluation-cardiac cath with cardiology team    Complications:  none    Reason for Admission: sob, leg edema and fatigue    Hospital Course:   Trent Ocasio is a 79 y.o. male patient who originally presented to the hospital on 3/28/2024 due to shortness of breath, lower extremity edema, dyspnea on exertion and fatigue.      To be in new acute diastolic CHF exacerbation.  Cardiology consulted.  Improved on IV diuretics.  Given history of CKD 3, nephrology was consulted as well.  Patient improved significantly on IV Lasix 40 mg 3 times daily with intermittent Diuril.  When echocardiogram as well as nuclear stress test which showed small reversible apical anterior defect.  Vision was made by  "cardiology given patient's renal history as well as acute diastolic heart failure, further workup for this can be done outpatient with an outpatient cardiac catheterization, patient denying chest pain.  Shortness of breath continues to improve.  Fattening has stabilized around 2.  Plan to discharge patient on torsemide 40 mg daily which will be a new medication.  While patient was admitted borderline hypotension/normotension noted and therefore home dose amlodipine and losartan were held, these will be discontinued on discharge given that patient's blood pressure is stable on metoprolol 100 mg twice daily and torsemide 40 mg daily.  Advised to keep a blood pressure log and follow-up outpatient.  Those medications can be reinitiated as needed.    Patient was initially requiring 3 to 4 L oxygen and intermittent BiPAP due to volume overload.  This resolved with excellent diuresis.  Patient is now on room air.  Did not qualify for home O2, ambulatory pulse ox stable on room air.  Stable for discharge with outpatient follow-up with PCP, nephrology and cardiology.  PT/OT had a level 2 recommendation, patient is not interested in rehab at this time and wants to go home with home VNA.    Please see above list of diagnoses and related plan for additional information.     Condition at Discharge: fair    Discharge Day Visit / Exam:   Subjective:  pt has no acute complaints. Denies sob or chest pain.   Vitals: Blood Pressure: 136/71 (04/03/24 0912)  Pulse: 85 (04/03/24 0912)  Temperature: 98 °F (36.7 °C) (04/03/24 0716)  Temp Source: Oral (04/03/24 0716)  Respirations: 19 (04/03/24 0716)  Height: 5' 10\" (177.8 cm) (03/29/24 1100)  Weight - Scale: 113 kg (248 lb 0.3 oz) (04/03/24 0600)  SpO2: 94 % (04/03/24 0912)  Exam:   Physical Exam  Constitutional:       General: He is not in acute distress.     Appearance: He is well-developed. He is obese. He is not diaphoretic.   HENT:      Head: Normocephalic and atraumatic.      " Mouth/Throat:      Pharynx: No oropharyngeal exudate.   Eyes:      General: No scleral icterus.     Extraocular Movements: Extraocular movements intact.      Conjunctiva/sclera: Conjunctivae normal.   Neck:      Vascular: No JVD.      Trachea: No tracheal deviation.   Cardiovascular:      Rate and Rhythm: Normal rate and regular rhythm.      Heart sounds: No murmur heard.     No friction rub. No gallop.   Pulmonary:      Effort: Pulmonary effort is normal. No respiratory distress.      Breath sounds: No stridor. No wheezing.   Abdominal:      General: There is no distension.      Palpations: Abdomen is soft. There is no mass.      Tenderness: There is no abdominal tenderness. There is no right CVA tenderness or left CVA tenderness.   Musculoskeletal:         General: No tenderness.      Right lower leg: No edema.      Left lower leg: No edema.   Skin:     General: Skin is warm.      Coloration: Skin is not pale.      Findings: No erythema.   Neurological:      Mental Status: He is oriented to person, place, and time. Mental status is at baseline.   Psychiatric:         Behavior: Behavior normal.         Thought Content: Thought content normal.          Discussion with Family:  PT.     Discharge instructions/Information to patient and family:   See after visit summary for information provided to patient and family.      Provisions for Follow-Up Care:  See after visit summary for information related to follow-up care and any pertinent home health orders.      Mobility at time of Discharge:   Basic Mobility Inpatient Raw Score: 22  JH-HLM Goal: 7: Walk 25 feet or more  JH-HLM Achieved: 3: Sit at edge of bed  HLM Goal NOT achieved. Continue to encourage mobility in post discharge setting.     Disposition:   Home with VNA Services (Reminder: Complete face to face encounter)    Planned Readmission: none     Discharge Statement:  I spent 50 minutes discharging the patient. This time was spent on the day of discharge. I had  direct contact with the patient on the day of discharge. Greater than 50% of the total time was spent examining patient, answering all patient questions, arranging and discussing plan of care with patient as well as directly providing post-discharge instructions.  Additional time then spent on discharge activities.    Discharge Medications:  See after visit summary for reconciled discharge medications provided to patient and/or family.      **Please Note: This note may have been constructed using a voice recognition system**

## 2024-04-03 NOTE — CASE MANAGEMENT
Case Management Discharge Planning Note    Patient name Trent Ocasio  Location /-01 MRN [id number]  : 1945 Date 4/3/2024       Current Admission Date: 3/28/2024  Current Admission Diagnosis:Acute CHF (congestive heart failure) (HCC)   Patient Active Problem List    Diagnosis Date Noted    Acute-on-chronic kidney injury  (HCC) 2024    Acute CHF (congestive heart failure) (HCC) 2024    History of prostate cancer 2024    Anxiety 2024    HTN (hypertension) 2024    Hypothyroid 2024    Type 2 diabetes mellitus, with long-term current use of insulin (HCC) 2024    Elevated troponin 2024      LOS (days): 6  Geometric Mean LOS (GMLOS) (days): 3.9  Days to GMLOS:-1.9     OBJECTIVE:  Risk of Unplanned Readmission Score: 15.69         Current admission status: Inpatient   Preferred Pharmacy:   [id number] - [place] - [address]  [address]  [place] [id number]  Phone: [phone] Fax: [phone]    Primary Care Provider: No primary care provider on file.    Primary Insurance: [facility]  Secondary Insurance:     DISCHARGE DETAILS:                                          Other Referral/Resources/Interventions Provided:  Referral Comments: Pt for d/c today.  Level II recommended and discussed w pt.  He declined STR, HH and RW as recommended by PT/OT.  Advised to call PCP office (Dr. Hylton) if he changes his mind re: HH or DME.  Pt expressed understanding.  Spouse at bedside to transport.         Treatment Team Recommendation: Short Term Rehab, SNF, Home with Home Health Care  Discharge Destination Plan:: Home  Transport at Discharge : Family                             IMM Given (Date):: 24  IMM Given to:: Patient  Family notified:: spouse present

## 2024-04-05 ENCOUNTER — TELEPHONE (OUTPATIENT)
Age: 79
End: 2024-04-05

## 2024-04-05 DIAGNOSIS — N18.30 STAGE 3 CHRONIC KIDNEY DISEASE, UNSPECIFIED WHETHER STAGE 3A OR 3B CKD (HCC): Primary | ICD-10-CM

## 2024-04-05 NOTE — TELEPHONE ENCOUNTER
Called and spoke to pt. Advised that 05/03 appt is ok per Dr Gilbert and to get labs done prior to that visit. Labs were mailed. Pt understood and was ok with that.

## 2024-04-05 NOTE — UTILIZATION REVIEW
NOTIFICATION OF ADMISSION DISCHARGE   This is a Notification of Discharge from [facility]. Please be advised that this patient has been discharge from our facility. Below you will find the admission and discharge date and time including the patient’s disposition.   UTILIZATION REVIEW CONTACT:  Desi Dugan  Utilization   Network Utilization Review Department  Phone: [phone] x carefully listen to the prompts. All voicemails are confidential.  Email: [email or web address]     ADMISSION INFORMATION  PRESENTATION DATE: 3/28/2024  2:53 PM  OBERVATION ADMISSION DATE:   INPATIENT ADMISSION DATE: 3/28/24  5:14 PM   DISCHARGE DATE: 4/3/2024  1:31 PM   DISPOSITION:Home/Self Care    Network Utilization Review Department  ATTENTION: Please call with any questions or concerns to [phone] and carefully listen to the prompts so that you are directed to the right person. All voicemails are confidential.   For Discharge needs, contact Care Management DC Support Team at [phone] opt. 2  Send all requests for admission clinical reviews, approved or denied determinations and any other requests to dedicated fax number below belonging to the campus where the patient is receiving treatment. List of dedicated fax numbers for the Facilities:  FACILITY NAME UR FAX NUMBER   ADMISSION DENIALS (Administrative/Medical Necessity) [phone]   DISCHARGE SUPPORT TEAM ([facility]) [phone]   PARENT CHILD HEALTH (Maternity/NICU/Pediatrics) [phone]   [facility] [phone]   [facility] [phone]   [facility] [phone]   [facility] [phone]   [facility] [phone]   [facility] [phone]   [facility] [phone]   [facility]  [phone]   [facility] [phone]   [facility] [phone]   [facility] [phone]   [facility] [phone]

## 2024-04-05 NOTE — TELEPHONE ENCOUNTER
Patient called to make a hospital follow up with Dr. Gilbert as he was seen by him in the hospital. First appt was 5/3 and the patient just wanted to make sure waiting 1 month was okay. Thank you.

## 2024-04-09 ENCOUNTER — OFFICE VISIT (OUTPATIENT)
Dept: CARDIOLOGY CLINIC | Facility: CLINIC | Age: 79
End: 2024-04-09
Payer: COMMERCIAL

## 2024-04-09 VITALS
SYSTOLIC BLOOD PRESSURE: 110 MMHG | HEIGHT: 70 IN | OXYGEN SATURATION: 98 % | WEIGHT: 244 LBS | RESPIRATION RATE: 16 BRPM | BODY MASS INDEX: 34.93 KG/M2 | DIASTOLIC BLOOD PRESSURE: 62 MMHG | HEART RATE: 68 BPM

## 2024-04-09 DIAGNOSIS — Z79.4 TYPE 2 DIABETES MELLITUS WITH OTHER SPECIFIED COMPLICATION, WITH LONG-TERM CURRENT USE OF INSULIN (HCC): ICD-10-CM

## 2024-04-09 DIAGNOSIS — I10 PRIMARY HYPERTENSION: ICD-10-CM

## 2024-04-09 DIAGNOSIS — E11.69 TYPE 2 DIABETES MELLITUS WITH OTHER SPECIFIED COMPLICATION, WITH LONG-TERM CURRENT USE OF INSULIN (HCC): ICD-10-CM

## 2024-04-09 DIAGNOSIS — I25.10 CORONARY ARTERY DISEASE INVOLVING NATIVE HEART, UNSPECIFIED VESSEL OR LESION TYPE, UNSPECIFIED WHETHER ANGINA PRESENT: ICD-10-CM

## 2024-04-09 DIAGNOSIS — E78.2 MIXED HYPERLIPIDEMIA: ICD-10-CM

## 2024-04-09 DIAGNOSIS — I50.32 CHRONIC HEART FAILURE WITH PRESERVED EJECTION FRACTION (HFPEF) (HCC): Primary | ICD-10-CM

## 2024-04-09 DIAGNOSIS — R94.39 ABNORMAL STRESS TEST: ICD-10-CM

## 2024-04-09 PROBLEM — N18.32 STAGE 3B CHRONIC KIDNEY DISEASE (HCC): Status: ACTIVE | Noted: 2024-03-30

## 2024-04-09 PROCEDURE — 99214 OFFICE O/P EST MOD 30 MIN: CPT

## 2024-04-09 NOTE — PROGRESS NOTES
[facility] Cardiology   Office Visit    Trent Ocasio 79 y.o. male MRN: [id number]    04/09/24        Assessment/Plan:  1.  Chronic HFpEF  Euvolemic on exam.  Recent TTE reviewed.  Continue torsemide 40 mg daily.  Encourage low-sodium diet, daily weights, LE compression stockings, LE elevation.  Advised to notify office for weight gain or any new/worsening symptoms.    2.  Abnormal stress test  Recent pharmacological MPI stress test revealed a predominantly fixed medium to large sized LV perfusion defect in the apex, apical lateral, apical septal, and inferior walls.  There is also a small reversible apical anterior defect visualized.   Patient continues to endorse exertional dyspnea which has been stable since time of discharge.  Discussed consideration for cardiac catheterization, however patient wishes to discuss further with Dr. Hylton at [facility].  Of note, if patient opts for cardiac catheterization he will need renal optimization prior to procedure.    3.  Remote history of CAD (2003)  Unfortunately, exact details are unknown.  Continue ASA, simvastatin, Zetia, Toprol-XL.    4.  Hypertension  BP is currently borderline soft.  Continue Toprol-XL and torsemide.  Encourage ambulatory monitoring.    5.  Hyperlipidemia  Continue simvastatin, Zetia, and dietary control.    6.  CKD 3b  7.  T2DM, 7.3  8.  Hx of CVA        HPI: Trent Ocasio is a 79 y.o. year old male with history of chronic HFpEF, abnormal stress test, remote history of CAD, hypertension, hyperlipidemia, CKD, and CVA who presents for office visit.  Patient was recently evaluated by cardiology at [facility] for acute HFpEF and abnormal stress test.  Patient continues to endorse fatigue and exertional dyspnea which has been fairly stable since time of discharge.  He experiences symptoms with minimal activity such as showering.  Patient wishes to discuss further management with his PCP Dr. Hylton.  He follows with nephrology for  "management of CKD 3b.  Denies any additional cardiac complaints at this time.  Patient monitors weight which has been stable.  Endorses compliance to all medications and low-sodium diet.  Denies adverse effects to current medications.  Remote history of CAD in 2003 noted, however exact details of such are currently unavailable for review.  Patient is uncertain if he had cardiac catheterization performed at that time.       Review of Systems:  Review of Systems   Constitutional:  Positive for fatigue. Negative for chills and fever.   HENT:  Negative for ear pain and sore throat.    Eyes:  Negative for pain and visual disturbance.   Respiratory:  Positive for shortness of breath. Negative for cough.    Cardiovascular:  Negative for chest pain, palpitations and leg swelling.   Gastrointestinal:  Negative for abdominal pain and vomiting.   Genitourinary:  Negative for dysuria and hematuria.   Musculoskeletal:  Negative for arthralgias and back pain.   Skin:  Negative for color change and rash.   Neurological:  Negative for dizziness, seizures, syncope and light-headedness.   All other systems reviewed and are negative.      PHYSICAL EXAM:  Vitals:   Vitals:    04/09/24 1010   BP: 110/62   BP Location: Left arm   Patient Position: Sitting   Cuff Size: Large   Pulse: 68   Resp: 16   SpO2: 98%   Weight: 111 kg (244 lb)   Height: 5' 10\" (1.778 m)        Physical Exam:  GEN: Alert and oriented x 3, in no acute distress.  Well appearing and well nourished.   HEENT: Sclera anicteric, conjunctivae pink, mucous membranes moist. Oropharynx clear.   NECK: Supple, no carotid bruits, no significant JVD. Trachea midline, no thyromegaly.   HEART: Regular rhythm, normal S1 and S2, no murmurs, clicks, gallops or rubs. PMI nondisplaced, no thrills.   LUNGS: Clear to auscultation bilaterally; no wheezes, rales, or rhonchi. No increased work of breathing or signs of respiratory distress.   ABDOMEN: Soft, nontender, nondistended, " normoactive bowel sounds.   EXTREMITIES: Skin warm and well perfused, no clubbing, cyanosis, or edema.  NEURO: No focal findings. Normal speech. Mood and affect normal.   SKIN: Normal without suspicious lesions on exposed skin.    Follow up: 1 month or sooner as needed    Allergies   Allergen Reactions    Canagliflozin GI Intolerance     Yeast infection  Yeast infection      Lisinopril Other (See Comments)    Lovastatin GI Intolerance         Current Outpatient Medications:     aspirin 81 mg chewable tablet, Chew 81 mg daily, Disp: , Rfl:     cholecalciferol 25 MCG (1000 UT) tablet, Take 1,000 Units by mouth daily, Disp: , Rfl:     ezetimibe (ZETIA) 10 mg tablet, Take 10 mg by mouth daily, Disp: , Rfl:     glipiZIDE (GLUCOTROL) 10 mg tablet, Take 10 mg by mouth 2 (two) times a day before meals, Disp: , Rfl:     insulin degludec (Tresiba FlexTouch) 100 units/mL injection pen, Inject 20 Units under the skin daily, Disp: , Rfl:     latanoprost (XALATAN) 0.005 % ophthalmic solution, Administer 1 drop to both eyes daily at bedtime, Disp: , Rfl:     levothyroxine 50 mcg tablet, Take 50 mcg by mouth daily, Disp: , Rfl:     metFORMIN (GLUCOPHAGE) 500 mg tablet, Take 500 mg by mouth 2 (two) times a day with meals, Disp: , Rfl:     metoprolol succinate (TOPROL-XL) 100 mg 24 hr tablet, Take 100 mg by mouth 2 (two) times a day, Disp: , Rfl:     pantoprazole (PROTONIX) 40 mg tablet, Take 40 mg by mouth daily, Disp: , Rfl:     pioglitazone (ACTOS) 45 mg tablet, Take 45 mg by mouth daily at bedtime, Disp: , Rfl:     simvastatin (ZOCOR) 40 mg tablet, Take 40 mg by mouth daily at bedtime, Disp: , Rfl:     torsemide (DEMADEX) 20 mg tablet, Take 2 tablets (40 mg total) by mouth daily, Disp: 60 tablet, Rfl: 0    Past Medical History:   Diagnosis Date    Acute respiratory failure with hypoxia (HCC) 03/28/2024    Diabetes mellitus (HCC)     Hypertension     Respiratory acidosis 03/31/2024       History reviewed. No pertinent family  history.    Past Medical History:   Diagnosis Date    Acute respiratory failure with hypoxia (HCC) 03/28/2024    Diabetes mellitus (HCC)     Hypertension     Respiratory acidosis 03/31/2024       History reviewed. No pertinent surgical history.    Social History     Socioeconomic History    Marital status: /Civil Union     Spouse name: Not on file    Number of children: Not on file    Years of education: Not on file    Highest education level: Not on file   Occupational History    Not on file   Tobacco Use    Smoking status: Former    Smokeless tobacco: Never   Vaping Use    Vaping status: Never Used   Substance and Sexual Activity    Alcohol use: Not Currently    Drug use: Never    Sexual activity: Not on file   Other Topics Concern    Not on file   Social History Narrative    Not on file     Social Determinants of Health     Financial Resource Strain: Low Risk  (8/20/2023)    Received from [facility]    Overall Financial Resource Strain (CARDIA)     Difficulty of Paying Living Expenses: Not hard at all   Food Insecurity: No Food Insecurity (3/29/2024)    Hunger Vital Sign     Worried About Running Out of Food in the Last Year: Never true     Ran Out of Food in the Last Year: Never true   Transportation Needs: No Transportation Needs (3/29/2024)    PRAPARE - Transportation     Lack of Transportation (Medical): No     Lack of Transportation (Non-Medical): No   Physical Activity: Not on file   Stress: Stress Concern Present (8/20/2023)    Received from [facility]    [employer] [place] - Occupational Stress Questionnaire     Feeling of Stress : To some extent   Social Connections: Unknown (8/20/2023)    Received from [facility]    Social Connection and Isolation Panel [NHANES]     Frequency of Communication with Friends and Family: Patient declined     Frequency of Social Gatherings with Friends and Family: Patient declined     Attends  "Sikh Services: Patient declined     Active Member of Clubs or Organizations: No     Attends Club or Organization Meetings: Patient declined     Marital Status:    Intimate Partner Violence: Not At Risk (8/20/2023)    Received from [facility]    Humiliation, Afraid, Rape, and Kick questionnaire     Fear of Current or Ex-Partner: No     Emotionally Abused: No     Physically Abused: No     Sexually Abused: No   Housing Stability: Low Risk  (3/29/2024)    Housing Stability Vital Sign     Unable to Pay for Housing in the Last Year: No     Number of Places Lived in the Last Year: 1     Unstable Housing in the Last Year: No             LABORATORY RESULTS:    Lab Results   Component Value Date    WBC 4.88 04/02/2024    HGB 10.7 (L) 04/02/2024    HCT 32.7 (L) 04/02/2024    MCV 99 (H) 04/02/2024     04/02/2024     Lab Results   Component Value Date    CALCIUM 9.4 04/03/2024    K 4.0 04/03/2024    CO2 34 (H) 04/03/2024    CL 93 (L) 04/03/2024    BUN 50 (H) 04/03/2024    CREATININE 2.03 (H) 04/03/2024     Lab Results   Component Value Date    HGBA1C 7.3 (H) 02/16/2024       Lipid Profile:   No results found for: \"CHOL\"  No results found for: \"HDL\"  No results found for: \"LDLCALC\"  No results found for: \"TRIG\"    The ASCVD Risk score (Marko DK, et al., 2019) failed to calculate for the following reasons:    The 2019 ASCVD risk score is only valid for ages 40 to 79    The patient has a prior MI or stroke diagnosis    1. Chronic heart failure with preserved ejection fraction (HFpEF) (HCC)        2. Abnormal stress test        3. Coronary artery disease involving native heart, unspecified vessel or lesion type, unspecified whether angina present        4. Primary hypertension        5. Mixed hyperlipidemia        6. Type 2 diabetes mellitus with other specified complication, with long-term current use of insulin (HCC)            Imaging: I have personally reviewed pertinent reports.  "       Recommend aggressive risk factor modification and therapeutic lifestyle changes.  Low-salt, low-calorie, low-fat, low-cholesterol diet with regular exercise and to optimize weight.    Discussed concepts of atherosclerosis, including signs and symptoms of cardiac disease.    Medications reviewed and possible side effects discussed.  Previous studies were reviewed.    Safety measures were reviewed.  All questions and concerns addressed.  Patient was advised to report any problems requiring medical attention.    Follow-up with PCP and appropriate specialist and lab work as discussed.    Return for follow up visit as scheduled or earlier, if needed.  Thank you for allowing me to participate in the care and evaluation of your patient.  Should you have any questions, please feel free to contact me.    Dante Swanson PA-C  4/9/2024,6:28 PM

## 2024-04-18 ENCOUNTER — TELEPHONE (OUTPATIENT)
Dept: NEPHROLOGY | Facility: CLINIC | Age: 79
End: 2024-04-18

## 2024-04-18 NOTE — TELEPHONE ENCOUNTER
Called spoke with patient advised patient to get non-fasting labs done 1 week prior to 05/03/24 hospital follow up appointment with Dr.Adeem Vladimir MD. patient verbalized understanding and is okay with it.

## 2024-04-24 ENCOUNTER — APPOINTMENT (OUTPATIENT)
Dept: LAB | Facility: CLINIC | Age: 79
End: 2024-04-24
Payer: COMMERCIAL

## 2024-04-24 DIAGNOSIS — N18.30 STAGE 3 CHRONIC KIDNEY DISEASE, UNSPECIFIED WHETHER STAGE 3A OR 3B CKD (HCC): ICD-10-CM

## 2024-04-24 DIAGNOSIS — N17.9 AKI (ACUTE KIDNEY INJURY) (HCC): ICD-10-CM

## 2024-04-24 DIAGNOSIS — I50.9 ACUTE CHF (CONGESTIVE HEART FAILURE) (HCC): ICD-10-CM

## 2024-04-24 LAB
25(OH)D3 SERPL-MCNC: 60.4 NG/ML (ref 30–100)
ALBUMIN SERPL BCP-MCNC: 4.2 G/DL (ref 3.5–5)
ALP SERPL-CCNC: 42 U/L (ref 34–104)
ALT SERPL W P-5'-P-CCNC: 11 U/L (ref 7–52)
ANION GAP SERPL CALCULATED.3IONS-SCNC: 12 MMOL/L (ref 4–13)
AST SERPL W P-5'-P-CCNC: 15 U/L (ref 13–39)
BACTERIA UR QL AUTO: ABNORMAL /HPF
BASOPHILS # BLD AUTO: 0.03 THOUSANDS/ÂΜL (ref 0–0.1)
BASOPHILS NFR BLD AUTO: 1 % (ref 0–1)
BILIRUB SERPL-MCNC: 0.31 MG/DL (ref 0.2–1)
BILIRUB UR QL STRIP: NEGATIVE
BUN SERPL-MCNC: 79 MG/DL (ref 5–25)
CALCIUM SERPL-MCNC: 9.6 MG/DL (ref 8.4–10.2)
CHLORIDE SERPL-SCNC: 97 MMOL/L (ref 96–108)
CLARITY UR: ABNORMAL
CO2 SERPL-SCNC: 29 MMOL/L (ref 21–32)
COLOR UR: ABNORMAL
CREAT SERPL-MCNC: 2.76 MG/DL (ref 0.6–1.3)
CREAT UR-MCNC: 68.7 MG/DL
EOSINOPHIL # BLD AUTO: 0.11 THOUSAND/ÂΜL (ref 0–0.61)
EOSINOPHIL NFR BLD AUTO: 2 % (ref 0–6)
ERYTHROCYTE [DISTWIDTH] IN BLOOD BY AUTOMATED COUNT: 14.1 % (ref 11.6–15.1)
GFR SERPL CREATININE-BSD FRML MDRD: 20 ML/MIN/1.73SQ M
GLUCOSE P FAST SERPL-MCNC: 60 MG/DL (ref 65–99)
GLUCOSE UR STRIP-MCNC: NEGATIVE MG/DL
HCT VFR BLD AUTO: 37 % (ref 36.5–49.3)
HGB BLD-MCNC: 12 G/DL (ref 12–17)
HGB UR QL STRIP.AUTO: NEGATIVE
IMM GRANULOCYTES # BLD AUTO: 0.02 THOUSAND/UL (ref 0–0.2)
IMM GRANULOCYTES NFR BLD AUTO: 0 % (ref 0–2)
KETONES UR STRIP-MCNC: NEGATIVE MG/DL
LEUKOCYTE ESTERASE UR QL STRIP: ABNORMAL
LYMPHOCYTES # BLD AUTO: 0.43 THOUSANDS/ÂΜL (ref 0.6–4.47)
LYMPHOCYTES NFR BLD AUTO: 9 % (ref 14–44)
MCH RBC QN AUTO: 32.2 PG (ref 26.8–34.3)
MCHC RBC AUTO-ENTMCNC: 32.4 G/DL (ref 31.4–37.4)
MCV RBC AUTO: 99 FL (ref 82–98)
MONOCYTES # BLD AUTO: 0.37 THOUSAND/ÂΜL (ref 0.17–1.22)
MONOCYTES NFR BLD AUTO: 8 % (ref 4–12)
NEUTROPHILS # BLD AUTO: 3.89 THOUSANDS/ÂΜL (ref 1.85–7.62)
NEUTS SEG NFR BLD AUTO: 80 % (ref 43–75)
NITRITE UR QL STRIP: POSITIVE
NON-SQ EPI CELLS URNS QL MICRO: ABNORMAL /HPF
NRBC BLD AUTO-RTO: 0 /100 WBCS
PH UR STRIP.AUTO: 6.5 [PH]
PHOSPHATE SERPL-MCNC: 4.2 MG/DL (ref 2.3–4.1)
PLATELET # BLD AUTO: 259 THOUSANDS/UL (ref 149–390)
PMV BLD AUTO: 11.4 FL (ref 8.9–12.7)
POTASSIUM SERPL-SCNC: 4.3 MMOL/L (ref 3.5–5.3)
PROT SERPL-MCNC: 7.3 G/DL (ref 6.4–8.4)
PROT UR STRIP-MCNC: ABNORMAL MG/DL
PROT UR-MCNC: 42 MG/DL
PROT/CREAT UR: 0.61 MG/G{CREAT} (ref 0–0.1)
PTH-INTACT SERPL-MCNC: 591.7 PG/ML (ref 12–88)
RBC # BLD AUTO: 3.73 MILLION/UL (ref 3.88–5.62)
RBC #/AREA URNS AUTO: ABNORMAL /HPF
SODIUM SERPL-SCNC: 138 MMOL/L (ref 135–147)
SP GR UR STRIP.AUTO: 1.01 (ref 1–1.03)
TRANS CELLS #/AREA URNS HPF: PRESENT /[HPF]
UROBILINOGEN UR STRIP-ACNC: <2 MG/DL
WBC # BLD AUTO: 4.85 THOUSAND/UL (ref 4.31–10.16)
WBC #/AREA URNS AUTO: ABNORMAL /HPF

## 2024-04-24 PROCEDURE — 83970 ASSAY OF PARATHORMONE: CPT

## 2024-04-24 PROCEDURE — 36415 COLL VENOUS BLD VENIPUNCTURE: CPT

## 2024-04-24 PROCEDURE — 82306 VITAMIN D 25 HYDROXY: CPT

## 2024-04-24 PROCEDURE — 81001 URINALYSIS AUTO W/SCOPE: CPT

## 2024-04-24 PROCEDURE — 84156 ASSAY OF PROTEIN URINE: CPT

## 2024-04-24 PROCEDURE — 80053 COMPREHEN METABOLIC PANEL: CPT

## 2024-04-24 PROCEDURE — 84100 ASSAY OF PHOSPHORUS: CPT

## 2024-04-24 PROCEDURE — 82570 ASSAY OF URINE CREATININE: CPT

## 2024-04-24 PROCEDURE — 85025 COMPLETE CBC W/AUTO DIFF WBC: CPT

## 2024-05-02 ENCOUNTER — TELEPHONE (OUTPATIENT)
Dept: NEPHROLOGY | Facility: CLINIC | Age: 79
End: 2024-05-02

## 2024-05-03 ENCOUNTER — OFFICE VISIT (OUTPATIENT)
Dept: NEPHROLOGY | Facility: CLINIC | Age: 79
End: 2024-05-03
Payer: COMMERCIAL

## 2024-05-03 VITALS
OXYGEN SATURATION: 94 % | BODY MASS INDEX: 34.65 KG/M2 | HEIGHT: 70 IN | HEART RATE: 74 BPM | RESPIRATION RATE: 18 BRPM | TEMPERATURE: 97.7 F | WEIGHT: 242 LBS | SYSTOLIC BLOOD PRESSURE: 110 MMHG | DIASTOLIC BLOOD PRESSURE: 64 MMHG

## 2024-05-03 DIAGNOSIS — N18.32 STAGE 3B CHRONIC KIDNEY DISEASE (HCC): ICD-10-CM

## 2024-05-03 DIAGNOSIS — I50.32 CHRONIC DIASTOLIC CONGESTIVE HEART FAILURE (HCC): ICD-10-CM

## 2024-05-03 DIAGNOSIS — N25.81 SECONDARY HYPERPARATHYROIDISM OF RENAL ORIGIN (HCC): Primary | ICD-10-CM

## 2024-05-03 DIAGNOSIS — R33.9 URINARY RETENTION: ICD-10-CM

## 2024-05-03 DIAGNOSIS — N17.9 AKI (ACUTE KIDNEY INJURY) (HCC): ICD-10-CM

## 2024-05-03 PROCEDURE — 99214 OFFICE O/P EST MOD 30 MIN: CPT | Performed by: INTERNAL MEDICINE

## 2024-05-03 PROCEDURE — G2211 COMPLEX E/M VISIT ADD ON: HCPCS | Performed by: INTERNAL MEDICINE

## 2024-05-03 RX ORDER — TORSEMIDE 20 MG/1
20 TABLET ORAL DAILY
Qty: 90 TABLET | Refills: 3 | Status: SHIPPED | OUTPATIENT
Start: 2024-05-03

## 2024-05-03 RX ORDER — CALCITRIOL 0.25 UG/1
0.25 CAPSULE, LIQUID FILLED ORAL DAILY
Qty: 90 CAPSULE | Refills: 3 | Status: SHIPPED | OUTPATIENT
Start: 2024-05-03

## 2024-05-03 RX ORDER — METOPROLOL TARTRATE 100 MG/1
TABLET ORAL
COMMUNITY
Start: 2024-03-21

## 2024-05-03 RX ORDER — NAPROXEN 500 MG/1
500 TABLET ORAL 2 TIMES DAILY PRN
COMMUNITY
Start: 2024-02-21

## 2024-05-03 NOTE — PATIENT INSTRUCTIONS
Hold Torsemide for the next 48 hours and resume it on Monday with a dose of 20 mg daily  Start taking Calcitriol 0.25 mcg once daily  Check BP daily and if systolic is < 105 (top number), hold your BP medication  Obtain labs BMP in 2 weeks ( On May 17th or afterwards)  Consult your cardiologist regarding cardiac cath

## 2024-05-03 NOTE — PROGRESS NOTES
NEPHROLOGY PROGRESS NOTE    Patient: Trent Ocasio               Sex: male          DOA: No admission date for patient encounter.   YOB: 1945        Age:  79 y.o.        LOS: [unfilled]  5/3/2024        BACKGROUND     79 years old male with past medical history of chronic kidney disease stage IIIb, hypertension, diabetes mellitus type 2, proteinuria, dyslipidemia, CHF with preserved ejection fraction, prostate cancer status post RT who was seen at [facility] after he presented with volume overload    SUBJECTIVE     Patient being seen after a month.  Admitted in March 2024 with volume overload and diagnosed with CHF with diastolic dysfunction.  Patient was also noted to have abnormal stress test however he refused cardiac catheterization at that time.  Patient was discharged from the hospital on April 3 with a prescription of torsemide 40 mg daily.  Patient endorses improvement in edema.  However he continues to complain of fatigue.  Seen by cardiology in outpatient setting and patient had refused cardiac catheterization at that time as well.  Patient now wants to undergo cardiac catheterization in near future.  Complains of nocturia.    REVIEW OF SYSTEMS     Review of Systems   Constitutional: Negative.    HENT: Negative.     Eyes: Negative.    Respiratory: Negative.     Cardiovascular: Negative.    Gastrointestinal: Negative.    Endocrine: Negative.    Genitourinary:  Positive for urgency.   Musculoskeletal: Negative.    Skin: Negative.    Allergic/Immunologic: Negative.    Neurological: Negative.    Hematological: Negative.    All other systems reviewed and are negative.      OBJECTIVE     Current Weight: Weight - Scale: 110 kg (242 lb)  Vitals:    05/03/24 1038   BP: 110/64   Pulse: 74   Resp: 18   Temp: 97.7 °F (36.5 °C)   SpO2: 94%     Body mass index is 34.72 kg/m².  [unfilled]    CURRENT MEDICATIONS       Current Outpatient Medications:     aspirin 81 mg chewable tablet, Chew 81  mg daily, Disp: , Rfl:     calcitriol (ROCALTROL) 0.25 mcg capsule, Take 1 capsule (0.25 mcg total) by mouth daily, Disp: 90 capsule, Rfl: 3    cholecalciferol 25 MCG (1000 UT) tablet, Take 1,000 Units by mouth daily, Disp: , Rfl:     ezetimibe (ZETIA) 10 mg tablet, Take 10 mg by mouth daily, Disp: , Rfl:     glipiZIDE (GLUCOTROL) 10 mg tablet, Take 10 mg by mouth 2 (two) times a day before meals, Disp: , Rfl:     insulin degludec (Tresiba FlexTouch) 100 units/mL injection pen, Inject 20 Units under the skin daily, Disp: , Rfl:     latanoprost (XALATAN) 0.005 % ophthalmic solution, Administer 1 drop to both eyes daily at bedtime, Disp: , Rfl:     levothyroxine 50 mcg tablet, Take 50 mcg by mouth daily, Disp: , Rfl:     metFORMIN (GLUCOPHAGE) 500 mg tablet, Take 500 mg by mouth 2 (two) times a day with meals, Disp: , Rfl:     metoprolol succinate (TOPROL-XL) 100 mg 24 hr tablet, Take 100 mg by mouth 2 (two) times a day, Disp: , Rfl:     naproxen (NAPROSYN) 500 mg tablet, Take 500 mg by mouth 2 (two) times a day as needed, Disp: , Rfl:     pantoprazole (PROTONIX) 40 mg tablet, Take 40 mg by mouth daily, Disp: , Rfl:     pioglitazone (ACTOS) 45 mg tablet, Take 45 mg by mouth daily at bedtime, Disp: , Rfl:     simvastatin (ZOCOR) 40 mg tablet, Take 40 mg by mouth daily at bedtime, Disp: , Rfl:     torsemide (DEMADEX) 20 mg tablet, Take 1 tablet (20 mg total) by mouth daily, Disp: 90 tablet, Rfl: 3    metoprolol tartrate (LOPRESSOR) 100 mg tablet, , Disp: , Rfl:       PHYSICAL EXAMINATION     Physical Exam  Constitutional:       Appearance: He is obese.   HENT:      Head: Normocephalic and atraumatic.   Eyes:      Pupils: Pupils are equal, round, and reactive to light.   Neck:      Vascular: No JVD.   Cardiovascular:      Rate and Rhythm: Normal rate and regular rhythm.      Heart sounds: Murmur heard.      No friction rub.   Pulmonary:      Effort: Pulmonary effort is normal.      Breath sounds: Normal breath sounds.    Abdominal:      General: Bowel sounds are normal. There is no distension.      Palpations: Abdomen is soft.      Tenderness: There is no abdominal tenderness. There is no rebound.   Musculoskeletal:         General: No tenderness.      Cervical back: Neck supple.   Skin:     General: Skin is dry.      Findings: No rash.   Neurological:      Mental Status: He is alert and oriented to person, place, and time.           LAB RESULTS       Results from last 6 Months   Lab Units 04/24/24  1007 04/03/24  0504 04/02/24  0500 04/01/24  0530 03/30/24  2139 03/30/24  0515 03/29/24  0432   WBC Thousand/uL 4.85  --  4.88 4.61   < > 5.61 5.62   HEMOGLOBIN g/dL 12.0  --  10.7* 10.2*   < > 11.3* 10.8*   HEMATOCRIT % 37.0  --  32.7* 31.3*   < > 36.4* 33.8*   PLATELETS Thousands/uL 259  --  286 289   < > 316 324   POTASSIUM mmol/L 4.3 4.0 3.7 4.1   < > 5.1 5.0   CHLORIDE mmol/L 97 93* 99 102   < > 105 107   CO2 mmol/L 29 34* 31 28   < > 26 26   BUN mg/dL 79* 50* 52* 58*   < > 49* 39*   CREATININE mg/dL 2.76* 2.03* 1.92* 2.08*   < > 2.07* 1.59*   CALCIUM mg/dL 9.6 9.4 9.5 9.2   < > 9.4 9.0   MAGNESIUM mg/dL  --   --  2.3  --   --  2.4 2.3   PHOSPHORUS mg/dL 4.2*  --   --   --   --   --   --     < > = values in this interval not displayed.           RADIOLOGY RESULTS    History: N18.3. Chronic kidney disease, stage III.     Study: Ultrasound retroperitoneum complete.     Comment:   There is no ultrasound for comparison.   There is no evidence of hydronephrosis or medical disease.     The kidneys are normal in size, contour and position.   The right kidney approximates 10.7 cm in length; the left 11.2 cm.     There is no inflammatory process, scarring, mass or calculus.     The bladder is intrinsically normal and empties well with micturition.   Prevoid volume 86 mL; postvoid volume 6 mL.   Bilateral ureteral jets are seen.     ASSESSMENT/PLAN     79 years old male with past medical history of CHF with preserved ejection fraction,  abnormal stress test, chronic kidney disease stage IIIb, hypertension, diabetes mellitus type 2, gastric cancer status post RT, proteinuria, dyslipidemia who presented to our facility with shortness of breath and was diagnosed with CHF exacerbation.  Patient is currently seeing me for hospital follow-up.    1.acute kidney injury on chronic kidney disease stage IIIB: Labs obtained on April 24, 2024 had revealed serum creatinine of 2.7 mg/dL and worse.  Likely etiology appears to be overdiuresis resulting in volume depletion with decreased free water intake as well as being on losartan causing loss of autoregulation.  Torsemide 48 hours and resume at a dose of 20 mg once daily.  Will repeat BMP in 2 weeks.  Resolution of CHE necessary for patient to undergo cardiac catheterization in near future.    2.  Hypertension: Blood pressure is noted to be at low normal range at 110/66.  He remains on losartan 50 mg once daily.  Instructed patient to hold blood pressure medication for systolic blood pressure less than 105.    3.  Proteinuria: Noted to have macroalbuminuria.  Remains on losartan 50 mg daily.  Etiology is likely diabetic nephropathy.    4.  Abnormal stress test: Noted during hospitalization.  Patient had earlier refused cardiac catheterization, however currently agreeing to 1.  He will consult with cardiology.    5.  Diabetes mellitus type 2: Longstanding history of diabetes mellitus type 2.  Remains on Actos, metformin and Tresiba.  Not a candidate for SGLT 2 inhibitor in the setting of CHE.    6.  Secondary hyperparathyroidism of renal region: 25-hydroxy vitamin D levels are normal.  PTH intact is significantly elevated greater than 500.  Will initiate patient on calcitriol 0.25 mcg p.o. once daily.  Normal calcium levels noted.    7.  Urinary urgency: Most likely a case of overactive bladder.  PSA performed in the year 2023 was within acceptable range.  Will obtain a bladder scan pre and postvoid to evaluate  for urinary retention.    8.  Anemia due to chronic kidney disease: Hemoglobin is 12 g/dL and acceptable.    9.  Volume overload: Currently appear euvolemic.  Holding diuretics for 48 hours given presence of CHE.            Nikki Gilbert MD  Nephrology  5/3/2024

## 2024-05-03 NOTE — LETTER
May 3, 2024     Dante Swanson PA-C  [address]   [address]  [facility] [address]    Patient: Trent Ocasio   YOB: 1945   Date of Visit: 5/3/2024       Dear Dr. Swanson:    Thank you for referring Trent Ocasio to me for evaluation. Below are my notes for this consultation.    If you have questions, please do not hesitate to call me. I look forward to following your patient along with you.         Sincerely,        Nikki Gilbert MD        CC: MD Nikki Maki MD  5/3/2024 11:52 AM  Incomplete  NEPHROLOGY PROGRESS NOTE    Patient: Trent Ocasio               Sex: male          DOA: No admission date for patient encounter.   YOB: 1945        Age:  79 y.o.        LOS: [unfilled]  5/3/2024        BACKGROUND     79 years old male with past medical history of chronic kidney disease stage IIIb, hypertension, diabetes mellitus type 2, proteinuria, dyslipidemia, CHF with preserved ejection fraction, prostate cancer status post RT who was seen at [facility] after he presented with volume overload    SUBJECTIVE     Patient being seen after a month.  Admitted in March 2024 with volume overload and diagnosed with CHF with diastolic dysfunction.  Patient was also noted to have abnormal stress test however he refused cardiac catheterization at that time.  Patient was discharged from the hospital on April 3 with a prescription of torsemide 40 mg daily.  Patient endorses improvement in edema.  However he continues to complain of fatigue.  Seen by cardiology in outpatient setting and patient had refused cardiac catheterization at that time as well.  Patient now wants to undergo cardiac catheterization in near future.  Complains of nocturia.    REVIEW OF SYSTEMS     Review of Systems   Constitutional: Negative.    HENT: Negative.     Eyes: Negative.    Respiratory: Negative.     Cardiovascular: Negative.    Gastrointestinal: Negative.     Endocrine: Negative.    Genitourinary:  Positive for urgency.   Musculoskeletal: Negative.    Skin: Negative.    Allergic/Immunologic: Negative.    Neurological: Negative.    Hematological: Negative.    All other systems reviewed and are negative.      OBJECTIVE     Current Weight: Weight - Scale: 110 kg (242 lb)  Vitals:    05/03/24 1038   BP: 110/64   Pulse: 74   Resp: 18   Temp: 97.7 °F (36.5 °C)   SpO2: 94%     Body mass index is 34.72 kg/m².  [unfilled]    CURRENT MEDICATIONS       Current Outpatient Medications:   •  aspirin 81 mg chewable tablet, Chew 81 mg daily, Disp: , Rfl:   •  calcitriol (ROCALTROL) 0.25 mcg capsule, Take 1 capsule (0.25 mcg total) by mouth daily, Disp: 90 capsule, Rfl: 3  •  cholecalciferol 25 MCG (1000 UT) tablet, Take 1,000 Units by mouth daily, Disp: , Rfl:   •  ezetimibe (ZETIA) 10 mg tablet, Take 10 mg by mouth daily, Disp: , Rfl:   •  glipiZIDE (GLUCOTROL) 10 mg tablet, Take 10 mg by mouth 2 (two) times a day before meals, Disp: , Rfl:   •  insulin degludec (Tresiba FlexTouch) 100 units/mL injection pen, Inject 20 Units under the skin daily, Disp: , Rfl:   •  latanoprost (XALATAN) 0.005 % ophthalmic solution, Administer 1 drop to both eyes daily at bedtime, Disp: , Rfl:   •  levothyroxine 50 mcg tablet, Take 50 mcg by mouth daily, Disp: , Rfl:   •  metFORMIN (GLUCOPHAGE) 500 mg tablet, Take 500 mg by mouth 2 (two) times a day with meals, Disp: , Rfl:   •  metoprolol succinate (TOPROL-XL) 100 mg 24 hr tablet, Take 100 mg by mouth 2 (two) times a day, Disp: , Rfl:   •  naproxen (NAPROSYN) 500 mg tablet, Take 500 mg by mouth 2 (two) times a day as needed, Disp: , Rfl:   •  pantoprazole (PROTONIX) 40 mg tablet, Take 40 mg by mouth daily, Disp: , Rfl:   •  pioglitazone (ACTOS) 45 mg tablet, Take 45 mg by mouth daily at bedtime, Disp: , Rfl:   •  simvastatin (ZOCOR) 40 mg tablet, Take 40 mg by mouth daily at bedtime, Disp: , Rfl:   •  torsemide (DEMADEX) 20 mg tablet, Take 1 tablet (20  mg total) by mouth daily, Disp: 90 tablet, Rfl: 3  •  metoprolol tartrate (LOPRESSOR) 100 mg tablet, , Disp: , Rfl:       PHYSICAL EXAMINATION     Physical Exam  Constitutional:       Appearance: He is obese.   HENT:      Head: Normocephalic and atraumatic.   Eyes:      Pupils: Pupils are equal, round, and reactive to light.   Neck:      Vascular: No JVD.   Cardiovascular:      Rate and Rhythm: Normal rate and regular rhythm.      Heart sounds: Murmur heard.      No friction rub.   Pulmonary:      Effort: Pulmonary effort is normal.      Breath sounds: Normal breath sounds.   Abdominal:      General: Bowel sounds are normal. There is no distension.      Palpations: Abdomen is soft.      Tenderness: There is no abdominal tenderness. There is no rebound.   Musculoskeletal:         General: No tenderness.      Cervical back: Neck supple.   Skin:     General: Skin is dry.      Findings: No rash.   Neurological:      Mental Status: He is alert and oriented to person, place, and time.           LAB RESULTS       Results from last 6 Months   Lab Units 04/24/24  1007 04/03/24  0504 04/02/24  0500 04/01/24  0530 03/30/24  2139 03/30/24  0515 03/29/24  0432   WBC Thousand/uL 4.85  --  4.88 4.61   < > 5.61 5.62   HEMOGLOBIN g/dL 12.0  --  10.7* 10.2*   < > 11.3* 10.8*   HEMATOCRIT % 37.0  --  32.7* 31.3*   < > 36.4* 33.8*   PLATELETS Thousands/uL 259  --  286 289   < > 316 324   POTASSIUM mmol/L 4.3 4.0 3.7 4.1   < > 5.1 5.0   CHLORIDE mmol/L 97 93* 99 102   < > 105 107   CO2 mmol/L 29 34* 31 28   < > 26 26   BUN mg/dL 79* 50* 52* 58*   < > 49* 39*   CREATININE mg/dL 2.76* 2.03* 1.92* 2.08*   < > 2.07* 1.59*   CALCIUM mg/dL 9.6 9.4 9.5 9.2   < > 9.4 9.0   MAGNESIUM mg/dL  --   --  2.3  --   --  2.4 2.3   PHOSPHORUS mg/dL 4.2*  --   --   --   --   --   --     < > = values in this interval not displayed.           RADIOLOGY RESULTS    History: N18.3. Chronic kidney disease, stage III.     Study: Ultrasound retroperitoneum  complete.     Comment:   There is no ultrasound for comparison.   There is no evidence of hydronephrosis or medical disease.     The kidneys are normal in size, contour and position.   The right kidney approximates 10.7 cm in length; the left 11.2 cm.     There is no inflammatory process, scarring, mass or calculus.     The bladder is intrinsically normal and empties well with micturition.   Prevoid volume 86 mL; postvoid volume 6 mL.   Bilateral ureteral jets are seen.     ASSESSMENT/PLAN     79 years old male with past medical history of CHF with preserved ejection fraction, abnormal stress test, chronic kidney disease stage IIIb, hypertension, diabetes mellitus type 2, gastric cancer status post RT, proteinuria, dyslipidemia who presented to our facility with shortness of breath and was diagnosed with CHF exacerbation.  Patient is currently seeing me for hospital follow-up.    1.acute kidney injury on chronic kidney disease stage IIIB: Labs obtained on April 24, 2024 had revealed serum creatinine of 2.7 mg/dL and worse.  Likely etiology appears to be overdiuresis resulting in volume depletion with decreased free water intake as well as being on losartan causing loss of autoregulation.  Torsemide 48 hours and resume at a dose of 20 mg once daily.  Will repeat BMP in 2 weeks.  Resolution of CHE necessary for patient to undergo cardiac catheterization in near future.    2.  Hypertension: Blood pressure is noted to be at low normal range at 110/66.  He remains on losartan 50 mg once daily.  Instructed patient to hold blood pressure medication for systolic blood pressure less than 105.    3.  Proteinuria: Noted to have macroalbuminuria.  Remains on losartan 50 mg daily.  Etiology is likely diabetic nephropathy.    4.  Abnormal stress test: Noted during hospitalization.  Patient had earlier refused cardiac catheterization, however currently agreeing to 1.  He will consult with cardiology.    5.  Diabetes mellitus type  2: Longstanding history of diabetes mellitus type 2.  Remains on Actos, metformin and Tresiba.  Not a candidate for SGLT 2 inhibitor in the setting of CHE.    6.  Secondary hyperparathyroidism of renal region: 25-hydroxy vitamin D levels are normal.  PTH intact is significantly elevated greater than 500.  Will initiate patient on calcitriol 0.25 mcg p.o. once daily.  Normal calcium levels noted.    7.  Urinary urgency: Most likely a case of overactive bladder.  PSA performed in the year 2023 was within acceptable range.  Will obtain a bladder scan pre and postvoid to evaluate for urinary retention.    8.  Anemia due to chronic kidney disease: Hemoglobin is 12 g/dL and acceptable.    9.  Volume overload: Currently appear euvolemic.  Holding diuretics for 48 hours given presence of CHE.            Nikki Gilbert MD  Nephrology  5/3/2024

## 2024-05-08 ENCOUNTER — TELEPHONE (OUTPATIENT)
Dept: CARDIOLOGY CLINIC | Facility: CLINIC | Age: 79
End: 2024-05-08

## 2024-05-08 NOTE — TELEPHONE ENCOUNTER
S/w pt. Advised of card cath scheduled for 5/24. Pt advised to cut insulin dose in half the night before the procedure and to hold glipizide and torsemide morning of procedure. Pt advised that I will contact him prior to procedure to ensure clearance by nephrology to proceed once his labs are obtained on 5/20. Message sent to pt via [employer] with instructions provided over the phone. Pt verbalized understanding

## 2024-05-09 ENCOUNTER — PREP FOR PROCEDURE (OUTPATIENT)
Dept: CARDIOLOGY CLINIC | Facility: CLINIC | Age: 79
End: 2024-05-09

## 2024-05-09 DIAGNOSIS — N18.32 STAGE 3B CHRONIC KIDNEY DISEASE (HCC): ICD-10-CM

## 2024-05-09 DIAGNOSIS — I50.32 CHRONIC HEART FAILURE WITH PRESERVED EJECTION FRACTION (HFPEF) (HCC): Primary | ICD-10-CM

## 2024-05-09 DIAGNOSIS — I25.10 CORONARY ARTERY DISEASE INVOLVING NATIVE HEART, UNSPECIFIED VESSEL OR LESION TYPE, UNSPECIFIED WHETHER ANGINA PRESENT: ICD-10-CM

## 2024-05-14 ENCOUNTER — HOSPITAL ENCOUNTER (OUTPATIENT)
Dept: ULTRASOUND IMAGING | Facility: HOSPITAL | Age: 79
Discharge: HOME | End: 2024-05-14
Attending: INTERNAL MEDICINE
Payer: COMMERCIAL

## 2024-05-14 DIAGNOSIS — R33.9 URINARY RETENTION: ICD-10-CM

## 2024-05-14 PROCEDURE — 76770 US EXAM ABDO BACK WALL COMP: CPT

## 2024-05-15 ENCOUNTER — TELEPHONE (OUTPATIENT)
Dept: OTHER | Facility: HOSPITAL | Age: 79
End: 2024-05-15

## 2024-05-15 ENCOUNTER — TELEPHONE (OUTPATIENT)
Age: 79
End: 2024-05-15

## 2024-05-15 DIAGNOSIS — D49.4 BLADDER TUMOR: Primary | ICD-10-CM

## 2024-05-15 NOTE — TELEPHONE ENCOUNTER
Patient being referred ASAP by Nephrology due to findings on US kidney and bladder done on 5/14/24    IMPRESSION:     1. New ovoid structure projecting from the posterior bladder wall with central hypocholic area measuring 2.4 x 1.5 x 3.2 cm, not seen on prior ultrasound 5/15/2020. Unclear if this may represent a mass or ureterocele. Recommend CT hematuria protocol for   further evaluation.    As per decision tree, please triage. I could not find any appts within 1 week     Patient can be reached at [phone]

## 2024-05-15 NOTE — TELEPHONE ENCOUNTER
Called patient and informed him about the bladder ultrasound results revealing ovoid mass in the bladder. Will refer him to Urology.  Dr. Gilbert

## 2024-05-15 NOTE — TELEPHONE ENCOUNTER
Spoke to patient and he is switching from [facility] to our urology group. Please assist in scheduling as soon as possible. Call [phone] first and if not try [phone]. We had connection issues during the conversation.

## 2024-05-15 NOTE — TELEPHONE ENCOUNTER
Suzie from [facility] radiology called and said there were significant findings done on the 5/14 kidney and bladder US. Please advise.

## 2024-05-16 ENCOUNTER — TELEPHONE (OUTPATIENT)
Age: 79
End: 2024-05-16

## 2024-05-16 ENCOUNTER — APPOINTMENT (OUTPATIENT)
Dept: LAB | Facility: CLINIC | Age: 79
DRG: 286 | End: 2024-05-16
Payer: COMMERCIAL

## 2024-05-16 DIAGNOSIS — N17.9 AKI (ACUTE KIDNEY INJURY) (HCC): ICD-10-CM

## 2024-05-16 LAB
ANION GAP SERPL CALCULATED.3IONS-SCNC: 10 MMOL/L (ref 4–13)
BUN SERPL-MCNC: 40 MG/DL (ref 5–25)
CALCIUM SERPL-MCNC: 9.9 MG/DL (ref 8.4–10.2)
CHLORIDE SERPL-SCNC: 104 MMOL/L (ref 96–108)
CO2 SERPL-SCNC: 27 MMOL/L (ref 21–32)
CREAT SERPL-MCNC: 2.17 MG/DL (ref 0.6–1.3)
GFR SERPL CREATININE-BSD FRML MDRD: 27 ML/MIN/1.73SQ M
GLUCOSE SERPL-MCNC: 120 MG/DL (ref 65–140)
POTASSIUM SERPL-SCNC: 4.3 MMOL/L (ref 3.5–5.3)
SODIUM SERPL-SCNC: 141 MMOL/L (ref 135–147)

## 2024-05-16 PROCEDURE — 80048 BASIC METABOLIC PNL TOTAL CA: CPT

## 2024-05-16 PROCEDURE — 36415 COLL VENOUS BLD VENIPUNCTURE: CPT

## 2024-05-20 ENCOUNTER — TELEPHONE (OUTPATIENT)
Dept: CARDIOLOGY CLINIC | Facility: CLINIC | Age: 79
End: 2024-05-20

## 2024-05-20 NOTE — TELEPHONE ENCOUNTER
Pt's BMP results are in. Please advise on whether ot not pt can move forward with cardiac catheterization.Thanks!!      ----- Message -----  From: Dante Swanson PA-C  Sent: 5/10/2024   4:06 PM EDT  To: [facility]    Okay, just an FYI - I had declined the order cosign yesterday when I messaged you.  ----- Message -----  From: Brigette Singh  Sent: 5/9/2024   3:46 PM EDT  To: Dante Swanson PA-C    Yes, I'm aware. I spoke with Dr. Gilbert yesterday. He stated that once labs are repeated on the 20th, if they come back ok, then wee can proceed with the cath. That's why the cath is pushed out until the 24th, just to have a tentative date. Thanks for the update.  ----- Message -----  From: Dante Swanson PA-C  Sent: 5/9/2024   2:07 PM EDT  To: Brigette Singh    Good afternoon, regarding cardiac catheterization nephrology note stated they will will repeat BMP in 2 weeks and resolution of CHE is necessary for patient to undergo cardiac catheterization.  Patient has not yet had repeat BMP/resolution of CHE.

## 2024-05-21 ENCOUNTER — PREP FOR PROCEDURE (OUTPATIENT)
Dept: NON INVASIVE DIAGNOSTICS | Facility: HOSPITAL | Age: 79
End: 2024-05-21

## 2024-05-21 RX ORDER — SODIUM CHLORIDE 9 MG/ML
1.5 INJECTION, SOLUTION INTRAVENOUS CONTINUOUS
Status: CANCELLED | OUTPATIENT
Start: 2024-05-21 | End: 2024-05-21

## 2024-05-22 ENCOUNTER — PROCEDURE VISIT (OUTPATIENT)
Dept: UROLOGY | Facility: AMBULATORY SURGERY CENTER | Age: 79
End: 2024-05-22
Payer: COMMERCIAL

## 2024-05-22 VITALS
HEIGHT: 70 IN | OXYGEN SATURATION: 93 % | BODY MASS INDEX: 34.93 KG/M2 | SYSTOLIC BLOOD PRESSURE: 120 MMHG | HEART RATE: 63 BPM | WEIGHT: 244 LBS | DIASTOLIC BLOOD PRESSURE: 64 MMHG

## 2024-05-22 DIAGNOSIS — C67.4 MALIGNANT NEOPLASM OF POSTERIOR WALL OF URINARY BLADDER (HCC): Primary | ICD-10-CM

## 2024-05-22 DIAGNOSIS — D49.4 BLADDER TUMOR: ICD-10-CM

## 2024-05-22 PROCEDURE — 52000 CYSTOURETHROSCOPY: CPT | Performed by: UROLOGY

## 2024-05-22 PROCEDURE — 99205 OFFICE O/P NEW HI 60 MIN: CPT | Performed by: UROLOGY

## 2024-05-22 RX ORDER — CEFAZOLIN SODIUM 2 G/50ML
2000 SOLUTION INTRAVENOUS ONCE
OUTPATIENT
Start: 2024-05-22 | End: 2024-05-22

## 2024-05-22 NOTE — PROGRESS NOTES
5/22/2024    Trent Ocasio  1945  [id number]    1. Malignant 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 (HCC)  Assessment & Plan:  Impression: History of CHF, history of prostate cancer status post external beam radiation therapy, new diagnosis posterior wall bladder tumor    Plan: I recommend cystoscopy with transurethral resection of the posterior bladder wall tumor.  Patient is scheduled undergo cardiac catheterization in the next 2 days.  I feel that the patient should complete catheterization even if this means anticoagulation for stent placement.  TURBT with intravesical gemcitabine is recommended and will be performed in the next few weeks after cardiac catheterization is completed.  Risk of the procedure including, not limited to bleeding, infection, perforation, disease recurrence, need for additional treatment or therapy was discussed and reviewed.  Informed consent obtained.  2. Bladder tumor  -     Ambulatory Referral to Urology  -     Cystoscopy  -     Case request operating room: TRANSURETHRAL RESECTION OF BLADDER TUMOR (TURBT), gemcitabine; Standing  -     Case request operating room: TRANSURETHRAL RESECTION OF BLADDER TUMOR (TURBT), gemcitabine       History of Present Illness  79 y.o. male with a history of recent onset CHF.  He was found to have a creatinine level of 2.76.  Baseline creatinine appears to be around 2.  Recent repeat creatinine was 2.17.  This prompted evaluation by nephrology.  A renal bladder ultrasound was performed revealing a 3 cm posterior wall bladder mass highly suspicious for urothelial carcinoma.  He denies ever seeing gross hematuria.  He states that he previously smoked but quit 50 years ago.  He presents today to undergo cystoscopy to assess his bladder.  His wife is present with him in the office.  She states that in 2 days time he is scheduled to undergo cardiac catheterization.  He states that he has a history of prostate cancer and received radiation at  least 10 years ago at [facility].  We discussed the possibility of a radiation-induced urothelial carcinoma.  Recent PSA level 0.13.      AUA Symptom Score  AUA SYMPTOM SCORE      Flowsheet Row Most Recent Value   AUA SYMPTOM SCORE    How often have you had a sensation of not emptying your bladder completely after you finished urinating? 1 (P)     How often have you had to urinate again less than two hours after you finished urinating? 3 (P)     How often have you found you stopped and started again several times when you urinate? 1 (P)     How often have you found it difficult to postpone urination? 3 (P)     How often have you had a weak urinary stream? 1 (P)     How often have you had to push or strain to begin urination? 0 (P)     How many times did you most typically get up to urinate from the time you went to bed at night until the time you got up in the morning? 5 (P)     Quality of Life: If you were to spend the rest of your life with your urinary condition just the way it is now, how would you feel about that? 4 (P)     AUA SYMPTOM SCORE 14 (P)              Review of Systems    Past Medical History  Past Medical History:   Diagnosis Date   • Acute respiratory failure with hypoxia (HCC) 03/28/2024   • Chronic kidney disease    • Diabetes mellitus (HCC)    • Hypertension    • Respiratory acidosis 03/31/2024       Past Social History  History reviewed. No pertinent surgical history.    Past Family History  History reviewed. No pertinent family history.    Past Social history  Social History     Socioeconomic History   • Marital status: /Civil Union     Spouse name: Not on file   • Number of children: Not on file   • Years of education: Not on file   • Highest education level: Not on file   Occupational History   • Not on file   Tobacco Use   • Smoking status: Former     Passive exposure: Past   • Smokeless tobacco: Never   Vaping Use   • Vaping status: Never Used   Substance and Sexual  Activity   • Alcohol use: Not Currently   • Drug use: Never   • Sexual activity: Yes     Partners: Female   Other Topics Concern   • Not on file   Social History Narrative   • Not on file     Social Determinants of Health     Financial Resource Strain: Low Risk  (8/20/2023)    [facility]    Overall Financial Resource Strain (CARDIA)    • Difficulty of Paying Living Expenses: Not hard at all   Food Insecurity: No Food Insecurity (3/29/2024)    Hunger Vital Sign    • Worried About Running Out of Food in the Last Year: Never true    • Ran Out of Food in the Last Year: Never true   Transportation Needs: No Transportation Needs (3/29/2024)    PRAPARE - Transportation    • Lack of Transportation (Medical): No    • Lack of Transportation (Non-Medical): No   Physical Activity: Not on file   Stress: Stress Concern Present (8/20/2023)    [facility]    [employer] [place] - Occupational Stress Questionnaire    • Feeling of Stress : To some extent   Social Connections: Unknown (8/20/2023)    [facility] from [facility], [facility]    Social Connection and Isolation Panel [NHANES]    • Frequency of Communication with Friends and Family: Patient declined    • Frequency of Social Gatherings with Friends and Family: Patient declined    • Attends Anabaptist Services: Patient declined    • Active Member of Clubs or Organizations: No    • Attends Club or Organization Meetings: Patient declined    • Marital Status:    Intimate Partner Violence: Not At Risk (8/20/2023)    [facility] from [facility]    Humiliation, Afraid, Rape, and Kick questionnaire    • Fear of Current or Ex-Partner: No    • Emotionally Abused: No    • Physically Abused: No    • Sexually Abused: No   Housing Stability: Low Risk  (3/29/2024)     Housing Stability Vital Sign    • Unable to Pay for Housing in the Last Year: No    • Number of Places Lived in the Last Year: 1    • Unstable Housing in the Last Year: No       Current Medications  Current Outpatient Medications   Medication Sig Dispense Refill   • aspirin 81 mg chewable tablet Chew 81 mg daily     • calcitriol (ROCALTROL) 0.25 mcg capsule Take 1 capsule (0.25 mcg total) by mouth daily 90 capsule 3   • cholecalciferol 25 MCG (1000 UT) tablet Take 1,000 Units by mouth daily     • ezetimibe (ZETIA) 10 mg tablet Take 10 mg by mouth daily     • glipiZIDE (GLUCOTROL) 10 mg tablet Take 10 mg by mouth 2 (two) times a day before meals     • insulin degludec (Tresiba FlexTouch) 100 units/mL injection pen Inject 20 Units under the skin daily     • latanoprost (XALATAN) 0.005 % ophthalmic solution Administer 1 drop to both eyes daily at bedtime     • levothyroxine 50 mcg tablet Take 50 mcg by mouth daily     • metFORMIN (GLUCOPHAGE) 500 mg tablet Take 500 mg by mouth 2 (two) times a day with meals     • metoprolol succinate (TOPROL-XL) 100 mg 24 hr tablet Take 100 mg by mouth 2 (two) times a day     • naproxen (NAPROSYN) 500 mg tablet Take 500 mg by mouth 2 (two) times a day as needed     • pantoprazole (PROTONIX) 40 mg tablet Take 40 mg by mouth daily     • pioglitazone (ACTOS) 45 mg tablet Take 45 mg by mouth daily at bedtime     • simvastatin (ZOCOR) 40 mg tablet Take 40 mg by mouth daily at bedtime     • torsemide (DEMADEX) 20 mg tablet Take 1 tablet (20 mg total) by mouth daily 90 tablet 3   • metoprolol tartrate (LOPRESSOR) 100 mg tablet  (Patient not taking: Reported on 5/3/2024)       No current facility-administered medications for this visit.       Allergies  Allergies   Allergen Reactions   • Canagliflozin GI Intolerance     Yeast infection  Yeast infection     • Lisinopril Other (See Comments)   • Lovastatin GI Intolerance       Past Medical History, Social History, Family History, medications  "and allergies were reviewed.    Vitals  Vitals:    05/22/24 1247   BP: 120/64   BP Location: Left arm   Patient Position: Sitting   Cuff Size: Adult   Pulse: 63   SpO2: 93%   Weight: 111 kg (244 lb)   Height: 5' 10\" (1.778 m)       Physical Exam  On examination he is in no acute distress.  Cardiac is regular.  Respiratory no distress.  Abdomen is soft obese nontender nondistended.   examination reveals normal phallus, scrotum and scrotal contents.  Skin is warm.  Extremities with trace edema.  Results  No results found for: \"PSA\"  Lab Results   Component Value Date    CALCIUM 9.9 05/16/2024    K 4.3 05/16/2024    CO2 27 05/16/2024     05/16/2024    BUN 40 (H) 05/16/2024    CREATININE 2.17 (H) 05/16/2024     Lab Results   Component Value Date    WBC 4.85 04/24/2024    HGB 12.0 04/24/2024    HCT 37.0 04/24/2024    MCV 99 (H) 04/24/2024     04/24/2024       Office Urine Dip  No results found for this or any previous visit (from the past 1 hour(s)).]       Cystoscopy     Date/Time  5/22/2024 1:00 PM     Performed by  Benigno Oliveira MD   Authorized by  Benigno Oliveira MD           Male cystoscopy procedure note:  Risk and benefits of flexible cystoscopy were discussed. Informed consent was obtained. The patient was placed in the supine position. His genitalia was prepped and draped in a sterile fashion. Viscous lidocaine jelly was instilled into the urethra and flexible cystoscopy was then performed. The urethra, prostatic urethra, and bladder were all thoroughly inspected.  Immediately upon entry into the bladder a large posterior wall bladder mass was appreciated consistent with urothelial carcinoma.  " Valtrex Pregnancy And Lactation Text: this medication is Pregnancy Category B and is considered safe during pregnancy. This medication is not directly found in breast milk but it's metabolite acyclovir is present.

## 2024-05-22 NOTE — LETTER
May 22, 2024     Nikki Gilbert MD  [address]  [address]  [facility] [address]    Patient: Trent Ocasio   YOB: 1945   Date of Visit: 5/22/2024       Dear Dr. Gilbert:    Thank you for referring Trent Ocasio to me for evaluation. Below are my notes for this consultation.    If you have questions, please do not hesitate to call me. I look forward to following your patient along with you.         Sincerely,        Benigno Oliveira MD        CC: MD Qiana Maki MD Nicholas J Prosper, PA-C Frank Jeremy Tamarkin, MD  5/22/2024  2:31 PM  Sign when Signing Visit  5/22/2024    Trent Ocasio  1945  [id number]    1. Malignant 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 (HCC)  Assessment & Plan:  Impression: History of CHF, history of prostate cancer status post external beam radiation therapy, new diagnosis posterior wall bladder tumor    Plan: I recommend cystoscopy with transurethral resection of the posterior bladder wall tumor.  Patient is scheduled undergo cardiac catheterization in the next 2 days.  I feel that the patient should complete catheterization even if this means anticoagulation for stent placement.  TURBT with intravesical gemcitabine is recommended and will be performed in the next few weeks after cardiac catheterization is completed.  Risk of the procedure including, not limited to bleeding, infection, perforation, disease recurrence, need for additional treatment or therapy was discussed and reviewed.  Informed consent obtained.  2. Bladder tumor  -     Ambulatory Referral to Urology  -     Cystoscopy  -     Case request operating room: TRANSURETHRAL RESECTION OF BLADDER TUMOR (TURBT), gemcitabine; Standing  -     Case request operating room: TRANSURETHRAL RESECTION OF BLADDER TUMOR (TURBT), gemcitabine       History of Present Illness  79 y.o. male with a history of recent onset CHF.  He was found to have a creatinine level of 2.76.  Baseline  creatinine appears to be around 2.  Recent repeat creatinine was 2.17.  This prompted evaluation by nephrology.  A renal bladder ultrasound was performed revealing a 3 cm posterior wall bladder mass highly suspicious for urothelial carcinoma.  He denies ever seeing gross hematuria.  He states that he previously smoked but quit 50 years ago.  He presents today to undergo cystoscopy to assess his bladder.  His wife is present with him in the office.  She states that in 2 days time he is scheduled to undergo cardiac catheterization.  He states that he has a history of prostate cancer and received radiation at least 10 years ago at [facility].  We discussed the possibility of a radiation-induced urothelial carcinoma.  Recent PSA level 0.13.      AUA Symptom Score  AUA SYMPTOM SCORE      Flowsheet Row Most Recent Value   AUA SYMPTOM SCORE    How often have you had a sensation of not emptying your bladder completely after you finished urinating? 1 (P)     How often have you had to urinate again less than two hours after you finished urinating? 3 (P)     How often have you found you stopped and started again several times when you urinate? 1 (P)     How often have you found it difficult to postpone urination? 3 (P)     How often have you had a weak urinary stream? 1 (P)     How often have you had to push or strain to begin urination? 0 (P)     How many times did you most typically get up to urinate from the time you went to bed at night until the time you got up in the morning? 5 (P)     Quality of Life: If you were to spend the rest of your life with your urinary condition just the way it is now, how would you feel about that? 4 (P)     AUA SYMPTOM SCORE 14 (P)              Review of Systems    Past Medical History  Past Medical History:   Diagnosis Date   • Acute respiratory failure with hypoxia (HCC) 03/28/2024   • Chronic kidney disease    • Diabetes mellitus (HCC)    • Hypertension    • Respiratory  acidosis 03/31/2024       Past Social History  History reviewed. No pertinent surgical history.    Past Family History  History reviewed. No pertinent family history.    Past Social history  Social History     Socioeconomic History   • Marital status: /Civil Union     Spouse name: Not on file   • Number of children: Not on file   • Years of education: Not on file   • Highest education level: Not on file   Occupational History   • Not on file   Tobacco Use   • Smoking status: Former     Passive exposure: Past   • Smokeless tobacco: Never   Vaping Use   • Vaping status: Never Used   Substance and Sexual Activity   • Alcohol use: Not Currently   • Drug use: Never   • Sexual activity: Yes     Partners: Female   Other Topics Concern   • Not on file   Social History Narrative   • Not on file     Social Determinants of Health     Financial Resource Strain: Low Risk  (8/20/2023)    [facility]    Overall Financial Resource Strain (CARDIA)    • Difficulty of Paying Living Expenses: Not hard at all   Food Insecurity: No Food Insecurity (3/29/2024)    Hunger Vital Sign    • Worried About Running Out of Food in the Last Year: Never true    • Ran Out of Food in the Last Year: Never true   Transportation Needs: No Transportation Needs (3/29/2024)    PRAPARE - Transportation    • Lack of Transportation (Medical): No    • Lack of Transportation (Non-Medical): No   Physical Activity: Not on file   Stress: Stress Concern Present (8/20/2023)    Received from [facility], [facility]    [employer] [place] [employer] - Occupational Stress Questionnaire    • Feeling of Stress : To some extent   Social Connections: Unknown (8/20/2023)    [facility]    Social Connection and Isolation Panel [NHANES]    • Frequency of Communication with Friends and Family: Patient  declined    • Frequency of Social Gatherings with Friends and Family: Patient declined    • Attends Temple Services: Patient declined    • Active Member of Clubs or Organizations: No    • Attends Club or Organization Meetings: Patient declined    • Marital Status:    Intimate Partner Violence: Not At Risk (8/20/2023)    [facility] from [facility], [facility]    Humiliation, Afraid, Rape, and Kick questionnaire    • Fear of Current or Ex-Partner: No    • Emotionally Abused: No    • Physically Abused: No    • Sexually Abused: No   Housing Stability: Low Risk  (3/29/2024)    Housing Stability Vital Sign    • Unable to Pay for Housing in the Last Year: No    • Number of Places Lived in the Last Year: 1    • Unstable Housing in the Last Year: No       Current Medications  Current Outpatient Medications   Medication Sig Dispense Refill   • aspirin 81 mg chewable tablet Chew 81 mg daily     • calcitriol (ROCALTROL) 0.25 mcg capsule Take 1 capsule (0.25 mcg total) by mouth daily 90 capsule 3   • cholecalciferol 25 MCG (1000 UT) tablet Take 1,000 Units by mouth daily     • ezetimibe (ZETIA) 10 mg tablet Take 10 mg by mouth daily     • glipiZIDE (GLUCOTROL) 10 mg tablet Take 10 mg by mouth 2 (two) times a day before meals     • insulin degludec (Tresiba FlexTouch) 100 units/mL injection pen Inject 20 Units under the skin daily     • latanoprost (XALATAN) 0.005 % ophthalmic solution Administer 1 drop to both eyes daily at bedtime     • levothyroxine 50 mcg tablet Take 50 mcg by mouth daily     • metFORMIN (GLUCOPHAGE) 500 mg tablet Take 500 mg by mouth 2 (two) times a day with meals     • metoprolol succinate (TOPROL-XL) 100 mg 24 hr tablet Take 100 mg by mouth 2 (two) times a day     • naproxen (NAPROSYN) 500 mg tablet Take 500 mg by mouth 2 (two) times a day as needed     • pantoprazole (PROTONIX) 40 mg tablet Take 40 mg by mouth daily     • pioglitazone (ACTOS) 45 mg tablet Take  "45 mg by mouth daily at bedtime     • simvastatin (ZOCOR) 40 mg tablet Take 40 mg by mouth daily at bedtime     • torsemide (DEMADEX) 20 mg tablet Take 1 tablet (20 mg total) by mouth daily 90 tablet 3   • metoprolol tartrate (LOPRESSOR) 100 mg tablet  (Patient not taking: Reported on 5/3/2024)       No current facility-administered medications for this visit.       Allergies  Allergies   Allergen Reactions   • Canagliflozin GI Intolerance     Yeast infection  Yeast infection     • Lisinopril Other (See Comments)   • Lovastatin GI Intolerance       Past Medical History, Social History, Family History, medications and allergies were reviewed.    Vitals  Vitals:    05/22/24 1247   BP: 120/64   BP Location: Left arm   Patient Position: Sitting   Cuff Size: Adult   Pulse: 63   SpO2: 93%   Weight: 111 kg (244 lb)   Height: 5' 10\" (1.778 m)       Physical Exam  On examination he is in no acute distress.  Cardiac is regular.  Respiratory no distress.  Abdomen is soft obese nontender nondistended.   examination reveals normal phallus, scrotum and scrotal contents.  Skin is warm.  Extremities with trace edema.  Results  No results found for: \"PSA\"  Lab Results   Component Value Date    CALCIUM 9.9 05/16/2024    K 4.3 05/16/2024    CO2 27 05/16/2024     05/16/2024    BUN 40 (H) 05/16/2024    CREATININE 2.17 (H) 05/16/2024     Lab Results   Component Value Date    WBC 4.85 04/24/2024    HGB 12.0 04/24/2024    HCT 37.0 04/24/2024    MCV 99 (H) 04/24/2024     04/24/2024       Office Urine Dip  No results found for this or any previous visit (from the past 1 hour(s)).]       Cystoscopy     Date/Time  5/22/2024 1:00 PM     Performed by  Benigno Oliveira MD   Authorized by  Benigno Oliveira MD           Male cystoscopy procedure note:  Risk and benefits of flexible cystoscopy were discussed. Informed consent was obtained. The patient was placed in the supine position. His genitalia was prepped and draped " in a sterile fashion. Viscous lidocaine jelly was instilled into the urethra and flexible cystoscopy was then performed. The urethra, prostatic urethra, and bladder were all thoroughly inspected.  Immediately upon entry into the bladder a large posterior wall bladder mass was appreciated consistent with urothelial carcinoma.

## 2024-05-22 NOTE — ASSESSMENT & PLAN NOTE
Impression: History of CHF, history of prostate cancer status post external beam radiation therapy, new diagnosis posterior wall bladder tumor    Plan: I recommend cystoscopy with transurethral resection of the posterior bladder wall tumor.  Patient is scheduled undergo cardiac catheterization in the next 2 days.  I feel that the patient should complete catheterization even if this means anticoagulation for stent placement.  TURBT with intravesical gemcitabine is recommended and will be performed in the next few weeks after cardiac catheterization is completed.  Risk of the procedure including, not limited to bleeding, infection, perforation, disease recurrence, need for additional treatment or therapy was discussed and reviewed.  Informed consent obtained.

## 2024-05-24 ENCOUNTER — HOSPITAL ENCOUNTER (INPATIENT)
Facility: HOSPITAL | Age: 79
LOS: 1 days | Discharge: HOME | DRG: 286 | End: 2024-05-26
Attending: INTERNAL MEDICINE | Admitting: STUDENT IN AN ORGANIZED HEALTH CARE EDUCATION/TRAINING PROGRAM
Payer: COMMERCIAL

## 2024-05-24 ENCOUNTER — TELEPHONE (OUTPATIENT)
Dept: CARDIOLOGY CLINIC | Facility: CLINIC | Age: 79
End: 2024-05-24

## 2024-05-24 DIAGNOSIS — G47.34 NOCTURNAL HYPOXIA: ICD-10-CM

## 2024-05-24 DIAGNOSIS — Z98.890 S/P CARDIAC CATH: ICD-10-CM

## 2024-05-24 DIAGNOSIS — R00.1 BRADYCARDIA: ICD-10-CM

## 2024-05-24 DIAGNOSIS — I50.32 CHRONIC HEART FAILURE WITH PRESERVED EJECTION FRACTION (HFPEF) (HCC): ICD-10-CM

## 2024-05-24 DIAGNOSIS — I25.10 CORONARY ARTERY DISEASE INVOLVING NATIVE CORONARY ARTERY OF NATIVE HEART, UNSPECIFIED WHETHER ANGINA PRESENT: Primary | ICD-10-CM

## 2024-05-24 DIAGNOSIS — Z98.890 S/P CARDIAC CATHETERIZATION: ICD-10-CM

## 2024-05-24 DIAGNOSIS — N18.32 STAGE 3B CHRONIC KIDNEY DISEASE (HCC): ICD-10-CM

## 2024-05-24 DIAGNOSIS — I25.10 CORONARY ARTERY DISEASE INVOLVING NATIVE HEART, UNSPECIFIED VESSEL OR LESION TYPE, UNSPECIFIED WHETHER ANGINA PRESENT: ICD-10-CM

## 2024-05-24 PROBLEM — E16.2 HYPOGLYCEMIA: Status: ACTIVE | Noted: 2024-05-24

## 2024-05-24 LAB
ANION GAP SERPL CALCULATED.3IONS-SCNC: 9 MMOL/L (ref 4–13)
BUN SERPL-MCNC: 40 MG/DL (ref 5–25)
CALCIUM SERPL-MCNC: 10 MG/DL (ref 8.4–10.2)
CHLORIDE SERPL-SCNC: 106 MMOL/L (ref 96–108)
CO2 SERPL-SCNC: 27 MMOL/L (ref 21–32)
CREAT SERPL-MCNC: 2.27 MG/DL (ref 0.6–1.3)
ERYTHROCYTE [DISTWIDTH] IN BLOOD BY AUTOMATED COUNT: 13.9 % (ref 11.6–15.1)
GFR SERPL CREATININE-BSD FRML MDRD: 26 ML/MIN/1.73SQ M
GLUCOSE P FAST SERPL-MCNC: 48 MG/DL (ref 65–99)
GLUCOSE SERPL-MCNC: 117 MG/DL (ref 65–140)
GLUCOSE SERPL-MCNC: 169 MG/DL (ref 65–140)
GLUCOSE SERPL-MCNC: 48 MG/DL (ref 65–140)
GLUCOSE SERPL-MCNC: 54 MG/DL (ref 65–140)
HCT VFR BLD AUTO: 37.6 % (ref 36.5–49.3)
HGB BLD-MCNC: 12.2 G/DL (ref 12–17)
KCT BLD-ACNC: 194 SEC (ref 89–137)
MCH RBC QN AUTO: 32 PG (ref 26.8–34.3)
MCHC RBC AUTO-ENTMCNC: 32.4 G/DL (ref 31.4–37.4)
MCV RBC AUTO: 99 FL (ref 82–98)
PLATELET # BLD AUTO: 277 THOUSANDS/UL (ref 149–390)
PMV BLD AUTO: 9.6 FL (ref 8.9–12.7)
POTASSIUM SERPL-SCNC: 4.2 MMOL/L (ref 3.5–5.3)
RBC # BLD AUTO: 3.81 MILLION/UL (ref 3.88–5.62)
SODIUM SERPL-SCNC: 142 MMOL/L (ref 135–147)
SPECIMEN SOURCE: ABNORMAL
WBC # BLD AUTO: 5.22 THOUSAND/UL (ref 4.31–10.16)

## 2024-05-24 PROCEDURE — NC001 PR NO CHARGE

## 2024-05-24 PROCEDURE — 93454 CORONARY ARTERY ANGIO S&I: CPT | Performed by: INTERNAL MEDICINE

## 2024-05-24 PROCEDURE — 99153 MOD SED SAME PHYS/QHP EA: CPT | Performed by: INTERNAL MEDICINE

## 2024-05-24 PROCEDURE — 99152 MOD SED SAME PHYS/QHP 5/>YRS: CPT | Performed by: INTERNAL MEDICINE

## 2024-05-24 PROCEDURE — G0379 DIRECT REFER HOSPITAL OBSERV: HCPCS

## 2024-05-24 PROCEDURE — 82948 REAGENT STRIP/BLOOD GLUCOSE: CPT

## 2024-05-24 PROCEDURE — C1894 INTRO/SHEATH, NON-LASER: HCPCS | Performed by: INTERNAL MEDICINE

## 2024-05-24 PROCEDURE — B211YZZ FLUOROSCOPY OF MULTIPLE CORONARY ARTERIES USING OTHER CONTRAST: ICD-10-PCS | Performed by: INTERNAL MEDICINE

## 2024-05-24 PROCEDURE — 80048 BASIC METABOLIC PNL TOTAL CA: CPT

## 2024-05-24 PROCEDURE — 99223 1ST HOSP IP/OBS HIGH 75: CPT | Performed by: STUDENT IN AN ORGANIZED HEALTH CARE EDUCATION/TRAINING PROGRAM

## 2024-05-24 PROCEDURE — 85347 COAGULATION TIME ACTIVATED: CPT

## 2024-05-24 PROCEDURE — 93005 ELECTROCARDIOGRAM TRACING: CPT

## 2024-05-24 PROCEDURE — C1769 GUIDE WIRE: HCPCS | Performed by: INTERNAL MEDICINE

## 2024-05-24 PROCEDURE — C1760 CLOSURE DEV, VASC: HCPCS | Performed by: INTERNAL MEDICINE

## 2024-05-24 PROCEDURE — 85027 COMPLETE CBC AUTOMATED: CPT | Performed by: INTERNAL MEDICINE

## 2024-05-24 DEVICE — ANGIO-SEAL VIP VASCULAR CLOSURE DEVICE
Type: IMPLANTABLE DEVICE | Site: GROIN | Status: FUNCTIONAL
Brand: ANGIO-SEAL

## 2024-05-24 RX ORDER — MIDAZOLAM HYDROCHLORIDE 2 MG/2ML
INJECTION, SOLUTION INTRAMUSCULAR; INTRAVENOUS CODE/TRAUMA/SEDATION MEDICATION
Status: DISCONTINUED | OUTPATIENT
Start: 2024-05-24 | End: 2024-05-24 | Stop reason: HOSPADM

## 2024-05-24 RX ORDER — PANTOPRAZOLE SODIUM 40 MG/1
40 TABLET, DELAYED RELEASE ORAL
Status: DISCONTINUED | OUTPATIENT
Start: 2024-05-25 | End: 2024-05-26 | Stop reason: HOSPADM

## 2024-05-24 RX ORDER — NAPROXEN 250 MG/1
500 TABLET ORAL 2 TIMES DAILY PRN
Status: DISCONTINUED | OUTPATIENT
Start: 2024-05-24 | End: 2024-05-24

## 2024-05-24 RX ORDER — HEPARIN SODIUM 1000 [USP'U]/ML
INJECTION, SOLUTION INTRAVENOUS; SUBCUTANEOUS CODE/TRAUMA/SEDATION MEDICATION
Status: DISCONTINUED | OUTPATIENT
Start: 2024-05-24 | End: 2024-05-24 | Stop reason: HOSPADM

## 2024-05-24 RX ORDER — LATANOPROST 50 UG/ML
1 SOLUTION/ DROPS OPHTHALMIC
Status: DISCONTINUED | OUTPATIENT
Start: 2024-05-24 | End: 2024-05-26 | Stop reason: HOSPADM

## 2024-05-24 RX ORDER — SODIUM CHLORIDE 9 MG/ML
75 INJECTION, SOLUTION INTRAVENOUS CONTINUOUS
Status: DISPENSED | OUTPATIENT
Start: 2024-05-24 | End: 2024-05-24

## 2024-05-24 RX ORDER — SODIUM CHLORIDE 9 MG/ML
1.5 INJECTION, SOLUTION INTRAVENOUS CONTINUOUS
Status: DISPENSED | OUTPATIENT
Start: 2024-05-24 | End: 2024-05-24

## 2024-05-24 RX ORDER — ASPIRIN 81 MG/1
81 TABLET, CHEWABLE ORAL DAILY
Status: DISCONTINUED | OUTPATIENT
Start: 2024-05-25 | End: 2024-05-26 | Stop reason: HOSPADM

## 2024-05-24 RX ORDER — ISOSORBIDE MONONITRATE 30 MG/1
30 TABLET, EXTENDED RELEASE ORAL DAILY
Qty: 90 TABLET | Refills: 1 | Status: SHIPPED | OUTPATIENT
Start: 2024-05-24

## 2024-05-24 RX ORDER — CALCITRIOL 0.25 UG/1
0.25 CAPSULE, LIQUID FILLED ORAL DAILY
Status: DISCONTINUED | OUTPATIENT
Start: 2024-05-25 | End: 2024-05-24

## 2024-05-24 RX ORDER — METOPROLOL SUCCINATE 100 MG/1
100 TABLET, EXTENDED RELEASE ORAL DAILY
Start: 2024-05-24 | End: 2024-05-24

## 2024-05-24 RX ORDER — NITROGLYCERIN 20 MG/100ML
INJECTION INTRAVENOUS CODE/TRAUMA/SEDATION MEDICATION
Status: DISCONTINUED | OUTPATIENT
Start: 2024-05-24 | End: 2024-05-24 | Stop reason: HOSPADM

## 2024-05-24 RX ORDER — DEXTROSE MONOHYDRATE 25 G/50ML
25 INJECTION, SOLUTION INTRAVENOUS ONCE
Status: COMPLETED | OUTPATIENT
Start: 2024-05-24 | End: 2024-05-24

## 2024-05-24 RX ORDER — FENTANYL CITRATE 50 UG/ML
INJECTION, SOLUTION INTRAMUSCULAR; INTRAVENOUS CODE/TRAUMA/SEDATION MEDICATION
Status: DISCONTINUED | OUTPATIENT
Start: 2024-05-24 | End: 2024-05-24 | Stop reason: HOSPADM

## 2024-05-24 RX ORDER — PRAVASTATIN SODIUM 80 MG/1
80 TABLET ORAL
Status: DISCONTINUED | OUTPATIENT
Start: 2024-05-24 | End: 2024-05-26 | Stop reason: HOSPADM

## 2024-05-24 RX ORDER — HEPARIN SODIUM 5000 [USP'U]/ML
5000 INJECTION, SOLUTION INTRAVENOUS; SUBCUTANEOUS EVERY 8 HOURS SCHEDULED
Status: DISCONTINUED | OUTPATIENT
Start: 2024-05-24 | End: 2024-05-26 | Stop reason: HOSPADM

## 2024-05-24 RX ORDER — IODIXANOL 320 MG/ML
INJECTION, SOLUTION INTRAVASCULAR CODE/TRAUMA/SEDATION MEDICATION
Status: DISCONTINUED | OUTPATIENT
Start: 2024-05-24 | End: 2024-05-24 | Stop reason: HOSPADM

## 2024-05-24 RX ORDER — EZETIMIBE 10 MG/1
10 TABLET ORAL DAILY
Status: DISCONTINUED | OUTPATIENT
Start: 2024-05-25 | End: 2024-05-26 | Stop reason: HOSPADM

## 2024-05-24 RX ORDER — LEVOTHYROXINE SODIUM 0.05 MG/1
50 TABLET ORAL
Status: DISCONTINUED | OUTPATIENT
Start: 2024-05-25 | End: 2024-05-26 | Stop reason: HOSPADM

## 2024-05-24 RX ORDER — TORSEMIDE 20 MG/1
20 TABLET ORAL DAILY
Status: DISCONTINUED | OUTPATIENT
Start: 2024-05-25 | End: 2024-05-25

## 2024-05-24 RX ORDER — LOSARTAN POTASSIUM 50 MG/1
50 TABLET ORAL DAILY
COMMUNITY

## 2024-05-24 RX ORDER — LIDOCAINE WITH 8.4% SOD BICARB 0.9%(10ML)
SYRINGE (ML) INJECTION CODE/TRAUMA/SEDATION MEDICATION
Status: DISCONTINUED | OUTPATIENT
Start: 2024-05-24 | End: 2024-05-24 | Stop reason: HOSPADM

## 2024-05-24 RX ADMIN — HEPARIN SODIUM 5000 UNITS: 5000 INJECTION INTRAVENOUS; SUBCUTANEOUS at 21:38

## 2024-05-24 RX ADMIN — SODIUM CHLORIDE 330 ML: 0.9 INJECTION, SOLUTION INTRAVENOUS at 08:29

## 2024-05-24 RX ADMIN — SODIUM CHLORIDE 75 ML/HR: 0.9 INJECTION, SOLUTION INTRAVENOUS at 17:20

## 2024-05-24 RX ADMIN — PRAVASTATIN SODIUM 80 MG: 80 TABLET ORAL at 18:37

## 2024-05-24 RX ADMIN — DEXTROSE MONOHYDRATE 25 ML: 25 INJECTION, SOLUTION INTRAVENOUS at 13:24

## 2024-05-24 RX ADMIN — LATANOPROST 1 DROP: 50 SOLUTION OPHTHALMIC at 21:38

## 2024-05-24 RX ADMIN — SODIUM CHLORIDE 1.5 ML/KG/HR: 0.9 INJECTION, SOLUTION INTRAVENOUS at 08:33

## 2024-05-24 NOTE — ASSESSMENT & PLAN NOTE
Lab Results   Component Value Date    HGBA1C 7.3 (H) 02/16/2024       Recent Labs     05/24/24  1336 05/24/24  1623   POCGLU 117 54*       Blood Sugar Average: Last 72 hrs:  (P) 85.5  Patient has recurrent hypoglycemia hold long-acting insulin, oral hypoglycemics

## 2024-05-24 NOTE — ASSESSMENT & PLAN NOTE
Patient has CKD stage IIIb/IV  Baseline creatinine is 1.5-2 currently 2.2  Patient gave himself 10 units of Tresiba and took half dose of metformin in the morning.  He was fasting before his cath.  Possible due to decreased clearance of oral hypoglycemic/insulin in the setting of advanced kidney disease.  Hypoglycemia protocol

## 2024-05-24 NOTE — ASSESSMENT & PLAN NOTE
S/p left heart cath showed multivessel disease.  Telemetry  Possibly due to hypoglycemia  Treat underlying cause  If patient remains symptomatic bradycardic, plan for possible pacemaker on Tuesday

## 2024-05-24 NOTE — QUICK NOTE
Cardiac catheterization revealed 100%  of pLAD and 80% stenosis of the LCx.  Case discussed with CT surgery Dr. Guerin and patient was planned to undergo outpatient CABG evaluation.  Unfortunately, patient developed bradycardia with 2-1 AVB/Wenke block on telemetry following procedure.  Will admit to hospitalist service, discontinue Toprol-XL, and continue telemetry monitoring.  Discussed possible consideration for PPM pending clinical course.  Patient agreeable with plan.

## 2024-05-24 NOTE — ASSESSMENT & PLAN NOTE
Lab Results   Component Value Date    EGFR 26 05/24/2024    EGFR 27 05/16/2024    EGFR 20 04/24/2024    CREATININE 2.27 (H) 05/24/2024    CREATININE 2.17 (H) 05/16/2024    CREATININE 2.76 (H) 04/24/2024   Baseline creatinine is 1.4-2 current is 2.27  Continue gentle IV fluids post cath

## 2024-05-24 NOTE — H&P
[facility]  H&P  Name: Trent Ocasio 79 y.o. male I MRN: [id number]  Unit/Bed#: MO CATH LAB ROOM I Date of Admission: 5/24/2024   Date of Service: 5/24/2024 I Hospital Day: 0      Assessment & Plan   Hypoglycemia  Assessment & Plan  Patient has CKD stage IIIb/IV  Baseline creatinine is 1.5-2 currently 2.2  Patient gave himself 10 units of Tresiba and took half dose of metformin in the morning.  He was fasting before his cath.  Possible due to decreased clearance of oral hypoglycemic/insulin in the setting of advanced kidney disease.  Hypoglycemia protocol    Bradycardia  Assessment & Plan  S/p left heart cath showed multivessel disease.  Telemetry  Multifactorial hypoglycemia/MVD  Treat underlying cause  If patient remains symptomatic bradycardic, plan for possible pacemaker on Tuesday    S/P cardiac cath  Assessment & Plan  there is 100% chronic total occlusion of proximal LAD. There are right to left collaterals visualized. Dist Cx lesion has a 80% stenosis.   Recommend cardiothoracic surgery evaluation for CABG. If not a CABG candidate, would uptitrate medical management. If remains symptomatic despite medical therapy uptitration, can consider complex PCI to distal circumflex with atherectomy.   Cont with ASA and statin  Routine post cath care  Follow up with primary cardiologist.     CAD (coronary artery disease)  Assessment & Plan  Continue with aspirin, statins.    Stage 3b chronic kidney disease (HCC)  Assessment & Plan  Lab Results   Component Value Date    EGFR 26 05/24/2024    EGFR 27 05/16/2024    EGFR 20 04/24/2024    CREATININE 2.27 (H) 05/24/2024    CREATININE 2.17 (H) 05/16/2024    CREATININE 2.76 (H) 04/24/2024   Baseline creatinine is 1.4-2 current is 2.27  Continue gentle IV fluids post cath    Type 2 diabetes mellitus, with long-term current use of insulin (HCC)  Assessment & Plan  Lab Results   Component Value Date    HGBA1C 7.3 (H) 02/16/2024       Recent Labs      05/24/24  1336 05/24/24  1623   POCGLU 117 54*       Blood Sugar Average: Last 72 hrs:  (P) 85.5  Patient has recurrent hypoglycemia hold long-acting insulin, oral hypoglycemics    Hypothyroid  Assessment & Plan  Continue with levothyroxine    HTN (hypertension)  Assessment & Plan  Continue torsemide  Hold metoprolol    History of prostate cancer  Assessment & Plan  Outpatient follow-up    Chronic heart failure with preserved ejection fraction (HFpEF) (HCC)  Assessment & Plan  Wt Readings from Last 3 Encounters:   05/24/24 110 kg (242 lb 15.2 oz)   05/22/24 111 kg (244 lb)   05/03/24 110 kg (242 lb)   Patient is currently euvolemic  I's and O's, daily weight  Continue home meds PTA but holding Lopressor in the setting of bradycardia                 VTE Pharmacologic Prophylaxis:   Moderate Risk (Score 3-4) - Pharmacological DVT Prophylaxis Ordered: heparin.  Code Status: Level 1 - Full Code   Discussion with family: Updated  (wife) at bedside.    Anticipated Length of Stay: Patient will be admitted on an observation basis with an anticipated length of stay of less than 2 midnights secondary to monitoring of hypoglycemia and bradycardia.        Chief Complaint: Post cath bradycardia and hypoglycemia monitoring    History of Present Illness:  Trent Ocasio is a 79 y.o. male with a PMH coronary artery disease, hypertension, obesity, hyperlipidemia, prior history of prostrate CA came for elective left heart cath.  He was found to have multivessel disease.  Patient had recurrent episodes of hypoglycemia and bradycardia.  Cardiology EP reached out to medicine team for overnight monitoring of bradycardia and hypoglycemia.  During my encounter, patient is lying comfortably in the bed without any cardiorespiratory stress.  He reported that he was fasting before cath and gave himself 10 units of long-acting insulin and took half dose of metformin.  He denied complaint of chest pain, shortness of breath,  orthopnea, PND, nausea vomiting or diarrhea    Review of Systems:  Review of Systems all negative except mentioned above    Past Medical and Surgical History:   Past Medical History:   Diagnosis Date    Acute respiratory failure with hypoxia (HCC) 03/28/2024    Chronic kidney disease     Diabetes mellitus (HCC)     Hypertension     Respiratory acidosis 03/31/2024       History reviewed. No pertinent surgical history.    Meds/Allergies:  Prior to Admission medications    Medication Sig Start Date End Date Taking? Authorizing Provider   aspirin 81 mg chewable tablet Chew 81 mg daily   Yes Historical Provider, MD   calcitriol (ROCALTROL) 0.25 mcg capsule Take 1 capsule (0.25 mcg total) by mouth daily 5/3/24  Yes Nikki Gilbert MD   cholecalciferol 25 MCG (1000 UT) tablet Take 1,000 Units by mouth daily   Yes Historical Provider, MD   ezetimibe (ZETIA) 10 mg tablet Take 10 mg by mouth daily   Yes Historical Provider, MD   glipiZIDE (GLUCOTROL) 10 mg tablet Take 10 mg by mouth 2 (two) times a day before meals   Yes Historical Provider, MD   insulin degludec (Tresiba FlexTouch) 100 units/mL injection pen Inject 20 Units under the skin daily   Yes Historical Provider, MD   isosorbide mononitrate (IMDUR) 30 mg 24 hr tablet Take 1 tablet (30 mg total) by mouth daily 5/24/24  Yes Dante Swanson PA-C   latanoprost (XALATAN) 0.005 % ophthalmic solution Administer 1 drop to both eyes daily at bedtime   Yes Historical Provider, MD   levothyroxine 50 mcg tablet Take 50 mcg by mouth daily   Yes Historical Provider, MD   metFORMIN (GLUCOPHAGE) 500 mg tablet Take 500 mg by mouth 2 (two) times a day with meals   Yes Historical Provider, MD   naproxen (NAPROSYN) 500 mg tablet Take 500 mg by mouth 2 (two) times a day as needed 2/21/24  Yes Historical Provider, MD   pantoprazole (PROTONIX) 40 mg tablet Take 40 mg by mouth daily   Yes Historical Provider, MD   pioglitazone (ACTOS) 45 mg tablet Take 45 mg by mouth daily at bedtime   Yes  "Historical Provider, MD   simvastatin (ZOCOR) 40 mg tablet Take 40 mg by mouth daily at bedtime   Yes Historical Provider, MD   torsemide (DEMADEX) 20 mg tablet Take 1 tablet (20 mg total) by mouth daily 5/3/24  Yes Nikki Gilbert MD   metoprolol succinate (TOPROL-XL) 100 mg 24 hr tablet Take 100 mg by mouth 2 (two) times a day  5/24/24 Yes Historical Provider, MD   metoprolol succinate (TOPROL-XL) 100 mg 24 hr tablet Take 1 tablet (100 mg total) by mouth daily 5/24/24 5/24/24 Yes Dante Swanson PA-C   metoprolol tartrate (LOPRESSOR) 100 mg tablet  3/21/24   Historical Provider, MD     I have reviewed home medications with patient personally.    Allergies:   Allergies   Allergen Reactions    Canagliflozin GI Intolerance     Yeast infection  Yeast infection      Lisinopril Other (See Comments)    Lovastatin GI Intolerance       Social History:  Marital Status: /Civil Union   Patient Pre-hospital Living Situation: With spouse  Patient Pre-hospital Level of Mobility: walks  Patient Pre-hospital Diet Restrictions: none  Substance Use History:   Social History     Substance and Sexual Activity   Alcohol Use Not Currently    Comment: social     Social History     Tobacco Use   Smoking Status Former    Passive exposure: Past   Smokeless Tobacco Never     Social History     Substance and Sexual Activity   Drug Use Never       Family History:  History reviewed. No pertinent family history.    Physical Exam:     Vitals:   Blood Pressure: 154/69 (05/24/24 1445)  Pulse: 56 (05/24/24 1445)  Temperature: 97.7 °F (36.5 °C) (05/24/24 1445)  Temp Source: Temporal (05/24/24 1445)  Respirations: 18 (05/24/24 1445)  Height: 5' 10\" (177.8 cm) (05/24/24 0812)  Weight - Scale: 110 kg (242 lb 15.2 oz) (05/24/24 0812)  SpO2: 94 % (05/24/24 1445)    Constitutional: No acute cardiovascular distress  HEENT: Pallor or icterus negative  CVS: S1 plus S2  Respiratory: Normal vascular breathe without crackles and wheeze  Gastroenterology: " Soft nontender without any palpable mass  Skin: No bruises or ecchymosis  Neurology: No focal logical deficit      Additional Data:     Lab Results:  Results from last 7 days   Lab Units 05/24/24  0828   WBC Thousand/uL 5.22   HEMOGLOBIN g/dL 12.2   HEMATOCRIT % 37.6   PLATELETS Thousands/uL 277     Results from last 7 days   Lab Units 05/24/24  1228   SODIUM mmol/L 142   POTASSIUM mmol/L 4.2   CHLORIDE mmol/L 106   CO2 mmol/L 27   BUN mg/dL 40*   CREATININE mg/dL 2.27*   ANION GAP mmol/L 9   CALCIUM mg/dL 10.0   GLUCOSE RANDOM mg/dL 48*         Results from last 7 days   Lab Units 05/24/24  1623 05/24/24  1336   POC GLUCOSE mg/dl 54* 117               Lines/Drains:  Invasive Devices       Peripheral Intravenous Line  Duration             Peripheral IV 05/24/24 Dorsal (posterior);Left Hand <1 day                        Imaging: No pertinent imaging reviewed.  No orders to display       EKG and Other Studies Reviewed on Admission:   EKG: EKG shows sinus bradycardia     ** Please Note: This note has been constructed using a voice recognition system. **

## 2024-05-24 NOTE — ASSESSMENT & PLAN NOTE
Wt Readings from Last 3 Encounters:   05/24/24 110 kg (242 lb 15.2 oz)   05/22/24 111 kg (244 lb)   05/03/24 110 kg (242 lb)   Patient is currently euvolemic  I's and O's, daily weight  Continue home meds PTA but holding Lopressor in the setting of bradycardia

## 2024-05-24 NOTE — H&P
History and Physical - Cardiology   Trent Ocasio 79 y.o. male MRN: [id number]  Unit/Bed#: [facility] CATH LAB ROOM Encounter: [id number]  05/24/24  11:13 AM          Assessment:  Exertional dyspnea  Abnormal stress test  Remote hx of CAD (2003)   Chronic HFpEF  Hypertension  Hyperlipidemia  CKD 4  T2DM  Hx of CVA    Plan:  Plan for outpatient cardiac catheterization.    Discussed the indications, alternatives, risks and benefit of cardiac catheterization and possible PCI. The procedure risks, benefits, and complications (including but not limited to bleeding, infection, arrhythmia, nephrotoxicity, vessel injury, myocardial infarction, CVA, and death) were reviewed.  Patient is alert and oriented x3 and wishes to proceed. All questions answered.         History of Present Illness       Principal Problem: Exertional dyspnea    HPI: Trent Ocasio is a 79 y.o. year old male with history of CAD, chronic HFpEF, hypertension, hyperlipidemia CKD 4, T2DM, and CVA who presents for outpatient cardiac catheterization.  Continues to experience exertional dyspnea.  Exact details regarding remote history of CAD unknown.  Denies any additional complaints at this time.  Previously evaluated by nephrology for renal optimization prior to cardiac catheterization.    Review of Systems:    Review of Systems   Constitutional:  Negative for chills and fever.   HENT:  Negative for ear pain and sore throat.    Eyes:  Negative for pain and visual disturbance.   Respiratory:  Positive for shortness of breath. Negative for cough.    Cardiovascular:  Negative for chest pain, palpitations and leg swelling.   Gastrointestinal:  Negative for abdominal pain and vomiting.   Genitourinary:  Negative for dysuria and hematuria.   Musculoskeletal:  Negative for arthralgias and back pain.   Skin:  Negative for color change and rash.   Neurological:  Negative for seizures and syncope.   All other systems reviewed and are negative.      Historical Information  "  Past Medical History:   Diagnosis Date    Acute respiratory failure with hypoxia (HCC) 2024    Chronic kidney disease     Diabetes mellitus (HCC)     Hypertension     Respiratory acidosis 2024     History reviewed. No pertinent surgical history.  Social History     Substance and Sexual Activity   Alcohol Use Not Currently    Comment: social     Social History     Substance and Sexual Activity   Drug Use Never     Social History     Tobacco Use   Smoking Status Former    Passive exposure: Past   Smokeless Tobacco Never       Family History: History reviewed. No pertinent family history.    Meds/Allergies   all current active meds have been reviewed  Allergies   Allergen Reactions    Canagliflozin GI Intolerance     Yeast infection  Yeast infection      Lisinopril Other (See Comments)    Lovastatin GI Intolerance       Objective   Vitals: Blood pressure 148/67, pulse 62, temperature 97.6 °F (36.4 °C), temperature source Temporal, resp. rate 18, height 5' 10\" (1.778 m), weight 110 kg (242 lb 15.2 oz), SpO2 98%., Body mass index is 34.86 kg/m².,   Orthostatic Blood Pressures      Flowsheet Row Most Recent Value   Blood Pressure 148/67 filed at 2024 0812            Systolic (24hrs), Av , Min:148 , Max:148     Diastolic (24hrs), Av, Min:67, Max:67      No intake or output data in the 24 hours ending 24 1113    Invasive Devices       Peripheral Intravenous Line  Duration             Peripheral IV 24 Dorsal (posterior);Left Hand <1 day                        Physical Exam:  Physical Exam  Vitals reviewed.   Constitutional:       General: He is not in acute distress.     Appearance: He is obese. He is not diaphoretic.   HENT:      Head: Normocephalic and atraumatic.      Nose: Nose normal.   Eyes:      Conjunctiva/sclera: Conjunctivae normal.   Cardiovascular:      Rate and Rhythm: Normal rate and regular rhythm.      Pulses: Normal pulses.      Heart sounds: Normal heart sounds. No " "murmur heard.     No friction rub.   Pulmonary:      Effort: Pulmonary effort is normal. No respiratory distress.      Breath sounds: Normal breath sounds. No wheezing or rales.   Chest:      Chest wall: No tenderness.   Abdominal:      Tenderness: There is no guarding.   Musculoskeletal:      Cervical back: Neck supple.      Right lower leg: No edema.      Left lower leg: No edema.   Skin:     General: Skin is warm and dry.      Capillary Refill: Capillary refill takes less than 2 seconds.   Neurological:      Mental Status: He is alert and oriented to person, place, and time.   Psychiatric:         Mood and Affect: Mood normal.         Lab Results:   Admission on 05/24/2024   Component Date Value    Ventricular Rate 05/24/2024 58     Atrial Rate 05/24/2024 58     CA Interval 05/24/2024 384     QRSD Interval 05/24/2024 82     QT Interval 05/24/2024 404     QTC Interval 05/24/2024 396     P Axis 05/24/2024 65     QRS Axis 05/24/2024 25     T Wave Axis 05/24/2024 54     WBC 05/24/2024 5.22     RBC 05/24/2024 3.81 (L)     Hemoglobin 05/24/2024 12.2     Hematocrit 05/24/2024 37.6     MCV 05/24/2024 99 (H)     MCH 05/24/2024 32.0     MCHC 05/24/2024 32.4     RDW 05/24/2024 13.9     Platelets 05/24/2024 277     MPV 05/24/2024 9.6              CBC with diff:   Results from last 7 days   Lab Units 05/24/24  0828   WBC Thousand/uL 5.22   HEMOGLOBIN g/dL 12.2   HEMATOCRIT % 37.6   MCV fL 99*   PLATELETS Thousands/uL 277   RBC Million/uL 3.81*   MCH pg 32.0   MCHC g/dL 32.4   RDW % 13.9   MPV fL 9.6         CMP:      Invalid input(s): \"ALBUMIN\"         "

## 2024-05-24 NOTE — TELEPHONE ENCOUNTER
----- Message from Dante Swanson PA-C sent at 5/24/2024  3:05 PM EDT -----  Patient had outpatient cardiac catheterization today.  Please schedule for follow-up appointment in 3 to 4 weeks.

## 2024-05-24 NOTE — PLAN OF CARE
Problem: Knowledge Deficit  Goal: Patient/family/caregiver demonstrates understanding of disease process, treatment plan, medications, and discharge instructions  Description: Complete learning assessment and assess knowledge base.  Interventions:  - Provide teaching at level of understanding  - Provide teaching via preferred learning methods  Outcome: Progressing     Problem: CARDIOVASCULAR - ADULT  Goal: Maintains optimal cardiac output and hemodynamic stability  Description: INTERVENTIONS:  - Monitor I/O, vital signs and rhythm  - Monitor for S/S and trends of decreased cardiac output  - Administer and titrate ordered vasoactive medications to optimize hemodynamic stability  - Assess quality of pulses, skin color and temperature  - Assess for signs of decreased coronary artery perfusion  - Instruct patient to report change in severity of symptoms  Outcome: Progressing  Goal: Absence of cardiac dysrhythmias or at baseline rhythm  Description: INTERVENTIONS:  - Continuous cardiac monitoring, vital signs, obtain 12 lead EKG if ordered  - Administer antiarrhythmic and heart rate control medications as ordered  - Monitor electrolytes and administer replacement therapy as ordered  Outcome: Progressing     Problem: METABOLIC, FLUID AND ELECTROLYTES - ADULT  Goal: Electrolytes maintained within normal limits  Description: INTERVENTIONS:  - Monitor labs and assess patient for signs and symptoms of electrolyte imbalances  - Administer electrolyte replacement as ordered  - Monitor response to electrolyte replacements, including repeat lab results as appropriate  - Instruct patient on fluid and nutrition as appropriate  Outcome: Progressing  Goal: Fluid balance maintained  Description: INTERVENTIONS:  - Monitor labs   - Monitor I/O and WT  - Instruct patient on fluid and nutrition as appropriate  - Assess for signs & symptoms of volume excess or deficit  Outcome: Progressing  Goal: Glucose maintained within target  range  Description: INTERVENTIONS:  - Monitor Blood Glucose as ordered  - Assess for signs and symptoms of hyperglycemia and hypoglycemia  - Administer ordered medications to maintain glucose within target range  - Assess nutritional intake and initiate nutrition service referral as needed  Outcome: Progressing

## 2024-05-24 NOTE — DISCHARGE INSTR - AVS FIRST PAGE
1. Please see the post cardiac catheterization dishcarge instructions.   No heavy lifting greater than 5 lbs for 1 week, no strenuous activity for 48 hrs.    2. Remove band aid tomorrow.  Shower and wash area- wrist and groin gently with soap and water- beginning tomorrow. Rinse and pat dry.  Apply new water seal band aid.  Repeat this process for 5 days. No powders, creams lotions or antibiotic ointments  for 5 days.  No tub baths, hot tubs or swimming for 5 days.     3. Please call our office ([phone]) if you have any fever, redness, swelling, discharge from your wrist and groin access site.    4.No driving for 2 days    5. Angioseal Booklet      Your torsemide dose has been increased to 40 mg by cardiology.  Please continue this until directed otherwise by them.  You have been referred to cardiac surgery outpatient for discussion/evaluation of whether or not you may need coronary bypass surgery.  You have also been referred to sleep medicine for sleep study if you are interested.  This was placed due to low oxygen level noted at nighttime, which can sometimes be from sleep apnea.

## 2024-05-24 NOTE — ASSESSMENT & PLAN NOTE
there is 100% chronic total occlusion of proximal LAD. There are right to left collaterals visualized. Dist Cx lesion has a 80% stenosis.   Recommend cardiothoracic surgery evaluation for CABG. If not a CABG candidate, would uptitrate medical management. If remains symptomatic despite medical therapy uptitration, can consider complex PCI to distal circumflex with atherectomy.   Cont with ASA and statin  Routine post cath care  Follow up with primary cardiologist.

## 2024-05-25 LAB
ALBUMIN SERPL BCP-MCNC: 3.6 G/DL (ref 3.5–5)
ALP SERPL-CCNC: 45 U/L (ref 34–104)
ALT SERPL W P-5'-P-CCNC: 9 U/L (ref 7–52)
ANION GAP SERPL CALCULATED.3IONS-SCNC: 6 MMOL/L (ref 4–13)
AST SERPL W P-5'-P-CCNC: 14 U/L (ref 13–39)
BASOPHILS # BLD AUTO: 0.02 THOUSANDS/ÂΜL (ref 0–0.1)
BASOPHILS NFR BLD AUTO: 0 % (ref 0–1)
BILIRUB SERPL-MCNC: 0.23 MG/DL (ref 0.2–1)
BUN SERPL-MCNC: 36 MG/DL (ref 5–25)
CALCIUM SERPL-MCNC: 9.4 MG/DL (ref 8.4–10.2)
CHLORIDE SERPL-SCNC: 108 MMOL/L (ref 96–108)
CO2 SERPL-SCNC: 26 MMOL/L (ref 21–32)
CREAT SERPL-MCNC: 1.87 MG/DL (ref 0.6–1.3)
EOSINOPHIL # BLD AUTO: 0.18 THOUSAND/ÂΜL (ref 0–0.61)
EOSINOPHIL NFR BLD AUTO: 4 % (ref 0–6)
ERYTHROCYTE [DISTWIDTH] IN BLOOD BY AUTOMATED COUNT: 14 % (ref 11.6–15.1)
GFR SERPL CREATININE-BSD FRML MDRD: 33 ML/MIN/1.73SQ M
GLUCOSE P FAST SERPL-MCNC: 263 MG/DL (ref 65–99)
GLUCOSE SERPL-MCNC: 195 MG/DL (ref 65–140)
GLUCOSE SERPL-MCNC: 205 MG/DL (ref 65–140)
GLUCOSE SERPL-MCNC: 217 MG/DL (ref 65–140)
GLUCOSE SERPL-MCNC: 218 MG/DL (ref 65–140)
GLUCOSE SERPL-MCNC: 226 MG/DL (ref 65–140)
GLUCOSE SERPL-MCNC: 248 MG/DL (ref 65–140)
GLUCOSE SERPL-MCNC: 252 MG/DL (ref 65–140)
GLUCOSE SERPL-MCNC: 263 MG/DL (ref 65–140)
GLUCOSE SERPL-MCNC: 269 MG/DL (ref 65–140)
HCT VFR BLD AUTO: 35.3 % (ref 36.5–49.3)
HGB BLD-MCNC: 11.2 G/DL (ref 12–17)
IMM GRANULOCYTES # BLD AUTO: 0.02 THOUSAND/UL (ref 0–0.2)
IMM GRANULOCYTES NFR BLD AUTO: 0 % (ref 0–2)
LYMPHOCYTES # BLD AUTO: 0.33 THOUSANDS/ÂΜL (ref 0.6–4.47)
LYMPHOCYTES NFR BLD AUTO: 7 % (ref 14–44)
MAGNESIUM SERPL-MCNC: 2.2 MG/DL (ref 1.9–2.7)
MCH RBC QN AUTO: 32 PG (ref 26.8–34.3)
MCHC RBC AUTO-ENTMCNC: 31.7 G/DL (ref 31.4–37.4)
MCV RBC AUTO: 101 FL (ref 82–98)
MONOCYTES # BLD AUTO: 0.47 THOUSAND/ÂΜL (ref 0.17–1.22)
MONOCYTES NFR BLD AUTO: 10 % (ref 4–12)
NEUTROPHILS # BLD AUTO: 3.79 THOUSANDS/ÂΜL (ref 1.85–7.62)
NEUTS SEG NFR BLD AUTO: 79 % (ref 43–75)
NRBC BLD AUTO-RTO: 0 /100 WBCS
PHOSPHATE SERPL-MCNC: 3.9 MG/DL (ref 2.3–4.1)
PLATELET # BLD AUTO: 244 THOUSANDS/UL (ref 149–390)
PMV BLD AUTO: 9.4 FL (ref 8.9–12.7)
POTASSIUM SERPL-SCNC: 4.3 MMOL/L (ref 3.5–5.3)
PROT SERPL-MCNC: 6.5 G/DL (ref 6.4–8.4)
RBC # BLD AUTO: 3.5 MILLION/UL (ref 3.88–5.62)
SODIUM SERPL-SCNC: 140 MMOL/L (ref 135–147)
WBC # BLD AUTO: 4.81 THOUSAND/UL (ref 4.31–10.16)

## 2024-05-25 PROCEDURE — 84100 ASSAY OF PHOSPHORUS: CPT | Performed by: STUDENT IN AN ORGANIZED HEALTH CARE EDUCATION/TRAINING PROGRAM

## 2024-05-25 PROCEDURE — 80053 COMPREHEN METABOLIC PANEL: CPT | Performed by: STUDENT IN AN ORGANIZED HEALTH CARE EDUCATION/TRAINING PROGRAM

## 2024-05-25 PROCEDURE — 85025 COMPLETE CBC W/AUTO DIFF WBC: CPT | Performed by: STUDENT IN AN ORGANIZED HEALTH CARE EDUCATION/TRAINING PROGRAM

## 2024-05-25 PROCEDURE — 99232 SBSQ HOSP IP/OBS MODERATE 35: CPT | Performed by: STUDENT IN AN ORGANIZED HEALTH CARE EDUCATION/TRAINING PROGRAM

## 2024-05-25 PROCEDURE — 83735 ASSAY OF MAGNESIUM: CPT | Performed by: STUDENT IN AN ORGANIZED HEALTH CARE EDUCATION/TRAINING PROGRAM

## 2024-05-25 PROCEDURE — 99223 1ST HOSP IP/OBS HIGH 75: CPT | Performed by: PHYSICIAN ASSISTANT

## 2024-05-25 PROCEDURE — 82948 REAGENT STRIP/BLOOD GLUCOSE: CPT

## 2024-05-25 RX ORDER — TORSEMIDE 20 MG/1
20 TABLET ORAL ONCE
Status: COMPLETED | OUTPATIENT
Start: 2024-05-25 | End: 2024-05-25

## 2024-05-25 RX ORDER — TORSEMIDE 20 MG/1
40 TABLET ORAL DAILY
Status: DISCONTINUED | OUTPATIENT
Start: 2024-05-26 | End: 2024-05-26 | Stop reason: HOSPADM

## 2024-05-25 RX ORDER — INSULIN GLARGINE 100 [IU]/ML
20 INJECTION, SOLUTION SUBCUTANEOUS
Status: DISCONTINUED | OUTPATIENT
Start: 2024-05-25 | End: 2024-05-26 | Stop reason: HOSPADM

## 2024-05-25 RX ORDER — INSULIN LISPRO 100 [IU]/ML
1-5 INJECTION, SOLUTION INTRAVENOUS; SUBCUTANEOUS
Status: DISCONTINUED | OUTPATIENT
Start: 2024-05-25 | End: 2024-05-26 | Stop reason: HOSPADM

## 2024-05-25 RX ADMIN — EZETIMIBE 10 MG: 10 TABLET ORAL at 08:44

## 2024-05-25 RX ADMIN — INSULIN GLARGINE 20 UNITS: 100 INJECTION, SOLUTION SUBCUTANEOUS at 21:41

## 2024-05-25 RX ADMIN — TORSEMIDE 20 MG: 20 TABLET ORAL at 12:31

## 2024-05-25 RX ADMIN — INSULIN LISPRO 2 UNITS: 100 INJECTION, SOLUTION INTRAVENOUS; SUBCUTANEOUS at 12:32

## 2024-05-25 RX ADMIN — HEPARIN SODIUM 5000 UNITS: 5000 INJECTION INTRAVENOUS; SUBCUTANEOUS at 21:41

## 2024-05-25 RX ADMIN — HEPARIN SODIUM 5000 UNITS: 5000 INJECTION INTRAVENOUS; SUBCUTANEOUS at 15:00

## 2024-05-25 RX ADMIN — TORSEMIDE 20 MG: 20 TABLET ORAL at 08:44

## 2024-05-25 RX ADMIN — PANTOPRAZOLE SODIUM 40 MG: 40 TABLET, DELAYED RELEASE ORAL at 06:01

## 2024-05-25 RX ADMIN — LEVOTHYROXINE SODIUM 50 MCG: 0.05 TABLET ORAL at 06:01

## 2024-05-25 RX ADMIN — INSULIN LISPRO 1 UNITS: 100 INJECTION, SOLUTION INTRAVENOUS; SUBCUTANEOUS at 08:44

## 2024-05-25 RX ADMIN — PRAVASTATIN SODIUM 80 MG: 80 TABLET ORAL at 17:35

## 2024-05-25 RX ADMIN — HEPARIN SODIUM 5000 UNITS: 5000 INJECTION INTRAVENOUS; SUBCUTANEOUS at 06:02

## 2024-05-25 RX ADMIN — INSULIN LISPRO 1 UNITS: 100 INJECTION, SOLUTION INTRAVENOUS; SUBCUTANEOUS at 17:00

## 2024-05-25 RX ADMIN — ASPIRIN 81 MG: 81 TABLET, CHEWABLE ORAL at 08:44

## 2024-05-25 NOTE — ASSESSMENT & PLAN NOTE
Wt Readings from Last 3 Encounters:   05/25/24 110 kg (241 lb 9.6 oz)   05/22/24 111 kg (244 lb)   05/03/24 110 kg (242 lb)   Cardiology adjusting diuretics

## 2024-05-25 NOTE — ASSESSMENT & PLAN NOTE
Transient heart block after left heart cath, continue to monitor on telemetry  Seems to be improving now  Holding home metoprolol

## 2024-05-25 NOTE — PROGRESS NOTES
[facility]  Progress Note  Name: Trent Ocasio I  MRN: [id number]  Unit/Bed#: 2 E 259-01 I Date of Admission: 5/24/2024   Date of Service: 5/25/2024 I Hospital Day: 0    Assessment & Plan   Bradycardia  Assessment & Plan  Transient heart block after left heart cath, continue to monitor on telemetry  Seems to be improving now  Holding home metoprolol    CAD (coronary artery disease)  Assessment & Plan  Multivessel CAD.  Left heart cath 5/24/2024 showed 100% chronic total occlusion of proximal LAD and distal circumflex lesion with 80% stenosis  Plan for outpatient cardiac surgery evaluation for possible CABG    Stage 3b chronic kidney disease (HCC)  Assessment & Plan  Lab Results   Component Value Date    EGFR 33 05/25/2024    EGFR 26 05/24/2024    EGFR 27 05/16/2024    CREATININE 1.87 (H) 05/25/2024    CREATININE 2.27 (H) 05/24/2024    CREATININE 2.17 (H) 05/16/2024   Baseline creatinine has fluctuated quite a bit between 1.4 and 2.7  Creatinine 1.8 today    Type 2 diabetes mellitus, with long-term current use of insulin ([facility])  Assessment & Plan  Lab Results   Component Value Date    HGBA1C 7.3 (H) 02/16/2024       Recent Labs     05/25/24  0233 05/25/24  0432 05/25/24  0614 05/25/24  0819   POCGLU 218* 252* 226* 205*       Blood Sugar Average: Last 72 hrs:  (P) 179.5  Resumed insulin now that hypoglycemic episode has resolved and patient is eating regularly     Hypothyroid  Assessment & Plan  Continue levothyroxine    Chronic heart failure with preserved ejection fraction (HFpEF) ([facility])  Assessment & Plan  Wt Readings from Last 3 Encounters:   05/25/24 110 kg (241 lb 9.6 oz)   05/22/24 111 kg (244 lb)   05/03/24 110 kg (242 lb)   Cardiology adjusting diuretics            * Hypoglycemia  Assessment & Plan  Had post cath hypoglycemia which may be due to n.p.o. status and taking partial dose of antihyperglycemic agents at home.  Now improving, sugars actually elevated.  Can resume Premeal and  bedtime checks.  Resume basal 20 units insulin at nighttime with insulin sliding scale during the day           VTE Pharmacologic Prophylaxis: VTE Score: 5 High Risk (Score >/= 5) - Pharmacological DVT Prophylaxis Ordered: heparin. Sequential Compression Devices Ordered.    Mobility:   Basic Mobility Inpatient Raw Score: 18  -HLM Goal: 6: Walk 10 steps or more  JH-HLM Achieved: 2: Bed activities/Dependent transfer  JH-HLM Goal NOT achieved. Continue with multidisciplinary rounding and encourage appropriate mobility to improve upon JH-HLM goals.    Patient Centered Rounds: I performed bedside rounds with nursing staff today.   Discussions with Specialists or Other Care Team Provider: Cardiology    Education and Discussions with Family / Patient: Updated  (wife) at bedside.    Total Time Spent on Date of Encounter in care of patient: 40 mins. This time was spent on one or more of the following: performing physical exam; counseling and coordination of care; obtaining or reviewing history; documenting in the medical record; reviewing/ordering tests, medications or procedures; communicating with other healthcare professionals and discussing with patient's family/caregivers.    Current Length of Stay: 0 day(s)  Current Patient Status: Inpatient   Certification Statement: The patient, admitted on an observation basis, will now require > 2 midnight hospital stay due to hypoglycemia, bradycardia, diuretic adjustment  Discharge Plan: Anticipate discharge tomorrow to home.    Code Status: Level 1 - Full Code    Subjective:   Patient reports he feels well today.  No new issues overnight.  Blood sugars have not been low since his initial episode yesterday.    Objective:     Vitals:   Temp (24hrs), Av °F (36.7 °C), Min:97.7 °F (36.5 °C), Max:98.2 °F (36.8 °C)    Temp:  [97.7 °F (36.5 °C)-98.2 °F (36.8 °C)] 98.2 °F (36.8 °C)  HR:  [46-77] 46  Resp:  [18-19] 18  BP: (108-157)/(63-76) 144/63  SpO2:  [89 %-97 %]  95 %  Body mass index is 34.67 kg/m².     Input and Output Summary (last 24 hours):   No intake or output data in the 24 hours ending 05/25/24 1105    Physical Exam:   Physical Exam  Vitals and nursing note reviewed.   Constitutional:       General: He is not in acute distress.     Appearance: He is well-developed.   HENT:      Head: Normocephalic and atraumatic.   Eyes:      Conjunctiva/sclera: Conjunctivae normal.      Pupils: Pupils are equal, round, and reactive to light.   Cardiovascular:      Rate and Rhythm: Normal rate and regular rhythm.      Heart sounds: No murmur heard.     Comments: Trace lower extremity edema  Pulmonary:      Effort: Pulmonary effort is normal. No respiratory distress.      Breath sounds: Normal breath sounds. No wheezing or rales.   Abdominal:      General: Abdomen is flat. Bowel sounds are normal. There is no distension.      Palpations: Abdomen is soft.      Tenderness: There is no abdominal tenderness. There is no guarding or rebound.   Musculoskeletal:         General: No swelling. Normal range of motion.      Cervical back: Normal range of motion.   Skin:     General: Skin is warm and dry.      Capillary Refill: Capillary refill takes less than 2 seconds.   Neurological:      General: No focal deficit present.      Mental Status: He is alert. Mental status is at baseline.   Psychiatric:         Mood and Affect: Mood normal.          Additional Data:     Labs:  Results from last 7 days   Lab Units 05/25/24  0427   WBC Thousand/uL 4.81   HEMOGLOBIN g/dL 11.2*   HEMATOCRIT % 35.3*   PLATELETS Thousands/uL 244   SEGS PCT % 79*   LYMPHO PCT % 7*   MONO PCT % 10   EOS PCT % 4     Results from last 7 days   Lab Units 05/25/24  0427   SODIUM mmol/L 140   POTASSIUM mmol/L 4.3   CHLORIDE mmol/L 108   CO2 mmol/L 26   BUN mg/dL 36*   CREATININE mg/dL 1.87*   ANION GAP mmol/L 6   CALCIUM mg/dL 9.4   ALBUMIN g/dL 3.6   TOTAL BILIRUBIN mg/dL 0.23   ALK PHOS U/L 45   ALT U/L 9   AST U/L 14    GLUCOSE RANDOM mg/dL 263*         Results from last 7 days   Lab Units 05/25/24  0819 05/25/24  0614 05/25/24  0432 05/25/24  0233 05/25/24  0014 05/24/24  2241 05/24/24  1623 05/24/24  1336   POC GLUCOSE mg/dl 205* 226* 252* 218* 195* 169* 54* 117               Lines/Drains:  Invasive Devices       Peripheral Intravenous Line  Duration             Peripheral IV 05/24/24 Dorsal (posterior);Left Hand 1 day                      Telemetry:  Telemetry Orders (From admission, onward)               24 Hour Telemetry Monitoring  Continuous x 24 Hours (Telem)        Question:  Reason for 24 Hour Telemetry  Answer:  Arrhythmias requiring acute medical intervention / PPM or ICD malfunction                     Telemetry Reviewed: Normal Sinus Rhythm  Indication for Continued Telemetry Use: Arrthymias requiring medical therapy             Imaging: Reviewed radiology reports from this admission including: procedure reports    Recent Cultures (last 7 days):         Last 24 Hours Medication List:   Current Facility-Administered Medications   Medication Dose Route Frequency Provider Last Rate    aspirin  81 mg Oral Daily Brandon Cohen MD      ezetimibe  10 mg Oral Daily Brandon Cohen MD      heparin (porcine)  5,000 Units Subcutaneous Q8H VALERIE Brandon Cohen MD      insulin glargine  20 Units Subcutaneous HS Sunday Gilbert MD      insulin lispro  1-5 Units Subcutaneous TID AC Sunday Gilbert MD      latanoprost  1 drop Both Eyes HS Brandon Cohen MD      levothyroxine  50 mcg Oral Early Morning Brandon Cohen MD      pantoprazole  40 mg Oral Daily Before Breakfast Brandon Cohen MD      pravastatin  80 mg Oral Daily With Dinner Brandon Cohen MD      torsemide  20 mg Oral Once Danelle Amaya PA-C      [START ON 5/26/2024] torsemide  40 mg Oral Daily Danelle Amaya PA-C          Today, Patient Was Seen By: Sunday Gilbert MD    **Please Note: This note may have been constructed using a voice recognition system.**

## 2024-05-25 NOTE — ASSESSMENT & PLAN NOTE
Lab Results   Component Value Date    HGBA1C 7.3 (H) 02/16/2024       Recent Labs     05/25/24  0233 05/25/24  0432 05/25/24  0614 05/25/24  0819   POCGLU 218* 252* 226* 205*       Blood Sugar Average: Last 72 hrs:  (P) 179.5  Resumed insulin now that hypoglycemic episode has resolved and patient is eating regularly

## 2024-05-25 NOTE — PLAN OF CARE
Problem: Knowledge Deficit  Goal: Patient/family/caregiver demonstrates understanding of disease process, treatment plan, medications, and discharge instructions  Description: Complete learning assessment and assess knowledge base.  Interventions:  - Provide teaching at level of understanding  - Provide teaching via preferred learning methods  Outcome: Progressing     Problem: CARDIOVASCULAR - ADULT  Goal: Maintains optimal cardiac output and hemodynamic stability  Description: INTERVENTIONS:  - Monitor I/O, vital signs and rhythm  - Monitor for S/S and trends of decreased cardiac output  - Administer and titrate ordered vasoactive medications to optimize hemodynamic stability  - Assess quality of pulses, skin color and temperature  - Assess for signs of decreased coronary artery perfusion  - Instruct patient to report change in severity of symptoms  Outcome: Progressing  Goal: Absence of cardiac dysrhythmias or at baseline rhythm  Description: INTERVENTIONS:  - Continuous cardiac monitoring, vital signs, obtain 12 lead EKG if ordered  - Administer antiarrhythmic and heart rate control medications as ordered  - Monitor electrolytes and administer replacement therapy as ordered  Outcome: Progressing     Problem: METABOLIC, FLUID AND ELECTROLYTES - ADULT  Goal: Electrolytes maintained within normal limits  Description: INTERVENTIONS:  - Monitor labs and assess patient for signs and symptoms of electrolyte imbalances  - Administer electrolyte replacement as ordered  - Monitor response to electrolyte replacements, including repeat lab results as appropriate  - Instruct patient on fluid and nutrition as appropriate  Outcome: Progressin  Goal: Fluid balance maintained  Description: INTERVENTIONS:  - Monitor labs   - Monitor I/O and WT  - Instruct patient on fluid and nutrition as appropriate  - Assess for signs & symptoms of volume excess or deficit  Outcome: Progressing  Goal: Glucose maintained within target  range  Description: INTERVENTIONS:  - Monitor Blood Glucose as ordered  - Assess for signs and symptoms of hyperglycemia and hypoglycemia  - Administer ordered medications to maintain glucose within target range  - Assess nutritional intake and initiate nutrition service referral as needed  Outcome: Progressing

## 2024-05-25 NOTE — ASSESSMENT & PLAN NOTE
Lab Results   Component Value Date    EGFR 33 05/25/2024    EGFR 26 05/24/2024    EGFR 27 05/16/2024    CREATININE 1.87 (H) 05/25/2024    CREATININE 2.27 (H) 05/24/2024    CREATININE 2.17 (H) 05/16/2024   Baseline creatinine has fluctuated quite a bit between 1.4 and 2.7  Creatinine 1.8 today

## 2024-05-25 NOTE — CONSULTS
Consultation - Cardiology   Trent Ocasio 79 y.o. male MRN: [id number]  Unit/Bed#: 2 E 259-01 Encounter: [id number]  05/25/24  11:02 AM    Assessment/ Plan:  Multivessel CAD  Status post cardiac catheterization which revealed 100%  proximal LAD with right to left collaterals as well as distal circumflex 80% stenosis.  Plan for outpatient CT surgery evaluation  If found to be not a good surgical candidate plan for up titration of medical therapy and symptom management  Patient remains symptomatic could consider complex PCI distal circumflex.   Aspirin, pravastatin, torsemide  Blockers on hold given bradycardia/Wenckebach    2.  2-1 heart block/Wenckebach  Metoprolol on hold  Continue telemetry  Plan to monitor for 1 more day  Currently asymptomatic    3.  Acute on chronic HFpEF  Increase torsemide to 40 daily; given extra dose of 20  Daily weights, salt restriction, strict I's and O's    4.  Hypertension  Stable 144/63  Continue torsemide    5.  Hyperlipidemia  On pravastatin    6.  Diabetes  7.  CKD 4  8.  History of CVA      History of Present Illness   Physician Requesting Consult: Sunday Gilbert MD    Reason for Consult / Principal Problem: s/p cath and bradycardia    HPI: Trent Ocasio is a 79 y.o. year old male who presented for planned outpatient cardiac catheterization complains of exertional dyspnea/abnormal stress test.  Patient had cardiac catheterization done yesterday which revealed 100%  proximal LAD, right to left collaterals visualized, distal circumflex 80% stenosis, plan for outpatient CT surgery evaluation; not a CABG candidate would uptitrate medical therapy and continue to monitor symptoms.  Could consider complex PCI to distal circumflex with atherectomy.  During the postop period patient noted to have AV block specifically Wenckebach.  Patient then admitted for further evaluation.  Patient was previously on Lopressor which is now on hold.  States currently he is overall feeling  "well, has no complaints, lying in bed appears pretty comfortable in no acute distress.       PMH: Remote history of CAD in , chronic diastolic heart failure, hypertension, hyperlipidemia, CKD 4, diabetes, history of CVA    Inpatient consult to Cardiology  Consult performed by: Danelle Amaya PA-C  Consult ordered by: Brandon Cohen MD          EKG: NSR with goldie  Tele: 2:1 avb/goldie    Review of Systems   Constitutional: Negative.    Respiratory:  Positive for shortness of breath.    Cardiovascular: Negative.    Neurological: Negative.    Hematological: Negative.    Psychiatric/Behavioral: Negative.     All other systems reviewed and are negative.      Historical Information   Past Medical History:   Diagnosis Date    Acute respiratory failure with hypoxia (HCC) 2024    Chronic kidney disease     Diabetes mellitus (HCC)     Hypertension     Respiratory acidosis 2024     History reviewed. No pertinent surgical history.  Social History     Substance and Sexual Activity   Alcohol Use Not Currently    Comment: social     Social History     Substance and Sexual Activity   Drug Use Never     Social History     Tobacco Use   Smoking Status Former    Passive exposure: Past   Smokeless Tobacco Never       Family History: History reviewed. No pertinent family history.    Meds/Allergies   all current active meds have been reviewed  Allergies   Allergen Reactions    Canagliflozin GI Intolerance     Yeast infection  Yeast infection      Lisinopril Other (See Comments)    Lovastatin GI Intolerance       Objective   Vitals: Blood pressure 144/63, pulse (!) 46, temperature 98.2 °F (36.8 °C), resp. rate 18, height 5' 10\" (1.778 m), weight 110 kg (241 lb 9.6 oz), SpO2 95%., Body mass index is 34.67 kg/m².,   Orthostatic Blood Pressures      Flowsheet Row Most Recent Value   Blood Pressure 144/63 filed at 2024 0709            Systolic (24hrs), Av , Min:108 , Max:157     Diastolic (24hrs), Av, " Min:63, Max:76      No intake or output data in the 24 hours ending 05/25/24 1102    Invasive Devices       Peripheral Intravenous Line  Duration             Peripheral IV 05/24/24 Dorsal (posterior);Left Hand 1 day                        Physical Exam:  GEN: Alert and oriented x 3, in no acute distress.  Well appearing and well nourished.   HEENT: Sclera anicteric, conjunctivae pink, mucous membranes moist. Oropharynx clear.   NECK: Supple, no carotid bruits, no significant JVD. Trachea midline, no thyromegaly.   HEART: Regular rhythm, normal S1 and S2, no murmurs, clicks, gallops or rubs. PMI nondisplaced, no thrills.   LUNGS: decreased breath sounds bilaterally; no wheezes, rales, or rhonchi. No increased work of breathing or signs of respiratory distress.   ABDOMEN: Soft, nontender, nondistended, normoactive bowel sounds.   EXTREMITIES: +1 edema, Skin warm and well perfused, no clubbing, cyanosis  NEURO: No focal findings. Normal speech. Mood and affect normal.   SKIN: Normal without suspicious lesions on exposed skin.      Lab Results:     Troponins:       CBC with diff:   Results from last 7 days   Lab Units 05/25/24  0427 05/24/24  0828   WBC Thousand/uL 4.81 5.22   HEMOGLOBIN g/dL 11.2* 12.2   HEMATOCRIT % 35.3* 37.6   MCV fL 101* 99*   PLATELETS Thousands/uL 244 277   RBC Million/uL 3.50* 3.81*   MCH pg 32.0 32.0   MCHC g/dL 31.7 32.4   RDW % 14.0 13.9   MPV fL 9.4 9.6   NRBC AUTO /100 WBCs 0  --          CMP:   Results from last 7 days   Lab Units 05/25/24  0427 05/24/24  1228   POTASSIUM mmol/L 4.3 4.2   CHLORIDE mmol/L 108 106   CO2 mmol/L 26 27   BUN mg/dL 36* 40*   CREATININE mg/dL 1.87* 2.27*   CALCIUM mg/dL 9.4 10.0   AST U/L 14  --    ALT U/L 9  --    ALK PHOS U/L 45  --    EGFR ml/min/1.73sq m 33 26

## 2024-05-25 NOTE — ASSESSMENT & PLAN NOTE
Multivessel CAD.  Left heart cath 5/24/2024 showed 100% chronic total occlusion of proximal LAD and distal circumflex lesion with 80% stenosis  Plan for outpatient cardiac surgery evaluation for possible CABG

## 2024-05-25 NOTE — UTILIZATION REVIEW
Initial Clinical Review    Observation 5/24 @ 1611 and changed to Inpatient on 5/25 @ 1100. Pt requiring continued stay for S/p Cardiac cath, found with multivessel disease. Hypoglycemia. Bradycardia. Workup and treat.    Admission: Date/Time/Statement:   Admission Orders (From admission, onward)       Ordered        05/25/24 1100  INPATIENT ADMISSION  Once            05/24/24 1611  Place in Observation  Once                          Orders Placed This Encounter   Procedures    INPATIENT ADMISSION     Standing Status:   Standing     Number of Occurrences:   1     Order Specific Question:   Level of Care     Answer:   Med Surg [16]     Order Specific Question:   Estimated length of stay     Answer:   More than 2 Midnights     Order Specific Question:   Certification     Answer:   I certify that inpatient services are medically necessary for this patient for a duration of greater than two midnights. See H&P and MD Progress Notes for additional information about the patient's course of treatment.     ED Arrival Information       Patient not seen in ED                           Initial Presentation: 79 y.o. male, Direct admit for Elective Left heart cath with Cardiology.  Found to have multivessel disease.  Patient had recurrent episodes of hypoglycemia and bradycardia. Monitor overnight.He reported that he was fasting before cath and gave himself 10 units of long-acting insulin and took half dose of metformin. PMH for coronary artery disease, hypertension, obesity, hyperlipidemia, prior history of prostrate CA .  Admit to Observation Dx; S/p Cardiac cath, found with multivessel disease. Hypoglycemia. Bradycardia. Creat 2.2, baseline of 1.5-2. Tele monitoring. Continue with ASA and statin.   If pt  remains symptomatic bradycardic, plan for possible pacemaker on Tuesday 5/24 S/p Cardiac Cath;  Impression    There is 100% chronic total occlusion of proximal LAD. There are right to left collaterals visualized. Dist Cx  lesion has a 80% stenosis.    Right radial access. Due to vessel tortuosity, access was switched to right femoral artery. Hemostasis achieved with radial band. Ultrasound and fluroscopy guided right femoral artery access was obtained. Hemostasis achhieved with Angeoseal closure device.   There is 100% chronic total occlusion of proximal LAD. There are right to left collaterals visualized. Dist Cx lesion has a 80% stenosis.     Recommendation;   Cardiothoracic surgery evaluation for CABG. If not a CABG candidate, would uptitrate medical management. If remains symptomatic despite medical therapy uptitration, can consider complex PCI to distal circumflex with atherectomy.   Cont with ASA and statin  Routine post cath care  Finding of cardiac catheterization and further management was discussed in detail with patient  Follow up with primary cardiologist.    5/24   Quick note per Cardiology; Post cath, noted to be in heart block.  Noted predominantly in the Wenckebach.  Possible intermittent 2-1 heart block as well.  Patient asymptomatic.  Hemodynamically stable.  Blood pressure well-maintained.  He is on metoprolol 100 twice daily at home. D/c Beta-blockers.  If he has persistent bradycardia arrhythmia/advanced heart block, would need consideration for pacemaker placement.     Changed to Inpatient status  Progress notes; Transient heart block after left heart cath, continue to monitor on telemetry. Seems ot improving now. Holding home metoprolol.  Creat improved to 1.87 today. Bld sugars improving, actually elevated.   Plan for outpatient cardiac surgery evaluation for possible CABG  On exam; Trace lower extremity edema.    Cardiology cons; Dx: Multivessel CAD. 2-1 heart block/Wenckebach. Acute on chronic HFpEF.   Remains symptomatic could consider complex PCI distal circumflex.  Aspirin, statin, torsemide.  Blockers on hold given bradycardia/Wenckebach. Continue tele monitoring. Increase torsemide to 40 daily; given  extra dose of 20   If found to be not a good surgical candidate plan for up titration of medical therapy and symptom management  On exam; +1 edema.  decreased breath sounds bilaterally     Date: 5/26  Day 3: Has surpassed a 2nd midnight with active treatments and services.  Plan for outpt CT surgery eval. Beta-blockers on hold given tendency for bradycardia and Wenckebach  Tele monitoring. 1st degree AV block noted.      Vitals   Temperature Pulse Respirations Blood Pressure SpO2   05/24/24 0812 05/24/24 0812 05/24/24 0812 05/24/24 0812 05/24/24 0812   97.6 °F (36.4 °C) 62 18 148/67 98 %      Temp Source Heart Rate Source Patient Position - Orthostatic VS BP Location FiO2 (%)   05/24/24 0812 05/24/24 0812 -- -- --   Temporal Monitor         Pain Score       05/24/24 1347       No Pain          Wt Readings from Last 1 Encounters:   05/25/24 109 kg (241 lb)     Additional Vital Signs:   05/26/24 07:03:22 -- 82 -- 132/77 95 96 % --   05/26/24 02:45:56 99 °F (37.2 °C) 85 19 124/63 83 95 % --   05/25/24 21:42:06 98.3 °F (36.8 °C) 86 20 144/66 92 90 %      5/25/24 07:09:48 98.2 °F (36.8 °C) 46 Abnormal  -- 144/63 90 95 % --   05/25/24 02:35:02 -- 71 18 125/65 85 93 % --   05/24/24 22:39:06 98 °F (36.7 °C) 77 19 151/73 99 90 % --   05/24/24 1900 -- 67 -- 153/76 102 89 % Abnormal  --   05/24/24 1830 -- -- -- -- -- -- None (Room air)   05/24/24 1820 -- 62 -- 147/63 91 95 % --   05/24/24 1750 -- 66 -- 157/65 96 94 % None (Room air)   05/24/24 1730 -- 62 -- 123/73 90 96 % --     Pertinent Labs/Diagnostic Test Results:   No orders to display         Results from last 7 days   Lab Units 05/26/24 0446 05/25/24  0427 05/24/24  0828   WBC Thousand/uL 4.80 4.81 5.22   HEMOGLOBIN g/dL 10.9* 11.2* 12.2   HEMATOCRIT % 33.0* 35.3* 37.6   PLATELETS Thousands/uL 245 244 277   TOTAL NEUT ABS Thousands/µL  --  3.79  --          Results from last 7 days   Lab Units 05/26/24 0446 05/25/24  0427 05/24/24  1228   SODIUM mmol/L 141 140 142    POTASSIUM mmol/L 4.1 4.3 4.2   CHLORIDE mmol/L 107 108 106   CO2 mmol/L 27 26 27   ANION GAP mmol/L 7 6 9   BUN mg/dL 35* 36* 40*   CREATININE mg/dL 1.93* 1.87* 2.27*   EGFR ml/min/1.73sq m 31 33 26   CALCIUM mg/dL 9.5 9.4 10.0   MAGNESIUM mg/dL  --  2.2  --    PHOSPHORUS mg/dL  --  3.9  --      Results from last 7 days   Lab Units 05/25/24  0427   AST U/L 14   ALT U/L 9   ALK PHOS U/L 45   TOTAL PROTEIN g/dL 6.5   ALBUMIN g/dL 3.6   TOTAL BILIRUBIN mg/dL 0.23     Results from last 7 days   Lab Units 05/26/24  0702 05/25/24  2037 05/25/24  1728 05/25/24  1224 05/25/24  0819 05/25/24  0614 05/25/24  0432 05/25/24  0233 05/25/24  0014 05/24/24  2241 05/24/24  1623 05/24/24  1336   POC GLUCOSE mg/dl 174* 248* 217* 269* 205* 226* 252* 218* 195* 169* 54* 117     Results from last 7 days   Lab Units 05/26/24  0446 05/25/24  0427 05/24/24  1228   GLUCOSE RANDOM mg/dL 199* 263* 48*         Past Medical History:   Diagnosis Date    Acute respiratory failure with hypoxia ([facility]) 03/28/2024    Chronic kidney disease     Diabetes mellitus ([facility])     Hypertension     Respiratory acidosis 03/31/2024     Present on Admission:   Hypoglycemia   Chronic heart failure with preserved ejection fraction (HFpEF) ([facility])   HTN (hypertension)   Hypothyroid   Stage 3b chronic kidney disease ([facility])   CAD (coronary artery disease)   Bradycardia      Admitting Diagnosis: Chronic heart failure with preserved ejection fraction (HFpEF) ([facility]) [I50.32]  Coronary artery disease involving native heart, unspecified vessel or lesion type, unspecified whether angina present [I25.10]  Stage 3b chronic kidney disease ([facility]) [N18.32]  Age/Sex: 79 y.o. male    Admission Orders:  Scheduled Medications:  aspirin, 81 mg, Oral, Daily  ezetimibe, 10 mg, Oral, Daily  heparin (porcine), 5,000 Units, Subcutaneous, Q8H VALERIE  insulin glargine, 20 Units, Subcutaneous, HS  insulin lispro, 1-5 Units, Subcutaneous, TID AC  latanoprost, 1 drop, Both Eyes, HS  levothyroxine, 50  mcg, Oral, Early Morning  pantoprazole, 40 mg, Oral, Daily Before Breakfast  pravastatin, 80 mg, Oral, Daily With Dinner  torsemide, 40 mg, Oral, Daily      Continuous IV Infusions:   sodium chloride 0.9 % infusion  Rate: 75 mL/hr Dose: 75 mL/hr  Freq: Continuous Route: IV  Indications of Use: IV Hydration  Last Dose: Stopped (05/24/24 2200)  Start: 05/24/24 1445 End: 05/24/24 2119    PRN Meds: None       IP CONSULT TO CARDIOLOGY    Network Utilization Review Department  ATTENTION: Please call with any questions or concerns to [phone] and carefully listen to the prompts so that you are directed to the right person. All voicemails are confidential.   For Discharge needs, contact Care Management DC Support Team at [phone] opt. [phone]  Send all requests for admission clinical reviews, approved or denied determinations and any other requests to dedicated fax number below belonging to the campus where the patient is receiving treatment. List of dedicated fax numbers for the Facilities:  FACILITY NAME UR FAX NUMBER   ADMISSION DENIALS (Administrative/Medical Necessity) [phone]   DISCHARGE SUPPORT TEAM (NETWORK) [phone]   PARENT CHILD HEALTH (Maternity/NICU/Pediatrics) [phone]   [facility] [phone]   [facility] [phone]   Critical access hospital [phone]   [facility] [phone]   Critical access hospital [phone]   [facility] [phone]   [facility] [phone]   [facility] [phone]   [facility] [phone]   [facility] [phone]   [facility] [phone]   [facility] [phone]

## 2024-05-26 VITALS
DIASTOLIC BLOOD PRESSURE: 77 MMHG | WEIGHT: 241 LBS | BODY MASS INDEX: 34.5 KG/M2 | SYSTOLIC BLOOD PRESSURE: 132 MMHG | TEMPERATURE: 99 F | HEART RATE: 82 BPM | RESPIRATION RATE: 19 BRPM | HEIGHT: 70 IN | OXYGEN SATURATION: 96 %

## 2024-05-26 DIAGNOSIS — R00.1 BRADYCARDIA: Primary | ICD-10-CM

## 2024-05-26 DIAGNOSIS — I44.0 1ST DEGREE AV BLOCK: ICD-10-CM

## 2024-05-26 LAB
ANION GAP SERPL CALCULATED.3IONS-SCNC: 7 MMOL/L (ref 4–13)
ATRIAL RATE: 58 BPM
ATRIAL RATE: 75 BPM
BUN SERPL-MCNC: 35 MG/DL (ref 5–25)
CALCIUM SERPL-MCNC: 9.5 MG/DL (ref 8.4–10.2)
CHLORIDE SERPL-SCNC: 107 MMOL/L (ref 96–108)
CO2 SERPL-SCNC: 27 MMOL/L (ref 21–32)
CREAT SERPL-MCNC: 1.93 MG/DL (ref 0.6–1.3)
ERYTHROCYTE [DISTWIDTH] IN BLOOD BY AUTOMATED COUNT: 13.9 % (ref 11.6–15.1)
GFR SERPL CREATININE-BSD FRML MDRD: 31 ML/MIN/1.73SQ M
GLUCOSE SERPL-MCNC: 174 MG/DL (ref 65–140)
GLUCOSE SERPL-MCNC: 199 MG/DL (ref 65–140)
HCT VFR BLD AUTO: 33 % (ref 36.5–49.3)
HGB BLD-MCNC: 10.9 G/DL (ref 12–17)
MCH RBC QN AUTO: 32.9 PG (ref 26.8–34.3)
MCHC RBC AUTO-ENTMCNC: 33 G/DL (ref 31.4–37.4)
MCV RBC AUTO: 100 FL (ref 82–98)
P AXIS: 65 DEGREES
PLATELET # BLD AUTO: 245 THOUSANDS/UL (ref 149–390)
PMV BLD AUTO: 9.7 FL (ref 8.9–12.7)
POTASSIUM SERPL-SCNC: 4.1 MMOL/L (ref 3.5–5.3)
PR INTERVAL: 384 MS
QRS AXIS: 128 DEGREES
QRS AXIS: 132 DEGREES
QRS AXIS: 25 DEGREES
QRSD INTERVAL: 74 MS
QRSD INTERVAL: 76 MS
QRSD INTERVAL: 82 MS
QT INTERVAL: 402 MS
QT INTERVAL: 404 MS
QT INTERVAL: 426 MS
QTC INTERVAL: 391 MS
QTC INTERVAL: 396 MS
QTC INTERVAL: 414 MS
RBC # BLD AUTO: 3.31 MILLION/UL (ref 3.88–5.62)
SODIUM SERPL-SCNC: 141 MMOL/L (ref 135–147)
T WAVE AXIS: 43 DEGREES
T WAVE AXIS: 46 DEGREES
T WAVE AXIS: 54 DEGREES
VENTRICULAR RATE: 57 BPM
VENTRICULAR RATE: 57 BPM
VENTRICULAR RATE: 58 BPM
WBC # BLD AUTO: 4.8 THOUSAND/UL (ref 4.31–10.16)

## 2024-05-26 PROCEDURE — 99232 SBSQ HOSP IP/OBS MODERATE 35: CPT | Performed by: PHYSICIAN ASSISTANT

## 2024-05-26 PROCEDURE — 99239 HOSP IP/OBS DSCHRG MGMT >30: CPT | Performed by: STUDENT IN AN ORGANIZED HEALTH CARE EDUCATION/TRAINING PROGRAM

## 2024-05-26 PROCEDURE — 93010 ELECTROCARDIOGRAM REPORT: CPT | Performed by: INTERNAL MEDICINE

## 2024-05-26 PROCEDURE — 82948 REAGENT STRIP/BLOOD GLUCOSE: CPT

## 2024-05-26 PROCEDURE — 85027 COMPLETE CBC AUTOMATED: CPT | Performed by: STUDENT IN AN ORGANIZED HEALTH CARE EDUCATION/TRAINING PROGRAM

## 2024-05-26 PROCEDURE — 80048 BASIC METABOLIC PNL TOTAL CA: CPT | Performed by: STUDENT IN AN ORGANIZED HEALTH CARE EDUCATION/TRAINING PROGRAM

## 2024-05-26 RX ORDER — TORSEMIDE 20 MG/1
40 TABLET ORAL DAILY
Qty: 30 TABLET | Refills: 0 | Status: SHIPPED | OUTPATIENT
Start: 2024-05-26

## 2024-05-26 RX ADMIN — INSULIN LISPRO 1 UNITS: 100 INJECTION, SOLUTION INTRAVENOUS; SUBCUTANEOUS at 08:00

## 2024-05-26 RX ADMIN — PANTOPRAZOLE SODIUM 40 MG: 40 TABLET, DELAYED RELEASE ORAL at 09:28

## 2024-05-26 RX ADMIN — EZETIMIBE 10 MG: 10 TABLET ORAL at 09:28

## 2024-05-26 RX ADMIN — ASPIRIN 81 MG: 81 TABLET, CHEWABLE ORAL at 09:28

## 2024-05-26 RX ADMIN — HEPARIN SODIUM 5000 UNITS: 5000 INJECTION INTRAVENOUS; SUBCUTANEOUS at 05:17

## 2024-05-26 RX ADMIN — LEVOTHYROXINE SODIUM 50 MCG: 0.05 TABLET ORAL at 05:17

## 2024-05-26 RX ADMIN — TORSEMIDE 40 MG: 20 TABLET ORAL at 09:28

## 2024-05-26 NOTE — NURSING NOTE
Patient d/c with all of his belongings. Instructions given and her verbalized understanding. Keila to transport home

## 2024-05-26 NOTE — ASSESSMENT & PLAN NOTE
Lab Results   Component Value Date    HGBA1C 7.3 (H) 02/16/2024       Recent Labs     05/25/24  1224 05/25/24  1728 05/25/24 2037 05/26/24  0702   POCGLU 269* 217* 248* 174*         Blood Sugar Average: Last 72 hrs:  (P) [id number]  Resumed insulin now that hypoglycemic episode has resolved and patient is eating regularly

## 2024-05-26 NOTE — ASSESSMENT & PLAN NOTE
Lab Results   Component Value Date    EGFR 31 05/26/2024    EGFR 33 05/25/2024    EGFR 26 05/24/2024    CREATININE 1.93 (H) 05/26/2024    CREATININE 1.87 (H) 05/25/2024    CREATININE 2.27 (H) 05/24/2024   Baseline creatinine has fluctuated quite a bit between 1.4 and 2.7  Creatinine 1.8 today

## 2024-05-26 NOTE — ASSESSMENT & PLAN NOTE
Had post cath hypoglycemia which may be due to n.p.o. status and taking partial dose of antihyperglycemic agents at home.  Now improving, sugars actually elevated.  Can resume Premeal and bedtime checks.  Resume basal 20 units insulin at nighttime with insulin sliding scale during the day

## 2024-05-26 NOTE — ASSESSMENT & PLAN NOTE
Transient heart block after left heart cath, continue to monitor on telemetry  Seems to be improving now  Discontinued home metoprolol

## 2024-05-26 NOTE — DISCHARGE SUMMARY
[facility]  Discharge- Trent Ocasio 1945, 79 y.o. male MRN: [id number]  Unit/Bed#: 2 E 259-01 Encounter: [id number]  Primary Care Provider: Micky Hylton MD   Date and time admitted to hospital: 5/24/2024  7:50 AM    Bradycardia  Assessment & Plan  Transient heart block after left heart cath, continue to monitor on telemetry  Seems to be improving now  Discontinued home metoprolol    CAD (coronary artery disease)  Assessment & Plan  Multivessel CAD.  Left heart cath 5/24/2024 showed 100% chronic total occlusion of proximal LAD and distal circumflex lesion with 80% stenosis  Plan for outpatient cardiac surgery evaluation for possible CABG    Stage 3b chronic kidney disease (HCC)  Assessment & Plan  Lab Results   Component Value Date    EGFR 31 05/26/2024    EGFR 33 05/25/2024    EGFR 26 05/24/2024    CREATININE 1.93 (H) 05/26/2024    CREATININE 1.87 (H) 05/25/2024    CREATININE 2.27 (H) 05/24/2024   Baseline creatinine has fluctuated quite a bit between 1.4 and 2.7  Creatinine 1.8 today    Type 2 diabetes mellitus, with long-term current use of insulin ([facility])  Assessment & Plan  Lab Results   Component Value Date    HGBA1C 7.3 (H) 02/16/2024       Recent Labs     05/25/24  1224 05/25/24  1728 05/25/24  2037 05/26/24  0702   POCGLU 269* 217* 248* 174*         Blood Sugar Average: Last 72 hrs:  (P) [id number]  Resumed insulin now that hypoglycemic episode has resolved and patient is eating regularly     Hypothyroid  Assessment & Plan  Continue levothyroxine    Chronic heart failure with preserved ejection fraction (HFpEF) ([facility])  Assessment & Plan  Wt Readings from Last 3 Encounters:   05/25/24 109 kg (241 lb)   05/22/24 111 kg (244 lb)   05/03/24 110 kg (242 lb)   Torsemide increased to 40 mg for home per cardiology          * Hypoglycemia  Assessment & Plan  Had post cath hypoglycemia which may be due to n.p.o. status and taking partial dose of antihyperglycemic agents at  home.  Now improving, sugars actually elevated.  Can resume Premeal and bedtime checks.  Resume basal 20 units insulin at nighttime with insulin sliding scale during the day      Medical Problems       Resolved Problems  Date Reviewed: 4/9/2024   None       Discharging Physician / Practitioner: Sunday Gilbert MD  PCP: Micky Hylton MD  Admission Date:   Admission Orders (From admission, onward)       Ordered        05/25/24 1100  INPATIENT ADMISSION  Once            05/24/24 1611  Place in Observation  Once                          Discharge Date: 05/26/24    Consultations During Hospital Stay:  Cardiology    Procedures Performed:   Left heart catheterization (5/24/2024): There is 100% chronic total occlusion of proximal LAD. There are right to left collaterals visualized. Dist Cx lesion has a 80% stenosis.     Significant Findings / Test Results:   No orders to display     Incidental Findings:   None    Test Results Pending at Discharge (will require follow up):   None     Outpatient Tests Requested:  Cardiac surgery evaluation for CABG    Complications: Hypoglycemia, bradycardia    Reason for Admission: Hypoglycemia    Hospital Course:   Trent Ocasio is a 79 y.o. male patient who originally presented to the hospital on 5/24/2024 due to postprocedural hypoglycemia after left heart cath.  Heart catheterization showed 100% chronic total occlusion of proximal LAD and 80% stenosis of distal circumflex.  No intervention was performed and he will follow-up outpatient with cardiac surgery for evaluation of possible CABG.  He was noted post procedurally to have bradycardia and hypoglycemia.  Diagnosed with Mobitz type I 2:1 AV block which resolved spontaneously.  Metoprolol was discontinued.  Cardiology arranged for him to have a 2-week event monitor on discharge.    His hypoglycemia is likely secondary to taking insulin and partial dose of home antihyperglycemic agents on morning of procedure while NPO.  This  "resolved and did not recur during hospitalization.  He was resumed on his home medicines on discharge.    Cardiology did increase his torsemide to 40 mg daily due to volume overload.  This had good effect while in the hospital.    Patient was noted to have mild hypoxia while sleeping.  Given BMI, concern for LANDON.  He was referred to sleep medicine on discharge.    Please see above list of diagnoses and related plan for additional information.     Condition at Discharge: good    Discharge Day Visit / Exam:   Subjective: Patient denies any chest pain, palpitations, shortness of breath today.  Was briefly on nasal cannula overnight due to mild hypoxia.  Vitals: Blood Pressure: 132/77 (05/26/24 0703)  Pulse: 82 (05/26/24 0703)  Temperature: 99 °F (37.2 °C) (05/26/24 0245)  Temp Source: Oral (05/25/24 1906)  Respirations: 19 (05/26/24 0245)  Height: 5' 10\" (177.8 cm) (05/25/24 1906)  Weight - Scale: 109 kg (241 lb) (05/25/24 1906)  SpO2: 96 % (05/26/24 0703)  Exam:   Physical Exam  Vitals and nursing note reviewed.   Constitutional:       General: He is not in acute distress.     Appearance: He is well-developed.   HENT:      Head: Normocephalic and atraumatic.   Eyes:      Conjunctiva/sclera: Conjunctivae normal.      Pupils: Pupils are equal, round, and reactive to light.   Cardiovascular:      Rate and Rhythm: Normal rate and regular rhythm.      Heart sounds: No murmur heard.     Comments: No edema  Pulmonary:      Effort: Pulmonary effort is normal. No respiratory distress.      Breath sounds: Normal breath sounds. No wheezing or rales.   Abdominal:      General: Abdomen is flat. Bowel sounds are normal. There is no distension.      Palpations: Abdomen is soft.      Tenderness: There is no abdominal tenderness. There is no guarding or rebound.   Musculoskeletal:         General: No swelling. Normal range of motion.      Cervical back: Normal range of motion.   Skin:     General: Skin is warm and dry.      Capillary " Refill: Capillary refill takes less than 2 seconds.   Neurological:      General: No focal deficit present.      Mental Status: He is alert. Mental status is at baseline.   Psychiatric:         Mood and Affect: Mood normal.          Discussion with Family: Updated  (wife) at bedside.    Discharge instructions/Information to patient and family:   See after visit summary for information provided to patient and family.      Provisions for Follow-Up Care:  See after visit summary for information related to follow-up care and any pertinent home health orders.      Mobility at time of Discharge:   Basic Mobility Inpatient Raw Score: 22  JH-HLM Goal: 7: Walk 25 feet or more  JH-HLM Achieved: 6: Walk 10 steps or more  HLM Goal NOT achieved. Continue to encourage mobility in post discharge setting.     Disposition:   Home    Planned Readmission: None     Discharge Statement:  I spent 40 minutes discharging the patient. This time was spent on the day of discharge. I had direct contact with the patient on the day of discharge. Greater than 50% of the total time was spent examining patient, answering all patient questions, arranging and discussing plan of care with patient as well as directly providing post-discharge instructions.  Additional time then spent on discharge activities.    Discharge Medications:  See after visit summary for reconciled discharge medications provided to patient and/or family.      **Please Note: This note may have been constructed using a voice recognition system**

## 2024-05-26 NOTE — ASSESSMENT & PLAN NOTE
Wt Readings from Last 3 Encounters:   05/25/24 109 kg (241 lb)   05/22/24 111 kg (244 lb)   05/03/24 110 kg (242 lb)   Torsemide increased to 40 mg for home per cardiology

## 2024-05-26 NOTE — PLAN OF CARE
Problem: Knowledge Deficit  Goal: Patient/family/caregiver demonstrates understanding of disease process, treatment plan, medications, and discharge instructions  Description: Complete learning assessment and assess knowledge base.  Interventions:  - Provide teaching at level of understanding  - Provide teaching via preferred learning methods  Outcome: Adequate for Discharge     Problem: CARDIOVASCULAR - ADULT  Goal: Maintains optimal cardiac output and hemodynamic stability  Description: INTERVENTIONS:  - Monitor I/O, vital signs and rhythm  - Monitor for S/S and trends of decreased cardiac output  - Administer and titrate ordered vasoactive medications to optimize hemodynamic stability  - Assess quality of pulses, skin color and temperature  - Assess for signs of decreased coronary artery perfusion  - Instruct patient to report change in severity of symptoms  Outcome: Adequate for Discharge  Goal: Absence of cardiac dysrhythmias or at baseline rhythm  Description: INTERVENTIONS:  - Continuous cardiac monitoring, vital signs, obtain 12 lead EKG if ordered  - Administer antiarrhythmic and heart rate control medications as ordered  - Monitor electrolytes and administer replacement therapy as ordered  Outcome: Adequate for Discharge     Problem: METABOLIC, FLUID AND ELECTROLYTES - ADULT  Goal: Electrolytes maintained within normal limits  Description: INTERVENTIONS:  - Monitor labs and assess patient for signs and symptoms of electrolyte imbalances  - Administer electrolyte replacement as ordered  - Monitor response to electrolyte replacements, including repeat lab results as appropriate  - Instruct patient on fluid and nutrition as appropriate  Outcome: Adequate for Discharge  Goal: Fluid balance maintained  Description: INTERVENTIONS:  - Monitor labs   - Monitor I/O and WT  - Instruct patient on fluid and nutrition as appropriate  - Assess for signs & symptoms of volume excess or deficit  Outcome: Adequate for  Discharge  Goal: Glucose maintained within target range  Description: INTERVENTIONS:  - Monitor Blood Glucose as ordered  - Assess for signs and symptoms of hyperglycemia and hypoglycemia  - Administer ordered medications to maintain glucose within target range  - Assess nutritional intake and initiate nutrition service referral as needed  Outcome: Adequate for Discharge

## 2024-05-26 NOTE — PROGRESS NOTES
"Cardiology Progress Note - Trent Ocasio 79 y.o. male MRN: [id number]    Unit/Bed#: 2 E 259-01 Encounter: [id number]      Assessment/Plan:  1.  Multivessel CAD  Status post cardiac catheterization which revealed 100%  proximal LAD with right to left collaterals as well as distal circumflex 80% stenosis  Plan for outpatient CT surgery evaluation  Continue aspirin, pravastatin, torsemide  Beta-blockers on hold given tendency for bradycardia and Wenckebach    2.  2-1 heart block/intermittent Wenckebach  Metoprolol discontinued  Monitor telemetry  Plan for outpatient event monitor 2 weeks    3.  Acute on chronic HFpEF  Now euvolemic  Plan to DC with torsemide 40 daily  Daily weights, salt restriction, strict I's and O's  Net -2 L    4.  Hypertension  Stable  Continue torsemide    5.  Hyperlipidemia  Continue pravastatin    6.  Diabetes  7.  CKD 4  8.  History of CVA    Patient is stable from cardiac standpoint to discharge.  Plan to follow-up in the office.  Follow-up with CT surgery as an outpatient.  Plan for outpatient event monitor    Subjective:   Patient seen and examined.  No significant events overnight. Im feeling great, I want to go home    Objective:     Vitals: Blood pressure 132/77, pulse 82, temperature 99 °F (37.2 °C), resp. rate 19, height 5' 10\" (1.778 m), weight 109 kg (241 lb), SpO2 96%., Body mass index is 34.58 kg/m².,   Orthostatic Blood Pressures      Flowsheet Row Most Recent Value   Blood Pressure 132/77 filed at 05/26/2024 0703              Intake/Output Summary (Last 24 hours) at 5/26/2024 1109  Last data filed at 5/25/2024 2144  Gross per 24 hour   Intake --   Output 1925 ml   Net -1925 ml         Physical Exam:  GEN: Alert and oriented x 3, in no acute distress.  Well appearing and well nourished.   HEENT: Sclera anicteric, conjunctivae pink, mucous membranes moist. Oropharynx clear.   NECK: Supple, no carotid bruits, no significant JVD. Trachea midline, no thyromegaly.   HEART: " Regular rhythm, normal S1 and S2, no murmurs, clicks, gallops or rubs. PMI nondisplaced, no thrills.   LUNGS: Clear to auscultation bilaterally; no wheezes, rales, or rhonchi. No increased work of breathing or signs of respiratory distress.   ABDOMEN: Soft, nontender, nondistended, normoactive bowel sounds.   EXTREMITIES: Skin warm and well perfused, no clubbing, cyanosis, or edema.  NEURO: No focal findings. Normal speech. Mood and affect normal.   SKIN: Normal without suspicious lesions on exposed skin.      Medications:      Current Facility-Administered Medications:     aspirin chewable tablet 81 mg, 81 mg, Oral, Daily, Brandon Cohen MD, 81 mg at 05/26/24 0928    ezetimibe (ZETIA) tablet 10 mg, 10 mg, Oral, Daily, Brandon Cohen MD, 10 mg at 05/26/24 0928    heparin (porcine) subcutaneous injection 5,000 Units, 5,000 Units, Subcutaneous, Q8H VALERIE, Brandon Cohen MD, 5,000 Units at 05/26/24 0517    insulin glargine (LANTUS) subcutaneous injection 20 Units 0.2 mL, 20 Units, Subcutaneous, HS, Sunday Gilbert MD, 20 Units at 05/25/24 2141    insulin lispro (HumALOG/ADMELOG) 100 units/mL subcutaneous injection 1-5 Units, 1-5 Units, Subcutaneous, TID AC, 1 Units at 05/26/24 0800 **AND** Fingerstick Glucose (POCT), , , TID AC, Sunday Gilbert MD    latanoprost (XALATAN) 0.005 % ophthalmic solution 1 drop, 1 drop, Both Eyes, HS, Brandon Cohen MD, 1 drop at 05/24/24 2138    levothyroxine tablet 50 mcg, 50 mcg, Oral, Early Morning, Brandon Cohen MD, 50 mcg at 05/26/24 0517    pantoprazole (PROTONIX) EC tablet 40 mg, 40 mg, Oral, Daily Before Breakfast, Brandon Cohen MD, 40 mg at 05/26/24 0928    pravastatin (PRAVACHOL) tablet 80 mg, 80 mg, Oral, Daily With Dinner, Brandon Cohen MD, 80 mg at 05/25/24 1735    torsemide (DEMADEX) tablet 40 mg, 40 mg, Oral, Daily, Danelle Amaya PA-C, 40 mg at 05/26/24 0928     Labs & Results:        Results from last 7 days   Lab Units 05/26/24  0446 05/25/24  0427 05/24/24  0828   WBC  Thousand/uL 4.80 4.81 5.22   HEMOGLOBIN g/dL 10.9* 11.2* 12.2   HEMATOCRIT % 33.0* 35.3* 37.6   PLATELETS Thousands/uL 245 244 277         Results from last 7 days   Lab Units 05/26/24  0446 05/25/24  0427 05/24/24  1228   POTASSIUM mmol/L 4.1 4.3 4.2   CHLORIDE mmol/L 107 108 106   CO2 mmol/L 27 26 27   BUN mg/dL 35* 36* 40*   CREATININE mg/dL 1.93* 1.87* 2.27*   CALCIUM mg/dL 9.5 9.4 10.0   ALK PHOS U/L  --  45  --    ALT U/L  --  9  --    AST U/L  --  14  --          Results from last 7 days   Lab Units 05/25/24 0427   MAGNESIUM mg/dL 2.2

## 2024-05-26 NOTE — CASE MANAGEMENT
Case Management Discharge Planning Note    Patient name Trent Ocasio  Location 2 [facility] 259/2 E 259-01 MRN [id number]  : 1945 Date 2024       Current Admission Date: 2024  Current Admission Diagnosis:Hypoglycemia   Patient Active Problem List    Diagnosis Date Noted Date Diagnosed    Hypoglycemia 2024     S/P cardiac cath 2024     Bradycardia 2024     Malignant 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 (HCC) 2024     Abnormal stress test 2024     CAD (coronary artery disease) 2024     Stage 3b chronic kidney disease (HCC) 2024     Chronic heart failure with preserved ejection fraction (HFpEF) (HCC) 2024     History of prostate cancer 2024     Anxiety 2024     HTN (hypertension) 2024     Hypothyroid 2024     Type 2 diabetes mellitus, with long-term current use of insulin (HCC) 2024     Elevated troponin 2024       LOS (days): 1  Geometric Mean LOS (GMLOS) (days):   Days to GMLOS:     OBJECTIVE:  Risk of Unplanned Readmission Score: 13.95         Current admission status: Inpatient   Preferred Pharmacy:   [id number] - [place] - [address]  [address]  [place] [id number]  Phone: [phone] Fax: [phone]    Primary Care Provider: Micky Hylton MD    Primary Insurance: [facility]  Secondary Insurance:     DISCHARGE DETAILS:    Discharge planning discussed with:: patient and wife at bedside.  Freedom of Choice: Yes  Comments - Freedom of Choice: No anticipated d/c needs except pt requesting Ly[employer] transport home for himself and wife.  Pt has no money and no one who can transport home.  Waiver signed.  Ride requested for 12:05.  CM contacted family/caregiver?: Yes  Were Treatment Team discharge recommendations reviewed with patient/caregiver?: Yes  Did patient/caregiver verbalize understanding of patient care needs?: Yes  Were patient/caregiver advised of the  risks associated with not following Treatment Team discharge recommendations?: Yes    Contacts  Patient Contacts: Awilda  Relationship to Patient:: Family  Contact Method: In Person  Reason/Outcome: Discharge Planning    Requested Home Health Care         Is the patient interested in HHC at discharge?: No    DME Referral Provided  Referral made for DME?: No    Other Referral/Resources/Interventions Provided:  Interventions: Transportation  Referral Comments: [facility] transport arranged for 12:05.  Waiver signed    Would you like to participate in our [facility] service program?  : No - Declined    Treatment Team Recommendation: Home  Discharge Destination Plan:: Home  Transport at Discharge : Other (Comment) ([facility])  Dispatcher Contacted: Yes  Number/Name of Dispatcher: Roundtrip     ETA of Transport (Date): 05/26/24  ETA of Transport (Time): 0251

## 2024-05-28 ENCOUNTER — CLINICAL SUPPORT (OUTPATIENT)
Dept: CARDIOLOGY CLINIC | Facility: CLINIC | Age: 79
End: 2024-05-28
Payer: COMMERCIAL

## 2024-05-28 ENCOUNTER — TELEPHONE (OUTPATIENT)
Dept: CARDIOLOGY CLINIC | Facility: CLINIC | Age: 79
End: 2024-05-28

## 2024-05-28 DIAGNOSIS — I25.10 CORONARY ARTERY DISEASE INVOLVING NATIVE HEART, UNSPECIFIED VESSEL OR LESION TYPE, UNSPECIFIED WHETHER ANGINA PRESENT: Primary | ICD-10-CM

## 2024-05-28 PROCEDURE — 99211 OFF/OP EST MAY X REQ PHY/QHP: CPT

## 2024-05-28 NOTE — UTILIZATION REVIEW
NOTIFICATION OF ADMISSION DISCHARGE   This is a Notification of Discharge from [facility]. Please be advised that this patient has been discharge from our facility. Below you will find the admission and discharge date and time including the patient’s disposition.   UTILIZATION REVIEW CONTACT:  Desi Dugan  Utilization   Network Utilization Review Department  Phone: [phone] x carefully listen to the prompts. All voicemails are confidential.  Email: [email or web address]     ADMISSION INFORMATION  PRESENTATION DATE: 5/24/2024  7:50 AM  OBERVATION ADMISSION DATE:   INPATIENT ADMISSION DATE: 5/25/24 11:00 AM   DISCHARGE DATE: 5/26/2024 12:23 PM   DISPOSITION:Home/Self Care    Network Utilization Review Department  ATTENTION: Please call with any questions or concerns to [phone] and carefully listen to the prompts so that you are directed to the right person. All voicemails are confidential.   For Discharge needs, contact Care Management DC Support Team at [phone] opt. 2  Send all requests for admission clinical reviews, approved or denied determinations and any other requests to dedicated fax number below belonging to the campus where the patient is receiving treatment. List of dedicated fax numbers for the Facilities:  FACILITY NAME UR FAX NUMBER   ADMISSION DENIALS (Administrative/Medical Necessity) [phone]   DISCHARGE SUPPORT TEAM ([facility]) [phone]   PARENT CHILD HEALTH (Maternity/NICU/Pediatrics) [phone]   [facility] [phone]   [facility] [phone]   [facility] [phone]   [facility] [phone]   [facility] [phone]   [facility] [phone]   [facility] [phone]   [facility]  [phone]   [facility] [phone]   [facility] [phone]   [facility] [phone]   [facility] [phone]

## 2024-05-28 NOTE — PROGRESS NOTES
Pt seen in office for biotel monitor placement. Pt educated on usage, care and return of monitor. Pt verbalized understanding

## 2024-05-28 NOTE — TELEPHONE ENCOUNTER
----- Message from Danelle Amaya PA-C sent at 5/26/2024 11:05 AM EDT -----  Regarding: Hosp FUP  Cardiology Follow-up:    Patient clinical visit in 4 week at the Cardio location: [place] office. .    Schedule visit with [facility] [place] Providers: Crissy Torres, Jay Taylor, or first available provider.    Type of Visit: VISIT TYPE: in-person office visit.    Test ordered: Cardiac tests: event monitor.    Additional Details: I ordered 2 week [facility] ; pt leaving today Sunday 5/26, pls call on tues and arrange to have him come in for placement, that day or wed, he also needs to see ct surgery soon

## 2024-05-29 DIAGNOSIS — G47.34 NOCTURNAL HYPOXIA: Primary | ICD-10-CM

## 2024-06-06 ENCOUNTER — TELEPHONE (OUTPATIENT)
Dept: UROLOGY | Facility: AMBULATORY SURGERY CENTER | Age: 79
End: 2024-06-06

## 2024-06-06 NOTE — TELEPHONE ENCOUNTER
I called pt to discuss scheduling his TURBT w/ [place] with Dr. Oliveira. I was able to speak with pt and before I could even go over Dr. Oliveira's next available date pt stopped me and wanted to know if his surgery could be scheduled at [facility]. I did explain that Dr. Oliveira does not perform surgery at the [facility]. Pt stated that he doesn't care who does the procedure as long as it can be done up there because it is convenient for him and his wife since she will be driving him. I informed pt that I will talk to Dr. Oliveira and see if I can schedule this procedure with Dr. Patel. Pt verbalized understanding and he will wait to hear back from me.

## 2024-06-10 ENCOUNTER — PREP FOR PROCEDURE (OUTPATIENT)
Dept: UROLOGY | Facility: AMBULATORY SURGERY CENTER | Age: 79
End: 2024-06-10

## 2024-06-10 DIAGNOSIS — E11.65 TYPE 2 DIABETES MELLITUS WITH HYPERGLYCEMIA, WITH LONG-TERM CURRENT USE OF INSULIN (HCC): ICD-10-CM

## 2024-06-10 DIAGNOSIS — Z01.818 PREOP EXAMINATION: ICD-10-CM

## 2024-06-10 DIAGNOSIS — Z79.4 TYPE 2 DIABETES MELLITUS WITH HYPERGLYCEMIA, WITH LONG-TERM CURRENT USE OF INSULIN (HCC): ICD-10-CM

## 2024-06-10 DIAGNOSIS — R39.89 SUSPECTED UTI: ICD-10-CM

## 2024-06-10 DIAGNOSIS — C67.4 MALIGNANT NEOPLASM OF POSTERIOR WALL OF URINARY BLADDER (HCC): Primary | ICD-10-CM

## 2024-06-10 DIAGNOSIS — Z01.812 PRE-PROCEDURE LAB EXAM: ICD-10-CM

## 2024-06-10 NOTE — TELEPHONE ENCOUNTER
I called pt to remind him that we already discussed his medical clearance was set up for 7:20 AM on 6/12/24. Pt thanked me for returning his call and will make sure to show up for his clearance appt.

## 2024-06-10 NOTE — TELEPHONE ENCOUNTER
Patient called back to ask the time of his 6/12 appt with Dr. Hylton.    Please review.    Call back [phone]

## 2024-06-10 NOTE — TELEPHONE ENCOUNTER
Spoke with patient and confirmed surgery date of: 6/20/24  Type of surgery: Cysto/TURBT w/ [place]  Operating physician: Dr. Patel  Location of surgery: [facility]    Verbally went over prep with patient on: 6/10/2024  NPO  Bowel prep? No  Hospital calls afternoon prior with arrival time -Calls Friday afternoon for Monday surgeries  Patient needs ride to and from surgery outpatient  Pre-op testing to be done 2 weeks prior to surgery  A1C and urine culture  Blood thinners:   Aspirin to be held 1 week prior to surgery- PCP will advise at appt on 6/12/24  Clearances needed: Medical    Mailed/emailed to patient on: 6/11/2024  Copy of packet scanned into Media on: 6/11/2024  Labs in packet  Soap / Bowel prep in packet  Pre-op & Post-op in packet  Dates of H&P and post-op if needed    Consent: in Media   If needed:    Medical/Cardiac Clearance: Medical  Appt with: Dr. Hylton  Appt date and time: 6/12/2024  Date clearance form faxed: 6/12/2024  Best fax number:    Medication Suspension of: Aspirin  Ordering provider: Dr. Hylton  Faxed Medication Suspension form on: 6/11/2024  Best fax number: [phone]    [place]:  Faxed form to pharmacy on:

## 2024-06-11 ENCOUNTER — ANESTHESIA EVENT (OUTPATIENT)
Dept: PERIOP | Facility: HOSPITAL | Age: 79
DRG: 243 | End: 2024-06-11
Payer: COMMERCIAL

## 2024-06-11 ENCOUNTER — APPOINTMENT (OUTPATIENT)
Dept: LAB | Facility: HOSPITAL | Age: 79
DRG: 243 | End: 2024-06-11
Payer: COMMERCIAL

## 2024-06-11 DIAGNOSIS — C67.4 MALIGNANT NEOPLASM OF POSTERIOR WALL OF URINARY BLADDER (HCC): ICD-10-CM

## 2024-06-11 DIAGNOSIS — Z01.812 PRE-PROCEDURE LAB EXAM: ICD-10-CM

## 2024-06-11 DIAGNOSIS — Z79.4 TYPE 2 DIABETES MELLITUS WITH HYPERGLYCEMIA, WITH LONG-TERM CURRENT USE OF INSULIN (HCC): ICD-10-CM

## 2024-06-11 DIAGNOSIS — E11.65 TYPE 2 DIABETES MELLITUS WITH HYPERGLYCEMIA, WITH LONG-TERM CURRENT USE OF INSULIN (HCC): ICD-10-CM

## 2024-06-11 DIAGNOSIS — R39.89 SUSPECTED UTI: ICD-10-CM

## 2024-06-11 DIAGNOSIS — Z01.818 PREOP EXAMINATION: ICD-10-CM

## 2024-06-11 LAB
EST. AVERAGE GLUCOSE BLD GHB EST-MCNC: 163 MG/DL
HBA1C MFR BLD: 7.3 %

## 2024-06-11 PROCEDURE — 87077 CULTURE AEROBIC IDENTIFY: CPT

## 2024-06-11 PROCEDURE — 83036 HEMOGLOBIN GLYCOSYLATED A1C: CPT

## 2024-06-11 PROCEDURE — 87086 URINE CULTURE/COLONY COUNT: CPT

## 2024-06-11 PROCEDURE — 36415 COLL VENOUS BLD VENIPUNCTURE: CPT

## 2024-06-11 NOTE — PRE-PROCEDURE INSTRUCTIONS
Pre-Surgery Instructions:   Medication Instructions    aspirin 81 mg chewable tablet Instructions provided by MD    calcitriol (ROCALTROL) 0.25 mcg capsule Take day of surgery.    cholecalciferol 25 MCG (1000 UT) tablet Hold day of surgery.    ezetimibe (ZETIA) 10 mg tablet Take night before surgery    glipiZIDE (GLUCOTROL) 10 mg tablet Hold day of surgery.    insulin degludec (Tresiba FlexTouch) 100 units/mL injection pen Take night before surgery    isosorbide mononitrate (IMDUR) 30 mg 24 hr tablet Take day of surgery.    latanoprost (XALATAN) 0.005 % ophthalmic solution Take night before surgery    levothyroxine 50 mcg tablet Take day of surgery.    losartan (COZAAR) 50 mg tablet Hold day of surgery.    metFORMIN (GLUCOPHAGE) 500 mg tablet Hold day of surgery.    pantoprazole (PROTONIX) 40 mg tablet Take day of surgery.    pioglitazone (ACTOS) 45 mg tablet Hold day of surgery.    simvastatin (ZOCOR) 40 mg tablet Take night before surgery    torsemide (DEMADEX) 20 mg tablet Hold day of surgery.    Medication instructions for day surgery reviewed. Please use only a sip of water to take your instructed medications. Avoid all over the counter vitamins, supplements and NSAIDS for one week prior to surgery per anesthesia guidelines. Tylenol is ok to take as needed.     You will receive a call one business day prior to surgery with an arrival time and hospital directions. If your surgery is scheduled on a Monday, the hospital will be calling you on the Friday prior to your surgery. If you have not heard from anyone by 8pm, please call the hospital supervisor through the hospital  at [phone]. ([place] [phone] or [place] [phone]).    Do not eat or drink anything after midnight the night before your surgery, including candy, mints, lifesavers, or chewing gum. Do not drink alcohol 24hrs before your surgery. Try not to smoke at least 24hrs before your surgery.       Follow the pre surgery showering  instructions as listed in the “My Surgical Experience Booklet” or otherwise provided by your surgeon's office. Do not use a blade to shave the surgical area 1 week before surgery. It is okay to use a clean electric clippers up to 24 hours before surgery. Do not apply any lotions, creams, including makeup, cologne, deodorant, or perfumes after showering on the day of your surgery. Do not use dry shampoo, hair spray, hair gel, or any type of hair products.     No contact lenses, eye make-up, or artificial eyelashes. Remove nail polish, including gel polish, and any artificial, gel, or acrylic nails if possible. Remove all jewelry including rings and body piercing jewelry.     Wear causal clothing that is easy to take on and off. Consider your type of surgery.    Keep any valuables, jewelry, piercings at home. Please bring any specially ordered equipment (sling, braces) if indicated.    Arrange for a responsible person to drive you to and from the hospital on the day of your surgery. Please confirm the visitor policy for the day of your procedure when you receive your phone call with an arrival time.     Call the surgeon's office with any new illnesses, exposures, or additional questions prior to surgery.    Please reference your “My Surgical Experience Booklet” for additional information to prepare for your upcoming surgery.    Pt instructed to stop motrin, aleve, advil and vitamins one week prior to surgery.  Pt verbalized understanding of shower and med instructions.

## 2024-06-11 NOTE — PRE-PROCEDURE INSTRUCTIONS
Pre-Surgery Instructions:   Medication Instructions    aspirin 81 mg chewable tablet Instructions provided by MD    calcitriol (ROCALTROL) 0.25 mcg capsule Take day of surgery.    cholecalciferol 25 MCG (1000 UT) tablet Hold day of surgery.    ezetimibe (ZETIA) 10 mg tablet Take night before surgery    glipiZIDE (GLUCOTROL) 10 mg tablet Hold day of surgery.    insulin degludec (Tresiba FlexTouch) 100 units/mL injection pen Take night before surgery    isosorbide mononitrate (IMDUR) 30 mg 24 hr tablet Take day of surgery.    latanoprost (XALATAN) 0.005 % ophthalmic solution Take night before surgery    levothyroxine 50 mcg tablet Take day of surgery.    losartan (COZAAR) 50 mg tablet Hold day of surgery.    metFORMIN (GLUCOPHAGE) 500 mg tablet Hold day of surgery.    pantoprazole (PROTONIX) 40 mg tablet Take day of surgery.    pioglitazone (ACTOS) 45 mg tablet Hold day of surgery.    simvastatin (ZOCOR) 40 mg tablet Take night before surgery    torsemide (DEMADEX) 20 mg tablet Hold day of surgery.    Medication instructions for day surgery reviewed. Please use only a sip of water to take your instructed medications. Avoid all over the counter vitamins, supplements and NSAIDS for one week prior to surgery per anesthesia guidelines. Tylenol is ok to take as needed.     You will receive a call one business day prior to surgery with an arrival time and hospital directions. If your surgery is scheduled on a Monday, the hospital will be calling you on the Friday prior to your surgery. If you have not heard from anyone by 8pm, please call the hospital supervisor through the hospital  at [phone]. ([place] [phone] or [place] [phone]).    Do not eat or drink anything after midnight the night before your surgery, including candy, mints, lifesavers, or chewing gum. Do not drink alcohol 24hrs before your surgery. Try not to smoke at least 24hrs before your surgery.       Follow the pre surgery showering  instructions as listed in the “My Surgical Experience Booklet” or otherwise provided by your surgeon's office. Do not use a blade to shave the surgical area 1 week before surgery. It is okay to use a clean electric clippers up to 24 hours before surgery. Do not apply any lotions, creams, including makeup, cologne, deodorant, or perfumes after showering on the day of your surgery. Do not use dry shampoo, hair spray, hair gel, or any type of hair products.     No contact lenses, eye make-up, or artificial eyelashes. Remove nail polish, including gel polish, and any artificial, gel, or acrylic nails if possible. Remove all jewelry including rings and body piercing jewelry.     Wear causal clothing that is easy to take on and off. Consider your type of surgery.    Keep any valuables, jewelry, piercings at home. Please bring any specially ordered equipment (sling, braces) if indicated.    Arrange for a responsible person to drive you to and from the hospital on the day of your surgery. Please confirm the visitor policy for the day of your procedure when you receive your phone call with an arrival time.     Call the surgeon's office with any new illnesses, exposures, or additional questions prior to surgery.    Please reference your “My Surgical Experience Booklet” for additional information to prepare for your upcoming surgery.    Pt instructed to stop motrin, aleve, advil and vitamins one week prior to surgery.  Pt verbalized understanding of shower and med instructions.     Message sent to surgeons office --per pt he does not have a ride to and from the hospital.

## 2024-06-13 ENCOUNTER — TELEPHONE (OUTPATIENT)
Dept: UROLOGY | Facility: CLINIC | Age: 79
End: 2024-06-13

## 2024-06-13 DIAGNOSIS — N39.0 URINARY TRACT INFECTION WITHOUT HEMATURIA, SITE UNSPECIFIED: Primary | ICD-10-CM

## 2024-06-13 LAB
BACTERIA UR CULT: ABNORMAL
BACTERIA UR CULT: ABNORMAL

## 2024-06-13 RX ORDER — AMOXICILLIN AND CLAVULANATE POTASSIUM 875; 125 MG/1; MG/1
1 TABLET, FILM COATED ORAL EVERY 12 HOURS SCHEDULED
Qty: 10 TABLET | Refills: 0 | Status: SHIPPED | OUTPATIENT
Start: 2024-06-13 | End: 2024-06-18 | Stop reason: ALTCHOICE

## 2024-06-13 NOTE — TELEPHONE ENCOUNTER
Patient is scheduled for TURBT with me on 6/20/2024    Preoperative urine culture below        5 days of Augmentin sent to pharmacy    Can someone please call patient and see if he can  the antibiotic and start either today or tomorrow?

## 2024-06-13 NOTE — TELEPHONE ENCOUNTER
Called and spoke to the pt results and [place] recommendations. Pt stated PCP wanted to know if PT urologist was speaking with PT Cardiology Team. PT states a possible and PCP and PT wanted to know if PT should get Procedure done by Urology first or Cardiology.     PT would like a call back with an update

## 2024-06-13 NOTE — TELEPHONE ENCOUNTER
Ok. It sounds like there is possibly some cardiac catheterization that needs to occur. To cover our bases let's just add cardiac clearance in addition to the PCP clearance.

## 2024-06-14 ENCOUNTER — TELEPHONE (OUTPATIENT)
Dept: CARDIOLOGY CLINIC | Facility: CLINIC | Age: 79
End: 2024-06-14

## 2024-06-14 NOTE — TELEPHONE ENCOUNTER
Vanessa Rose, RN  P Cardiology Cardiac Clearance [place]  Pt scheduled for surgery 6/20- per telephone note from urology from 6/13, Dr. Patel is requesting cardiac clearance. Is there any update on this?          TRANSURETHRAL RESECTION OF BLADDER TUMOR (TURBT), gemcitabine with Nick Patel, DO on 6/20/2024

## 2024-06-17 NOTE — TELEPHONE ENCOUNTER
I dont know why was this forwarded to me? I did the elective heart catheterization but he has otherwise seen other providers    If patient needs preop , he should be seen by a provider who has seen him in the past

## 2024-06-18 ENCOUNTER — TELEPHONE (OUTPATIENT)
Dept: UROLOGY | Facility: CLINIC | Age: 79
End: 2024-06-18

## 2024-06-18 ENCOUNTER — OFFICE VISIT (OUTPATIENT)
Dept: CARDIOLOGY CLINIC | Facility: CLINIC | Age: 79
End: 2024-06-18
Payer: COMMERCIAL

## 2024-06-18 VITALS
HEART RATE: 96 BPM | SYSTOLIC BLOOD PRESSURE: 138 MMHG | DIASTOLIC BLOOD PRESSURE: 76 MMHG | HEIGHT: 70 IN | OXYGEN SATURATION: 93 % | RESPIRATION RATE: 16 BRPM | BODY MASS INDEX: 34.36 KG/M2 | WEIGHT: 240 LBS

## 2024-06-18 DIAGNOSIS — I25.10 CORONARY ARTERY DISEASE INVOLVING NATIVE HEART, UNSPECIFIED VESSEL OR LESION TYPE, UNSPECIFIED WHETHER ANGINA PRESENT: Primary | ICD-10-CM

## 2024-06-18 PROCEDURE — 99214 OFFICE O/P EST MOD 30 MIN: CPT | Performed by: INTERNAL MEDICINE

## 2024-06-18 RX ORDER — AMLODIPINE BESYLATE 10 MG/1
10 TABLET ORAL DAILY
COMMUNITY
Start: 2024-06-11

## 2024-06-18 NOTE — TELEPHONE ENCOUNTER
This patient has only been seen by cardiology once and they were seen by Howard Swanson for that visit. He is not attached to any doctor yet so,   because you did his Cath it was routed correctly. PT scheduled pre-op appointment in open slot with Brian

## 2024-06-18 NOTE — TELEPHONE ENCOUNTER
Patient is status post cystoscopy with Dr. Oliveira in the office on 5/22/2024.  At that time, large posterior bladder wall mass was visualized.    He was planned for TURBT, gemcitabine.  Due to the patient being from the [facility], he requested the case at the [facility], for which I was contacted.    During his PCP clearance, he was found to have some cardiac issues.  He was therefore referred to his cardiologist for preoperative clearance.  His recent cardiac catheterization was reviewed.  He was found to have significant chronic CAD.  He was noted to not have any signs or symptoms of ACS, acute heart failure, valvular disease.  On the note from cardiologist Dr. Torres on 6/18/2024, he was deemed to moderate to high risk of perioperative cardiovascular complications based on age, comorbidities, activity level, and nature of upcoming surgery.    As of now, the patient is scheduled for TURBT with gemcitabine on 6/20/2024 at [facility].    I called the patient to review the above assessment from the cardiologist.  I explained to the patient that given the apparent large size of his tumor that was seen on TURBT, waiting too long for surgery could result in the cancer progressing need metastasizing if it has not already.    I therefore explained to him that if there was a window to do surgery soon, albeit in the setting of not ideal cardiac function, he should consider getting surgery done with the understanding that he is at risk of cardiac complications during anesthesia.    Patient stated that during conversation with his cardiologist, it did not sound like a long-term plan for him would be permanent cardiac stents or CABG at this point, although he will see the cardiac surgeon in the next few weeks.    Therefore with all this being considered, the patient agreed to proceed with TURBT on 6/20/2024 with the understanding that he is at increased risk for cardiac complications.

## 2024-06-18 NOTE — PROGRESS NOTES
Cardiology Follow Up    Trent Ocasio  1945  [id number]  [facility] [place]  [address]  [place] [address] [phone]    Discussion/Summary:  Preop cardiovascular risk assessement  MV CAD with  of LAD  Bradyarrhythmias on beta blockers  Hypertension  HFpEF  Hyperlipidemia  Diabetes  CKD  History of CVA      He returned his cardiac event monitor. Unfortunately, unavailable for review  His cardiac cath showed significant chronic CAD.   No CP or pressure. No SOB  He is able to climb a flight of stairs with no issues.     Patient  is scheduled for bladder surgery. There is concern about malignancy  Activity level: Patient has limited activity. Cardiac cath showed significant chronic CAD with a  of LAD with right to left collaterals. Distal calcified chronic stenosis of distal left circumflex    There is no sign or symptoms of acute coronary syndrome, acute heart failure, valvular heart disease.    Patient is at moderate to high risk of perioperative cardiovascular complications based on his age, comorbidities, activity level and nature of upcoming surgery.      Discussed cardiac cath finding.   Discussed management options with patient. Discussed medical therapy, versus high risk  PCI versus CABG. Patient prefers medical management and wants to pursue it  He is scheduled to see cardiac surgeon for CABG opinion in July. Currently he prefers medical management which is reasonable with his age, activity and comorbidities.      He will inform us if he has any cardiac symptoms    Previous studies were reviewed.    Safety measures were reviewed.  Questions were entertained and answered.  Patient was advised to report any problems requiring medical attention.    Follow-up with PCP and appropriate specialist and lab work as discussed.    Return for follow up visit as scheduled or earlier, if  needed.    Thank you for allowing me to participate in the care and evaluation of your patient.  Should you have any questions, please feel free to contact me.      History of Present Illness:     Trent Ocasio is a 79 y.o. male who presents for follow up for cardiovascular care.    Denies chest pain, chest pressure, shortness of breath, dizziness, lightheadedness, presyncope or syncope.  Denies orthopnea, PND or lower limb edema.    Limited mobility. Uses a cane for walking    Feels a bit tired but no other symptoms    Cardiac cath:      There is 100% chronic total occlusion of proximal LAD. There are right to left collaterals visualized. Dist Cx lesion has a 80% stenosis.    Right radial access. Due to vessel tortuosity, access was switched to right femoral artery. Hemostasis achieved with radial band. Ultrasound and fluroscopy guided right femoral artery access was obtained. Hemostasis achhieved with Angeoseal closure device.     There is 100% chronic total occlusion of proximal LAD. There are right to left collaterals visualized. Dist Cx lesion has a 80% stenosis.       Patient Active Problem List   Diagnosis    Chronic heart failure with preserved ejection fraction (HFpEF) (HCC)    History of prostate cancer    Anxiety    HTN (hypertension)    Hypothyroid    Type 2 diabetes mellitus, with long-term current use of insulin (HCC)    Elevated troponin    Stage 3b chronic kidney disease (HCC)    Abnormal stress test    CAD (coronary artery disease)    Malignant 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 (HCC)    Hypoglycemia    S/P cardiac cath    Bradycardia     Past Medical History:   Diagnosis Date    Acute respiratory failure with hypoxia ([facility]) 03/28/2024    Chronic kidney disease     Diabetes mellitus (HCC)     Hypertension     Respiratory acidosis 03/31/2024     Social History     Socioeconomic History    Marital status: /Civil Union     Spouse name: Not on file    Number of children: Not on file     Years of education: Not on file    Highest education level: Not on file   Occupational History    Not on file   Tobacco Use    Smoking status: Former     Passive exposure: Past    Smokeless tobacco: Never   Vaping Use    Vaping status: Never Used   Substance and Sexual Activity    Alcohol use: Not Currently    Drug use: Never    Sexual activity: Yes     Partners: Female   Other Topics Concern    Not on file   Social History Narrative    Not on file     Social Determinants of Health     Financial Resource Strain: Low Risk  (8/20/2023)    [facility]    Overall Financial Resource Strain (CARDIA)     Difficulty of Paying Living Expenses: Not hard at all   Food Insecurity: No Food Insecurity (5/26/2024)    Hunger Vital Sign     Worried About Running Out of Food in the Last Year: Never true     Ran Out of Food in the Last Year: Never true   Transportation Needs: No Transportation Needs (5/26/2024)    PRAPARE - Transportation     Lack of Transportation (Medical): No     Lack of Transportation (Non-Medical): No   Physical Activity: Not on file   Stress: Stress Concern Present (8/20/2023)    [facility]    [employer] [place] [employer] - Occupational Stress Questionnaire     Feeling of Stress : To some extent   Social Connections: Unknown (8/20/2023)    [facility] from [facility]    Social Connection and Isolation Panel [NHANES]     Frequency of Communication with Friends and Family: Patient declined     Frequency of Social Gatherings with Friends and Family: Patient declined     Attends [employer] Services: Patient declined     Active Member of Clubs or Organizations: No     Attends Club or Organization Meetings: Patient declined     Marital Status:    Intimate Partner Violence: Not At Risk (8/20/2023)    Received from [facility]  [facility], [facility]    Humiliation, Afraid, Rape, and Kick questionnaire     Fear of Current or Ex-Partner: No     Emotionally Abused: No     Physically Abused: No     Sexually Abused: No   Housing Stability: Low Risk  (5/26/2024)    Housing Stability Vital Sign     Unable to Pay for Housing in the Last Year: No     Number of Times Moved in the Last Year: 1     Homeless in the Last Year: No      No family history on file.  Past Surgical History:   Procedure Laterality Date    APPENDECTOMY      CARDIAC CATHETERIZATION Left 05/24/2024    Procedure: Cardiac Left Heart Cath;  Surgeon: Qiana Torres MD;  Location: [facility];  Service: Cardiology    CARDIAC CATHETERIZATION N/A 05/24/2024    Procedure: Cardiac Coronary Angiogram;  Surgeon: Qiana Torres MD;  Location: [facility];  Service: Cardiology    CARDIAC CATHETERIZATION  05/24/2024    Procedure: Cardiac catheterization;  Surgeon: Qiana Torres MD;  Location: [facility];  Service: Cardiology       Current Outpatient Medications:     amLODIPine (NORVASC) 10 mg tablet, Take 10 mg by mouth daily, Disp: , Rfl:     aspirin 81 mg chewable tablet, Chew 81 mg daily, Disp: , Rfl:     calcitriol (ROCALTROL) 0.25 mcg capsule, Take 1 capsule (0.25 mcg total) by mouth daily, Disp: 90 capsule, Rfl: 3    cholecalciferol 25 MCG (1000 UT) tablet, Take 1,000 Units by mouth daily, Disp: , Rfl:     ezetimibe (ZETIA) 10 mg tablet, Take 10 mg by mouth daily, Disp: , Rfl:     glipiZIDE (GLUCOTROL) 10 mg tablet, Take 10 mg by mouth 2 (two) times a day before meals, Disp: , Rfl:     insulin degludec (Tresiba FlexTouch) 100 units/mL injection pen, Inject 20 Units under the skin daily at bedtime, Disp: , Rfl:     isosorbide mononitrate (IMDUR) 30 mg 24 hr tablet, Take 1 tablet (30 mg total) by mouth daily, Disp: 90 tablet, Rfl: 1    latanoprost (XALATAN) 0.005 % ophthalmic solution, Administer 1 drop to both eyes daily at bedtime,  Disp: , Rfl:     levothyroxine 50 mcg tablet, Take 50 mcg by mouth daily, Disp: , Rfl:     losartan (COZAAR) 50 mg tablet, Take 50 mg by mouth daily, Disp: , Rfl:     metFORMIN (GLUCOPHAGE) 500 mg tablet, Take 500 mg by mouth 2 (two) times a day with meals, Disp: , Rfl:     pantoprazole (PROTONIX) 40 mg tablet, Take 40 mg by mouth daily at bedtime, Disp: , Rfl:     pioglitazone (ACTOS) 45 mg tablet, Take 45 mg by mouth daily, Disp: , Rfl:     simvastatin (ZOCOR) 40 mg tablet, Take 40 mg by mouth daily at bedtime, Disp: , Rfl:     torsemide (DEMADEX) 20 mg tablet, Take 2 tablets (40 mg total) by mouth daily (Patient taking differently: Take 40 mg by mouth 2 (two) times a day), Disp: 30 tablet, Rfl: 0  Allergies   Allergen Reactions    Canagliflozin GI Intolerance     Yeast infection  Yeast infection      Lisinopril Other (See Comments)    Lovastatin GI Intolerance       Labs:  Lab Results   Component Value Date    WBC 4.80 05/26/2024    HGB 10.9 (L) 05/26/2024    HCT 33.0 (L) 05/26/2024     (H) 05/26/2024     05/26/2024     Lab Results   Component Value Date    CALCIUM 9.5 05/26/2024    K 4.1 05/26/2024    CO2 27 05/26/2024     05/26/2024    BUN 35 (H) 05/26/2024    CREATININE 1.93 (H) 05/26/2024     Lab Results   Component Value Date    HGBA1C 7.3 (H) 06/11/2024       Review of Systems:  Review of Systems   Constitutional: Negative.  Negative for activity change, appetite change, fatigue and fever.   Respiratory:  Negative for cough, chest tightness and shortness of breath.    Cardiovascular:  Negative for chest pain, palpitations and leg swelling.   Gastrointestinal:  Negative for nausea and vomiting.   Musculoskeletal:  Negative for arthralgias.   Skin:  Negative for color change and pallor.   Neurological:  Negative for dizziness, syncope and light-headedness.   Psychiatric/Behavioral:  Negative for agitation.    All other systems reviewed and are negative.      Physical Exam:  Physical  Exam  Vitals and nursing note reviewed.   Constitutional:       General: He is not in acute distress.     Appearance: Normal appearance. He is not ill-appearing or diaphoretic.   HENT:      Head: Normocephalic and atraumatic.   Eyes:      Extraocular Movements: Extraocular movements intact.   Cardiovascular:      Rate and Rhythm: Normal rate and regular rhythm.      Heart sounds: No murmur heard.     No friction rub. No gallop.   Pulmonary:      Effort: Pulmonary effort is normal. No respiratory distress.      Breath sounds: Normal breath sounds.   Abdominal:      General: There is no distension.      Palpations: Abdomen is soft.   Musculoskeletal:         General: No swelling. Normal range of motion.      Cervical back: Normal range of motion.   Skin:     General: Skin is warm and dry.      Capillary Refill: Capillary refill takes less than 2 seconds.      Coloration: Skin is not pale.   Neurological:      General: No focal deficit present.      Mental Status: He is alert and oriented to person, place, and time.      Cranial Nerves: No cranial nerve deficit.   Psychiatric:         Mood and Affect: Mood normal.         Behavior: Behavior normal.

## 2024-06-19 NOTE — ANESTHESIA PREPROCEDURE EVALUATION
Procedure:  TRANSURETHRAL RESECTION OF BLADDER TUMOR (TURBT), gemcitabine (Bladder)  INSTILLATION OF GEMCITABINE (Bladder)    Relevant Problems   CARDIO   (+) CAD (coronary artery disease)   (+) HTN (hypertension)      ENDO   (+) Hypothyroid   (+) Type 2 diabetes mellitus, with long-term current use of insulin (HCC)      /RENAL   (+) Stage 3b chronic kidney disease (HCC)      NEURO/PSYCH   (+) Anxiety        Physical Exam    Airway    Mallampati score: II  TM Distance: >3 FB  Neck ROM: full     Dental       Cardiovascular      Pulmonary      Other Findings        Anesthesia Plan  ASA Score- 3     Anesthesia Type- general with ASA Monitors.         Additional Monitors:     Airway Plan: LMA.    Comment: Patient seen and examined, history reviewed.  Patient to be done under general anesthesia with LMA and routine monitors.  Risks discussed with the patient, consent obtained..       Plan Factors-Exercise tolerance (METS): >4 METS.    Chart reviewed.   Existing labs reviewed. Patient summary reviewed.                  Induction- intravenous.    Postoperative Plan- Plan for postoperative opioid use.         Informed Consent- Anesthetic plan and risks discussed with patient.  I personally reviewed this patient with the CRNA. Discussed and agreed on the Anesthesia Plan with the CRNA..

## 2024-06-20 ENCOUNTER — PREP FOR PROCEDURE (OUTPATIENT)
Dept: OTHER | Facility: HOSPITAL | Age: 79
End: 2024-06-20

## 2024-06-20 ENCOUNTER — ANESTHESIA (OUTPATIENT)
Dept: PERIOP | Facility: HOSPITAL | Age: 79
DRG: 243 | End: 2024-06-20
Payer: COMMERCIAL

## 2024-06-20 ENCOUNTER — HOSPITAL ENCOUNTER (INPATIENT)
Facility: HOSPITAL | Age: 79
LOS: 2 days | Discharge: HOME | DRG: 243 | End: 2024-06-22
Attending: UROLOGY | Admitting: INTERNAL MEDICINE
Payer: COMMERCIAL

## 2024-06-20 ENCOUNTER — CLINICAL SUPPORT (OUTPATIENT)
Dept: CARDIOLOGY CLINIC | Facility: CLINIC | Age: 79
End: 2024-06-20
Payer: COMMERCIAL

## 2024-06-20 ENCOUNTER — TELEPHONE (OUTPATIENT)
Dept: UROLOGY | Facility: CLINIC | Age: 79
End: 2024-06-20

## 2024-06-20 ENCOUNTER — ANESTHESIA EVENT (INPATIENT)
Dept: NON INVASIVE DIAGNOSTICS | Facility: HOSPITAL | Age: 79
DRG: 243 | End: 2024-06-20
Payer: COMMERCIAL

## 2024-06-20 DIAGNOSIS — I44.0 1ST DEGREE AV BLOCK: ICD-10-CM

## 2024-06-20 DIAGNOSIS — D49.4 BLADDER TUMOR: Primary | ICD-10-CM

## 2024-06-20 DIAGNOSIS — R00.1 BRADYCARDIA: Primary | ICD-10-CM

## 2024-06-20 DIAGNOSIS — M54.40 CHRONIC LOW BACK PAIN WITH SCIATICA, SCIATICA LATERALITY UNSPECIFIED, UNSPECIFIED BACK PAIN LATERALITY: ICD-10-CM

## 2024-06-20 DIAGNOSIS — I44.30 AVB (ATRIOVENTRICULAR BLOCK): ICD-10-CM

## 2024-06-20 DIAGNOSIS — I50.32 CHRONIC HEART FAILURE WITH PRESERVED EJECTION FRACTION (HFPEF) (HCC): ICD-10-CM

## 2024-06-20 DIAGNOSIS — D49.4 BLADDER TUMOR: ICD-10-CM

## 2024-06-20 DIAGNOSIS — G89.29 CHRONIC LOW BACK PAIN WITH SCIATICA, SCIATICA LATERALITY UNSPECIFIED, UNSPECIFIED BACK PAIN LATERALITY: ICD-10-CM

## 2024-06-20 DIAGNOSIS — I25.10 CORONARY ARTERY DISEASE INVOLVING NATIVE CORONARY ARTERY OF NATIVE HEART, UNSPECIFIED WHETHER ANGINA PRESENT: ICD-10-CM

## 2024-06-20 DIAGNOSIS — R00.1 BRADYCARDIA: ICD-10-CM

## 2024-06-20 LAB
EST. AVERAGE GLUCOSE BLD GHB EST-MCNC: 157 MG/DL
GLUCOSE SERPL-MCNC: 177 MG/DL (ref 65–140)
GLUCOSE SERPL-MCNC: 275 MG/DL (ref 65–140)
GLUCOSE SERPL-MCNC: 80 MG/DL (ref 65–140)
GLUCOSE SERPL-MCNC: 88 MG/DL (ref 65–140)
HBA1C MFR BLD: 7.1 %

## 2024-06-20 PROCEDURE — 99024 POSTOP FOLLOW-UP VISIT: CPT | Performed by: UROLOGY

## 2024-06-20 PROCEDURE — 82948 REAGENT STRIP/BLOOD GLUCOSE: CPT

## 2024-06-20 PROCEDURE — NC001 PR NO CHARGE: Performed by: UROLOGY

## 2024-06-20 PROCEDURE — 83036 HEMOGLOBIN GLYCOSYLATED A1C: CPT | Performed by: NURSE PRACTITIONER

## 2024-06-20 PROCEDURE — 0TBB8ZZ EXCISION OF BLADDER, VIA NATURAL OR ARTIFICIAL OPENING ENDOSCOPIC: ICD-10-PCS | Performed by: UROLOGY

## 2024-06-20 PROCEDURE — 52235 CYSTOSCOPY AND TREATMENT: CPT | Performed by: UROLOGY

## 2024-06-20 PROCEDURE — 99223 1ST HOSP IP/OBS HIGH 75: CPT | Performed by: INTERNAL MEDICINE

## 2024-06-20 PROCEDURE — 88307 TISSUE EXAM BY PATHOLOGIST: CPT | Performed by: PATHOLOGY

## 2024-06-20 PROCEDURE — 93228 REMOTE 30 DAY ECG REV/REPORT: CPT | Performed by: INTERNAL MEDICINE

## 2024-06-20 PROCEDURE — 93005 ELECTROCARDIOGRAM TRACING: CPT

## 2024-06-20 PROCEDURE — 99223 1ST HOSP IP/OBS HIGH 75: CPT | Performed by: NURSE PRACTITIONER

## 2024-06-20 RX ORDER — LABETALOL HYDROCHLORIDE 5 MG/ML
5 INJECTION, SOLUTION INTRAVENOUS
Status: DISCONTINUED | OUTPATIENT
Start: 2024-06-20 | End: 2024-06-20 | Stop reason: HOSPADM

## 2024-06-20 RX ORDER — ASPIRIN 81 MG/1
81 TABLET, CHEWABLE ORAL DAILY
Status: DISCONTINUED | OUTPATIENT
Start: 2024-06-20 | End: 2024-06-22 | Stop reason: HOSPADM

## 2024-06-20 RX ORDER — HYDROMORPHONE HCL/PF 1 MG/ML
0.5 SYRINGE (ML) INJECTION
Status: DISCONTINUED | OUTPATIENT
Start: 2024-06-20 | End: 2024-06-20 | Stop reason: HOSPADM

## 2024-06-20 RX ORDER — PROPOFOL 10 MG/ML
INJECTION, EMULSION INTRAVENOUS AS NEEDED
Status: DISCONTINUED | OUTPATIENT
Start: 2024-06-20 | End: 2024-06-20

## 2024-06-20 RX ORDER — ONDANSETRON 2 MG/ML
INJECTION INTRAMUSCULAR; INTRAVENOUS AS NEEDED
Status: DISCONTINUED | OUTPATIENT
Start: 2024-06-20 | End: 2024-06-20

## 2024-06-20 RX ORDER — ALBUTEROL SULFATE 2.5 MG/3ML
2.5 SOLUTION RESPIRATORY (INHALATION) ONCE AS NEEDED
Status: DISCONTINUED | OUTPATIENT
Start: 2024-06-20 | End: 2024-06-20 | Stop reason: HOSPADM

## 2024-06-20 RX ORDER — FENTANYL CITRATE/PF 50 MCG/ML
25 SYRINGE (ML) INJECTION
Status: DISCONTINUED | OUTPATIENT
Start: 2024-06-20 | End: 2024-06-20 | Stop reason: HOSPADM

## 2024-06-20 RX ORDER — INSULIN LISPRO 100 [IU]/ML
1-6 INJECTION, SOLUTION INTRAVENOUS; SUBCUTANEOUS
Status: DISCONTINUED | OUTPATIENT
Start: 2024-06-20 | End: 2024-06-22 | Stop reason: HOSPADM

## 2024-06-20 RX ORDER — AMLODIPINE BESYLATE 10 MG/1
10 TABLET ORAL DAILY
Status: DISCONTINUED | OUTPATIENT
Start: 2024-06-20 | End: 2024-06-22 | Stop reason: HOSPADM

## 2024-06-20 RX ORDER — MAGNESIUM HYDROXIDE 1200 MG/15ML
LIQUID ORAL AS NEEDED
Status: DISCONTINUED | OUTPATIENT
Start: 2024-06-20 | End: 2024-06-20 | Stop reason: HOSPADM

## 2024-06-20 RX ORDER — CALCITRIOL 0.25 UG/1
0.25 CAPSULE, LIQUID FILLED ORAL DAILY
Status: DISCONTINUED | OUTPATIENT
Start: 2024-06-20 | End: 2024-06-22 | Stop reason: HOSPADM

## 2024-06-20 RX ORDER — LIDOCAINE HYDROCHLORIDE 20 MG/ML
INJECTION, SOLUTION EPIDURAL; INFILTRATION; INTRACAUDAL; PERINEURAL AS NEEDED
Status: DISCONTINUED | OUTPATIENT
Start: 2024-06-20 | End: 2024-06-20

## 2024-06-20 RX ORDER — ISOSORBIDE MONONITRATE 30 MG/1
30 TABLET, EXTENDED RELEASE ORAL DAILY
Status: DISCONTINUED | OUTPATIENT
Start: 2024-06-20 | End: 2024-06-22 | Stop reason: HOSPADM

## 2024-06-20 RX ORDER — EZETIMIBE 10 MG/1
10 TABLET ORAL DAILY
Status: DISCONTINUED | OUTPATIENT
Start: 2024-06-20 | End: 2024-06-22 | Stop reason: HOSPADM

## 2024-06-20 RX ORDER — LOSARTAN POTASSIUM 50 MG/1
50 TABLET ORAL DAILY
Status: DISCONTINUED | OUTPATIENT
Start: 2024-06-20 | End: 2024-06-22 | Stop reason: HOSPADM

## 2024-06-20 RX ORDER — SODIUM CHLORIDE, SODIUM LACTATE, POTASSIUM CHLORIDE, CALCIUM CHLORIDE 600; 310; 30; 20 MG/100ML; MG/100ML; MG/100ML; MG/100ML
125 INJECTION, SOLUTION INTRAVENOUS CONTINUOUS
Status: DISCONTINUED | OUTPATIENT
Start: 2024-06-20 | End: 2024-06-22 | Stop reason: HOSPADM

## 2024-06-20 RX ORDER — SODIUM CHLORIDE 9 MG/ML
50 INJECTION, SOLUTION INTRAVENOUS CONTINUOUS
OUTPATIENT
Start: 2024-06-20

## 2024-06-20 RX ORDER — HEPARIN SODIUM 5000 [USP'U]/ML
5000 INJECTION, SOLUTION INTRAVENOUS; SUBCUTANEOUS EVERY 8 HOURS SCHEDULED
Status: DISCONTINUED | OUTPATIENT
Start: 2024-06-20 | End: 2024-06-22 | Stop reason: HOSPADM

## 2024-06-20 RX ORDER — SODIUM CHLORIDE 9 MG/ML
INJECTION, SOLUTION INTRAVENOUS AS NEEDED
Status: DISCONTINUED | OUTPATIENT
Start: 2024-06-20 | End: 2024-06-20 | Stop reason: HOSPADM

## 2024-06-20 RX ORDER — TORSEMIDE 20 MG/1
40 TABLET ORAL DAILY
Status: DISCONTINUED | OUTPATIENT
Start: 2024-06-20 | End: 2024-06-22 | Stop reason: HOSPADM

## 2024-06-20 RX ORDER — ACETAMINOPHEN 325 MG/1
650 TABLET ORAL EVERY 6 HOURS PRN
Status: DISCONTINUED | OUTPATIENT
Start: 2024-06-20 | End: 2024-06-22 | Stop reason: HOSPADM

## 2024-06-20 RX ORDER — CHLORHEXIDINE GLUCONATE ORAL RINSE 1.2 MG/ML
15 SOLUTION DENTAL ONCE
Status: DISCONTINUED | OUTPATIENT
Start: 2024-06-20 | End: 2024-06-21 | Stop reason: HOSPADM

## 2024-06-20 RX ORDER — MEPERIDINE HYDROCHLORIDE 50 MG/ML
12.5 INJECTION INTRAMUSCULAR; INTRAVENOUS; SUBCUTANEOUS ONCE
Status: DISCONTINUED | OUTPATIENT
Start: 2024-06-20 | End: 2024-06-20 | Stop reason: HOSPADM

## 2024-06-20 RX ORDER — PROMETHAZINE HYDROCHLORIDE 25 MG/ML
12.5 INJECTION, SOLUTION INTRAMUSCULAR; INTRAVENOUS ONCE AS NEEDED
Status: DISCONTINUED | OUTPATIENT
Start: 2024-06-20 | End: 2024-06-20 | Stop reason: HOSPADM

## 2024-06-20 RX ORDER — SODIUM CHLORIDE, SODIUM LACTATE, POTASSIUM CHLORIDE, CALCIUM CHLORIDE 600; 310; 30; 20 MG/100ML; MG/100ML; MG/100ML; MG/100ML
INJECTION, SOLUTION INTRAVENOUS CONTINUOUS PRN
Status: DISCONTINUED | OUTPATIENT
Start: 2024-06-20 | End: 2024-06-20

## 2024-06-20 RX ORDER — FENTANYL CITRATE 50 UG/ML
INJECTION, SOLUTION INTRAMUSCULAR; INTRAVENOUS AS NEEDED
Status: DISCONTINUED | OUTPATIENT
Start: 2024-06-20 | End: 2024-06-20

## 2024-06-20 RX ORDER — ONDANSETRON 2 MG/ML
4 INJECTION INTRAMUSCULAR; INTRAVENOUS ONCE AS NEEDED
Status: COMPLETED | OUTPATIENT
Start: 2024-06-20 | End: 2024-06-20

## 2024-06-20 RX ORDER — LATANOPROST 50 UG/ML
1 SOLUTION/ DROPS OPHTHALMIC
Status: DISCONTINUED | OUTPATIENT
Start: 2024-06-20 | End: 2024-06-22 | Stop reason: HOSPADM

## 2024-06-20 RX ORDER — CEFAZOLIN SODIUM 2 G/50ML
2000 SOLUTION INTRAVENOUS ONCE
Status: COMPLETED | OUTPATIENT
Start: 2024-06-20 | End: 2024-06-20

## 2024-06-20 RX ORDER — LEVOTHYROXINE SODIUM 0.05 MG/1
50 TABLET ORAL DAILY
Status: DISCONTINUED | OUTPATIENT
Start: 2024-06-20 | End: 2024-06-22 | Stop reason: HOSPADM

## 2024-06-20 RX ORDER — PANTOPRAZOLE SODIUM 40 MG/1
40 TABLET, DELAYED RELEASE ORAL
Status: DISCONTINUED | OUTPATIENT
Start: 2024-06-20 | End: 2024-06-22 | Stop reason: HOSPADM

## 2024-06-20 RX ORDER — DOCUSATE SODIUM 100 MG/1
100 CAPSULE, LIQUID FILLED ORAL 2 TIMES DAILY
Status: DISCONTINUED | OUTPATIENT
Start: 2024-06-20 | End: 2024-06-22 | Stop reason: HOSPADM

## 2024-06-20 RX ORDER — PRAVASTATIN SODIUM 80 MG/1
80 TABLET ORAL
Status: DISCONTINUED | OUTPATIENT
Start: 2024-06-20 | End: 2024-06-22 | Stop reason: HOSPADM

## 2024-06-20 RX ADMIN — INSULIN LISPRO 1 UNITS: 100 INJECTION, SOLUTION INTRAVENOUS; SUBCUTANEOUS at 17:44

## 2024-06-20 RX ADMIN — SODIUM CHLORIDE, SODIUM LACTATE, POTASSIUM CHLORIDE, AND CALCIUM CHLORIDE: .6; .31; .03; .02 INJECTION, SOLUTION INTRAVENOUS at 07:58

## 2024-06-20 RX ADMIN — SODIUM CHLORIDE, SODIUM LACTATE, POTASSIUM CHLORIDE, AND CALCIUM CHLORIDE 125 ML/HR: .6; .31; .03; .02 INJECTION, SOLUTION INTRAVENOUS at 20:20

## 2024-06-20 RX ADMIN — CEFAZOLIN SODIUM 2000 MG: 2 SOLUTION INTRAVENOUS at 08:00

## 2024-06-20 RX ADMIN — PANTOPRAZOLE SODIUM 40 MG: 40 TABLET, DELAYED RELEASE ORAL at 21:37

## 2024-06-20 RX ADMIN — ACETAMINOPHEN 650 MG: 325 TABLET, FILM COATED ORAL at 21:54

## 2024-06-20 RX ADMIN — LIDOCAINE HYDROCHLORIDE 100 MG: 20 INJECTION, SOLUTION EPIDURAL; INFILTRATION; INTRACAUDAL; PERINEURAL at 08:01

## 2024-06-20 RX ADMIN — EZETIMIBE 10 MG: 10 TABLET ORAL at 15:03

## 2024-06-20 RX ADMIN — ONDANSETRON 4 MG: 2 INJECTION INTRAMUSCULAR; INTRAVENOUS at 08:11

## 2024-06-20 RX ADMIN — LATANOPROST 1 DROP: 50 SOLUTION OPHTHALMIC at 21:37

## 2024-06-20 RX ADMIN — FENTANYL CITRATE 50 MCG: 50 INJECTION, SOLUTION INTRAMUSCULAR; INTRAVENOUS at 08:23

## 2024-06-20 RX ADMIN — PHENYLEPHRINE HYDROCHLORIDE 20 MCG/MIN: 10 INJECTION INTRAVENOUS at 08:32

## 2024-06-20 RX ADMIN — ASPIRIN 81 MG: 81 TABLET, CHEWABLE ORAL at 15:04

## 2024-06-20 RX ADMIN — TORSEMIDE 40 MG: 20 TABLET ORAL at 15:03

## 2024-06-20 RX ADMIN — SODIUM CHLORIDE, SODIUM LACTATE, POTASSIUM CHLORIDE, AND CALCIUM CHLORIDE 125 ML/HR: .6; .31; .03; .02 INJECTION, SOLUTION INTRAVENOUS at 12:26

## 2024-06-20 RX ADMIN — PRAVASTATIN SODIUM 80 MG: 80 TABLET ORAL at 17:22

## 2024-06-20 RX ADMIN — ONDANSETRON 4 MG: 2 INJECTION INTRAMUSCULAR; INTRAVENOUS at 09:06

## 2024-06-20 RX ADMIN — DOCUSATE SODIUM 100 MG: 100 CAPSULE, LIQUID FILLED ORAL at 17:21

## 2024-06-20 RX ADMIN — CALCITRIOL CAPSULES 0.25 MCG 0.25 MCG: 0.25 CAPSULE ORAL at 15:04

## 2024-06-20 RX ADMIN — FENTANYL CITRATE 50 MCG: 50 INJECTION, SOLUTION INTRAMUSCULAR; INTRAVENOUS at 08:11

## 2024-06-20 RX ADMIN — INSULIN LISPRO 4 UNITS: 100 INJECTION, SOLUTION INTRAVENOUS; SUBCUTANEOUS at 21:37

## 2024-06-20 RX ADMIN — HEPARIN SODIUM 5000 UNITS: 5000 INJECTION INTRAVENOUS; SUBCUTANEOUS at 15:05

## 2024-06-20 RX ADMIN — PROPOFOL 130 MG: 10 INJECTION, EMULSION INTRAVENOUS at 08:01

## 2024-06-20 RX ADMIN — Medication 1000 UNITS: at 15:04

## 2024-06-20 RX ADMIN — HEPARIN SODIUM 5000 UNITS: 5000 INJECTION INTRAVENOUS; SUBCUTANEOUS at 21:37

## 2024-06-20 NOTE — PLAN OF CARE
Problem: PAIN - ADULT  Goal: Verbalizes/displays adequate comfort level or baseline comfort level  Description: Interventions:  - Encourage patient to monitor pain and request assistance  - Assess pain using appropriate pain scale  - Administer analgesics based on type and severity of pain and evaluate response  - Implement non-pharmacological measures as appropriate and evaluate response  - Consider cultural and social influences on pain and pain management  - Notify physician/advanced practitioner if interventions unsuccessful or patient reports new pain  Outcome: Progressing     Problem: INFECTION - ADULT  Goal: Absence or prevention of progression during hospitalization  Description: INTERVENTIONS:  - Assess and monitor for signs and symptoms of infection  - Monitor lab/diagnostic results  - Monitor all insertion sites, i.e. indwelling lines, tubes, and drains  - Monitor endotracheal if appropriate and nasal secretions for changes in amount and color  - [place] appropriate cooling/warming therapies per order  - Administer medications as ordered  - Instruct and encourage patient and family to use good hand hygiene technique  - Identify and instruct in appropriate isolation precautions for identified infection/condition  Outcome: Progressing  Goal: Absence of fever/infection during neutropenic period  Description: INTERVENTIONS:  - Monitor WBC    Outcome: Progressing     Problem: SAFETY ADULT  Goal: Patient will remain free of falls  Description: INTERVENTIONS:  - Educate patient/family on patient safety including physical limitations  - Instruct patient to call for assistance with activity   - Consult OT/PT to assist with strengthening/mobility   - Keep Call bell within reach  - Keep bed low and locked with side rails adjusted as appropriate  - Keep care items and personal belongings within reach  - Initiate and maintain comfort rounds  - Make Fall Risk Sign visible to staff  - Offer Toileting every 2 Hours,  in advance of need  - Initiate/Maintain bed alarm  - Obtain necessary fall risk management equipment:   - Apply yellow socks and bracelet for high fall risk patients  - Consider moving patient to room near nurses station  Outcome: Progressing  Goal: Maintain or return to baseline ADL function  Description: INTERVENTIONS:  -  Assess patient's ability to carry out ADLs; assess patient's baseline for ADL function and identify physical deficits which impact ability to perform ADLs (bathing, care of mouth/teeth, toileting, grooming, dressing, etc.)  - Assess/evaluate cause of self-care deficits   - Assess range of motion  - Assess patient's mobility; develop plan if impaired  - Assess patient's need for assistive devices and provide as appropriate  - Encourage maximum independence but intervene and supervise when necessary  - Involve family in performance of ADLs  - Assess for home care needs following discharge   - Consider OT consult to assist with ADL evaluation and planning for discharge  - Provide patient education as appropriate  Outcome: Progressing  Goal: Maintains/Returns to pre admission functional level  Description: INTERVENTIONS:  - Perform AM-PAC 6 Click Basic Mobility/ Daily Activity assessment daily.  - Set and communicate daily mobility goal to care team and patient/family/caregiver.   - Collaborate with rehabilitation services on mobility goals if consulted  - Perform Range of Motion 3 times a day.  - Reposition patient every 2 hours.  - Dangle patient 3 times a day  - Stand patient 3 times a day  - Ambulate patient 3 times a day  - Out of bed to chair 3 times a day   - Out of bed for meals 3 times a day  - Out of bed for toileting  - Record patient progress and toleration of activity level   Outcome: Progressing     Problem: DISCHARGE PLANNING  Goal: Discharge to home or other facility with appropriate resources  Description: INTERVENTIONS:  - Identify barriers to discharge w/patient and caregiver  -  Arrange for needed discharge resources and transportation as appropriate  - Identify discharge learning needs (meds, wound care, etc.)  - Arrange for interpretive services to assist at discharge as needed  - Refer to Case Management Department for coordinating discharge planning if the patient needs post-hospital services based on physician/advanced practitioner order or complex needs related to functional status, cognitive ability, or social support system  Outcome: Progressing     Problem: Knowledge Deficit  Goal: Patient/family/caregiver demonstrates understanding of disease process, treatment plan, medications, and discharge instructions  Description: Complete learning assessment and assess knowledge base.  Interventions:  - Provide teaching at level of understanding  - Provide teaching via preferred learning methods  Outcome: Progressing     Problem: CARDIOVASCULAR - ADULT  Goal: Maintains optimal cardiac output and hemodynamic stability  Description: INTERVENTIONS:  - Monitor I/O, vital signs and rhythm  - Monitor for S/S and trends of decreased cardiac output  - Administer and titrate ordered vasoactive medications to optimize hemodynamic stability  - Assess quality of pulses, skin color and temperature  - Assess for signs of decreased coronary artery perfusion  - Instruct patient to report change in severity of symptoms  Outcome: Progressing  Goal: Absence of cardiac dysrhythmias or at baseline rhythm  Description: INTERVENTIONS:  - Continuous cardiac monitoring, vital signs, obtain 12 lead EKG if ordered  - Administer antiarrhythmic and heart rate control medications as ordered  - Monitor electrolytes and administer replacement therapy as ordered  Outcome: Progressing

## 2024-06-20 NOTE — ASSESSMENT & PLAN NOTE
Patient presented to hospital for planned surgery with urology. Underwent TURP, became bradycardic in the pacu. Urology disccused with cardiology, consult was placed   Continue Negron  Follow up with Urology OP

## 2024-06-20 NOTE — TELEPHONE ENCOUNTER
S/p TURBT at [facility] on 6/20/24    Unfortunately tumor at very difficult location and unable to resect whole thing with the regular scope    20F 10 cc ayon in. Needs office TOV in about 3-5 days    Elsa: I ordered case request for TURBT, poss gemcitabine. I could not give gem today since tumor was not completely resected. He needs to have extra long resectoscope for this case. If he wants to do it at [facility], it needs to be transferred from [place] or [place]. I would like to do this case within next few weeks if possible. If I have the right equipment, it should not take very long. Based on his pathology results from the first case I will decide if I am going to give gemcitabine at the next case and will send a message about that accordingly.    Let me know if any questions.

## 2024-06-20 NOTE — DISCHARGE INSTR - AVS FIRST PAGE
Mr. Burgess,        Unfortunately, the location of your tumor is all the way the top of your bladder, which is overall very dilated.  This means I was not able to reach the entire tumor with my scope.  I was able to cut out about half of the tumor.  I will need to custom order special equipment that will be long enough to reach her tumor.  We will therefore need to schedule for a repeat procedure in the coming weeks to cut out the rest of the tumor.    You have a Negron catheter in place to let your bladder decompress and heal the next couple days.  The office will call you to have this removed likely early next week.    Please see the rest of the postoperative instructions below for details.    Please call the office with any questions or concerns.        Sincerely,  Nick Patel        You had a procedure on your bladder.     You have Negron catheter in place. Please see specific catheter instructions below.    You may eat whatever you like after your surgery. Sometimes anesthesia can cause nausea, so it may be a good idea to avoid heavy foods for a few days after your procedure.     Please take over the counter Tylenol as needed for pain.    Your catheter will be removed in the office.  The office will schedule this with you.    You can resume your blood thinner, aspirin 81 mg, on 6/21/2024.              Negron catheter      You are going home with a tube in the bladder called a Negron catheter. This drains urine from your bladder out of the tube exiting from your urethra.   Please make sure that the catheter is always secured to the leg. There should never be any tension or tugging on the catheter. Take care not to pull your catheter when rolling in bed or changing position.   You can have your catheter draining to a large bag or a smaller leg bag. Patients typically like to use the larger bag overnight so that they do not need to wake up in middle of night to empty the smaller leg bag.  The Negron catheter has a  balloon on the end of it to keep it from falling out of the bladder. This balloon may give you the feeling that you need to urinate. These sensations are called bladder spasms.  Bladder spasms may also cause you to urinate around the Negron catheter. This is not abnormal and not an emergency as long as you see that additional urine is draining properly in the catheter tubing.  Please monitor the catheter periodically to make sure that it is draining. If the catheter stops draining, get up and walk around. If it is still not draining, call the office or come to the ER as the catheter may be clogged and need to be irrigated.  You may see blood in catheter tubing. This is normal. Please make sure that you drink plenty of fluids. Be sure to call office or present to ER if you notice large blood clots in your urine or if catheter is not draining as explained above.  Office will call you to arrange for Negron catheter removal.  Once the Negron catheter is removed, it is normal to have burning and stinging with urination for a few weeks after surgery. It is also common to have more frequent urination and a greater sense of urge to urinate. There may not be much warning from the time you feel the urge to urinate to the time when bladder is ready to empty.      Activity    It is very important to walk after your procedure. Walking prevents blood clots in legs and lungs. You may go up and down stairs.  Since you still have a catheter in place, please be sure not perform any movements that would tug on catheter and cause bleeding.  Avoid any strenuous activity or lifting more than ten pounds until your follow up appointment about 4 weeks after surgery. This includes any heavy lifting, running, riding a bicycle, or golf. This also includes activities such as raking leaves, mowing the lawn, shoveling snow, or other strenuous chores.  If you see blood in your urine, increase the amount of water you are drinking and avoid strenuous  activity until the blood clears.  You may shower the day after your surgery. If you have a catheter in place, please be careful not to have the catheter tugged while you are bathing. With the catheter in place, you may notice some clot or residue on the part of the tube coming out of the urethra. You can gently clean this with soap and water as needed.  It is typically best to avoid baths, hot tubs, pools, or any body of standing water while you have Negron catheter in place. If there is no Negron catheter in place, you can resume showering/bathing/swimming as usual.        Follow up plan    Since you are going home with a Negron catheter (tube coming out of the urethra that drains your bladder), the office will call you to schedule you for Negron catheter removal.   As mentioned above, we will schedule you for a repeat operative procedure to cut out the rest of your bladder tumor.    Return precautions    Please call the office or present to ER if you experience concerning symptoms including but not limited to: fever greater than 100.4 degrees Fahrenheit, chills, nausea, vomiting, passage of large blood clots in urine, inability to urinate (or lack of urine draining from Negron catheter if you have one in place), abdominal/flank pain.          AFTER PACEMAKER CARE:  If any questions,  please call  [phone] .   WHAT YOU SHOULD KNOW:   A pacemaker is a small, battery-powered device under your skin with wires placed from a vein directly into the heart. that is implanted into your chest to help regulate your heart rate and prevent your heart from beating to slowly.   AFTER YOU LEAVE:   Medicines:   Pain medicine:    You may need medicine to take away or decrease pain.     Learn how to take your medicine. Ask what medicine and how much you should take. Be sure you know how, when, and how often to take it. Usually Over the counter pain medicine is sufficient to control pain (Acetominophen or Ibuprofen) Ask your doctor if  you may take these. If this does not control your pain, narcotic pain killers may be prescribed, please call if you need prescription.     Do not wait until the pain is severe before you take your medicine. Tell caregivers if your pain does not decrease.    Pain medicine can make you dizzy or sleepy. Prevent falls by calling someone when you get out of bed or if you need help.  Take your medicine as directed.  Call your healthcare provider if you think your medicine is not helping or if you have side effects. Tell him if you are allergic to any medicine.  Follow up with your cardiologist after your procedure:  You will need a follow-up visit approximately 2 weeks after you leave the hospital. Your cardiologist will check your wound and make sure that your pacemaker is working correctly.   Follow the instructions to check your pacemaker:  Your cardiologist or primary healthcare provider will check your pacemaker and the battery regularly.  He will use a computer to check your pacemaker over the telephone or wireless device which will be given to you.   Pacemaker batteries usually last 5 to 10 years. The pacemaker unit will be replaced when the battery gets low. This is a simpler procedure than the original one to implant your pacemaker.  Wound care:  Keep your incisions clean and dry for 7  after your procedure. Sponge bath is ok, but keep the incision dry and avoid a shower for 7 days..   The outer dressing may be removed 2 days after discharge. Steri strips underneath will be fall off on their own over next several weeks. Sometimes additional dressings are applied called pressure dressing, if this is in place, ask your doctor or nurse when this may be removed.   Activity:   Arm movement and lifting:  Be careful using the arm on the side of your pacemaker. Do not move your arm for the first 24 hours after your procedure. Do not  lift your arm above your shoulder or lift more than 10 pounds for 6 weeks after your  procedure. This helps the leads stay in place and helps your wound heal. Ask your caregiver when you can drive after your procedure. You may move your arm side to side without lifting above your shoulder,  and do not need to wear a sling.   Typically driving is allowed after 1 week post pacemaker if you are stable but in some cases it may be longer and you should discuss with your doctor before proceeding.     Sports:  Ask your caregiver when it is okay to play tennis, golf, basketball, or any sport that requires you to lift your arms. Do not play full contact sports, such as football, that could damage your pacemaker. Ask your cardiologist or primary healthcare provider how much and what kinds of physical activity are safe for you.  Living with a pacemaker:   Tell all caregivers you have a pacemaker:  This includes surgeons, radiologists, and medical technicians. You may want to wear a medical alert ID bracelet or necklace that states that you have a pacemaker.    Carry your pacemaker ID card:  Make sure you receive a pacemaker ID card. Carry it with you at all times. It lists important information about your pacemaker. Show it to airport security if you travel.     Avoid electrical interference:  Avoid welding equipment and other equipment with large magnets or electric fields. These things could interfere with how your pacemaker works. Use your cell phone on the ear opposite from your pacemaker. Do not carry your cell phone in your shirt pocket over your chest.   Some Pacemakers are MRI safe. Ask you doctor if it is safe to proceed with MRI and let the radiologist and staff know you have a pacemaker.   Do not touch the skin around your pacemaker:  This can cause damage to the lead wires or move the pacemaker unit from where it should be.  Contact your cardiologist or primary healthcare provider if:   The area around your pacemaker has increasing amount of pain after surgery. The pain should improve over first few  days after implantation.   The skin around your stitches has incresing reddness, swolling, or has drainage. This may mean that you have an infection.   You have a fever.   You have chills, a cough, and feel weak or achy. These are also signs of infection.  Your feet or ankles are more swollen than your baseline.   Your Heart rate is less than 50 beats per minute   Seek care immediately if:   Your bandage becomes soaked with blood.   Your pacemaker is swelling rapidly  Your stitches open up.   You feel your heart suddenly beating very slowly or quickly.  You become too weak or dizzy to stand, or you pass out.   Your arm or leg feels warm, tender, and painful. It may look swollen and red.You have chest pain that does not go away with rest or medicine.   You feel lightheaded, short of breath, and have chest pain. You cough up blood.  © 2014 [employer] Information is for End User's use only and may not be sold, redistributed or otherwise used for commercial purposes. All illustrations and images included in CareNotes® are the copyrighted property of [employer] or [employer].  The above information is an  only. It is not intended as medical advice for individual conditions or treatments. Talk to your doctor, nurse or pharmacist before following any medical regimen to see if it is safe and effective for you.

## 2024-06-20 NOTE — Clinical Note
Prepped: left chest and left neck. Prepped with: ChloraPrep. The site was clipped. The patient was draped.

## 2024-06-20 NOTE — ASSESSMENT & PLAN NOTE
Patient was Bradycardic in PACU after TURP  Cadiology consulted  Monitor on tele  Plan for pacemaker

## 2024-06-20 NOTE — Clinical Note
Site (pad location): upper leg. Laterality: right. Grounding pad site assessment: skin integrity intact.

## 2024-06-20 NOTE — ANESTHESIA POSTPROCEDURE EVALUATION
Post-Op Assessment Note    CV Status:  Stable  Pain Score: 2    Pain management: adequate       Mental Status:  Alert and awake   Hydration Status:  Euvolemic   PONV Controlled:  Controlled   Airway Patency:  Patent  Two or more mitigation strategies used for obstructive sleep apnea. There is a medical reason for not screening for obstructive sleep apnea and/or for not using two or more mitigation strategies   Post Op Vitals Reviewed: Yes    No anethesia notable event occurred.    Staff: CRNA               BP   97/54   Temp 97.9   Pulse 72   Resp 15   SpO2 94

## 2024-06-20 NOTE — OP NOTE
OPERATIVE REPORT  PATIENT NAME: Trent Ocasio    :  1945  MRN: [id number]  Pt Location: [facility] OR ROOM 04    SURGERY DATE: 2024    Surgeons and Role:     * Nick Patel DO - Primary    Preop Diagnosis:  Bladder tumor [D49.4]    Post-Op Diagnosis Codes:     * Bladder tumor [D49.4]    Procedure(s):  TRANSURETHRAL RESECTION OF BLADDER TUMOR (TURBT)    Specimen(s):  ID Type Source Tests Collected by Time Destination   1 : Bladder Tumor Tissue Urinary Bladder TISSUE EXAM Nick Francisco J PatelDO 2024 0820        Estimated Blood Loss:   Minimal    Drains:  Urethral Catheter Latex 20 Fr. (Active)   Number of days: 0       Anesthesia Type:   General    Operative Indications:  Bladder tumor [D49.4]        79 y.o. old male with bladder tumor     Anticoagulation: Aspirin 81 mg     He was admitted around May 2024 with CHF.  He was found to have an elevated creatinine of 2.76.  He had a workup with nephrology which included a kidney bladder ultrasound.     Kidney bladder ultrasound 2024: New ovoid structure projecting from the posterior bladder with central hypoechoic area measuring about 3.2 cm     He had office cystoscopy with Dr. Oliveira on 2024 which demonstrated large posterior wall bladder mass     He was then set up for TURBT, intravesical instillation of gemcitabine     He had a positive preoperative urine culture which was pretreated with Augmentin     I had a long conversation with him on 2024.  Please see the telephone note for details.  We discussed the cardiac risk for the procedure.  He ultimately opted to proceed with surgery today knowing that he is at moderate to high risk of cardiac complications per his cardiologist.          Operative Findings:        No meatal stenosis  No urethral strictures  Prostate: mild to moderately enlarged  Bilateral ureteral orifices in orthotopic positions  No bladder stones  Trabeculations: mild  Cellules/diverticuli: large dome chimney,  very hard to reach with scope    Bladder lesion #1  Location: Posterior aspect of the dome chimney  Size: 4 cm  Appearance: Papillary  Predicted depth: Ta  -Tumor extremely difficult to reach with the resectoscope due to the fact that it was in the dome chimney.  I was only able to resect about 50% of the tumor with maximal downward pressure on the lower abdomen and pushing all the way in with the resectoscope  -Repeat case must be done with extra long scope next time    Due to incomplete resection of the tumor, gemcitabine was not given              Complications:   None    Procedure and Technique:          Patient was identified in the preop holding area.  Consent was obtained.  Risks and benefits of the procedure were explained to the patient.  Patient was in agreement.  Patient was brought back to the OR and placed supine on the table.  Bilateral lower extremities were placed and turned on.  Patient underwent smooth induction of general anesthesia.  IV Ancef was administered for preoperative antibiotics.  Patient's legs were placed in stirrups.  Patient was in dorsolithotomy position.  Area was prepped and draped in sterile fashion.  Timeout was performed by the OR team.    Male urethral sounds were used to sequentially dilate the urethral meatus.    Case began with insertion of resectoscope.  Pan cystourethroscopy was performed.  See the above findings for details.    The lesions listed in the findings section above were noted.    As stated above, unfortunately, due to the location of the tumor, only part of the tumor was able to be accessed with the resectoscope despite pushing all the way and the with the resectoscope and maximal downward pressure on the abdomen.  Bipolar electrocautery loop was used to resect about 50% of the tumor.  Bladder tumor chips from each of the lesions above were sent for pathology.  Bipolar button was used to cauterize the resection site areas that were persistently oozing.  After  cauterizing with the bipolar button, hemostasis was excellent with the flow off.    The resection sites were then examined with the inflow off.  Hemostasis was excellent.    Scope was removed.  20 [place] Negron catheter was placed.  There was return of clear urine.  10 cc of urine were placed in the balloon.     Patient was uneventfully awoken from anesthesia.  Patient was brought to PACU in good condition.               A qualified resident physician was not available.    Patient Disposition:  PACU         SIGNATURE: Nick Patel,   DATE: June 20, 2024  TIME: 8:54 AM      Plan  - Patient is 20 [place] two-way Negron catheter in place.  10 cc in balloon.  Patient can have office trial of void in 3 to 5 days.  - Due to the location of the tumor and lack of extra long resectoscope, the tumor was incompletely resected.  Patient will need a repeat trip to the operating room to have the rest of his tumor resected.  The case will need to be booked with an extra long resectoscope.

## 2024-06-20 NOTE — H&P
UROLOGY H&P NOTE     Patient Identifiers: Trent Ocasio (MRN [id number])    Date of Service: 6/20/2024    History of Present Illness:     Trent Ocasio is a 79 y.o. old with a history of bladder tumor    Past Medical, Past Surgical History:     Past Medical History:   Diagnosis Date    Acute respiratory failure with hypoxia (HCC) 03/28/2024    Chronic kidney disease     Diabetes mellitus (HCC)     Hypertension     Respiratory acidosis 03/31/2024   :    Past Surgical History:   Procedure Laterality Date    APPENDECTOMY      CARDIAC CATHETERIZATION Left 05/24/2024    Procedure: Cardiac Left Heart Cath;  Surgeon: Qiana Torres MD;  Location: [facility];  Service: Cardiology    CARDIAC CATHETERIZATION N/A 05/24/2024    Procedure: Cardiac Coronary Angiogram;  Surgeon: Qiana Torres MD;  Location: [facility];  Service: Cardiology    CARDIAC CATHETERIZATION  05/24/2024    Procedure: Cardiac catheterization;  Surgeon: Qiana Torres MD;  Location: [facility];  Service: Cardiology   :    Medications, Allergies:     Current Facility-Administered Medications   Medication Dose Route Frequency    ceFAZolin (ANCEF) IVPB (premix in dextrose) 2,000 mg 50 mL  2,000 mg Intravenous Once    gemcitabine (GEMZAR) 2,000 mg in sodium chloride 0.9 % 47.4 mL bladder instillation  2,000 mg Intravesical Once       Allergies:  Allergies   Allergen Reactions    Canagliflozin GI Intolerance     Yeast infection  Yeast infection      Lisinopril Other (See Comments)    Lovastatin GI Intolerance   :    Social and Family History:   Social History:   Social History     Tobacco Use    Smoking status: Former     Passive exposure: Past    Smokeless tobacco: Never   Vaping Use    Vaping status: Never Used   Substance Use Topics    Alcohol use: Not Currently    Drug use: Never   .    Social History     Tobacco Use   Smoking Status Former    Passive exposure: Past   Smokeless Tobacco Never       Family  "History:  History reviewed. No pertinent family history.:     Review of Systems:     General: Fever, chills, or night sweats: negative  Cardiac: Negative for chest pain.    Pulmonary: Negative for shortness of breath.  Gastrointestinal: Abdominal pain negative.  Nausea, vomiting, or diarrhea negative,  Genitourinary: See HPI above.  Patient does not have hematuria.  All other systems queried were negative.    Physical Exam:   General: Patient is pleasant and in NAD. Awake and alert  /63   Pulse 88   Temp 97.7 °F (36.5 °C) (Temporal)   Resp 18   Ht 5' 10\" (1.778 m)   Wt 108 kg (238 lb 1.6 oz)   SpO2 98%   BMI 34.16 kg/m² Temp (24hrs), Av.7 °F (36.5 °C), Min:97.7 °F (36.5 °C), Max:97.7 °F (36.5 °C)  current; Temperature: 97.7 °F (36.5 °C)  No intake/output data recorded.  Cardiac: Peripheral edema: negative  Pulmonary: Non-labored breathing  Abdomen: Soft, non-tender, non-distended.  No surgical scars.  No masses, tenderness, hernias noted.    Genitourinary: Negative CVA tenderness, negative suprapubic tenderness.      Labs:     Lab Results   Component Value Date    HGB 10.9 (L) 2024    HCT 33.0 (L) 2024    WBC 4.80 2024     2024   ]    Lab Results   Component Value Date    K 4.1 2024     2024    CO2 27 2024    BUN 35 (H) 2024    CREATININE 1.93 (H) 2024    CALCIUM 9.5 2024   ]    Imaging:   I personally reviewed the images and report of the following studies, and reviewed them with the patient:    Reviewed, see below      ASSESSMENT:     79 y.o. old male with bladder tumor    Anticoagulation: Aspirin 81 mg    He was admitted around May 2024 with CHF.  He was found to have an elevated creatinine of 2.76.  He had a workup with nephrology which included a kidney bladder ultrasound.    Kidney bladder ultrasound 2024: New ovoid structure projecting from the posterior bladder with central hypoechoic area measuring about 3.2 " cm    He had office cystoscopy with Dr. Oliveira on 5/22/2024 which demonstrated large posterior wall bladder mass    He was then set up for TURBT, intravesical instillation of gemcitabine    He had a positive preoperative urine culture which was pretreated with Augmentin    I had a long conversation with him on 6/18/2024.  Please see the telephone note for details.  We discussed the cardiac risk for the procedure.  He ultimately opted to proceed with surgery today knowing that he is at moderate to high risk of cardiac complications per his cardiologist.        Patient was consented for transurethral resection of bladder tumor, possible intravesical instillation of chemotherapy agent.    I explained that the procedure would be performed under general anesthesia in the operating room. I explained that during procedure, we would place scope into bladder. We would then look around bladder for any tumors or any areas of bladder that appeared suspicious for bladder cancer.     If the lesions or concerning areas were flat or generally small, we would remove tissue using biopsy forceps. This tissue would be sent for pathology. We would then coagulate the area with hot current device known as Bugbee to stop any bleeding. We would additionally use Bugbee to treat surrounding areas that appeared suspicious for cancer. I explained that this process could help kill superficial cancer cells and prevent recurrence.    If the lesions were larger, they would resected with hot current loop that could more formally cut into bladder tissue for a more thorough resection of the lesion. I explained that these pieces of tumor would then be collected through the scope and sent for pathology as well. I explained that the loop could also be used to coagulate adjacent areas of the bladder that appeared suspicious for bladder cancer.    We reviewed the basic risks of procedure which include but are not limited to: risks of general anesthesia,  bleeding, infection, need for repeat procedures, need for hospitalization, worsening voiding symptoms, need for prolonged Negron catheter, bladder injury, urethral injury, injury to ureteral orifices, need for ureteral stent placement.    I explained that depending of the appearance of the tumor that I remove during surgery, I may opt to instill intravesical chemotherapy into the bladder. I explained that this means that at the end of the procedure while the patient is still asleep, I would place Negron catheter into the bladder. I would then inject chemotherapy agent through the catheter and into the bladder. I would then clamp the catheter so that the medicine could sit inside. Patient would then be transferred to PACU with the chemotherapy agent inside the bladder. After an hour, the catheter would be unclamped and Negron catheter would be removed. Patient would then have a trial of void prior to leaving hospital.    I explained that treatment with these chemotherapy agents in the bladder after surgery for low or intermediate risk bladder cancer has been shown to decrease rates of recurrence for bladder cancer.    I explained that if the resection for surgery was too deep into the bladder wall, we would not be able to give the intravesical chemotherapy.    I explained that the chemotherapy agents that would be placed in the bladder would be either gemcitabine or mitomycin. We reviewed the risks of these intravesical medications which include but are not limited to: nausea, vomiting, flu-like symptoms, uncomfortable voiding symptoms, dermatitis, swelling, hematuria, breathing difficulties, bladder wall necrosis, damage to nearby structures.    We will plan to schedule patient for surgery. I explained to patient that we would schedule PATs for procedure.    Patient verbalized understanding. All questions were answered.    I explained to patient that surgical scheduler would reach out to confirm a date.        PLAN:      -preop ancef  -OR plan as above  -will stay overnight for monitoring  -Once creatinine stabilized, will need to consider CT urogram for upper tract evaluation

## 2024-06-20 NOTE — H&P
[facility]  H&P  Name: Trent Ocasio 79 y.o. male I MRN: [id number]  Unit/Bed#: 2 E 282-01 I Date of Admission: 6/20/2024   Date of Service: 6/20/2024 I Hospital Day: 0      Assessment & Plan   * Bladder tumor  Assessment & Plan  Patient presented to hospital for planned surgery with urology. Underwent TURP, became bradycardic in the pacu. Urology disccused with cardiology, consult was placed   Continue Negron  Follow up with Urology OP    Bradycardia  Assessment & Plan  Patient was Bradycardic in PACU after TURP  Cadiology consulted  Monitor on tele  Plan for pacemaker    Type 2 diabetes mellitus, with long-term current use of insulin (HCC)  Assessment & Plan    Lab Results   Component Value Date    HGBA1C 7.3 (H) 06/11/2024   Hold home oral  SSI  CC diet            VTE Prophylaxis: Heparin  / sequential compression device   Code Status: full code  Discussion with family: wife at bedside    Anticipated Length of Stay:  Patient will be admitted on an Outpatient Surgery basis with an anticipated length of stay of  > 2 midnights.   Justification for Hospital Stay: pacemaker    Total Time for Visit, including Counseling / Coordination of Care: 70 minutes.  Greater than 50% of this total time spent on direct patient counseling and coordination of care.    Past Medical History:    Past Medical History:   Diagnosis Date    Acute respiratory failure with hypoxia (HCC) 03/28/2024    Chronic kidney disease     Diabetes mellitus (HCC)     Hypertension     Respiratory acidosis 03/31/2024       Chief Complaint:   No chief complaint on file.      History of Present Illness:    Trent Ocasio is a 79 y.o. male with past medical history of above who presents with  bradycardia. Bradycardia in PACU after TURP    Review of Systems:    Review of Systems   Constitutional: Negative.    HENT: Negative.     Eyes: Negative.    Respiratory: Negative.     Cardiovascular: Negative.    Endocrine: Negative.     Genitourinary: Negative.    Neurological: Negative.        Past Surgical History:     Past Medical History:   Diagnosis Date    Acute respiratory failure with hypoxia (HCC) 03/28/2024    Chronic kidney disease     Diabetes mellitus (HCC)     Hypertension     Respiratory acidosis 03/31/2024       Past Surgical History:   Procedure Laterality Date    APPENDECTOMY      CARDIAC CATHETERIZATION Left 05/24/2024    Procedure: Cardiac Left Heart Cath;  Surgeon: Qiana Torres MD;  Location: [facility];  Service: Cardiology    CARDIAC CATHETERIZATION N/A 05/24/2024    Procedure: Cardiac Coronary Angiogram;  Surgeon: Qiana Torres MD;  Location: [facility];  Service: Cardiology    CARDIAC CATHETERIZATION  05/24/2024    Procedure: Cardiac catheterization;  Surgeon: Qiana Torers MD;  Location: [facility];  Service: Cardiology       Meds/Allergies:    Prior to Admission medications    Medication Sig Start Date End Date Taking? Authorizing Provider   amLODIPine (NORVASC) 10 mg tablet Take 10 mg by mouth daily 6/11/24  Yes Historical Provider, MD   aspirin 81 mg chewable tablet Chew 81 mg daily   Yes Historical Provider, MD   calcitriol (ROCALTROL) 0.25 mcg capsule Take 1 capsule (0.25 mcg total) by mouth daily 5/3/24  Yes Nikki Gilbert MD   cholecalciferol 25 MCG (1000 UT) tablet Take 1,000 Units by mouth daily   Yes Historical Provider, MD   ezetimibe (ZETIA) 10 mg tablet Take 10 mg by mouth daily   Yes Historical Provider, MD   glipiZIDE (GLUCOTROL) 10 mg tablet Take 10 mg by mouth 2 (two) times a day before meals   Yes Historical Provider, MD   insulin degludec (Tresiba FlexTouch) 100 units/mL injection pen Inject 20 Units under the skin daily at bedtime   Yes Historical Provider, MD   isosorbide mononitrate (IMDUR) 30 mg 24 hr tablet Take 1 tablet (30 mg total) by mouth daily 5/24/24  Yes Dante Swanson PA-C   latanoprost (XALATAN) 0.005 % ophthalmic solution Administer 1 drop to both  "eyes daily at bedtime   Yes Historical Provider, MD   levothyroxine 50 mcg tablet Take 50 mcg by mouth daily   Yes Historical Provider, MD   losartan (COZAAR) 50 mg tablet Take 50 mg by mouth daily   Yes Historical Provider, MD   metFORMIN (GLUCOPHAGE) 500 mg tablet Take 500 mg by mouth 2 (two) times a day with meals   Yes Historical Provider, MD   pantoprazole (PROTONIX) 40 mg tablet Take 40 mg by mouth daily at bedtime   Yes Historical Provider, MD   pioglitazone (ACTOS) 45 mg tablet Take 45 mg by mouth daily   Yes Historical Provider, MD   simvastatin (ZOCOR) 40 mg tablet Take 40 mg by mouth daily at bedtime   Yes Historical Provider, MD   torsemide (DEMADEX) 20 mg tablet Take 2 tablets (40 mg total) by mouth daily  Patient taking differently: Take 40 mg by mouth 2 (two) times a day 5/26/24  Yes Sunday Gilbert MD     I have reviewed home medications using allscripts.    Allergies:   Allergies   Allergen Reactions    Canagliflozin GI Intolerance     Yeast infection  Yeast infection      Lisinopril Other (See Comments)    Lovastatin GI Intolerance       Social History:     Marital Status: /Civil Union   Occupation: na  Patient Pre-hospital Living Situation: home  Patient Pre-hospital Level of Mobility: self  Patient Pre-hospital Diet Restrictions: CC  Substance Use History:   Social History     Substance and Sexual Activity   Alcohol Use Not Currently     Social History     Tobacco Use   Smoking Status Former    Passive exposure: Past   Smokeless Tobacco Never     Social History     Substance and Sexual Activity   Drug Use Never       Family History:    History reviewed. No pertinent family history.    Physical Exam:     Vitals:   Blood Pressure: 120/51 (06/20/24 1034)  Pulse: 75 (06/20/24 1034)  Temperature: 97.9 °F (36.6 °C) (06/20/24 0900)  Temp Source: Temporal (06/20/24 0655)  Respirations: 18 (06/20/24 1034)  Height: 5' 10\" (177.8 cm) (06/20/24 0655)  Weight - Scale: 108 kg (238 lb 1.6 oz) " (06/20/24 0655)  SpO2: 95 % (06/20/24 1034)    Physical Exam  Vitals and nursing note reviewed.   Constitutional:       General: He is not in acute distress.     Appearance: He is obese. He is not ill-appearing.   Cardiovascular:      Rate and Rhythm: Normal rate.   Pulmonary:      Effort: No respiratory distress.      Comments: HR in the 70s on my exam  Skin:     Coloration: Skin is pale.   Neurological:      Mental Status: He is alert. Mental status is at baseline.   Psychiatric:         Mood and Affect: Mood normal.       Additional Data:     Lab Results: I have personally reviewed pertinent reports.                Results from last 7 days   Lab Units 06/20/24  0940 06/20/24  0703   POC GLUCOSE mg/dl 80 88               EKG, Pathology, and Other Studies Reviewed on Admission:   EKG: bradycardia  Outpatient documentation reviewed when available     [facility] Records Reviewed: Yes     ** Please Note: This note has been constructed using a voice recognition system. **

## 2024-06-20 NOTE — PLAN OF CARE
Problem: PAIN - ADULT  Goal: Verbalizes/displays adequate comfort level or baseline comfort level  Description: Interventions:  - Encourage patient to monitor pain and request assistance  - Assess pain using appropriate pain scale  - Administer analgesics based on type and severity of pain and evaluate response  - Implement non-pharmacological measures as appropriate and evaluate response  - Consider cultural and social influences on pain and pain management  - Notify physician/advanced practitioner if interventions unsuccessful or patient reports new pain  Outcome: Progressing     Problem: INFECTION - ADULT  Goal: Absence or prevention of progression during hospitalization  Description: INTERVENTIONS:  - Assess and monitor for signs and symptoms of infection  - Monitor lab/diagnostic results  - Monitor all insertion sites, i.e. indwelling lines, tubes, and drains  - Monitor endotracheal if appropriate and nasal secretions for changes in amount and color  - [place] appropriate cooling/warming therapies per order  - Administer medications as ordered  - Instruct and encourage patient and family to use good hand hygiene technique  - Identify and instruct in appropriate isolation precautions for identified infection/condition  Outcome: Progressing  Goal: Absence of fever/infection during neutropenic period  Description: INTERVENTIONS:  - Monitor WBC    Outcome: Progressing     Problem: SAFETY ADULT  Goal: Patient will remain free of falls  Description: INTERVENTIONS:  - Educate patient/family on patient safety including physical limitations  - Instruct patient to call for assistance with activity   - Consult OT/PT to assist with strengthening/mobility   - Keep Call bell within reach  - Keep bed low and locked with side rails adjusted as appropriate  - Keep care items and personal belongings within reach  - Initiate and maintain comfort rounds  - Make Fall Risk Sign visible to staff  - Offer Toileting every 2 Hours,  in advance of need  - Initiate/Maintain bed alarm  - Obtain necessary fall risk management equipment:   - Apply yellow socks and bracelet for high fall risk patients  - Consider moving patient to room near nurses station  Outcome: Progressing  Goal: Maintain or return to baseline ADL function  Description: INTERVENTIONS:  -  Assess patient's ability to carry out ADLs; assess patient's baseline for ADL function and identify physical deficits which impact ability to perform ADLs (bathing, care of mouth/teeth, toileting, grooming, dressing, etc.)  - Assess/evaluate cause of self-care deficits   - Assess range of motion  - Assess patient's mobility; develop plan if impaired  - Assess patient's need for assistive devices and provide as appropriate  - Encourage maximum independence but intervene and supervise when necessary  - Involve family in performance of ADLs  - Assess for home care needs following discharge   - Consider OT consult to assist with ADL evaluation and planning for discharge  - Provide patient education as appropriate  Outcome: Progressing  Goal: Maintains/Returns to pre admission functional level  Description: INTERVENTIONS:  - Perform AM-PAC 6 Click Basic Mobility/ Daily Activity assessment daily.  - Set and communicate daily mobility goal to care team and patient/family/caregiver.   - Collaborate with rehabilitation services on mobility goals if consulted  - Perform Range of Motion  times a day.  - Reposition patient every  hours.  - Dangle patient  times a day  - Stand patient  times a day  - Ambulate patient 2 times a day  - Out of bed to chair 3 times a day   - Out of bed for meals 3 times a day  - Out of bed for toileting  - Record patient progress and toleration of activity level   Outcome: Progressing     Problem: DISCHARGE PLANNING  Goal: Discharge to home or other facility with appropriate resources  Description: INTERVENTIONS:  - Identify barriers to discharge w/patient and caregiver  -  Arrange for needed discharge resources and transportation as appropriate  - Identify discharge learning needs (meds, wound care, etc.)  - Arrange for interpretive services to assist at discharge as needed  - Refer to Case Management Department for coordinating discharge planning if the patient needs post-hospital services based on physician/advanced practitioner order or complex needs related to functional status, cognitive ability, or social support system  Outcome: Progressing     Problem: Knowledge Deficit  Goal: Patient/family/caregiver demonstrates understanding of disease process, treatment plan, medications, and discharge instructions  Description: Complete learning assessment and assess knowledge base.  Interventions:  - Provide teaching at level of understanding  - Provide teaching via preferred learning methods  Outcome: Progressing

## 2024-06-20 NOTE — CONSULTS
Consultation - Cardiology   Trent Ocasio 79 y.o. male MRN: [id number]  Unit/Bed#: 2 E 282-01 Encounter: [id number]  06/20/24  4:29 PM    Assessment/ Plan:  Advanced AVB, sinus bradycardia  MV CAD with  of LAD  Chronic HFpEF  Hypertension  Hyperlipidemia  T2DM, A1c 7.3  CKD 3b  Anemia  History of CVA  Bladder tumor      Presented for outpatient TURBT.  Following procedure patient developed Bradycardia with advanced 2-1 AVB and HR in the 30s.    Recent outpatient event monitor results pending.    Discussed indications/risks/benefits of PPM implant.  Patient verbalizes understanding and wishes to proceed.  All questions were answered.  Case discussed with spouse at bedside who is also agreeable with plan.  Will plan for such tomorrow.    Continue avoidance of AV maria m blockers.  Transcutaneous pacer at bedside.    Scheduled to see cardiac surgery for CABG evaluation in July.  Of note, patient currently prefers medical management of CAD.    Continue ASA, pravastatin, Zetia, amlodipine, Imdur, losartan, and torsemide.        History of Present Illness   Physician Requesting Consult: Chris Henderson DO    Reason for Consult / Principal Problem: Bradycardia    HPI: Trent Ocasio is a 79 y.o. year old male with history of sinus bradycardia, MV CAD with  of LAD, chronic HFpEF, hypertension, hyperlipidemia, T2DM, CKD 3B, anemia, CVA, and bladder tumor who presented for outpatient TURBT.  Following procedure patient developed significant bradycardia.  Cardiology consulted for further evaluation and management.  Patient was found to be in advanced 2-1 AVB with HR in the 30s.  An outpatient underwent recent cardiac catheterization within the past month revealed 100%  of pLAD and 80% stenosis of distal circumflex.  He is scheduled for CABG evaluation in July.  Denies any additional complaints at this time.  Follows with Dr. Torres as primary cardiologist.      Inpatient consult to Cardiology  Consult performed  by: Dante Swanson PA-C  Consult ordered by: Danelle Amaya PA-C          Review of Systems   Constitutional:  Negative for chills and fever.   HENT:  Negative for ear pain and sore throat.    Eyes:  Negative for pain and visual disturbance.   Respiratory:  Negative for cough and shortness of breath.    Cardiovascular:  Negative for chest pain, palpitations and leg swelling.   Gastrointestinal:  Negative for abdominal pain and vomiting.   Genitourinary:  Negative for dysuria and hematuria.   Musculoskeletal:  Negative for arthralgias and back pain.   Skin:  Negative for color change and rash.   Neurological:  Positive for light-headedness. Negative for seizures and syncope.   All other systems reviewed and are negative.      Historical Information   Past Medical History:   Diagnosis Date    Acute respiratory failure with hypoxia (HCC) 03/28/2024    Chronic kidney disease     Diabetes mellitus (HCC)     Hypertension     Respiratory acidosis 03/31/2024     Past Surgical History:   Procedure Laterality Date    APPENDECTOMY      CARDIAC CATHETERIZATION Left 05/24/2024    Procedure: Cardiac Left Heart Cath;  Surgeon: Qiana Torres MD;  Location: [facility];  Service: Cardiology    CARDIAC CATHETERIZATION N/A 05/24/2024    Procedure: Cardiac Coronary Angiogram;  Surgeon: Qiana Torres MD;  Location: [facility];  Service: Cardiology    CARDIAC CATHETERIZATION  05/24/2024    Procedure: Cardiac catheterization;  Surgeon: Qiana Torres MD;  Location: [facility];  Service: Cardiology     Social History     Substance and Sexual Activity   Alcohol Use Not Currently     Social History     Substance and Sexual Activity   Drug Use Never     Social History     Tobacco Use   Smoking Status Former    Passive exposure: Past   Smokeless Tobacco Never       Family History: History reviewed. No pertinent family history.    Meds/Allergies   all current active meds have been reviewed  Allergies  "  Allergen Reactions    Canagliflozin GI Intolerance     Yeast infection  Yeast infection      Lisinopril Other (See Comments)    Lovastatin GI Intolerance       Objective   Vitals: Blood pressure 124/55, pulse 66, temperature 97.9 °F (36.6 °C), resp. rate 18, height 5' 10\" (1.778 m), weight 108 kg (238 lb 1.6 oz), SpO2 91%., Body mass index is 34.16 kg/m².,   Orthostatic Blood Pressures      Flowsheet Row Most Recent Value   Blood Pressure 124/55 filed at 2024 1502            Systolic (24hrs), Av , Min:59 , Max:144     Diastolic (24hrs), Av, Min:37, Max:63        Intake/Output Summary (Last 24 hours) at 2024 1629  Last data filed at 2024 1100  Gross per 24 hour   Intake --   Output 200 ml   Net -200 ml       Invasive Devices       Peripheral Intravenous Line  Duration             Peripheral IV 24 Dorsal (posterior);Right Forearm <1 day              Drain  Duration             Urethral Catheter Latex 20 Fr. <1 day              Airway  Duration             Supraglottic Airway LMA 5 <1 day                        Physical Exam:  GEN: Alert and oriented x 3, in no acute distress.  Ill appearing, obese, and well nourished.   HEENT: Sclera anicteric, conjunctivae pink, mucous membranes moist. Oropharynx clear.   NECK: Supple, no carotid bruits, no significant JVD. Trachea midline, no thyromegaly.   HEART: Bradycardic, regular rhythm, normal S1 and S2, no murmurs, clicks, gallops or rubs. PMI nondisplaced, no thrills.   LUNGS: Clear to auscultation bilaterally; no wheezes, rales, or rhonchi. No increased work of breathing or signs of respiratory distress.   ABDOMEN: Soft, nontender, nondistended, normoactive bowel sounds.   EXTREMITIES: Skin warm and well perfused, no clubbing, cyanosis, or edema.  NEURO: No focal findings. Normal speech. Mood and affect normal.   SKIN: Normal without suspicious lesions on exposed skin.      "

## 2024-06-20 NOTE — ANESTHESIA POSTPROCEDURE EVALUATION
Post-Op Assessment Note            Anethesia notable event occurred.    Staff: Anesthesiologist               BP      Temp      Pulse     Resp      SpO2        Vitals from PACU record.    Called to PACU for pt having bradycardia and hypotension.  HR 30-35.  SBP 57.  Pt placed in trendelenburg position, and fluids opened up.  Pulse and BP improved over the next few minutes without any pharmacologic interventions.  HR > 60, SBP > 100.    Episode discussed with Dr Patel, who agreed that the pt should be admitted for evaluation and observation.  SLIM and Cardiology consulted.    Family made aware.

## 2024-06-20 NOTE — PROGRESS NOTES
Patient status post TURBT.  See operative note for details.    He had Negron catheter in place.  Plan is for him to be discharged from PACU with plan for trial of void in a few days.  He was also plan for repeat operative intervention to resect the rest of his bladder tumor.    Unfortunately, in the PACU he did get bradycardic into the 30s.  He was also hypotensive into the systolic 50s.  Without any medications, his heart rate did come up into the 60s and his systolic did come up into the low 100s.    Cardiology team assessed him at the bedside.  They recommended observation overnight on a telemetry bed.    I also reached out to the on-call internal medicine admission team.  They will assess him and admit him onto their service.        From urology standpoint, the only additional plan of care would be to keep the Negron catheter during admission.  He will have his Negron catheter removed in the outpatient setting.

## 2024-06-20 NOTE — Clinical Note
The PACER GENERATOR MICHELE XT DR ADRIANA TREJO - [id number] device was inserted. The leads were placed into the connector and visually verified to be in correct position. Injury current obtained.

## 2024-06-21 ENCOUNTER — APPOINTMENT (INPATIENT)
Dept: RADIOLOGY | Facility: HOSPITAL | Age: 79
DRG: 243 | End: 2024-06-21
Payer: COMMERCIAL

## 2024-06-21 ENCOUNTER — TELEPHONE (OUTPATIENT)
Dept: CARDIOLOGY CLINIC | Facility: CLINIC | Age: 79
End: 2024-06-21

## 2024-06-21 ENCOUNTER — ANESTHESIA (INPATIENT)
Dept: NON INVASIVE DIAGNOSTICS | Facility: HOSPITAL | Age: 79
DRG: 243 | End: 2024-06-21
Payer: COMMERCIAL

## 2024-06-21 PROBLEM — K21.9 GASTROESOPHAGEAL REFLUX DISEASE: Status: ACTIVE | Noted: 2024-06-21

## 2024-06-21 LAB
ALBUMIN SERPL BCG-MCNC: 3.8 G/DL (ref 3.5–5)
ALP SERPL-CCNC: 45 U/L (ref 34–104)
ALT SERPL W P-5'-P-CCNC: 6 U/L (ref 7–52)
ANION GAP SERPL CALCULATED.3IONS-SCNC: 7 MMOL/L (ref 4–13)
AST SERPL W P-5'-P-CCNC: 12 U/L (ref 13–39)
BILIRUB SERPL-MCNC: 0.27 MG/DL (ref 0.2–1)
BUN SERPL-MCNC: 42 MG/DL (ref 5–25)
CALCIUM SERPL-MCNC: 9.2 MG/DL (ref 8.4–10.2)
CHLORIDE SERPL-SCNC: 106 MMOL/L (ref 96–108)
CO2 SERPL-SCNC: 27 MMOL/L (ref 21–32)
CREAT SERPL-MCNC: 2.46 MG/DL (ref 0.6–1.3)
ERYTHROCYTE [DISTWIDTH] IN BLOOD BY AUTOMATED COUNT: 13.5 % (ref 11.6–15.1)
GFR SERPL CREATININE-BSD FRML MDRD: 23 ML/MIN/1.73SQ M
GLUCOSE SERPL-MCNC: 101 MG/DL (ref 65–140)
GLUCOSE SERPL-MCNC: 105 MG/DL (ref 65–140)
GLUCOSE SERPL-MCNC: 105 MG/DL (ref 65–140)
GLUCOSE SERPL-MCNC: 110 MG/DL (ref 65–140)
GLUCOSE SERPL-MCNC: 201 MG/DL (ref 65–140)
HCT VFR BLD AUTO: 32.8 % (ref 36.5–49.3)
HGB BLD-MCNC: 10.7 G/DL (ref 12–17)
MAGNESIUM SERPL-MCNC: 2 MG/DL (ref 1.9–2.7)
MCH RBC QN AUTO: 32.5 PG (ref 26.8–34.3)
MCHC RBC AUTO-ENTMCNC: 32.6 G/DL (ref 31.4–37.4)
MCV RBC AUTO: 100 FL (ref 82–98)
PLATELET # BLD AUTO: 254 THOUSANDS/UL (ref 149–390)
PMV BLD AUTO: 9.7 FL (ref 8.9–12.7)
POTASSIUM SERPL-SCNC: 4.1 MMOL/L (ref 3.5–5.3)
PROT SERPL-MCNC: 6.5 G/DL (ref 6.4–8.4)
RBC # BLD AUTO: 3.29 MILLION/UL (ref 3.88–5.62)
SODIUM SERPL-SCNC: 140 MMOL/L (ref 135–147)
WBC # BLD AUTO: 7.12 THOUSAND/UL (ref 4.31–10.16)

## 2024-06-21 PROCEDURE — 3E0102A INTRODUCTION OF ANTI-INFECTIVE ENVELOPE INTO SUBCUTANEOUS TISSUE, OPEN APPROACH: ICD-10-PCS | Performed by: INTERNAL MEDICINE

## 2024-06-21 PROCEDURE — C1887 CATHETER, GUIDING: HCPCS | Performed by: INTERNAL MEDICINE

## 2024-06-21 PROCEDURE — 71045 X-RAY EXAM CHEST 1 VIEW: CPT

## 2024-06-21 PROCEDURE — 02H63JZ INSERTION OF PACEMAKER LEAD INTO RIGHT ATRIUM, PERCUTANEOUS APPROACH: ICD-10-PCS | Performed by: INTERNAL MEDICINE

## 2024-06-21 PROCEDURE — C1769 GUIDE WIRE: HCPCS | Performed by: INTERNAL MEDICINE

## 2024-06-21 PROCEDURE — 93005 ELECTROCARDIOGRAM TRACING: CPT

## 2024-06-21 PROCEDURE — 02HK3JZ INSERTION OF PACEMAKER LEAD INTO RIGHT VENTRICLE, PERCUTANEOUS APPROACH: ICD-10-PCS | Performed by: INTERNAL MEDICINE

## 2024-06-21 PROCEDURE — C1898 LEAD, PMKR, OTHER THAN TRANS: HCPCS | Performed by: INTERNAL MEDICINE

## 2024-06-21 PROCEDURE — 0JH606Z INSERTION OF PACEMAKER, DUAL CHAMBER INTO CHEST SUBCUTANEOUS TISSUE AND FASCIA, OPEN APPROACH: ICD-10-PCS | Performed by: INTERNAL MEDICINE

## 2024-06-21 PROCEDURE — 80053 COMPREHEN METABOLIC PANEL: CPT | Performed by: NURSE PRACTITIONER

## 2024-06-21 PROCEDURE — 83735 ASSAY OF MAGNESIUM: CPT | Performed by: NURSE PRACTITIONER

## 2024-06-21 PROCEDURE — C1892 INTRO/SHEATH,FIXED,PEEL-AWAY: HCPCS | Performed by: INTERNAL MEDICINE

## 2024-06-21 PROCEDURE — 99232 SBSQ HOSP IP/OBS MODERATE 35: CPT | Performed by: NURSE PRACTITIONER

## 2024-06-21 PROCEDURE — 82948 REAGENT STRIP/BLOOD GLUCOSE: CPT

## 2024-06-21 PROCEDURE — 85027 COMPLETE CBC AUTOMATED: CPT | Performed by: NURSE PRACTITIONER

## 2024-06-21 PROCEDURE — 33208 INSRT HEART PM ATRIAL & VENT: CPT | Performed by: INTERNAL MEDICINE

## 2024-06-21 PROCEDURE — C1785 PMKR, DUAL, RATE-RESP: HCPCS | Performed by: INTERNAL MEDICINE

## 2024-06-21 DEVICE — LEAD 3830 US MKT/ 69CM MRI LBBAP
Type: IMPLANTABLE DEVICE | Site: HEART | Status: FUNCTIONAL
Brand: SELECTSECURE™ MRI SURESCAN™

## 2024-06-21 DEVICE — IPG W1DR01 AZURE XT DR MRI USA
Type: IMPLANTABLE DEVICE | Status: FUNCTIONAL
Brand: AZURE™ XT DR MRI SURESCAN™

## 2024-06-21 DEVICE — ENVELOPE CMRM6122 ABSORB MED MR
Type: IMPLANTABLE DEVICE | Status: FUNCTIONAL
Brand: TYRX™

## 2024-06-21 DEVICE — LEAD 457453 MRI US BI RCMCRD MVC
Type: IMPLANTABLE DEVICE | Site: HEART | Status: FUNCTIONAL
Brand: CAPSURE SENSE MRI™ SURESCAN™

## 2024-06-21 RX ORDER — CEFAZOLIN SODIUM 2 G/50ML
2000 SOLUTION INTRAVENOUS ONCE
Status: COMPLETED | OUTPATIENT
Start: 2024-06-21 | End: 2024-06-21

## 2024-06-21 RX ORDER — ONDANSETRON 2 MG/ML
4 INJECTION INTRAMUSCULAR; INTRAVENOUS ONCE AS NEEDED
Status: DISCONTINUED | OUTPATIENT
Start: 2024-06-21 | End: 2024-06-21 | Stop reason: HOSPADM

## 2024-06-21 RX ORDER — FENTANYL CITRATE/PF 50 MCG/ML
25 SYRINGE (ML) INJECTION
Status: DISCONTINUED | OUTPATIENT
Start: 2024-06-21 | End: 2024-06-21 | Stop reason: HOSPADM

## 2024-06-21 RX ORDER — SODIUM CHLORIDE, SODIUM LACTATE, POTASSIUM CHLORIDE, CALCIUM CHLORIDE 600; 310; 30; 20 MG/100ML; MG/100ML; MG/100ML; MG/100ML
INJECTION, SOLUTION INTRAVENOUS CONTINUOUS PRN
Status: DISCONTINUED | OUTPATIENT
Start: 2024-06-21 | End: 2024-06-21

## 2024-06-21 RX ORDER — PROPOFOL 10 MG/ML
INJECTION, EMULSION INTRAVENOUS CONTINUOUS PRN
Status: DISCONTINUED | OUTPATIENT
Start: 2024-06-21 | End: 2024-06-21

## 2024-06-21 RX ORDER — ACETAMINOPHEN 10 MG/ML
1000 INJECTION, SOLUTION INTRAVENOUS ONCE
Status: COMPLETED | OUTPATIENT
Start: 2024-06-21 | End: 2024-06-21

## 2024-06-21 RX ORDER — FENTANYL CITRATE 50 UG/ML
INJECTION, SOLUTION INTRAMUSCULAR; INTRAVENOUS AS NEEDED
Status: DISCONTINUED | OUTPATIENT
Start: 2024-06-21 | End: 2024-06-21

## 2024-06-21 RX ORDER — LIDOCAINE HYDROCHLORIDE 10 MG/ML
INJECTION, SOLUTION EPIDURAL; INFILTRATION; INTRACAUDAL; PERINEURAL CODE/TRAUMA/SEDATION MEDICATION
Status: DISCONTINUED | OUTPATIENT
Start: 2024-06-21 | End: 2024-06-21 | Stop reason: HOSPADM

## 2024-06-21 RX ADMIN — SODIUM CHLORIDE 8 MCG: 9 INJECTION, SOLUTION INTRAVENOUS at 08:48

## 2024-06-21 RX ADMIN — SODIUM CHLORIDE, SODIUM LACTATE, POTASSIUM CHLORIDE, AND CALCIUM CHLORIDE: .6; .31; .03; .02 INJECTION, SOLUTION INTRAVENOUS at 07:59

## 2024-06-21 RX ADMIN — LATANOPROST 1 DROP: 50 SOLUTION OPHTHALMIC at 21:37

## 2024-06-21 RX ADMIN — FENTANYL CITRATE 50 MCG: 50 INJECTION, SOLUTION INTRAMUSCULAR; INTRAVENOUS at 08:00

## 2024-06-21 RX ADMIN — INSULIN LISPRO 2 UNITS: 100 INJECTION, SOLUTION INTRAVENOUS; SUBCUTANEOUS at 21:48

## 2024-06-21 RX ADMIN — FENTANYL CITRATE 50 MCG: 50 INJECTION, SOLUTION INTRAMUSCULAR; INTRAVENOUS at 09:18

## 2024-06-21 RX ADMIN — ACETAMINOPHEN 1000 MG: 10 INJECTION INTRAVENOUS at 05:25

## 2024-06-21 RX ADMIN — SODIUM CHLORIDE, SODIUM LACTATE, POTASSIUM CHLORIDE, AND CALCIUM CHLORIDE 125 ML/HR: .6; .31; .03; .02 INJECTION, SOLUTION INTRAVENOUS at 12:58

## 2024-06-21 RX ADMIN — HEPARIN SODIUM 5000 UNITS: 5000 INJECTION INTRAVENOUS; SUBCUTANEOUS at 05:01

## 2024-06-21 RX ADMIN — FENTANYL CITRATE 50 MCG: 50 INJECTION, SOLUTION INTRAMUSCULAR; INTRAVENOUS at 08:02

## 2024-06-21 RX ADMIN — HEPARIN SODIUM 5000 UNITS: 5000 INJECTION INTRAVENOUS; SUBCUTANEOUS at 13:31

## 2024-06-21 RX ADMIN — PRAVASTATIN SODIUM 80 MG: 80 TABLET ORAL at 16:26

## 2024-06-21 RX ADMIN — Medication 2 G: at 21:37

## 2024-06-21 RX ADMIN — FENTANYL CITRATE 25 MCG: 50 INJECTION INTRAMUSCULAR; INTRAVENOUS at 10:34

## 2024-06-21 RX ADMIN — HEPARIN SODIUM 5000 UNITS: 5000 INJECTION INTRAVENOUS; SUBCUTANEOUS at 21:38

## 2024-06-21 RX ADMIN — FENTANYL CITRATE 25 MCG: 50 INJECTION, SOLUTION INTRAMUSCULAR; INTRAVENOUS at 09:25

## 2024-06-21 RX ADMIN — PROPOFOL 30 MCG/KG/MIN: 10 INJECTION, EMULSION INTRAVENOUS at 08:04

## 2024-06-21 RX ADMIN — FENTANYL CITRATE 25 MCG: 50 INJECTION, SOLUTION INTRAMUSCULAR; INTRAVENOUS at 09:35

## 2024-06-21 RX ADMIN — SODIUM CHLORIDE 8 MCG: 9 INJECTION, SOLUTION INTRAVENOUS at 08:03

## 2024-06-21 RX ADMIN — Medication 2 G: at 16:27

## 2024-06-21 RX ADMIN — CEFAZOLIN SODIUM 2000 MG: 2 SOLUTION INTRAVENOUS at 08:05

## 2024-06-21 RX ADMIN — SODIUM CHLORIDE 8 MCG: 9 INJECTION, SOLUTION INTRAVENOUS at 08:18

## 2024-06-21 RX ADMIN — SODIUM CHLORIDE, SODIUM LACTATE, POTASSIUM CHLORIDE, AND CALCIUM CHLORIDE 125 ML/HR: .6; .31; .03; .02 INJECTION, SOLUTION INTRAVENOUS at 05:01

## 2024-06-21 RX ADMIN — PANTOPRAZOLE SODIUM 40 MG: 40 TABLET, DELAYED RELEASE ORAL at 21:38

## 2024-06-21 NOTE — ANESTHESIA PREPROCEDURE EVALUATION
Procedure:  Cardiac pacer implant (Chest)    Presented for outpatient TURBT.  Following procedure patient developed Bradycardia with advanced 2-1 AVB and HR in the 30s.     Relevant Problems   ANESTHESIA   (-) History of anesthesia complications      CARDIO   (+) CAD (coronary artery disease)   (+) HTN (hypertension)      ENDO   (+) Hypothyroid   (+) Type 2 diabetes mellitus, with long-term current use of insulin (HCC)      GI/HEPATIC   (+) Gastroesophageal reflux disease (Well controlled)      /RENAL   (+) Stage 3b chronic kidney disease (HCC)      NEURO/PSYCH   (+) Anxiety      PULMONARY   (-) Sleep apnea   (-) URI (upper respiratory infection)        Physical Exam    Airway    Mallampati score: II  TM Distance: >3 FB  Neck ROM: full     Dental   Comment: Denies loose     Cardiovascular      Pulmonary      Other Findings        Anesthesia Plan  ASA Score- 3     Anesthesia Type- IV sedation with anesthesia with ASA Monitors.         Additional Monitors:     Airway Plan:            Plan Factors-Exercise tolerance (METS): <4 METS.    Chart reviewed. EKG reviewed.  Existing labs reviewed. Patient summary reviewed.          Obstructive sleep apnea risk education given perioperatively.        Induction- intravenous.    Postoperative Plan-     Perioperative Resuscitation Plan - Level 1 - Full Code.       Informed Consent- Anesthetic plan and risks discussed with patient.  I personally reviewed this patient with the CRNA. Discussed and agreed on the Anesthesia Plan with the CRNA..

## 2024-06-21 NOTE — ASSESSMENT & PLAN NOTE
Patient was Bradycardic in PACU after TURP  Cadiology consulted  Monitor on tele  Plan for pacemaker 6/21

## 2024-06-21 NOTE — UTILIZATION REVIEW
Initial Clinical Review    Admission: Date/Time/Statement:   Admission Orders (From admission, onward)       Ordered        06/20/24 1345  Inpatient Admission  Once                          Orders Placed This Encounter   Procedures    Inpatient Admission     Standing Status:   Standing     Number of Occurrences:   1     Order Specific Question:   Level of Care     Answer:   Med Surg [16]     Order Specific Question:   Estimated length of stay     Answer:   More than 2 Midnights     Order Specific Question:   Certification     Answer:   I certify that inpatient services are medically necessary for this patient for a duration of greater than two midnights. See H&P and MD Progress Notes for additional information about the patient's course of treatment.     ED Arrival Information       Patient not seen in ED                       No chief complaint on file.      Initial Presentation: 79 y.o. male w/ PMH of sinus bradycardia, MV CAD with  of LAD, chronic HFpEF, HTN, HLD, T2DM, CKD 3B, anemia, CVA, and bladder tumor initially presented to [facility] from home for elective TURBT. Following procedure patient developed significant bradycardia.   Pt underwent recent OP cardiac catheterization within the past month revealed 100%  of pLAD and 80% stenosis of distal circumflex. He is scheduled for CABG evaluation in July.   Plan: Elective outpatient procedure, converted to inpatient admission d/t bradycardia s/p TURBT. Cardiology consult. Mon on tele. Plan for PPM placement. Cont ayon.     OP Note:   SURGERY DATE: 6/20/2024   Procedure(s): TRANSURETHRAL RESECTION OF BLADDER TUMOR (TURBT)  Anesthesia Type: General  Operative Findings:   No meatal stenosis  No urethral strictures  Prostate: mild to moderately enlarged  Bilateral ureteral orifices in orthotopic positions  No bladder stones  Trabeculations: mild  Cellules/diverticuli: large dome chimney, very hard to reach with scope     Bladder lesion #1  Location:  Posterior aspect of the dome chimney  Size: 4 cm  Appearance: Papillary  Predicted depth: Ta  -Tumor extremely difficult to reach with the resectoscope due to the fact that it was in the dome chimney.  I was only able to resect about 50% of the tumor with maximal downward pressure on the lower abdomen and pushing all the way in with the resectoscope  -Repeat case must be done with extra long scope next time     Due to incomplete resection of the tumor, gemcitabine was not given     Urology Notes: Pt s/p TURBT. Developed bradycardia in the 30s in PACU. Also hypotensive w/ SBP in the 50s. Without any medications, his heart rate did come up into the 60s and his systolic did come up into the low 100s. Evaluated by cards and recommend to observe ovn on tele. Keep ayon cath. Will have ayon cath removed in OP setting.     Cardiology Consult: Advanced AVB, sinus bradycardia  Pt presented for outpatient TURBT.  Following procedure patient developed Bradycardia with advanced 2-1 AVB and HR in the 30s.Plan for PPM placement tomorrow.  Continue avoidance of AV maria m blockers. Transcutaneous pacer at bedside.  Continue ASA, pravastatin, Zetia, amlodipine, Imdur, losartan, and torsemide.     Anticipated Length of Stay/Certification Statement: The patient will continue to require additional inpatient hospital stay due to monitoring     Date: 06/21   Day 2: Pt reports feeling well, rae MS at baseline. Normal HR and rhythm. Cont to mon on tele. Plan for PPm placement today.       ED Triage Vitals [06/20/24 0655]   Temperature Pulse Respirations Blood Pressure SpO2 Pain Score   97.7 °F (36.5 °C) 88 18 144/63 98 % No Pain     Weight (last 2 days)       Date/Time Weight    06/20/24 1653 108 (238.1)    06/20/24 1500 108 (238.1)    06/20/24 0655 108 (238.1)            Vital Signs (last 3 days)       Date/Time Temp Pulse Resp BP MAP (mmHg) SpO2 Calculated FIO2 (%) - Nasal Cannula O2 Flow Rate (L/min) Nasal Cannula O2 Flow Rate (L/min)  O2 Device Patient Position - Orthostatic VS [place] Coma Scale Score Pain    06/21/24 1330 -- 78 -- 132/68 89 94 % -- -- -- -- -- -- --    06/21/24 1300 -- 74 -- 110/66 81 96 % 28 -- 2 L/min Nasal cannula -- 15 --    06/21/24 1230 -- 72 -- 134/64 87 95 % -- -- -- -- -- -- --    06/21/24 1200 -- 77 -- 127/56 80 94 % -- -- -- -- -- -- --    06/21/24 11:56:31 -- 76 18 127/56 80 97 % -- -- -- -- -- -- --    06/21/24 11:22:38 97.6 °F (36.4 °C) 78 18 161/70 100 93 % -- -- -- -- -- -- --    06/21/24 1119 -- -- -- -- -- -- -- -- -- -- -- 15 --    06/21/24 1100 -- 68 16 124/61 88 93 % 28 -- 2 L/min Nasal cannula -- -- 5    06/21/24 1045 97.2 °F (36.2 °C) 70 14 132/64 92 93 % 28 -- 2 L/min Nasal cannula -- -- --    06/21/24 1030 -- 72 18 128/63 89 94 % 28 -- 2 L/min Nasal cannula -- -- 5    06/21/24 1015 -- 74 24 136/71 95 92 % 28 -- 2 L/min Nasal cannula -- -- 5    06/21/24 1000 97.6 °F (36.4 °C) 90 20 144/64 92 97 % -- 6 L/min -- Simple mask -- -- --    06/21/24 0845 98.1 °F (36.7 °C) 74 -- 128/51 -- 96 % -- -- -- None (Room air) -- -- --    06/21/24 07:58:30 -- -- -- -- -- -- -- -- -- -- -- -- No Pain    06/21/24 0719 99.3 °F (37.4 °C) 85 22 120/65 -- 95 % 28 -- 2 L/min Nasal cannula -- -- 10 - Worst Possible Pain    06/21/24 04:47:52 -- 57 -- 108/40 63 90 % -- -- -- -- -- -- --    06/21/24 0300 -- -- -- -- -- -- 32 -- 3 L/min Nasal cannula -- 15 No Pain    06/21/24 02:35:53 -- 57 20 115/35 62 95 % -- -- -- -- -- -- --    06/20/24 23:10:32 97.7 °F (36.5 °C) 72 -- 103/64 77 94 % -- -- -- -- -- -- --    06/20/24 2154 -- -- -- -- -- -- -- -- -- -- -- -- 5    06/20/24 1900 97.8 °F (36.6 °C) -- -- 133/58 83 -- -- -- -- None (Room air) Sitting -- --    06/20/24 1653 -- 80 18 128/57 -- -- -- -- -- -- -- -- --    06/20/24 16:37:07 97.5 °F (36.4 °C) 80 -- 128/57 81 90 % -- -- -- -- -- -- --    06/20/24 15:02:54 -- 66 -- 124/55 78 91 % -- -- -- -- -- -- --    06/20/24 1500 -- -- -- -- -- -- -- -- -- -- -- -- No Pain    06/20/24 1035  -- -- -- -- -- -- -- -- -- None (Room air) -- 15 --    24 10:34:14 -- 75 18 120/51 74 95 % -- -- -- -- -- -- --    24 1029 -- -- -- -- -- -- -- -- -- -- -- -- 4    24 1027 -- -- -- -- -- -- -- -- -- -- -- -- 4    24 1000 -- 66 15 126/61 88 95 % 28 -- 2 L/min Nasal cannula -- -- No Pain    24 0950 -- 62 15 114/57 82 96 % -- -- -- -- -- -- --    24 0945 -- 64 18 129/58 84 96 % 28 -- 2 L/min Nasal cannula -- -- No Pain    24 0940 -- 65 18 120/58 83 95 % -- -- -- -- -- -- --    24 0930 -- 65 16 121/57 82 99 % 28 -- 2 L/min Nasal cannula -- -- No Pain    24 0925 -- 64 18 107/59 77 97 % -- 6 L/min -- Simple mask -- -- No Pain    24 0920 -- 62 19 103/59 78 96 % -- 6 L/min -- Simple mask -- -- No Pain    24 0915 -- 56 14 98/57 65 92 % -- 6 L/min -- Simple mask -- -- No Pain    24 0910 -- 36 12 59/37 44 94 % -- 6 L/min -- Simple mask -- -- No Pain    24 0905 -- -- -- -- -- -- -- -- -- -- -- -- No Pain    24 0900 97.9 °F (36.6 °C) 75 15 97/54 72 95 % -- 4 L/min -- Simple mask -- -- No Pain    24 0655 97.7 °F (36.5 °C) 88 18 144/63 -- 98 % -- -- -- None (Room air) -- -- No Pain              Pertinent Labs/Diagnostic Test Results:   Radiology:  XR chest portable   Final Interpretation by Jg Appiah MD ( 1117)      Pacemaker leads appear to be properly positioned.      Mild subsegmental atelectasis in both lung bases.            Workstation performed: [id number]         XR chest 2 views    (Results Pending)     Cardiology:  ECG 12 lead    by Interface, Ris Results In ( 1201)      Cardiac ep lab eps/ablations   Final Result by Solomon Quintero MD ( 1038)   Images from the original result were not included.   ELECTROPHYSIOLOGY   OPERATIVE REPORT   PATIENT NAME: Trent Ocasio   :  1945   MRN: [id number]   Date of surgery: 24   Surgeon: Solomon Quintero MD   Pt Location:  Cardiac  Electrophysiology Laboratory      PROCEDURE PERFORMED:    1)Dual Chamber Permanent Pacemaker Implantation   Passive RA lead, RV his lead      Preoperative Medications: Ancef   ANESTHESIA: Per anesthesia      PREOPERATIVE DIAGNOSIS:    Symptomatic bradycardia, AV block         POSTOPERATIVE DIAGNOSIS: Successful Dual Chamber Permanent Pacemaker    implant.  Same as Preop.       Informed Consent: Risks, benefits, and alternatives discussed with patient    and any family present. The patient understands risks, which include but    are not limited to life threatening  bleeding, infection, air around    lungs, blood around the heart and reoperation dislodged or malfunctioning    device.       Procedure Description:   After informed consent was obtained, the patient was brought to the    electrophysiology laboratory NPO. A time out was called and the patient    was properly identified. The patient was pre-medicated as above. The left    infraclavicular area was prepped and draped in the usual sterile fashion.     Second rib approach was done with axillary venography done prior to the    pocket formation. After local anesthetic infiltration with 1% lidocaine,    an incision was made below clavicle. The incision was extended down to the    level of the pectoral fascia. A pocket was formed above the pectoral    fascia using cautery and blunt dissection. A safe sheath introducer was    advanced over a wire into the axillary vein, the wire was confirmed to be    in the right atrium on flouroscopy, the wire was removed. Through the    introducer the RV his pacing lead was passed into the right heart. Under    fluoroscopic guidance the right ventricular lead was positioned on the    right ventricular septum. After satisfactory ventricular sensing and    pacing thresholds were confirmed, the lead screwed in and was sewn to the    pectoral fascia/muscle using 2-0 Ethibond sutures.       The passive right atrial lead was placed in  the right atrial appendage,    tissue contact was confirmed and good lead parameters were seen, the    Safe-sheath was removed and the lead was tied down with 2-0 Ethibond    sutures to the muscle.       Pocket flushed with antibiotic solution.      Device placed in an antibiotic pouch.        The lead and pulse generator were placed in the pre-pectoral pocket. The    pocket was then closed with 3 layers of 2-0, 3-0 Vicryl, 4-0 Vicryl suture    was used to close the skin.       Sterile dressing applied.         EBL: Minimal   Complications: None   Contrast: 10 cc   Findings: None      The patient tolerated the procedure well.      Plan: Routine postoperative care and CXR. Follow-up in 2 weeks with    incision check and interrogation.                            GI:  No orders to display           Results from last 7 days   Lab Units 06/21/24  0431   WBC Thousand/uL 7.12   HEMOGLOBIN g/dL 10.7*   HEMATOCRIT % 32.8*   PLATELETS Thousands/uL 254         Results from last 7 days   Lab Units 06/21/24  0431   SODIUM mmol/L 140   POTASSIUM mmol/L 4.1   CHLORIDE mmol/L 106   CO2 mmol/L 27   ANION GAP mmol/L 7   BUN mg/dL 42*   CREATININE mg/dL 2.46*   EGFR ml/min/1.73sq m 23   CALCIUM mg/dL 9.2   MAGNESIUM mg/dL 2.0     Results from last 7 days   Lab Units 06/21/24  0431   AST U/L 12*   ALT U/L 6*   ALK PHOS U/L 45   TOTAL PROTEIN g/dL 6.5   ALBUMIN g/dL 3.8   TOTAL BILIRUBIN mg/dL 0.27     Results from last 7 days   Lab Units 06/21/24  1124 06/21/24  0726 06/20/24  2031 06/20/24  1652 06/20/24  0940 06/20/24  0703   POC GLUCOSE mg/dl 110 101 275* 177* 80 88     Results from last 7 days   Lab Units 06/21/24  0431   GLUCOSE RANDOM mg/dL 105         Results from last 7 days   Lab Units 06/20/24  1431   HEMOGLOBIN A1C % 7.1*   EAG mg/dl 157       ED Treatment-Medication Administration - No Administrations Displayed (No Start Event Found)       None            Past Medical History:   Diagnosis Date    Acute respiratory failure  with hypoxia (HCC) 03/28/2024    Chronic kidney disease     Diabetes mellitus (HCC)     Hypertension     Respiratory acidosis 03/31/2024     Present on Admission:   Bladder tumor   Bradycardia   Gastroesophageal reflux disease      Admitting Diagnosis: Bladder tumor [D49.4]  Age/Sex: 79 y.o. male  Admission Orders:  SCD  Tele    Scheduled Medications:  amLODIPine, 10 mg, Oral, Daily  aspirin, 81 mg, Oral, Daily  calcitriol, 0.25 mcg, Oral, Daily  cholecalciferol, 1,000 Units, Oral, Daily  Diclofenac Sodium, 2 g, Topical, 4x Daily  docusate sodium, 100 mg, Oral, BID  ezetimibe, 10 mg, Oral, Daily  heparin (porcine), 5,000 Units, Subcutaneous, Q8H VALERIE  insulin lispro, 1-6 Units, Subcutaneous, TID AC  insulin lispro, 1-6 Units, Subcutaneous, HS  isosorbide mononitrate, 30 mg, Oral, Daily  latanoprost, 1 drop, Both Eyes, HS  levothyroxine, 50 mcg, Oral, Daily  losartan, 50 mg, Oral, Daily  pantoprazole, 40 mg, Oral, HS  pravastatin, 80 mg, Oral, Daily With Dinner  torsemide, 40 mg, Oral, Daily      Continuous IV Infusions:  lactated ringers, 125 mL/hr, Intravenous, Continuous      PRN Meds:  acetaminophen, 650 mg, Oral, Q6H PRN        IP CONSULT TO CARDIOLOGY  IP CONSULT TO UROLOGY    Network Utilization Review Department  ATTENTION: Please call with any questions or concerns to [phone] and carefully listen to the prompts so that you are directed to the right person. All voicemails are confidential.   For Discharge needs, contact Care Management DC Support Team at [phone] opt. 2  Send all requests for admission clinical reviews, approved or denied determinations and any other requests to dedicated fax number below belonging to the campus where the patient is receiving treatment. List of dedicated fax numbers for the Facilities:  FACILITY NAME UR FAX NUMBER   ADMISSION DENIALS (Administrative/Medical Necessity) [phone]   DISCHARGE SUPPORT TEAM (NETWORK) [phone]   [facility]  (Maternity/NICU/Pediatrics) [phone]   [facility] [phone]   [facility] [phone]   [facility] [phone]   [facility] [phone]   [facility] [phone]   [facility] [phone]   [facility] [phone]   [facility] [phone]   [facility] [phone]   [facility] [phone]   [facility] [phone]   [facility] [phone]

## 2024-06-21 NOTE — CASE MANAGEMENT
Case Management Assessment & Discharge Planning Note    Patient name Trent Cleaning  Location [address] [address]/2 [address]01 MRN [id number]  : 1945 Date 2024       Current Admission Date: 2024  Current Admission Diagnosis:Bladder tumor   Patient Active Problem List    Diagnosis Date Noted Date Diagnosed    Gastroesophageal reflux disease 2024     Bladder tumor 2024     Hypoglycemia 2024     S/P cardiac cath 2024     Bradycardia 2024     Malignant 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 (HCC) 2024     Abnormal stress test 2024     CAD (coronary artery disease) 2024     Stage 3b chronic kidney disease (HCC) 2024     Chronic heart failure with preserved ejection fraction (HFpEF) (HCC) 2024     History of prostate cancer 2024     Anxiety 2024     HTN (hypertension) 2024     Hypothyroid 2024     Type 2 diabetes mellitus, with long-term current use of insulin (HCC) 2024     Elevated troponin 2024       LOS (days): 1  Geometric Mean LOS (GMLOS) (days):   Days to GMLOS:     OBJECTIVE:  PATIENT READMITTED TO HOSPITAL  Risk of Unplanned Readmission Score: 20.22      Current admission status: Inpatient     Preferred Pharmacy:   [id number] - [place] - [address]  [address]  [place] [id number]  Phone: [phone] Fax: [phone]    Primary Care Provider: Micky Hylton MD  Primary Insurance: [facility]  Secondary Insurance:     ASSESSMENT:  Active Health Care Proxies       KAJAL CLEANING [facility] Care Representative - Spouse   Primary Phone: [phone] (Mobile)            Advance Directives  Does patient have a Health Care POA?: No  Was patient offered paperwork?: Yes (declined)  Does patient currently have a Health Care decision maker?: Yes, please see Health Care Proxy section  Does patient have Advance Directives?: No  Was patient offered paperwork?: Yes  (declined)  Primary Contact: Awilda Ocasio (Spouse)   [phone]    Readmission Root Cause  30 Day Readmission: Yes  Who directed you to return to the hospital?: Specialist  Did you understand whom to contact if you had questions or problems?: Yes  Did you get your prescriptions before you left the hospital?: Yes  Were you able to get your prescriptions filled when you left the hospital?: Yes  Did you take your medications as prescribed?: Yes  Were you able to get to your follow-up appointments?: Yes  During previous admission, was a post-acute recommendation made?: No  Patient was readmitted due to: Originally presented to the hospital due to needing TURBT. Noted to be bradycardic in PACU; recommended admission.  Action Plan: Urology & cardiology consults, gabo, follow-up with Urology OP, monitor on tele, pacemaker 6/21.    Patient Information  Admitted from:: Other (comment)  Mental Status: Alert  During Assessment patient was accompanied by: Spouse  Assessment information provided by:: Spouse  Primary Caregiver: Self  Support Systems: Self, Spouse/significant other  County of Residence: [place]  What city do you live in?: [place]  Home entry access options. Select all that apply.: Stairs  Number of steps to enter home.: 2  Do the steps have railings?: No  Type of Current Residence: [facility]  Living Arrangements: Lives w/ Spouse/significant other  Is patient a ?: Yes  Is [facility] with [facility]: No    Activities of Daily Living Prior to Admission  Functional Status: Independent  Completes ADLs independently?: Yes  Ambulates independently?: Yes  Does patient use assisted devices?: No  Does patient currently own DME?: No  Does patient have a history of Outpatient Therapy (PT/OT)?: Yes  Does the patient have a history of Short-Term Rehab?: No  Does patient have a history of HHC?: No  Does patient currently have HHC?: No    Patient Information Continued  Income Source: SSI/SSD  Does  patient have prescription coverage?: Yes  Does patient receive dialysis treatments?: No  Does patient have a history of substance abuse?: No  Does patient have a history of Mental Health Diagnosis?: No    PHQ 2/9 Screening   Reviewed PHQ 2/9 Depression Screening Score?: No    Means of Transportation  Means of Transport to Appts:: Drives Self      Social Determinants of Health (SDOH)      Flowsheet Row Most Recent Value   Housing Stability    In the last 12 months, was there a time when you were not able to pay the mortgage or rent on time? N   In the past 12 months, how many times have you moved where you were living? 0   At any time in the past 12 months, were you homeless or living in a shelter (including now)? N   Transportation Needs    In the past 12 months, has lack of transportation kept you from medical appointments or from getting medications? no   In the past 12 months, has lack of transportation kept you from meetings, work, or from getting things needed for daily living? No   Food Insecurity    Within the past 12 months, you worried that your food would run out before you got the money to buy more. Never true   Within the past 12 months, the food you bought just didn't last and you didn't have money to get more. Never true   Utilities    In the past 12 months has the electric, gas, oil, or water company threatened to shut off services in your home? No       DISCHARGE DETAILS:    Discharge planning discussed with:: Spouse at bedside.  Freedom of Choice: Yes  Comments - Freedom of Choice: FOC maintained - CM introduced self and role.  Spouse is familiar with process due to prior admission.  No anticipated CM needs.  Aware that CM will remain available through to d/c.  CM contacted family/caregiver?: Yes  Were Treatment Team discharge recommendations reviewed with patient/caregiver?: Yes (As it pertains to d/c planning and CM role to this point.)  Did patient/caregiver verbalize understanding of patient  care needs?: Yes (As it pertains to d/c planning and CM role to this point.)  Were patient/caregiver advised of the risks associated with not following Treatment Team discharge recommendations?: Yes (As it pertains to d/c planning and CM role to this point.)    Contacts  Patient Contacts: Awilda (spouse)  Relationship to Patient:: Family  Contact Method: In Person  Reason/Outcome: Continuity of Care, Discharge Planning    Requested Home Health Care         Is the patient interested in HHC at discharge?: No    DME Referral Provided  Referral made for DME?: No    Other Referral/Resources/Interventions Provided:  Interventions: None Indicated, Outpatient   Referral Comments: OP CM REFERRAL NEEDED.  Programs:: CHF    Treatment Team Recommendation: Home  Discharge Destination Plan:: Home  Transport at Discharge : Family

## 2024-06-21 NOTE — PLAN OF CARE
Problem: PAIN - ADULT  Goal: Verbalizes/displays adequate comfort level or baseline comfort level  Description: Interventions:  - Encourage patient to monitor pain and request assistance  - Assess pain using appropriate pain scale  - Administer analgesics based on type and severity of pain and evaluate response  - Implement non-pharmacological measures as appropriate and evaluate response  - Consider cultural and social influences on pain and pain management  - Notify physician/advanced practitioner if interventions unsuccessful or patient reports new pain  Outcome: Progressing     Problem: INFECTION - ADULT  Goal: Absence or prevention of progression during hospitalization  Description: INTERVENTIONS:  - Assess and monitor for signs and symptoms of infection  - Monitor lab/diagnostic results  - Monitor all insertion sites, i.e. indwelling lines, tubes, and drains  - Monitor endotracheal if appropriate and nasal secretions for changes in amount and color  - [place] appropriate cooling/warming therapies per order  - Administer medications as ordered  - Instruct and encourage patient and family to use good hand hygiene technique  - Identify and instruct in appropriate isolation precautions for identified infection/condition  Outcome: Progressing  Goal: Absence of fever/infection during neutropenic period  Description: INTERVENTIONS:  - Monitor WBC    Outcome: Progressing     Problem: SAFETY ADULT  Goal: Patient will remain free of falls  Description: INTERVENTIONS:  - Educate patient/family on patient safety including physical limitations  - Instruct patient to call for assistance with activity   - Consult OT/PT to assist with strengthening/mobility   - Keep Call bell within reach  - Keep bed low and locked with side rails adjusted as appropriate  - Keep care items and personal belongings within reach  - Initiate and maintain comfort rounds  - Make Fall Risk Sign visible to staff  - Offer Toileting every 2 Hours,  in advance of need  - Initiate/Maintain bed alarm  - Obtain necessary fall risk management equipment:   - Apply yellow socks and bracelet for high fall risk patients  - Consider moving patient to room near nurses station  Outcome: Progressing  Goal: Maintain or return to baseline ADL function  Description: INTERVENTIONS:  -  Assess patient's ability to carry out ADLs; assess patient's baseline for ADL function and identify physical deficits which impact ability to perform ADLs (bathing, care of mouth/teeth, toileting, grooming, dressing, etc.)  - Assess/evaluate cause of self-care deficits   - Assess range of motion  - Assess patient's mobility; develop plan if impaired  - Assess patient's need for assistive devices and provide as appropriate  - Encourage maximum independence but intervene and supervise when necessary  - Involve family in performance of ADLs  - Assess for home care needs following discharge   - Consider OT consult to assist with ADL evaluation and planning for discharge  - Provide patient education as appropriate  Outcome: Progressing  Goal: Maintains/Returns to pre admission functional level  Description: INTERVENTIONS:  - Perform AM-PAC 6 Click Basic Mobility/ Daily Activity assessment daily.  - Set and communicate daily mobility goal to care team and patient/family/caregiver.   - Collaborate with rehabilitation services on mobility goals if consulted  - Perform Range of Motion  times a day.  - Reposition patient every  hours.  - Dangle patient  times a day  - Stand patient 2 times a day  - Ambulate patient 3 times a day  - Out of bed to chair 3 times a day   - Out of bed for meals  times a day  - Out of bed for toileting  - Record patient progress and toleration of activity level   Outcome: Progressing     Problem: DISCHARGE PLANNING  Goal: Discharge to home or other facility with appropriate resources  Description: INTERVENTIONS:  - Identify barriers to discharge w/patient and caregiver  -  Arrange for needed discharge resources and transportation as appropriate  - Identify discharge learning needs (meds, wound care, etc.)  - Arrange for interpretive services to assist at discharge as needed  - Refer to Case Management Department for coordinating discharge planning if the patient needs post-hospital services based on physician/advanced practitioner order or complex needs related to functional status, cognitive ability, or social support system  Outcome: Progressing     Problem: DISCHARGE PLANNING  Goal: Discharge to home or other facility with appropriate resources  Description: INTERVENTIONS:  - Identify barriers to discharge w/patient and caregiver  - Arrange for needed discharge resources and transportation as appropriate  - Identify discharge learning needs (meds, wound care, etc.)  - Arrange for interpretive services to assist at discharge as needed  - Refer to Case Management Department for coordinating discharge planning if the patient needs post-hospital services based on physician/advanced practitioner order or complex needs related to functional status, cognitive ability, or social support system  Outcome: Progressing     Problem: Knowledge Deficit  Goal: Patient/family/caregiver demonstrates understanding of disease process, treatment plan, medications, and discharge instructions  Description: Complete learning assessment and assess knowledge base.  Interventions:  - Provide teaching at level of understanding  - Provide teaching via preferred learning methods  Outcome: Progressing     Problem: CARDIOVASCULAR - ADULT  Goal: Maintains optimal cardiac output and hemodynamic stability  Description: INTERVENTIONS:  - Monitor I/O, vital signs and rhythm  - Monitor for S/S and trends of decreased cardiac output  - Administer and titrate ordered vasoactive medications to optimize hemodynamic stability  - Assess quality of pulses, skin color and temperature  - Assess for signs of decreased coronary  artery perfusion  - Instruct patient to report change in severity of symptoms  Outcome: Progressing  Goal: Absence of cardiac dysrhythmias or at baseline rhythm  Description: INTERVENTIONS:  - Continuous cardiac monitoring, vital signs, obtain 12 lead EKG if ordered  - Administer antiarrhythmic and heart rate control medications as ordered  - Monitor electrolytes and administer replacement therapy as ordered  Outcome: Progressing

## 2024-06-21 NOTE — PLAN OF CARE
Problem: PAIN - ADULT  Goal: Verbalizes/displays adequate comfort level or baseline comfort level  Description: Interventions:  - Encourage patient to monitor pain and request assistance  - Assess pain using appropriate pain scale  - Administer analgesics based on type and severity of pain and evaluate response  - Implement non-pharmacological measures as appropriate and evaluate response  - Consider cultural and social influences on pain and pain management  - Notify physician/advanced practitioner if interventions unsuccessful or patient reports new pain  Outcome: Progressing     Problem: INFECTION - ADULT  Goal: Absence or prevention of progression during hospitalization  Description: INTERVENTIONS:  - Assess and monitor for signs and symptoms of infection  - Monitor lab/diagnostic results  - Monitor all insertion sites, i.e. indwelling lines, tubes, and drains  - Monitor endotracheal if appropriate and nasal secretions for changes in amount and color  - [place] appropriate cooling/warming therapies per order  - Administer medications as ordered  - Instruct and encourage patient and family to use good hand hygiene technique  - Identify and instruct in appropriate isolation precautions for identified infection/condition  Outcome: Progressing  Goal: Absence of fever/infection during neutropenic period  Description: INTERVENTIONS:  - Monitor WBC    Outcome: Progressing     Problem: SAFETY ADULT  Goal: Patient will remain free of falls  Description: INTERVENTIONS:  - Educate patient/family on patient safety including physical limitations  - Instruct patient to call for assistance with activity   - Consult OT/PT to assist with strengthening/mobility   - Keep Call bell within reach  - Keep bed low and locked with side rails adjusted as appropriate  - Keep care items and personal belongings within reach  - Initiate and maintain comfort rounds  - Make Fall Risk Sign visible to staff  - Offer Toileting every  Hours,  in advance of need  - Initiate/Maintain alarm  - Obtain necessary fall risk management equipment:   - Apply yellow socks and bracelet for high fall risk patients  - Consider moving patient to room near nurses station  Outcome: Progressing  Goal: Maintain or return to baseline ADL function  Description: INTERVENTIONS:  -  Assess patient's ability to carry out ADLs; assess patient's baseline for ADL function and identify physical deficits which impact ability to perform ADLs (bathing, care of mouth/teeth, toileting, grooming, dressing, etc.)  - Assess/evaluate cause of self-care deficits   - Assess range of motion  - Assess patient's mobility; develop plan if impaired  - Assess patient's need for assistive devices and provide as appropriate  - Encourage maximum independence but intervene and supervise when necessary  - Involve family in performance of ADLs  - Assess for home care needs following discharge   - Consider OT consult to assist with ADL evaluation and planning for discharge  - Provide patient education as appropriate  Outcome: Progressing  Goal: Maintains/Returns to pre admission functional level  Description: INTERVENTIONS:  - Perform AM-PAC 6 Click Basic Mobility/ Daily Activity assessment daily.  - Set and communicate daily mobility goal to care team and patient/family/caregiver.   - Collaborate with rehabilitation services on mobility goals if consulted  - Perform Range of Motion  times a day.  - Reposition patient every  hours.  - Dangle patient  times a day  - Stand patient  times a day  - Ambulate patient  times a day  - Out of bed to chair  times a day   - Out of bed for meals  times a day  - Out of bed for toileting  - Record patient progress and toleration of activity level   Outcome: Progressing     Problem: DISCHARGE PLANNING  Goal: Discharge to home or other facility with appropriate resources  Description: INTERVENTIONS:  - Identify barriers to discharge w/patient and caregiver  - Arrange for  needed discharge resources and transportation as appropriate  - Identify discharge learning needs (meds, wound care, etc.)  - Arrange for interpretive services to assist at discharge as needed  - Refer to Case Management Department for coordinating discharge planning if the patient needs post-hospital services based on physician/advanced practitioner order or complex needs related to functional status, cognitive ability, or social support system  Outcome: Progressing     Problem: Knowledge Deficit  Goal: Patient/family/caregiver demonstrates understanding of disease process, treatment plan, medications, and discharge instructions  Description: Complete learning assessment and assess knowledge base.  Interventions:  - Provide teaching at level of understanding  - Provide teaching via preferred learning methods  Outcome: Progressing     Problem: CARDIOVASCULAR - ADULT  Goal: Maintains optimal cardiac output and hemodynamic stability  Description: INTERVENTIONS:  - Monitor I/O, vital signs and rhythm  - Monitor for S/S and trends of decreased cardiac output  - Administer and titrate ordered vasoactive medications to optimize hemodynamic stability  - Assess quality of pulses, skin color and temperature  - Assess for signs of decreased coronary artery perfusion  - Instruct patient to report change in severity of symptoms  Outcome: Progressing  Goal: Absence of cardiac dysrhythmias or at baseline rhythm  Description: INTERVENTIONS:  - Continuous cardiac monitoring, vital signs, obtain 12 lead EKG if ordered  - Administer antiarrhythmic and heart rate control medications as ordered  - Monitor electrolytes and administer replacement therapy as ordered  Outcome: Progressing

## 2024-06-21 NOTE — ANESTHESIA POSTPROCEDURE EVALUATION
Post-Op Assessment Note    CV Status:  Stable  Pain Score: 0    Pain management: adequate    Multimodal analgesia used between 6 hours prior to anesthesia start to PACU discharge    Mental Status:  Alert and awake   Hydration Status:  Euvolemic   PONV Controlled:  Controlled   Airway Patency:  Patent  Two or more mitigation strategies used for obstructive sleep apnea   Post Op Vitals Reviewed: Yes      Staff: CRNA               BP   144/64   Temp 97.1   Pulse 74   Resp 19   SpO2 96

## 2024-06-21 NOTE — PROGRESS NOTES
[facility]  Progress Note  Name: Trent Ocasio I  MRN: [id number]  Unit/Bed#: MO CATH LAB ROOM I Date of Admission: 6/20/2024   Date of Service: 6/21/2024 I Hospital Day: 1    Assessment & Plan   * Bladder tumor  Assessment & Plan  Patient presented to hospital for planned surgery with urology. Underwent TURP, became bradycardic in the pacu. Urology disccused with cardiology, consult was placed   Continue Negron  Follow up with Urology OP    Bradycardia  Assessment & Plan  Patient was Bradycardic in PACU after TURP  Cadiology consulted  Monitor on tele  Plan for pacemaker 6/21    Type 2 diabetes mellitus, with long-term current use of insulin (HCC)  Assessment & Plan    Lab Results   Component Value Date    HGBA1C 7.1 (H) 06/20/2024   Hold home oral  SSI  CC diet           VTE Pharmacologic Prophylaxis:   Moderate Risk (Score 3-4) - Pharmacological DVT Prophylaxis Ordered: heparin.    Mobility:   Basic Mobility Inpatient Raw Score: 16  JH-HLM Goal: 5: Stand one or more mins  JH-HLM Achieved: 1: Laying in bed  JH-HLM Goal achieved. Continue to encourage appropriate mobility.    Patient Centered Rounds: I performed bedside rounds with nursing staff today.   Discussions with Specialists or Other Care Team Provider: cardiology    Education and Discussions with Family / Patient: Updated  (wife) at bedside.    Total Time Spent on Date of Encounter in care of patient: 35 mins. This time was spent on one or more of the following: performing physical exam; counseling and coordination of care; obtaining or reviewing history; documenting in the medical record; reviewing/ordering tests, medications or procedures; communicating with other healthcare professionals and discussing with patient's family/caregivers.    Current Length of Stay: 1 day(s)  Current Patient Status: Inpatient   Certification Statement: The patient will continue to require additional inpatient hospital stay due to  monitoring  Discharge Plan: Anticipate discharge tomorrow to home.    Code Status: Level 1 - Full Code    Subjective:   Feeling well offers no complaints    Objective:     Vitals:   Temp (24hrs), Av.1 °F (36.7 °C), Min:97.5 °F (36.4 °C), Max:99.3 °F (37.4 °C)    Temp:  [97.5 °F (36.4 °C)-99.3 °F (37.4 °C)] 98.1 °F (36.7 °C)  HR:  [57-85] 74  Resp:  [18-22] 22  BP: (103-133)/(35-65) 128/51  SpO2:  [90 %-96 %] 96 %  Body mass index is 34.16 kg/m².     Input and Output Summary (last 24 hours):     Intake/Output Summary (Last 24 hours) at 2024 1001  Last data filed at 2024 0500  Gross per 24 hour   Intake --   Output 1655 ml   Net -1655 ml       Physical Exam:   Physical Exam  Vitals reviewed.   Constitutional:       General: He is not in acute distress.     Appearance: He is obese. He is not ill-appearing.   Cardiovascular:      Rate and Rhythm: Normal rate.   Skin:     General: Skin is warm.      Coloration: Skin is pale.   Neurological:      Mental Status: He is alert. Mental status is at baseline.   Psychiatric:         Mood and Affect: Mood normal.          Additional Data:     Labs:  Results from last 7 days   Lab Units 24  0431   WBC Thousand/uL 7.12   HEMOGLOBIN g/dL 10.7*   HEMATOCRIT % 32.8*   PLATELETS Thousands/uL 254     Results from last 7 days   Lab Units 24  0431   SODIUM mmol/L 140   POTASSIUM mmol/L 4.1   CHLORIDE mmol/L 106   CO2 mmol/L 27   BUN mg/dL 42*   CREATININE mg/dL 2.46*   ANION GAP mmol/L 7   CALCIUM mg/dL 9.2   ALBUMIN g/dL 3.8   TOTAL BILIRUBIN mg/dL 0.27   ALK PHOS U/L 45   ALT U/L 6*   AST U/L 12*   GLUCOSE RANDOM mg/dL 105         Results from last 7 days   Lab Units 24  0726 24  2031 24  1652 24  0940 24  0703   POC GLUCOSE mg/dl 101 275* 177* 80 88     Results from last 7 days   Lab Units 24  1431   HEMOGLOBIN A1C % 7.1*           Lines/Drains:  Invasive Devices       Peripheral Intravenous Line  Duration              Peripheral IV 06/20/24 Dorsal (posterior);Right Forearm 1 day              Drain  Duration             Urethral Catheter Latex 20 Fr. 1 day              Airway  Duration             Supraglottic Airway LMA 5 1 day                  Urinary Catheter:  Goal for removal: N/A- Discharging with Negron         Recent Cultures (last 7 days):         Last 24 Hours Medication List:   Current Facility-Administered Medications   Medication Dose Route Frequency Provider Last Rate    [Transfer Hold] acetaminophen  650 mg Oral Q6H PRN Nick Patel DO      [Transfer Hold] amLODIPine  10 mg Oral Daily ANNY Mcintosh      [Transfer Hold] aspirin  81 mg Oral Daily ANNY Mcintosh      [Transfer Hold] calcitriol  0.25 mcg Oral Daily ANNY Mcintosh      chlorhexidine  15 mL Swish & Spit Once Dante Swanson PA-C      [Transfer Hold] cholecalciferol  1,000 Units Oral Daily ANNY Mcintosh      [Transfer Hold] docusate sodium  100 mg Oral BID ANNY Mcintosh      [Transfer Hold] ezetimibe  10 mg Oral Daily ANNY Mcintosh      [Transfer Hold] heparin (porcine)  5,000 Units Subcutaneous Q8H [facility] ANNY Mcintosh      [Transfer Hold] insulin lispro  1-6 Units Subcutaneous TID AC ANNY Mcintosh      [Transfer Hold] insulin lispro  1-6 Units Subcutaneous HS ANNY Mcintosh      [Transfer Hold] isosorbide mononitrate  30 mg Oral Daily ANNY Mcintosh      lactated ringers  125 mL/hr Intravenous Continuous Micky Masterson  mL/hr (06/21/24 0501)    [Transfer Hold] latanoprost  1 drop Both Eyes HS ANNY Mcintosh      [Transfer Hold] levothyroxine  50 mcg Oral Daily ANNY Mcintosh      [Transfer Hold] losartan  50 mg Oral Daily ANNY Mcintosh      [Transfer Hold] pantoprazole  40 mg Oral HS ANNY Mcintosh      [Transfer Hold] pravastatin  80 mg Oral Daily With Dinner ANNY Mcintosh      [Transfer Hold] torsemide  40 mg  Oral Daily ANNY Mcintosh          Today, Patient Was Seen By: ANNY Mcintosh    **Please Note: This note may have been constructed using a voice recognition system.**

## 2024-06-21 NOTE — ANESTHESIA POSTPROCEDURE EVALUATION
Post-Op Assessment Note            No anethesia notable event occurred.                /70 (06/21/24 1122)    Temp 97.6 °F (36.4 °C) (06/21/24 1122)    Pulse 78 (06/21/24 1122)   Resp 18 (06/21/24 1122)    SpO2 93 % (06/21/24 1122)

## 2024-06-21 NOTE — TREATMENT PLAN
Patient currently undergoing cardiac procedure and unable to evaluate patient    Assessment and Plan:  Bladder mass POD#1 TURBT  2. Postoperative bradycardia  - Medical optimization per primary team and cardiology. Patient undergoing pacemaker placement today  - Maintain ayon catheter, to be removed in outpatient setting as scheduled  - Will require additional TURBT and possible gemcitabine      Jennifer Dsouza PA-C

## 2024-06-21 NOTE — TELEPHONE ENCOUNTER
----- Message from Danelle Amaya PA-C sent at 6/21/2024  3:42 PM EDT -----  Regarding: Hosp [facility]UP  Cardiology Follow-up:    Patient clinical visit in 3 week at the Cardio location: [place] office. .    Schedule visit with Cardio [place] Providers: Crissy Torres or first available provider.    Type of Visit: VISIT TYPE: in-person office visit.    Test ordered: Cardiac tests: .    Additional Details: AFTER SCHEDULE AFTER 7/2/24 (SEEING CT SURGERY THEN)

## 2024-06-22 ENCOUNTER — APPOINTMENT (INPATIENT)
Dept: RADIOLOGY | Facility: HOSPITAL | Age: 79
DRG: 243 | End: 2024-06-22
Payer: COMMERCIAL

## 2024-06-22 VITALS
RESPIRATION RATE: 20 BRPM | SYSTOLIC BLOOD PRESSURE: 170 MMHG | BODY MASS INDEX: 34.09 KG/M2 | OXYGEN SATURATION: 94 % | WEIGHT: 238.1 LBS | TEMPERATURE: 98.9 F | HEIGHT: 70 IN | HEART RATE: 87 BPM | DIASTOLIC BLOOD PRESSURE: 73 MMHG

## 2024-06-22 PROBLEM — M54.40 CHRONIC LOW BACK PAIN WITH SCIATICA: Status: ACTIVE | Noted: 2024-06-22

## 2024-06-22 PROBLEM — G89.29 CHRONIC LOW BACK PAIN WITH SCIATICA: Status: ACTIVE | Noted: 2024-06-22

## 2024-06-22 LAB
ANION GAP SERPL CALCULATED.3IONS-SCNC: 6 MMOL/L (ref 4–13)
ATRIAL RATE: 60 BPM
ATRIAL RATE: 73 BPM
BUN SERPL-MCNC: 30 MG/DL (ref 5–25)
CALCIUM SERPL-MCNC: 8.8 MG/DL (ref 8.4–10.2)
CHLORIDE SERPL-SCNC: 106 MMOL/L (ref 96–108)
CO2 SERPL-SCNC: 28 MMOL/L (ref 21–32)
CREAT SERPL-MCNC: 1.95 MG/DL (ref 0.6–1.3)
ERYTHROCYTE [DISTWIDTH] IN BLOOD BY AUTOMATED COUNT: 13.7 % (ref 11.6–15.1)
GFR SERPL CREATININE-BSD FRML MDRD: 31 ML/MIN/1.73SQ M
GLUCOSE SERPL-MCNC: 140 MG/DL (ref 65–140)
GLUCOSE SERPL-MCNC: 149 MG/DL (ref 65–140)
HCT VFR BLD AUTO: 33.4 % (ref 36.5–49.3)
HGB BLD-MCNC: 10.5 G/DL (ref 12–17)
MCH RBC QN AUTO: 31.9 PG (ref 26.8–34.3)
MCHC RBC AUTO-ENTMCNC: 31.4 G/DL (ref 31.4–37.4)
MCV RBC AUTO: 102 FL (ref 82–98)
P AXIS: 53 DEGREES
P AXIS: 74 DEGREES
PLATELET # BLD AUTO: 234 THOUSANDS/UL (ref 149–390)
PMV BLD AUTO: 9.7 FL (ref 8.9–12.7)
POTASSIUM SERPL-SCNC: 4.2 MMOL/L (ref 3.5–5.3)
PR INTERVAL: 356 MS
QRS AXIS: -61 DEGREES
QRS AXIS: 72 DEGREES
QRSD INTERVAL: 120 MS
QRSD INTERVAL: 86 MS
QT INTERVAL: 424 MS
QT INTERVAL: 450 MS
QTC INTERVAL: 422 MS
QTC INTERVAL: 467 MS
RBC # BLD AUTO: 3.29 MILLION/UL (ref 3.88–5.62)
SODIUM SERPL-SCNC: 140 MMOL/L (ref 135–147)
T WAVE AXIS: 43 DEGREES
T WAVE AXIS: 69 DEGREES
VENTRICULAR RATE: 53 BPM
VENTRICULAR RATE: 73 BPM
WBC # BLD AUTO: 5.68 THOUSAND/UL (ref 4.31–10.16)

## 2024-06-22 PROCEDURE — 93010 ELECTROCARDIOGRAM REPORT: CPT | Performed by: INTERNAL MEDICINE

## 2024-06-22 PROCEDURE — 99024 POSTOP FOLLOW-UP VISIT: CPT | Performed by: INTERNAL MEDICINE

## 2024-06-22 PROCEDURE — 82948 REAGENT STRIP/BLOOD GLUCOSE: CPT

## 2024-06-22 PROCEDURE — 85027 COMPLETE CBC AUTOMATED: CPT | Performed by: NURSE PRACTITIONER

## 2024-06-22 PROCEDURE — 99239 HOSP IP/OBS DSCHRG MGMT >30: CPT | Performed by: NURSE PRACTITIONER

## 2024-06-22 PROCEDURE — 71046 X-RAY EXAM CHEST 2 VIEWS: CPT

## 2024-06-22 PROCEDURE — 80048 BASIC METABOLIC PNL TOTAL CA: CPT | Performed by: NURSE PRACTITIONER

## 2024-06-22 RX ORDER — TRAMADOL HYDROCHLORIDE 50 MG/1
50 TABLET ORAL EVERY 6 HOURS PRN
Qty: 2 TABLET | Refills: 0 | Status: SHIPPED | OUTPATIENT
Start: 2024-06-22 | End: 2024-07-02

## 2024-06-22 RX ORDER — TRAMADOL HYDROCHLORIDE 50 MG/1
50 TABLET ORAL EVERY 6 HOURS PRN
Status: DISCONTINUED | OUTPATIENT
Start: 2024-06-22 | End: 2024-06-22 | Stop reason: HOSPADM

## 2024-06-22 RX ADMIN — HEPARIN SODIUM 5000 UNITS: 5000 INJECTION INTRAVENOUS; SUBCUTANEOUS at 05:44

## 2024-06-22 RX ADMIN — CALCITRIOL CAPSULES 0.25 MCG 0.25 MCG: 0.25 CAPSULE ORAL at 09:05

## 2024-06-22 RX ADMIN — ISOSORBIDE MONONITRATE 30 MG: 30 TABLET, EXTENDED RELEASE ORAL at 09:06

## 2024-06-22 RX ADMIN — LOSARTAN POTASSIUM 50 MG: 50 TABLET, FILM COATED ORAL at 09:05

## 2024-06-22 RX ADMIN — TORSEMIDE 40 MG: 20 TABLET ORAL at 09:05

## 2024-06-22 RX ADMIN — ASPIRIN 81 MG: 81 TABLET, CHEWABLE ORAL at 09:06

## 2024-06-22 RX ADMIN — EZETIMIBE 10 MG: 10 TABLET ORAL at 09:05

## 2024-06-22 RX ADMIN — Medication 2 G: at 03:49

## 2024-06-22 RX ADMIN — AMLODIPINE BESYLATE 10 MG: 10 TABLET ORAL at 09:05

## 2024-06-22 RX ADMIN — LEVOTHYROXINE SODIUM 50 MCG: 0.05 TABLET ORAL at 09:05

## 2024-06-22 RX ADMIN — Medication 1000 UNITS: at 09:06

## 2024-06-22 NOTE — DISCHARGE INSTRUCTIONS
Dear Trent Ocasio,     It was our pleasure to care for you here at [facility].  It is our hope that we were always able to exceed the expected standards for your care during your stay.  You were hospitalized due to slow heart rate.  You were cared for on the second floor floor by ANNY Mcinotsh under the service of Adri Posada MD with the [facility] who covers for your primary care physician (PCP), Micky Hylton MD, while you were hospitalized.  If you have any questions or concerns related to this hospitalization, you may contact us at .  For follow up as well as any medication refills, we recommend that you follow up with your primary care physician.  A registered nurse will reach out to you by phone within a few days after your discharge to answer any additional questions that you may have after going home.  However, at this time we provide for you here, the most important instructions / recommendations at discharge:     Notable Medication Adjustments -   tramadol  Testing Required after Discharge -   Negron removal with urology  Pacemaker check with cardiology  Important follow up information -   PCP    Other Instructions -   Activity!!!  Please review this entire after visit summary as additional general instructions including medication list, appointments, activity, diet, any pertinent wound care, and other additional recommendations from your care team that may be provided for you.      Sincerely,     ANNY Mcintosh

## 2024-06-22 NOTE — CASE MANAGEMENT
Case Management Progress Note    Patient name Trent Ocasio  Location 2 [facility] 282/2 E 282-01 MRN [id number]  : 1945 Date 2024       LOS (days): 2  Geometric Mean LOS (GMLOS) (days):   Days to GMLOS:        OBJECTIVE:        Current admission status: Inpatient  Preferred Pharmacy:   [id number] - [place] - [phone]  [phone]  [place] [id number]  Phone: [phone] Fax: [phone]    Primary Care Provider: Micky Hylton MD    Primary Insurance: [facility]  Secondary Insurance:     PROGRESS NOTE:  [facility] OP CM referral called in to [phone].

## 2024-06-22 NOTE — PLAN OF CARE
Problem: PAIN - ADULT  Goal: Verbalizes/displays adequate comfort level or baseline comfort level  Description: Interventions:  - Encourage patient to monitor pain and request assistance  - Assess pain using appropriate pain scale  - Administer analgesics based on type and severity of pain and evaluate response  - Implement non-pharmacological measures as appropriate and evaluate response  - Consider cultural and social influences on pain and pain management  - Notify physician/advanced practitioner if interventions unsuccessful or patient reports new pain  Outcome: Progressing     Problem: CARDIOVASCULAR - ADULT  Goal: Maintains optimal cardiac output and hemodynamic stability  Description: INTERVENTIONS:  - Monitor I/O, vital signs and rhythm  - Monitor for S/S and trends of decreased cardiac output  - Administer and titrate ordered vasoactive medications to optimize hemodynamic stability  - Assess quality of pulses, skin color and temperature  - Assess for signs of decreased coronary artery perfusion  - Instruct patient to report change in severity of symptoms  Outcome: Progressing  Goal: Absence of cardiac dysrhythmias or at baseline rhythm  Description: INTERVENTIONS:  - Continuous cardiac monitoring, vital signs, obtain 12 lead EKG if ordered  - Administer antiarrhythmic and heart rate control medications as ordered  - Monitor electrolytes and administer replacement therapy as ordered  Outcome: Progressing     Problem: Knowledge Deficit  Goal: Patient/family/caregiver demonstrates understanding of disease process, treatment plan, medications, and discharge instructions  Description: Complete learning assessment and assess knowledge base.  Interventions:  - Provide teaching at level of understanding  - Provide teaching via preferred learning methods  Outcome: Progressing

## 2024-06-22 NOTE — ASSESSMENT & PLAN NOTE
Patient was Bradycardic in PACU after TURP  Cadiology consulted  Pacemaker placed on 6/21  No events overnight

## 2024-06-22 NOTE — PROGRESS NOTES
"Cardiology Progress Note - Trent Ocasio 79 y.o. 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  Plan to set patient up with device clinic.  Restrictions s/p PPM implant reviewed with patient.    2.  MV CAD with  of LAD  Continue aspirin, amlodipine, Zetia, Imdur, statin    3.  Chronic HFpEF  Appears euvolemic on exam  Continue torsemide    4.  Hypertension  Continue amlodipine, Imdur, losartan, torsemide    5.  Hyperlipidemia  Continue statin, Zetia    6.  DM 2 (A1c 7.3)    7.  CKD 3B    8.  Anemia    9.  History of CVA    10.  Bladder tumor     Cardiology will sign-off. Please reach out with any further questions or concerns.      Subjective:   Patient seen and examined.  He states he is overall feeling well and offers no specific cardiac concerns or complaints today.    Objective:     Vitals: Blood pressure 170/73, pulse 87, temperature 98.9 °F (37.2 °C), resp. rate 20, height 5' 10\" (1.778 m), weight 108 kg (238 lb 1.6 oz), SpO2 94%., Body mass index is 34.16 kg/m².,   Orthostatic Blood Pressures      Flowsheet Row Most Recent Value   Blood Pressure 170/73 filed at 06/22/2024 0708   Patient Position - Orthostatic VS Sitting filed at 06/20/2024 1900              Intake/Output Summary (Last 24 hours) at 6/22/2024 1248  Last data filed at 6/22/2024 0541  Gross per 24 hour   Intake 5365.67 ml   Output 1825 ml   Net 3540.67 ml         Physical Exam:   GEN: Alert and oriented x 3, in no acute distress.  Well appearing and well nourished.   HEENT: Sclera anicteric, conjunctivae pink, mucous membranes moist. Oropharynx clear.   NECK: Supple, no significant JVD. Trachea midline, no thyromegaly.   HEART: Regular rhythm, normal S1 and S2, no murmurs, clicks, " gallops or rubs. PMI nondisplaced, no thrills.   LUNGS: Clear to auscultation bilaterally; no wheezes, rales, or rhonchi. No increased work of breathing or signs of respiratory distress.   CHEST WALL: Pacemaker site on L chest wall examined. Aquacell clean, dry, and intact with no visible drainage. No evidence of hematoma.   ABDOMEN: Soft, nontender, non-distended.   EXTREMITIES: Skin warm and well perfused, no clubbing, cyanosis, or edema.  NEURO: No focal findings. Normal speech. Mood and affect normal.   SKIN: Normal without suspicious lesions on exposed skin.        Medications:      Current Facility-Administered Medications:     acetaminophen (TYLENOL) tablet 650 mg, 650 mg, Oral, Q6H PRN, ANNY Mcintosh, 650 mg at 06/20/24 2154    amLODIPine (NORVASC) tablet 10 mg, 10 mg, Oral, Daily, ANNY Mcintosh, 10 mg at 06/22/24 0905    aspirin chewable tablet 81 mg, 81 mg, Oral, Daily, ANNY Mcintosh, 81 mg at 06/22/24 0906    calcitriol (ROCALTROL) capsule 0.25 mcg, 0.25 mcg, Oral, Daily, ANNY Mcintosh, 0.25 mcg at 06/22/24 0905    Cholecalciferol (VITAMIN D3) tablet 1,000 Units, 1,000 Units, Oral, Daily, ANNY Mcintosh, 1,000 Units at 06/22/24 0906    Diclofenac Sodium (VOLTAREN) 1 % topical gel 2 g, 2 g, Topical, 4x Daily, ANNY Mcintosh, 2 g at 06/22/24 0349    docusate sodium (COLACE) capsule 100 mg, 100 mg, Oral, BID, ANNY Mcintosh, 100 mg at 06/20/24 1721    ezetimibe (ZETIA) tablet 10 mg, 10 mg, Oral, Daily, ANNY Mcintosh, 10 mg at 06/22/24 0905    heparin (porcine) subcutaneous injection 5,000 Units, 5,000 Units, Subcutaneous, Q8H VALERIE, ANNY Mcintosh, 5,000 Units at 06/22/24 0544    insulin lispro (HumALOG/ADMELOG) 100 units/mL subcutaneous injection 1-6 Units, 1-6 Units, Subcutaneous, TID AC, 1 Units at 06/20/24 1744 **AND** Fingerstick Glucose (POCT), , , TID AC, ANNY Mcintosh    insulin lispro (HumALOG/ADMELOG) 100 units/mL  subcutaneous injection 1-6 Units, 1-6 Units, Subcutaneous, HS, ANNY Mcintosh, 2 Units at 06/21/24 2148    isosorbide mononitrate (IMDUR) 24 hr tablet 30 mg, 30 mg, Oral, Daily, ANNY Mcintosh, 30 mg at 06/22/24 0906    lactated ringers infusion, 125 mL/hr, Intravenous, Continuous, ANNY Mcintosh, Last Rate: 125 mL/hr at 06/21/24 1258, 125 mL/hr at 06/21/24 1258    latanoprost (XALATAN) 0.005 % ophthalmic solution 1 drop, 1 drop, Both Eyes, HS, ANNY Mcintosh, 1 drop at 06/21/24 2137    levothyroxine tablet 50 mcg, 50 mcg, Oral, Daily, ANNY Mcintosh, 50 mcg at 06/22/24 0905    losartan (COZAAR) tablet 50 mg, 50 mg, Oral, Daily, ANNY Mcintosh, 50 mg at 06/22/24 0905    pantoprazole (PROTONIX) EC tablet 40 mg, 40 mg, Oral, HS, ANNY Mcintosh, 40 mg at 06/21/24 2138    pravastatin (PRAVACHOL) tablet 80 mg, 80 mg, Oral, Daily With Dinner, ANNY Mcintosh, 80 mg at 06/21/24 1626    torsemide (DEMADEX) tablet 40 mg, 40 mg, Oral, Daily, ANNY Mcintosh, 40 mg at 06/22/24 0905    traMADol (ULTRAM) tablet 50 mg, 50 mg, Oral, Q6H PRN, ANNY Mcintosh     Labs & Results:        Results from last 7 days   Lab Units 06/22/24  0453 06/21/24  0431   WBC Thousand/uL 5.68 7.12   HEMOGLOBIN g/dL 10.5* 10.7*   HEMATOCRIT % 33.4* 32.8*   PLATELETS Thousands/uL 234 254         Results from last 7 days   Lab Units 06/22/24  0453 06/21/24  0431   POTASSIUM mmol/L 4.2 4.1   CHLORIDE mmol/L 106 106   CO2 mmol/L 28 27   BUN mg/dL 30* 42*   CREATININE mg/dL 1.95* 2.46*   CALCIUM mg/dL 8.8 9.2   ALK PHOS U/L  --  45   ALT U/L  --  6*   AST U/L  --  12*         Results from last 7 days   Lab Units 06/21/24  0431   MAGNESIUM mg/dL 2.0

## 2024-06-22 NOTE — PLAN OF CARE
Problem: PAIN - ADULT  Goal: Verbalizes/displays adequate comfort level or baseline comfort level  Description: Interventions:  - Encourage patient to monitor pain and request assistance  - Assess pain using appropriate pain scale  - Administer analgesics based on type and severity of pain and evaluate response  - Implement non-pharmacological measures as appropriate and evaluate response  - Consider cultural and social influences on pain and pain management  - Notify physician/advanced practitioner if interventions unsuccessful or patient reports new pain  Outcome: Adequate for Discharge     Problem: INFECTION - ADULT  Goal: Absence or prevention of progression during hospitalization  Description: INTERVENTIONS:  - Assess and monitor for signs and symptoms of infection  - Monitor lab/diagnostic results  - Monitor all insertion sites, i.e. indwelling lines, tubes, and drains  - Monitor endotracheal if appropriate and nasal secretions for changes in amount and color  - [place] appropriate cooling/warming therapies per order  - Administer medications as ordered  - Instruct and encourage patient and family to use good hand hygiene technique  - Identify and instruct in appropriate isolation precautions for identified infection/condition  Outcome: Adequate for Discharge  Goal: Absence of fever/infection during neutropenic period  Description: INTERVENTIONS:  - Monitor WBC    Outcome: Adequate for Discharge     Problem: SAFETY ADULT  Goal: Patient will remain free of falls  Description: INTERVENTIONS:  - Educate patient/family on patient safety including physical limitations  - Instruct patient to call for assistance with activity   - Consult OT/PT to assist with strengthening/mobility   - Keep Call bell within reach  - Keep bed low and locked with side rails adjusted as appropriate  - Keep care items and personal belongings within reach  - Initiate and maintain comfort rounds  - Make Fall Risk Sign visible to  staff  - Offer Toileting every  Hours, in advance of need  - Initiate/Maintain alarm  - Obtain necessary fall risk management equipment:   - Apply yellow socks and bracelet for high fall risk patients  - Consider moving patient to room near nurses station  Outcome: Adequate for Discharge  Goal: Maintain or return to baseline ADL function  Description: INTERVENTIONS:  -  Assess patient's ability to carry out ADLs; assess patient's baseline for ADL function and identify physical deficits which impact ability to perform ADLs (bathing, care of mouth/teeth, toileting, grooming, dressing, etc.)  - Assess/evaluate cause of self-care deficits   - Assess range of motion  - Assess patient's mobility; develop plan if impaired  - Assess patient's need for assistive devices and provide as appropriate  - Encourage maximum independence but intervene and supervise when necessary  - Involve family in performance of ADLs  - Assess for home care needs following discharge   - Consider OT consult to assist with ADL evaluation and planning for discharge  - Provide patient education as appropriate  Outcome: Adequate for Discharge  Goal: Maintains/Returns to pre admission functional level  Description: INTERVENTIONS:  - Perform AM-PAC 6 Click Basic Mobility/ Daily Activity assessment daily.  - Set and communicate daily mobility goal to care team and patient/family/caregiver.   - Collaborate with rehabilitation services on mobility goals if consulted  - Perform Range of Motion  times a day.  - Reposition patient every  hours.  - Dangle patient  times a day  - Stand patient  times a day  - Ambulate patient  times a day  - Out of bed to chair  times a day   - Out of bed for meals times a day  - Out of bed for toileting  - Record patient progress and toleration of activity level   Outcome: Adequate for Discharge     Problem: DISCHARGE PLANNING  Goal: Discharge to home or other facility with appropriate resources  Description: INTERVENTIONS:  -  Identify barriers to discharge w/patient and caregiver  - Arrange for needed discharge resources and transportation as appropriate  - Identify discharge learning needs (meds, wound care, etc.)  - Arrange for interpretive services to assist at discharge as needed  - Refer to Case Management Department for coordinating discharge planning if the patient needs post-hospital services based on physician/advanced practitioner order or complex needs related to functional status, cognitive ability, or social support system  Outcome: Adequate for Discharge     Problem: Knowledge Deficit  Goal: Patient/family/caregiver demonstrates understanding of disease process, treatment plan, medications, and discharge instructions  Description: Complete learning assessment and assess knowledge base.  Interventions:  - Provide teaching at level of understanding  - Provide teaching via preferred learning methods  Outcome: Adequate for Discharge     Problem: CARDIOVASCULAR - ADULT  Goal: Maintains optimal cardiac output and hemodynamic stability  Description: INTERVENTIONS:  - Monitor I/O, vital signs and rhythm  - Monitor for S/S and trends of decreased cardiac output  - Administer and titrate ordered vasoactive medications to optimize hemodynamic stability  - Assess quality of pulses, skin color and temperature  - Assess for signs of decreased coronary artery perfusion  - Instruct patient to report change in severity of symptoms  Outcome: Adequate for Discharge  Goal: Absence of cardiac dysrhythmias or at baseline rhythm  Description: INTERVENTIONS:  - Continuous cardiac monitoring, vital signs, obtain 12 lead EKG if ordered  - Administer antiarrhythmic and heart rate control medications as ordered  - Monitor electrolytes and administer replacement therapy as ordered  Outcome: Adequate for Discharge

## 2024-06-22 NOTE — DISCHARGE SUMMARY
Formerly [facility]  Discharge- Trent Ocasio 1945, 79 y.o. male MRN: [id number]  Unit/Bed#: 2 E 282-01 Encounter: [id number]  Primary Care Provider: Micky Hylton MD   Date and time admitted to hospital: 6/20/2024  6:32 AM    * Bladder tumor  Assessment & Plan  Patient presented to hospital for planned surgery with urology. Underwent TURP, became bradycardic in the pacu. Urology disccused with cardiology, consult was placed   Continue Negron  Follow up with Urology OP    Chronic low back pain with sciatica  Assessment & Plan  Patient complaining of chronic Sciatica back pain that he does nothing for outpatient  Initially requesting fentanyl as he received this post up and it helped  Extensive education provided as to why this was not appropriate for sciatica  Requesting narcotics  Explained how this is not an appropriated indication for narcotics  Refused tylenol  NSAID with DC'D by his PCP secondary to renal function  PT referral placed        Bradycardia  Assessment & Plan  Patient was Bradycardic in PACU after TURP  Cadiology consulted  Pacemaker placed on 6/21  No events overnight    Type 2 diabetes mellitus, with long-term current use of insulin (HCC)  Assessment & Plan    Lab Results   Component Value Date    HGBA1C 7.1 (H) 06/20/2024   Hold home oral  SSI  CC diet       Discharging Physician / Practitioner: ANNY Mcintosh  PCP: Micky Hylton MD  Admission Date:   Admission Orders (From admission, onward)       Ordered        06/20/24 1345  Inpatient Admission  Once                          Discharge Date: 06/22/24    Medical Problems       Resolved Problems  Date Reviewed: 6/18/2024   None         Consultations During Hospital Stay:  IP CONSULT TO CARDIOLOGY  IP CONSULT TO UROLOGY    Procedures Performed:   XR chest 2 views    Result Date: 6/22/2024  Compromised by body habitus. Pacemaker well-positioned with no pneumothorax. Trace pleural effusions. Workstation performed:  [id number]     XR chest portable    Result Date: 6/21/2024  Pacemaker leads appear to be properly positioned. Mild subsegmental atelectasis in both lung bases. Workstation performed: [id number]         Significant Findings / Test Results:   Bradycardia- pacemaker placed    Incidental Findings:   bradycardia     Test Results Pending at Discharge (will require follow up):   nonee     Outpatient Tests Requested:  Ayon removal    Complications:  noen    Past Medical History:   Diagnosis Date    Acute respiratory failure with hypoxia (HCC) 03/28/2024    Chronic kidney disease     Diabetes mellitus (HCC)     Hypertension     Respiratory acidosis 03/31/2024       Reason for Admission: No chief complaint on file.       Hospital Course:     Trent Ocasio is a 79 y.o. male patient with past medical history of above who originally presented to the hospital on 6/20/2024 due to No chief complaint on file. Came in for TURP for bladder tumor  ended up bradycardic. Pacemaker was decided upon and placed  Went well. Had some sciatic pain that is chronic due to lack of activity. Recommended PT. Has follow up set up      Please see above list of diagnoses and related plan for additional information.     Condition at Discharge: stable     Discharge Day Visit / Exam:     Subjective:  frustrated with complications and how poorly he is feeling. Back pain and ayon pain. Education provided, encourment provided    Vitals: Blood Pressure: 170/73 (06/22/24 0708)  Pulse: 87 (06/22/24 0708)  Temperature: 98.9 °F (37.2 °C) (06/22/24 0335)  Temp Source: Axillary (06/21/24 0845)  Respirations: 20 (06/22/24 0335)  Height: 5' 10\" (177.8 cm) (06/20/24 1653)  Weight - Scale: 108 kg (238 lb 1.6 oz) (06/20/24 1653)  SpO2: 94 % (06/22/24 0708)  Exam:   Physical Exam  Vitals reviewed.   Constitutional:       General: He is not in acute distress.     Appearance: He is obese. He is ill-appearing.   Abdominal:      General: There is no distension. "   Musculoskeletal:         General: Tenderness present.   Neurological:      Mental Status: He is alert. Mental status is at baseline.   Psychiatric:         Mood and Affect: Mood normal.       Discussion with Family: Wife at the bedside    Discharge instructions/Information to patient and family:   See after visit summary for information provided to patient and family.      Provisions for Follow-Up Care:  See after visit summary for information related to follow-up care and any pertinent home health orders.      Disposition:     Home    Planned Readmission: no     Discharge Statement:  I spent 45 minutes discharging the patient. This time was spent on the day of discharge. I had direct contact with the patient on the day of discharge. Greater than 50% of the total time was spent examining patient, answering all patient questions, arranging and discussing plan of care with patient as well as directly providing post-discharge instructions.  Additional time then spent on discharge activities.    Discharge Medications:  See after visit summary for reconciled discharge medications provided to patient and family.      ** Please Note: This note has been constructed using a voice recognition system **

## 2024-06-22 NOTE — ASSESSMENT & PLAN NOTE
Patient complaining of chronic Sciatica back pain that he does nothing for outpatient  Initially requesting fentanyl as he received this post up and it helped  Extensive education provided as to why this was not appropriate for sciatica  Requesting narcotics  Explained how this is not an appropriated indication for narcotics  Refused tylenol  NSAID with DC'D by his PCP secondary to renal function  PT referral placed

## 2024-06-22 NOTE — NURSING NOTE
Patient d/c home with his belongings. Negron leg bag placed and both patient and wife educated on appliance and emptying. D/C instructions given and they both verbalized understanding. Patient is to drive home with spouse

## 2024-06-22 NOTE — UTILIZATION REVIEW
SEE INITIAL REVIEW AT BOTTOM    Continued Stay Review    Date: 6/22                          Current Patient Class: Inpatient  Current Level of Care: Med Surg    HPI:79 y.o. male initially admitted on 6/20     Assessment/Plan: Date: 6/22  Day 3: Has surpassed a 2nd midnight with active treatments and services.    6/21 S/p Cardiac Cath Lab;  PROCEDURE PERFORMED:   1)Dual Chamber Permanent Pacemaker Implantation  Passive RA lead, RV his lead     Preoperative Medications: Ancef  ANESTHESIA: Per anesthesia     PREOPERATIVE DIAGNOSIS:   Symptomatic bradycardia, AV block      POSTOPERATIVE DIAGNOSIS: Successful Dual Chamber Permanent Pacemaker implant.     6/22  S/p PPM placed. No overnight events.   Pt frustrated with complications and how poorly he is feeling. Back pain and ayon pain.   Pt initially presented for TURP for bladder tumor, ended up bradycardic. Pacemaker planned.      Vital Signs (last 3 days)       Date/Time Temp Pulse Resp BP MAP (mmHg) SpO2 Calculated FIO2 (%) - Nasal Cannula O2 Flow Rate (L/min) Nasal Cannula O2 Flow Rate (L/min) O2 Device Patient Position - Orthostatic VS Anish Coma Scale Score Pain    06/22/24 07:08:14 -- 87 -- 170/73 105 94 % -- -- -- -- -- -- --    06/22/24 03:35:16 98.9 °F (37.2 °C) 90 20 147/85 106 95 % -- -- -- -- -- -- --    06/21/24 22:23:01 99 °F (37.2 °C) 91 20 169/75 106 89 % -- -- -- -- -- -- --    06/21/24 1926 -- -- -- -- -- -- -- -- -- -- -- 15 No Pain    06/21/24 18:48:43 98.3 °F (36.8 °C) 92 -- 134/70 91 90 % -- -- -- -- -- -- --    06/21/24 1800 -- 94 -- 164/76 105 89 % -- -- -- -- -- -- --    06/21/24 1600 -- 90 -- 142/66 91 94 % -- -- -- -- -- -- --    06/21/24 15:33:43 -- 82 -- 134/72 93 91 % -- -- -- -- -- -- --    06/21/24 1330 -- 78 -- 132/68 89 94 % -- -- -- -- -- -- --    06/21/24 1300 -- 74 -- 110/66 81 96 % 28 -- 2 L/min Nasal cannula -- 15 --    06/21/24 1230 -- 72 -- 134/64 87 95 % -- -- -- -- -- -- --    06/21/24 1200 -- 77 -- 127/56 80 94 % -- -- --  -- -- -- --    06/21/24 11:56:31 -- 76 18 127/56 80 97 % -- -- -- -- -- -- --    06/21/24 11:22:38 97.6 °F (36.4 °C) 78 18 161/70 100 93 % -- -- -- -- -- -- --    06/21/24 1119 -- -- -- -- -- -- -- -- -- -- -- 15 --    06/21/24 1100 -- 68 16 124/61 88 93 % 28 -- 2 L/min Nasal cannula -- -- 5    06/21/24 1045 97.2 °F (36.2 °C) 70 14 132/64 92 93 % 28 -- 2 L/min Nasal cannula -- -- --    06/21/24 1030 -- 72 18 128/63 89 94 % 28 -- 2 L/min Nasal cannula -- -- 5    06/21/24 1015 -- 74 24 136/71 95 92 % 28 -- 2 L/min Nasal cannula -- -- 5    06/21/24 1000 97.6 °F (36.4 °C) 90 20 144/64 92 97 % -- 6 L/min -- Simple mask -- -- --    06/21/24 0845 98.1 °F (36.7 °C) 74 -- 128/51 -- 96 % -- -- -- None (Room air) -- -- --    06/21/24 07:58:30 -- -- -- -- -- -- -- -- -- -- -- -- No Pain    06/21/24 0719 99.3 °F (37.4 °C) 85 22 120/65 -- 95 % 28 -- 2 L/min Nasal cannula -- -- 10 - Worst Possible Pain    06/21/24 04:47:52 -- 57 -- 108/40 63 90 % -- -- -- -- -- -- --    06/21/24 0300 -- -- -- -- -- -- 32 -- 3 L/min Nasal cannula -- 15 No Pain    06/21/24 02:35:53 -- 57 20 115/35 62 95 % -- -- -- -- -- -- --    06/20/24 23:10:32 97.7 °F (36.5 °C) 72 -- 103/64 77 94 % -- -- -- -- -- -- --    06/20/24 2154 -- -- -- -- -- -- -- -- -- -- -- -- 5    06/20/24 1900 97.8 °F (36.6 °C) -- -- 133/58 83 -- -- -- -- None (Room air) Sitting -- --    06/20/24 1653 -- 80 18 128/57 -- -- -- -- -- -- -- -- --    06/20/24 16:37:07 97.5 °F (36.4 °C) 80 -- 128/57 81 90 % -- -- -- -- -- -- --    06/20/24 15:02:54 -- 66 -- 124/55 78 91 % -- -- -- -- -- -- --    06/20/24 1500 -- -- -- -- -- -- -- -- -- -- -- -- No Pain    06/20/24 1035 -- -- -- -- -- -- -- -- -- None (Room air) -- 15 --    06/20/24 10:34:14 -- 75 18 120/51 74 95 % -- -- -- -- -- -- --    06/20/24 1029 -- -- -- -- -- -- -- -- -- -- -- -- 4 06/20/24 1027 -- -- -- -- -- -- -- -- -- -- -- -- 4 06/20/24 1000 -- 66 15 126/61 88 95 % 28 -- 2 L/min Nasal cannula -- -- No Pain    06/20/24  0950 -- 62 15 114/57 82 96 % -- -- -- -- -- -- --    24 0945 -- 64 18 129/58 84 96 % 28 -- 2 L/min Nasal cannula -- -- No Pain    24 0940 -- 65 18 120/58 83 95 % -- -- -- -- -- -- --    24 0930 -- 65 16 121/57 82 99 % 28 -- 2 L/min Nasal cannula -- -- No Pain    24 0925 -- 64 18 107/59 77 97 % -- 6 L/min -- Simple mask -- -- No Pain    24 0920 -- 62 19 103/59 78 96 % -- 6 L/min -- Simple mask -- -- No Pain    24 0915 -- 56 14 98/57 65 92 % -- 6 L/min -- Simple mask -- -- No Pain    24 0910 -- 36 12 59/37 44 94 % -- 6 L/min -- Simple mask -- -- No Pain    24 0905 -- -- -- -- -- -- -- -- -- -- -- -- No Pain    24 0900 97.9 °F (36.6 °C) 75 15 97/54 72 95 % -- 4 L/min -- Simple mask -- -- No Pain    24 0655 97.7 °F (36.5 °C) 88 18 144/63 -- 98 % -- -- -- None (Room air) -- -- No Pain          Weight (last 2 days)       Date/Time Weight    24 1653 108 (238.1)    24 1500 108 (238.1)    24 0655 108 (238.1)            Pertinent Labs/Diagnostic Results:   Radiology:  XR chest 2 views   Final Interpretation by Yolanda Maxwell MD ( 06)      Compromised by body habitus.      Pacemaker well-positioned with no pneumothorax.      Trace pleural effusions.            Workstation performed: [id number]         XR chest portable   Final Interpretation by Jg Appiah MD ( 1117)      Pacemaker leads appear to be properly positioned.      Mild subsegmental atelectasis in both lung bases.            Workstation performed: [id number]           Cardiology:  ECG 12 lead    by Interface, Ris Results In ( 1201)      Cardiac ep lab eps/ablations   Final Result by Solomon Quintero MD ( 1038)   Images from the original result were not included.   ELECTROPHYSIOLOGY   OPERATIVE REPORT   PATIENT NAME: Trent Ocasio   :  1945   MRN: [id number]   Date of surgery: 24   Surgeon: Solomon Quintero MD   Pt Location:   Cardiac Electrophysiology Laboratory      PROCEDURE PERFORMED:    1)Dual Chamber Permanent Pacemaker Implantation   Passive RA lead, RV his lead      Preoperative Medications: Ancef   ANESTHESIA: Per anesthesia      PREOPERATIVE DIAGNOSIS:    Symptomatic bradycardia, AV block         POSTOPERATIVE DIAGNOSIS: Successful Dual Chamber Permanent Pacemaker    implant.  Same as Preop.       Informed Consent: Risks, benefits, and alternatives discussed with patient    and any family present. The patient understands risks, which include but    are not limited to life threatening  bleeding, infection, air around    lungs, blood around the heart and reoperation dislodged or malfunctioning    device.       Procedure Description:   After informed consent was obtained, the patient was brought to the    electrophysiology laboratory NPO. A time out was called and the patient    was properly identified. The patient was pre-medicated as above. The left    infraclavicular area was prepped and draped in the usual sterile fashion.     Second rib approach was done with axillary venography done prior to the    pocket formation. After local anesthetic infiltration with 1% lidocaine,    an incision was made below clavicle. The incision was extended down to the    level of the pectoral fascia. A pocket was formed above the pectoral    fascia using cautery and blunt dissection. A safe sheath introducer was    advanced over a wire into the axillary vein, the wire was confirmed to be    in the right atrium on flouroscopy, the wire was removed. Through the    introducer the RV his pacing lead was passed into the right heart. Under    fluoroscopic guidance the right ventricular lead was positioned on the    right ventricular septum. After satisfactory ventricular sensing and    pacing thresholds were confirmed, the lead screwed in and was sewn to the    pectoral fascia/muscle using 2-0 Ethibond sutures.       The passive right atrial lead was  placed in the right atrial appendage,    tissue contact was confirmed and good lead parameters were seen, the    Safe-sheath was removed and the lead was tied down with 2-0 Ethibond    sutures to the muscle.       Pocket flushed with antibiotic solution.      Device placed in an antibiotic pouch.        The lead and pulse generator were placed in the pre-pectoral pocket. The    pocket was then closed with 3 layers of 2-0, 3-0 Vicryl, 4-0 Vicryl suture    was used to close the skin.       Sterile dressing applied.         EBL: Minimal   Complications: None   Contrast: 10 cc   Findings: None      The patient tolerated the procedure well.      Plan: Routine postoperative care and CXR. Follow-up in 2 weeks with    incision check and interrogation.                            GI:  No orders to display           Results from last 7 days   Lab Units 06/22/24  0453 06/21/24  0431   WBC Thousand/uL 5.68 7.12   HEMOGLOBIN g/dL 10.5* 10.7*   HEMATOCRIT % 33.4* 32.8*   PLATELETS Thousands/uL 234 254         Results from last 7 days   Lab Units 06/22/24  0453 06/21/24  0431   SODIUM mmol/L 140 140   POTASSIUM mmol/L 4.2 4.1   CHLORIDE mmol/L 106 106   CO2 mmol/L 28 27   ANION GAP mmol/L 6 7   BUN mg/dL 30* 42*   CREATININE mg/dL 1.95* 2.46*   EGFR ml/min/1.73sq m 31 23   CALCIUM mg/dL 8.8 9.2   MAGNESIUM mg/dL  --  2.0     Results from last 7 days   Lab Units 06/21/24  0431   AST U/L 12*   ALT U/L 6*   ALK PHOS U/L 45   TOTAL PROTEIN g/dL 6.5   ALBUMIN g/dL 3.8   TOTAL BILIRUBIN mg/dL 0.27     Results from last 7 days   Lab Units 06/22/24  0754 06/21/24  2122 06/21/24  1620 06/21/24  1124 06/21/24  0726 06/20/24  2031 06/20/24  1652 06/20/24  0940 06/20/24  0703   POC GLUCOSE mg/dl 149* 201* 105 110 101 275* 177* 80 88     Results from last 7 days   Lab Units 06/22/24  0453 06/21/24  0431   GLUCOSE RANDOM mg/dL 140 105         Results from last 7 days   Lab Units 06/20/24  1431   HEMOGLOBIN A1C % 7.1*   EAG mg/dl 157        Medications:   Scheduled Medications:  amLODIPine, 10 mg, Oral, Daily  aspirin, 81 mg, Oral, Daily  calcitriol, 0.25 mcg, Oral, Daily  cholecalciferol, 1,000 Units, Oral, Daily  Diclofenac Sodium, 2 g, Topical, 4x Daily  docusate sodium, 100 mg, Oral, BID  ezetimibe, 10 mg, Oral, Daily  heparin (porcine), 5,000 Units, Subcutaneous, Q8H VALERIE  insulin lispro, 1-6 Units, Subcutaneous, TID AC  insulin lispro, 1-6 Units, Subcutaneous, HS  isosorbide mononitrate, 30 mg, Oral, Daily  latanoprost, 1 drop, Both Eyes, HS  levothyroxine, 50 mcg, Oral, Daily  losartan, 50 mg, Oral, Daily  pantoprazole, 40 mg, Oral, HS  pravastatin, 80 mg, Oral, Daily With Dinner  torsemide, 40 mg, Oral, Daily    ceFAZolin (ANCEF) IVPB (premix in dextrose) 2,000 mg 50 mL  Dose: 2,000 mg  Freq: Once Route: IV  Indications of Use: PROPHYLAXIS  Indications Comment: surgical prophylaxis  Start: 06/21/24 0730 End: 06/21/24 0805    Continuous IV Infusions:  lactated ringers, 125 mL/hr, Intravenous, Continuous      PRN Meds:  acetaminophen, 650 mg, Oral, Q6H PRN  traMADol, 50 mg, Oral, Q6H PRN        Discharge Plan: D    Network Utilization Review Department  ATTENTION: Please call with any questions or concerns to [phone] and carefully listen to the prompts so that you are directed to the right person. All voicemails are confidential.   For Discharge needs, contact Care Management DC Support Team at [phone] opt. 2  Send all requests for admission clinical reviews, approved or denied determinations and any other requests to dedicated fax number below belonging to the campus where the patient is receiving treatment. List of dedicated fax numbers for the Facilities:  FACILITY NAME UR FAX NUMBER   ADMISSION DENIALS (Administrative/Medical Necessity) [phone]   DISCHARGE SUPPORT TEAM (NETWORK) [phone]   PARENT CHILD HEALTH (Maternity/NICU/Pediatrics) [phone]   [facility] [phone]   [facility]  [facility] [phone]   [facility] [phone]   [facility] [phone]   [facility] [phone]   [facility] [phone]   [facility] [phone]   [facility] [phone]   [facility] [phone]   Critical access hospital [phone]   [facility] [phone]   [facility] [phone]

## 2024-06-24 ENCOUNTER — TELEPHONE (OUTPATIENT)
Age: 79
End: 2024-06-24

## 2024-06-24 NOTE — TELEPHONE ENCOUNTER
Patient called Urology to find out if we'd called him re: an Rx for Tramadol.    Pt states he contacted his pharmacy and was told that there was nothing there for him.    Pt was informed that it was internal medicine and pt was connected with  who will send message to charge nurse.    No further action is needed at this time.

## 2024-06-24 NOTE — TELEPHONE ENCOUNTER
Spoke with patient, advised of normal post op expectations. Patient expressed significant bladder spasms. Advised to increase water intake and utilize tylenol ATC. Patient questioning tramadol. It appears as though internal medicine sent patient home with tramadol. Confirmed post op voiding trial for 6/25/24. Patient verbalized understanding and thankful for call.

## 2024-06-24 NOTE — TELEPHONE ENCOUNTER
Called patient l/m to call back and schedule next procedure.  We are looking at 7/31 at [place].    Also patients cardiology appointment was moved to  july 15th at 3:40 [place] office instead of 7/12

## 2024-06-24 NOTE — UTILIZATION REVIEW
NOTIFICATION OF ADMISSION DISCHARGE   This is a Notification of Discharge from [facility]. Please be advised that this patient has been discharge from our facility. Below you will find the admission and discharge date and time including the patient’s disposition.   UTILIZATION REVIEW CONTACT:  Desi Dugan  Utilization   Network Utilization Review Department  Phone: [phone] x carefully listen to the prompts. All voicemails are confidential.  Email: [email or web address]     ADMISSION INFORMATION  PRESENTATION DATE: 6/20/2024  6:32 AM  OBERVATION ADMISSION DATE:   INPATIENT ADMISSION DATE: 6/20/24  1:45 PM   DISCHARGE DATE: 6/22/2024  2:24 PM   DISPOSITION:Home/Self Care    Network Utilization Review Department  ATTENTION: Please call with any questions or concerns to [phone] and carefully listen to the prompts so that you are directed to the right person. All voicemails are confidential.   For Discharge needs, contact Care Management DC Support Team at [phone] opt. 2  Send all requests for admission clinical reviews, approved or denied determinations and any other requests to dedicated fax number below belonging to the campus where the patient is receiving treatment. List of dedicated fax numbers for the Facilities:  FACILITY NAME UR FAX NUMBER   ADMISSION DENIALS (Administrative/Medical Necessity) [phone]   DISCHARGE SUPPORT TEAM ([facility]) [phone]   PARENT CHILD HEALTH (Maternity/NICU/Pediatrics) [phone]   [facility] [phone]   [facility] [phone]   [facility] [phone]   [facility] [phone]   [facility] [phone]   [facility] [phone]   [facility] [phone]   [facility]  [phone]   [facility] [phone]   [facility] [phone]   General acute hospital [phone]   [facility] [phone]

## 2024-06-25 ENCOUNTER — PROCEDURE VISIT (OUTPATIENT)
Dept: UROLOGY | Facility: CLINIC | Age: 79
End: 2024-06-25
Payer: COMMERCIAL

## 2024-06-25 VITALS
TEMPERATURE: 97.4 F | OXYGEN SATURATION: 98 % | HEART RATE: 82 BPM | BODY MASS INDEX: 34.79 KG/M2 | HEIGHT: 70 IN | WEIGHT: 243 LBS

## 2024-06-25 DIAGNOSIS — R33.9 URINARY RETENTION: ICD-10-CM

## 2024-06-25 DIAGNOSIS — N39.0 URINARY TRACT INFECTION WITHOUT HEMATURIA, SITE UNSPECIFIED: Primary | ICD-10-CM

## 2024-06-25 LAB — POST-VOID RESIDUAL VOLUME, ML POC: 12 ML

## 2024-06-25 PROCEDURE — 88307 TISSUE EXAM BY PATHOLOGIST: CPT | Performed by: PATHOLOGY

## 2024-06-25 PROCEDURE — 51798 US URINE CAPACITY MEASURE: CPT

## 2024-06-25 NOTE — PROGRESS NOTES
"6/25/2024    Trent Ocasio  1945  [id number]    Diagnosis  Chief Complaint    Urinary Retention         Patient presents for TOV post TURP 6/20/24 managed by Dr. Patel    Plan  Patient is to have ayon cath removed this morning in office and then return this afternoon for Post Void Residual    Procedure Ayon removal/voiding trial    Ayon catheter removed after deflation of an intact balloon. Patient tolerated well. Encouraged patient to hydrate well and return this afternoon for post void residual. He knows he may return early if uncomfortable and unable to urinate. Patient agrees to this plan.    Patient returned this afternoon. Patient states able to void. Bladder ultrasound performed and PVR measured 12 ml.    No results found for this or any previous visit (from the past 4 hour(s)).        Vitals:    06/25/24 0839   Pulse: 82   Temp: (!) 97.4 °F (36.3 °C)   TempSrc: Temporal   SpO2: 98%   Weight: 110 kg (243 lb)   Height: 5' 10\" (1.778 m)     Patient presented this morning to have ayon cath removed. 10 mL balloon deflated, upon removal ayon cath tip noted to be intact, no trauma noted. Patient denies any pain/discomfort. Patient educated on maintaining adequate hydration throughout the day and urinating every 1-2 hours to encourage emptying the bladder. Patient is also educated on the importance of preventing constipation as this effects urination. Patient verbalizes understanding at this time. Patient returned this afternoon stated he maintained adequate hydration and urinated throughout the day. Reports no complications with urination. Patient educated on maintaining hydration and immediately reporting s/s of urinary retention or difficulty. Patient verbalizes full understanding to teaching.      Marleen eMrcer LPN        "

## 2024-06-27 ENCOUNTER — TELEPHONE (OUTPATIENT)
Dept: CARDIAC SURGERY | Facility: CLINIC | Age: 79
End: 2024-06-27

## 2024-06-27 NOTE — TELEPHONE ENCOUNTER
"Appointment with Dr. Guerin was canceled by patient via my chart. Patient stated \"After consulting with the Cardiologist in [place], we decided that not to do surgery.\"  "

## 2024-07-05 LAB
ATRIAL RATE: 74 BPM
P AXIS: 50 DEGREES
PR INTERVAL: 204 MS
QRS AXIS: 153 DEGREES
QRSD INTERVAL: 108 MS
QT INTERVAL: 410 MS
QTC INTERVAL: 455 MS
T WAVE AXIS: 25 DEGREES
VENTRICULAR RATE: 74 BPM

## 2024-07-12 ENCOUNTER — IN-CLINIC DEVICE VISIT (OUTPATIENT)
Dept: CARDIOLOGY CLINIC | Facility: CLINIC | Age: 79
End: 2024-07-12

## 2024-07-12 DIAGNOSIS — Z95.0 PRESENCE OF PERMANENT CARDIAC PACEMAKER: Primary | ICD-10-CM

## 2024-07-12 PROCEDURE — 99024 POSTOP FOLLOW-UP VISIT: CPT | Performed by: INTERNAL MEDICINE

## 2024-07-12 NOTE — PROGRESS NOTES
MDT DC PM/ACTIVE SYSTEM IS MRI CONDITIONAL   DEVICE INTERROGATED IN THE [place] OFFICE:  BATTERY VOLTAGE ADEQUATE (11.9 YR.).  AP 7.5% -LBB 82.8% (>40%/AVB/DDDR 60 PPM, -180 MS, MVP ON).  ALL LEAD PARAMETERS WITHIN NORMAL LIMITS.  NO SIGNIFICANT HIGH RATE EPISODES.  NO PROGRAMMING CHANGES MADE TO DEVICE PARAMETERS.  INCISION CLEAN AND DRY WITH EDGES APPROXIMATED (SEE MEDIA).  AQUACEL AND STERI-STRIPS REMOVED; WOUND CARE AND RESTRICTIONS REVIEWED WITH PATIENT.  NORMAL DEVICE FUNCTION.  RG

## 2024-07-15 ENCOUNTER — OFFICE VISIT (OUTPATIENT)
Dept: CARDIOLOGY CLINIC | Facility: CLINIC | Age: 79
End: 2024-07-15
Payer: COMMERCIAL

## 2024-07-15 VITALS
RESPIRATION RATE: 16 BRPM | OXYGEN SATURATION: 94 % | WEIGHT: 237 LBS | HEART RATE: 90 BPM | BODY MASS INDEX: 33.93 KG/M2 | SYSTOLIC BLOOD PRESSURE: 128 MMHG | DIASTOLIC BLOOD PRESSURE: 80 MMHG | HEIGHT: 70 IN

## 2024-07-15 DIAGNOSIS — I25.10 CORONARY ARTERY DISEASE INVOLVING NATIVE HEART, UNSPECIFIED VESSEL OR LESION TYPE, UNSPECIFIED WHETHER ANGINA PRESENT: Primary | ICD-10-CM

## 2024-07-15 PROCEDURE — 99214 OFFICE O/P EST MOD 30 MIN: CPT | Performed by: INTERNAL MEDICINE

## 2024-07-15 NOTE — PROGRESS NOTES
Cardiology Follow Up    Trent Ocasio  1945  [id number]  [facility] [place]  [address] [address] [phone]    Discussion/Summary:  S/p PPM  CAD with  LAD  Hypertension  Hyperlipidemia  Diabetes  CKD  Preop cardiovascular risk assessment    Patient presents for preoperative cardiovascular risk assessment.   Patient  is scheduled for bladder surgery.  Activity level: Patient is active. He is easily able to complete greater than 4 METS of activity.  There is no sign or symptoms of acute coronary syndrome, acute heart failure, valvular heart disease or uncontrolled arrhythmias.  Patient is at moderate to high risk of perioperative cardiovascular complications based on his age, comorbidities, activity level and nature of upcoming surgery.  Okay to proceed      Previous studies were reviewed.    Safety measures were reviewed.  Questions were entertained and answered.  Patient was advised to report any problems requiring medical attention.    Follow-up with PCP and appropriate specialist and lab work as discussed.    Return for follow up visit as scheduled or earlier, if needed.    Thank you for allowing me to participate in the care and evaluation of your patient.  Should you have any questions, please feel free to contact me.      History of Present Illness:     Trent Ocasio is a 79 y.o. male who presents for follow up for cardiovascular care.    Denies chest pain, chest pressure, shortness of breath, dizziness, lightheadedness, presyncope or syncope.  Denies orthopnea, PND or lower limb edema.    Patient has history of bladder tumor. This could not be completely resected because of location. He is scheduled for repeat procedure  Following intial procedure, he was noted to be bradycardic and underwent PPM placement      Cardiac cath:  There is 100% chronic total occlusion of proximal  LAD. There are right to left collaterals visualized. Dist Cx lesion has a 80% stenosis.         Patient Active Problem List   Diagnosis    Chronic heart failure with preserved ejection fraction (HFpEF) (HCC)    History of prostate cancer    Anxiety    HTN (hypertension)    Hypothyroid    Type 2 diabetes mellitus, with long-term current use of insulin (HCC)    Elevated troponin    Stage 3b chronic kidney disease (HCC)    Abnormal stress test    CAD (coronary artery disease)    Malignant 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 (HCC)    Hypoglycemia    S/P cardiac cath    Bradycardia    Bladder tumor    Gastroesophageal reflux disease    Chronic low back pain with sciatica     Past Medical History:   Diagnosis Date    Acute respiratory failure with hypoxia (HCC) 03/28/2024    Chronic kidney disease     Diabetes mellitus (HCC)     Hypertension     Respiratory acidosis 03/31/2024     Social History     Socioeconomic History    Marital status: /Civil Union     Spouse name: Not on file    Number of children: Not on file    Years of education: Not on file    Highest education level: Not on file   Occupational History    Not on file   Tobacco Use    Smoking status: Former     Passive exposure: Past    Smokeless tobacco: Never   Vaping Use    Vaping status: Never Used   Substance and Sexual Activity    Alcohol use: Not Currently    Drug use: Never    Sexual activity: Yes     Partners: Female   Other Topics Concern    Not on file   Social History Narrative    Not on file     Social Determinants of Health     Financial Resource Strain: Low Risk  (8/20/2023)    [facility]    Overall Financial Resource Strain (CARDIA)     Difficulty of Paying Living Expenses: Not hard at all   Food Insecurity: No Food Insecurity (6/21/2024)    Hunger Vital Sign     Worried About Running Out of Food in the Last Year: Never true     Ran Out of Food in the Last Year: Never  true   Transportation Needs: No Transportation Needs (6/21/2024)    PRAPARE - Transportation     Lack of Transportation (Medical): No     Lack of Transportation (Non-Medical): No   Physical Activity: Not on file   Stress: Stress Concern Present (8/20/2023)    [facility] from [facility], [facility]    [employer] [place] [employer] - Occupational Stress Questionnaire     Feeling of Stress : To some extent   Social Connections: Unknown (6/18/2024)    Received from [employer]    Social Connections     How often do you feel lonely or isolated from those around you? (Adult - for ages 18 years and over): Not on file   Intimate Partner Violence: Not At Risk (8/20/2023)    Received from [facility], [facility]    Humiliation, Afraid, Rape, and Kick questionnaire     Fear of Current or Ex-Partner: No     Emotionally Abused: No     Physically Abused: No     Sexually Abused: No   Housing Stability: Low Risk  (6/21/2024)    Housing Stability Vital Sign     Unable to Pay for Housing in the Last Year: No     Number of Times Moved in the Last Year: 0     Homeless in the Last Year: No      No family history on file.  Past Surgical History:   Procedure Laterality Date    APPENDECTOMY      CARDIAC CATHETERIZATION Left 05/24/2024    Procedure: Cardiac Left Heart Cath;  Surgeon: Qiana Torres MD;  Location: [facility];  Service: Cardiology    CARDIAC CATHETERIZATION N/A 05/24/2024    Procedure: Cardiac Coronary Angiogram;  Surgeon: Qiana Torres MD;  Location: [facility];  Service: Cardiology    CARDIAC CATHETERIZATION  05/24/2024    Procedure: Cardiac catheterization;  Surgeon: Qiana Torres MD;  Location: [facility];  Service: Cardiology    CARDIAC ELECTROPHYSIOLOGY PROCEDURE N/A 6/21/2024    Procedure: Cardiac pacer implant;  Surgeon: Solomon Quintero MD;  Location: [facility];  Service: Cardiology    NY CYSTO  W/REMOVAL OF LESIONS SMALL N/A 6/20/2024    Procedure: TRANSURETHRAL RESECTION OF BLADDER TUMOR (TURBT);  Surgeon: Nick Patel DO;  Location: [facility];  Service: Urology       Current Outpatient Medications:     amLODIPine (NORVASC) 10 mg tablet, Take 10 mg by mouth daily, Disp: , Rfl:     aspirin 81 mg chewable tablet, Chew 81 mg daily, Disp: , Rfl:     calcitriol (ROCALTROL) 0.25 mcg capsule, Take 1 capsule (0.25 mcg total) by mouth daily, Disp: 90 capsule, Rfl: 3    cholecalciferol 25 MCG (1000 UT) tablet, Take 1,000 Units by mouth daily, Disp: , Rfl:     ezetimibe (ZETIA) 10 mg tablet, Take 10 mg by mouth daily, Disp: , Rfl:     glipiZIDE (GLUCOTROL) 10 mg tablet, Take 10 mg by mouth 2 (two) times a day before meals, Disp: , Rfl:     insulin degludec (Tresiba FlexTouch) 100 units/mL injection pen, Inject 20 Units under the skin daily at bedtime, Disp: , Rfl:     isosorbide mononitrate (IMDUR) 30 mg 24 hr tablet, Take 1 tablet (30 mg total) by mouth daily, Disp: 90 tablet, Rfl: 1    latanoprost (XALATAN) 0.005 % ophthalmic solution, Administer 1 drop to both eyes daily at bedtime, Disp: , Rfl:     levothyroxine 50 mcg tablet, Take 50 mcg by mouth daily, Disp: , Rfl:     losartan (COZAAR) 50 mg tablet, Take 50 mg by mouth daily, Disp: , Rfl:     metFORMIN (GLUCOPHAGE) 500 mg tablet, Take 500 mg by mouth 2 (two) times a day with meals, Disp: , Rfl:     pantoprazole (PROTONIX) 40 mg tablet, Take 40 mg by mouth daily at bedtime, Disp: , Rfl:     pioglitazone (ACTOS) 45 mg tablet, Take 45 mg by mouth daily, Disp: , Rfl:     simvastatin (ZOCOR) 40 mg tablet, Take 40 mg by mouth daily at bedtime, Disp: , Rfl:     torsemide (DEMADEX) 20 mg tablet, Take 2 tablets (40 mg total) by mouth daily (Patient taking differently: Take 40 mg by mouth 2 (two) times a day), Disp: 30 tablet, Rfl: 0  Allergies   Allergen Reactions    Canagliflozin GI Intolerance     Yeast infection  Yeast infection      Lisinopril Other  (See Comments)    Lovastatin GI Intolerance       Labs:  Lab Results   Component Value Date    WBC 5.68 06/22/2024    HGB 10.5 (L) 06/22/2024    HCT 33.4 (L) 06/22/2024     (H) 06/22/2024     06/22/2024     Lab Results   Component Value Date    CALCIUM 8.8 06/22/2024    K 4.2 06/22/2024    CO2 28 06/22/2024     06/22/2024    BUN 30 (H) 06/22/2024    CREATININE 1.95 (H) 06/22/2024     Lab Results   Component Value Date    HGBA1C 7.1 (H) 06/20/2024       Review of Systems:  Review of Systems   Constitutional: Negative.  Negative for activity change, appetite change, fatigue and fever.   Respiratory:  Negative for chest tightness and shortness of breath.    Cardiovascular:  Negative for chest pain, palpitations and leg swelling.   Gastrointestinal:  Negative for nausea and vomiting.   Musculoskeletal:  Negative for back pain.   Skin:  Negative for color change and pallor.   Neurological:  Negative for dizziness, syncope, weakness and light-headedness.   Psychiatric/Behavioral:  Negative for agitation.    All other systems reviewed and are negative.      Physical Exam:  Physical Exam  Vitals and nursing note reviewed.   Constitutional:       General: He is not in acute distress.     Appearance: Normal appearance. He is not ill-appearing or diaphoretic.   HENT:      Head: Normocephalic and atraumatic.   Eyes:      Extraocular Movements: Extraocular movements intact.   Cardiovascular:      Rate and Rhythm: Normal rate and regular rhythm.      Heart sounds: No murmur heard.     No friction rub. No gallop.   Pulmonary:      Effort: Pulmonary effort is normal. No respiratory distress.      Breath sounds: Normal breath sounds.   Abdominal:      General: There is no distension.      Palpations: Abdomen is soft.   Musculoskeletal:         General: No swelling. Normal range of motion.      Cervical back: Normal range of motion.   Skin:     General: Skin is warm and dry.      Capillary Refill: Capillary refill  takes less than 2 seconds.      Coloration: Skin is not pale.   Neurological:      General: No focal deficit present.      Mental Status: He is alert and oriented to person, place, and time.      Cranial Nerves: No cranial nerve deficit.   Psychiatric:         Mood and Affect: Mood normal.         Behavior: Behavior normal.

## 2024-07-16 ENCOUNTER — PREP FOR PROCEDURE (OUTPATIENT)
Dept: UROLOGY | Facility: CLINIC | Age: 79
End: 2024-07-16

## 2024-07-16 DIAGNOSIS — R39.89 SUSPECTED UTI: Primary | ICD-10-CM

## 2024-07-17 ENCOUNTER — PREP FOR PROCEDURE (OUTPATIENT)
Dept: UROLOGY | Facility: CLINIC | Age: 79
End: 2024-07-17

## 2024-07-17 ENCOUNTER — APPOINTMENT (OUTPATIENT)
Dept: LAB | Facility: CLINIC | Age: 79
End: 2024-07-17
Payer: COMMERCIAL

## 2024-07-17 DIAGNOSIS — R39.89 SUSPECTED UTI: ICD-10-CM

## 2024-07-17 PROCEDURE — 87086 URINE CULTURE/COLONY COUNT: CPT

## 2024-07-18 ENCOUNTER — ANESTHESIA EVENT (OUTPATIENT)
Dept: PERIOP | Facility: HOSPITAL | Age: 79
End: 2024-07-18
Payer: COMMERCIAL

## 2024-07-19 LAB — BACTERIA UR CULT: NORMAL

## 2024-07-22 NOTE — PRE-PROCEDURE INSTRUCTIONS
Pre-Surgery Instructions:   Medication Instructions    amLODIPine (NORVASC) 10 mg tablet Take day of surgery.    aspirin 81 mg chewable tablet Hold day of surgery.    calcitriol (ROCALTROL) 0.25 mcg capsule Take day of surgery.    cholecalciferol 25 MCG (1000 UT) tablet Hold day of surgery.    ezetimibe (ZETIA) 10 mg tablet Take night before surgery    glipiZIDE (GLUCOTROL) 10 mg tablet Hold day of surgery.    insulin degludec (Tresiba FlexTouch) 100 units/mL injection pen Take night before surgery    isosorbide mononitrate (IMDUR) 30 mg 24 hr tablet Take day of surgery.    latanoprost (XALATAN) 0.005 % ophthalmic solution Take night before surgery    levothyroxine 50 mcg tablet Take day of surgery.    losartan (COZAAR) 50 mg tablet Hold day of surgery.    metFORMIN (GLUCOPHAGE) 500 mg tablet Hold day of surgery.    pantoprazole (PROTONIX) 40 mg tablet Take day of surgery.    pioglitazone (ACTOS) 45 mg tablet Hold day of surgery.    simvastatin (ZOCOR) 40 mg tablet Take night before surgery    torsemide (DEMADEX) 20 mg tablet Hold day of surgery.    Medication instructions for day surgery reviewed. Please use only a sip of water to take your instructed medications. Avoid all over the counter vitamins, supplements and NSAIDS for one week prior to surgery per anesthesia guidelines. Tylenol is ok to take as needed.     You will receive a call one business day prior to surgery with an arrival time and hospital directions. If your surgery is scheduled on a Monday, the hospital will be calling you on the Friday prior to your surgery. If you have not heard from anyone by 8pm, please call the hospital supervisor through the hospital  at [phone]. ([place] [phone] or [place] [phone]).    Do not eat or drink anything after midnight the night before your surgery, including candy, mints, lifesavers, or chewing gum. Do not drink alcohol 24hrs before your surgery. Try not to smoke at least 24hrs before  your surgery.       Follow the pre surgery showering instructions as listed in the “My Surgical Experience Booklet” or otherwise provided by your surgeon's office. Do not use a blade to shave the surgical area 1 week before surgery. It is okay to use a clean electric clippers up to 24 hours before surgery. Do not apply any lotions, creams, including makeup, cologne, deodorant, or perfumes after showering on the day of your surgery. Do not use dry shampoo, hair spray, hair gel, or any type of hair products.     No contact lenses, eye make-up, or artificial eyelashes. Remove nail polish, including gel polish, and any artificial, gel, or acrylic nails if possible. Remove all jewelry including rings and body piercing jewelry.     Wear causal clothing that is easy to take on and off. Consider your type of surgery.    Keep any valuables, jewelry, piercings at home. Please bring any specially ordered equipment (sling, braces) if indicated.    Arrange for a responsible person to drive you to and from the hospital on the day of your surgery. Please confirm the visitor policy for the day of your procedure when you receive your phone call with an arrival time.     Call the surgeon's office with any new illnesses, exposures, or additional questions prior to surgery.    Please reference your “My Surgical Experience Booklet” for additional information to prepare for your upcoming surgery.    Pt verbalized understanding of shower and med instructions. Pt instructed to stop motrin, aleve, advil and vitamins one week prior to surgery. Message sent to surgeons office per pt he is staying overnight after surgery and will drive himself home the next day.

## 2024-07-31 ENCOUNTER — HOSPITAL ENCOUNTER (OUTPATIENT)
Facility: HOSPITAL | Age: 79
Setting detail: OUTPATIENT SURGERY
Discharge: HOME | End: 2024-08-01
Attending: UROLOGY | Admitting: UROLOGY
Payer: COMMERCIAL

## 2024-07-31 ENCOUNTER — ANESTHESIA (OUTPATIENT)
Dept: PERIOP | Facility: HOSPITAL | Age: 79
End: 2024-07-31
Payer: COMMERCIAL

## 2024-07-31 ENCOUNTER — TELEPHONE (OUTPATIENT)
Dept: UROLOGY | Facility: CLINIC | Age: 79
End: 2024-07-31

## 2024-07-31 DIAGNOSIS — D49.4 BLADDER TUMOR: ICD-10-CM

## 2024-07-31 LAB
ANION GAP SERPL CALCULATED.3IONS-SCNC: 8 MMOL/L (ref 4–13)
BUN SERPL-MCNC: 28 MG/DL (ref 5–25)
CALCIUM SERPL-MCNC: 8.7 MG/DL (ref 8.4–10.2)
CHLORIDE SERPL-SCNC: 111 MMOL/L (ref 96–108)
CO2 SERPL-SCNC: 22 MMOL/L (ref 21–32)
CREAT SERPL-MCNC: 1.81 MG/DL (ref 0.6–1.3)
ERYTHROCYTE [DISTWIDTH] IN BLOOD BY AUTOMATED COUNT: 15 % (ref 11.6–15.1)
GFR SERPL CREATININE-BSD FRML MDRD: 34 ML/MIN/1.73SQ M
GLUCOSE P FAST SERPL-MCNC: 120 MG/DL (ref 65–99)
GLUCOSE SERPL-MCNC: 111 MG/DL (ref 65–140)
GLUCOSE SERPL-MCNC: 112 MG/DL (ref 65–140)
GLUCOSE SERPL-MCNC: 120 MG/DL (ref 65–140)
GLUCOSE SERPL-MCNC: 131 MG/DL (ref 65–140)
GLUCOSE SERPL-MCNC: 51 MG/DL (ref 65–140)
GLUCOSE SERPL-MCNC: 54 MG/DL (ref 65–140)
HCT VFR BLD AUTO: 30.6 % (ref 36.5–49.3)
HGB BLD-MCNC: 9.9 G/DL (ref 12–17)
MCH RBC QN AUTO: 32.6 PG (ref 26.8–34.3)
MCHC RBC AUTO-ENTMCNC: 32.4 G/DL (ref 31.4–37.4)
MCV RBC AUTO: 101 FL (ref 82–98)
PLATELET # BLD AUTO: 273 THOUSANDS/UL (ref 149–390)
PMV BLD AUTO: 9.4 FL (ref 8.9–12.7)
POTASSIUM SERPL-SCNC: 4.6 MMOL/L (ref 3.5–5.3)
RBC # BLD AUTO: 3.04 MILLION/UL (ref 3.88–5.62)
SODIUM SERPL-SCNC: 141 MMOL/L (ref 135–147)
WBC # BLD AUTO: 4.58 THOUSAND/UL (ref 4.31–10.16)

## 2024-07-31 PROCEDURE — 82948 REAGENT STRIP/BLOOD GLUCOSE: CPT

## 2024-07-31 PROCEDURE — NC001 PR NO CHARGE: Performed by: UROLOGY

## 2024-07-31 PROCEDURE — 88342 IMHCHEM/IMCYTCHM 1ST ANTB: CPT | Performed by: PATHOLOGY

## 2024-07-31 PROCEDURE — 88307 TISSUE EXAM BY PATHOLOGIST: CPT | Performed by: PATHOLOGY

## 2024-07-31 PROCEDURE — 80048 BASIC METABOLIC PNL TOTAL CA: CPT | Performed by: UROLOGY

## 2024-07-31 PROCEDURE — 52235 CYSTOSCOPY AND TREATMENT: CPT | Performed by: UROLOGY

## 2024-07-31 PROCEDURE — 85027 COMPLETE CBC AUTOMATED: CPT | Performed by: UROLOGY

## 2024-07-31 PROCEDURE — 88341 IMHCHEM/IMCYTCHM EA ADD ANTB: CPT | Performed by: PATHOLOGY

## 2024-07-31 RX ORDER — GLIPIZIDE 5 MG/1
10 TABLET ORAL
Status: DISCONTINUED | OUTPATIENT
Start: 2024-08-01 | End: 2024-07-31

## 2024-07-31 RX ORDER — DEXTROSE MONOHYDRATE 25 G/50ML
25 INJECTION, SOLUTION INTRAVENOUS ONCE
Status: COMPLETED | OUTPATIENT
Start: 2024-07-31 | End: 2024-07-31

## 2024-07-31 RX ORDER — FENTANYL CITRATE 50 UG/ML
INJECTION, SOLUTION INTRAMUSCULAR; INTRAVENOUS AS NEEDED
Status: DISCONTINUED | OUTPATIENT
Start: 2024-07-31 | End: 2024-07-31

## 2024-07-31 RX ORDER — PHENYLEPHRINE HCL IN 0.9% NACL 1 MG/10 ML
SYRINGE (ML) INTRAVENOUS AS NEEDED
Status: DISCONTINUED | OUTPATIENT
Start: 2024-07-31 | End: 2024-07-31

## 2024-07-31 RX ORDER — PRAVASTATIN SODIUM 80 MG/1
80 TABLET ORAL
Status: DISCONTINUED | OUTPATIENT
Start: 2024-08-01 | End: 2024-08-01 | Stop reason: HOSPADM

## 2024-07-31 RX ORDER — MAGNESIUM HYDROXIDE 1200 MG/15ML
LIQUID ORAL AS NEEDED
Status: DISCONTINUED | OUTPATIENT
Start: 2024-07-31 | End: 2024-07-31 | Stop reason: HOSPADM

## 2024-07-31 RX ORDER — AMLODIPINE BESYLATE 10 MG/1
10 TABLET ORAL DAILY
Status: DISCONTINUED | OUTPATIENT
Start: 2024-08-01 | End: 2024-08-01 | Stop reason: HOSPADM

## 2024-07-31 RX ORDER — LOSARTAN POTASSIUM 50 MG/1
50 TABLET ORAL DAILY
Status: DISCONTINUED | OUTPATIENT
Start: 2024-08-01 | End: 2024-08-01 | Stop reason: HOSPADM

## 2024-07-31 RX ORDER — SODIUM CHLORIDE 9 MG/ML
50 INJECTION, SOLUTION INTRAVENOUS CONTINUOUS
Status: DISCONTINUED | OUTPATIENT
Start: 2024-07-31 | End: 2024-08-01

## 2024-07-31 RX ORDER — LIDOCAINE HYDROCHLORIDE 10 MG/ML
INJECTION, SOLUTION EPIDURAL; INFILTRATION; INTRACAUDAL; PERINEURAL AS NEEDED
Status: DISCONTINUED | OUTPATIENT
Start: 2024-07-31 | End: 2024-07-31

## 2024-07-31 RX ORDER — ONDANSETRON 2 MG/ML
INJECTION INTRAMUSCULAR; INTRAVENOUS AS NEEDED
Status: DISCONTINUED | OUTPATIENT
Start: 2024-07-31 | End: 2024-07-31

## 2024-07-31 RX ORDER — LATANOPROST 50 UG/ML
1 SOLUTION/ DROPS OPHTHALMIC
Status: DISCONTINUED | OUTPATIENT
Start: 2024-07-31 | End: 2024-08-01 | Stop reason: HOSPADM

## 2024-07-31 RX ORDER — ACETAMINOPHEN 325 MG/1
650 TABLET ORAL EVERY 6 HOURS PRN
Status: DISCONTINUED | OUTPATIENT
Start: 2024-07-31 | End: 2024-08-01 | Stop reason: HOSPADM

## 2024-07-31 RX ORDER — ONDANSETRON 2 MG/ML
4 INJECTION INTRAMUSCULAR; INTRAVENOUS ONCE AS NEEDED
Status: DISCONTINUED | OUTPATIENT
Start: 2024-07-31 | End: 2024-07-31 | Stop reason: HOSPADM

## 2024-07-31 RX ORDER — HYDROMORPHONE HCL/PF 1 MG/ML
SYRINGE (ML) INJECTION AS NEEDED
Status: DISCONTINUED | OUTPATIENT
Start: 2024-07-31 | End: 2024-07-31

## 2024-07-31 RX ORDER — CEFAZOLIN SODIUM 2 G/50ML
2000 SOLUTION INTRAVENOUS
Status: COMPLETED | OUTPATIENT
Start: 2024-07-31 | End: 2024-07-31

## 2024-07-31 RX ORDER — TAMSULOSIN HYDROCHLORIDE 0.4 MG/1
0.4 CAPSULE ORAL
Status: DISCONTINUED | OUTPATIENT
Start: 2024-07-31 | End: 2024-08-01 | Stop reason: HOSPADM

## 2024-07-31 RX ORDER — LEVOTHYROXINE SODIUM 50 UG/1
50 TABLET ORAL DAILY
Status: DISCONTINUED | OUTPATIENT
Start: 2024-08-01 | End: 2024-08-01 | Stop reason: HOSPADM

## 2024-07-31 RX ORDER — EZETIMIBE 10 MG/1
10 TABLET ORAL DAILY
Status: DISCONTINUED | OUTPATIENT
Start: 2024-08-01 | End: 2024-08-01 | Stop reason: HOSPADM

## 2024-07-31 RX ORDER — PANTOPRAZOLE SODIUM 40 MG/1
40 TABLET, DELAYED RELEASE ORAL
Status: DISCONTINUED | OUTPATIENT
Start: 2024-07-31 | End: 2024-08-01 | Stop reason: HOSPADM

## 2024-07-31 RX ORDER — HEPARIN SODIUM 5000 [USP'U]/ML
5000 INJECTION, SOLUTION INTRAVENOUS; SUBCUTANEOUS EVERY 8 HOURS SCHEDULED
Status: DISCONTINUED | OUTPATIENT
Start: 2024-07-31 | End: 2024-08-01 | Stop reason: HOSPADM

## 2024-07-31 RX ORDER — PROPOFOL 10 MG/ML
INJECTION, EMULSION INTRAVENOUS AS NEEDED
Status: DISCONTINUED | OUTPATIENT
Start: 2024-07-31 | End: 2024-07-31

## 2024-07-31 RX ORDER — PRAVASTATIN SODIUM 40 MG
40 TABLET ORAL
Status: DISCONTINUED | OUTPATIENT
Start: 2024-08-01 | End: 2024-07-31

## 2024-07-31 RX ORDER — TRAMADOL HYDROCHLORIDE 50 MG/1
50 TABLET ORAL EVERY 6 HOURS PRN
Status: DISCONTINUED | OUTPATIENT
Start: 2024-07-31 | End: 2024-08-01 | Stop reason: HOSPADM

## 2024-07-31 RX ORDER — ISOSORBIDE MONONITRATE 30 MG/1
30 TABLET, EXTENDED RELEASE ORAL DAILY
Status: DISCONTINUED | OUTPATIENT
Start: 2024-08-01 | End: 2024-08-01 | Stop reason: HOSPADM

## 2024-07-31 RX ORDER — OXYBUTYNIN CHLORIDE 5 MG/1
5 TABLET, EXTENDED RELEASE ORAL DAILY PRN
Status: DISCONTINUED | OUTPATIENT
Start: 2024-07-31 | End: 2024-08-01 | Stop reason: HOSPADM

## 2024-07-31 RX ORDER — FENTANYL CITRATE/PF 50 MCG/ML
25 SYRINGE (ML) INJECTION
Status: DISCONTINUED | OUTPATIENT
Start: 2024-07-31 | End: 2024-07-31 | Stop reason: HOSPADM

## 2024-07-31 RX ORDER — TORSEMIDE 20 MG/1
40 TABLET ORAL 2 TIMES DAILY
Status: DISCONTINUED | OUTPATIENT
Start: 2024-07-31 | End: 2024-08-01 | Stop reason: HOSPADM

## 2024-07-31 RX ORDER — INSULIN LISPRO 100 [IU]/ML
1-6 INJECTION, SOLUTION INTRAVENOUS; SUBCUTANEOUS
Status: DISCONTINUED | OUTPATIENT
Start: 2024-07-31 | End: 2024-08-01 | Stop reason: HOSPADM

## 2024-07-31 RX ORDER — ASPIRIN 81 MG/1
81 TABLET, CHEWABLE ORAL DAILY
Status: DISCONTINUED | OUTPATIENT
Start: 2024-08-01 | End: 2024-08-01 | Stop reason: HOSPADM

## 2024-07-31 RX ORDER — HYDROMORPHONE HCL/PF 1 MG/ML
0.4 SYRINGE (ML) INJECTION
Status: DISCONTINUED | OUTPATIENT
Start: 2024-07-31 | End: 2024-07-31 | Stop reason: HOSPADM

## 2024-07-31 RX ORDER — PIOGLITAZONEHYDROCHLORIDE 15 MG/1
45 TABLET ORAL DAILY
Status: DISCONTINUED | OUTPATIENT
Start: 2024-08-01 | End: 2024-07-31

## 2024-07-31 RX ADMIN — FENTANYL CITRATE 50 MCG: 50 INJECTION, SOLUTION INTRAMUSCULAR; INTRAVENOUS at 17:08

## 2024-07-31 RX ADMIN — Medication 100 MCG: at 16:58

## 2024-07-31 RX ADMIN — HYDROMORPHONE HYDROCHLORIDE 0.25 MG: 1 INJECTION, SOLUTION INTRAMUSCULAR; INTRAVENOUS; SUBCUTANEOUS at 17:38

## 2024-07-31 RX ADMIN — Medication 100 MCG: at 18:02

## 2024-07-31 RX ADMIN — DEXTROSE MONOHYDRATE 25 ML: 25 INJECTION, SOLUTION INTRAVENOUS at 18:29

## 2024-07-31 RX ADMIN — DEXTROSE MONOHYDRATE 25 ML: 25 INJECTION, SOLUTION INTRAVENOUS at 14:34

## 2024-07-31 RX ADMIN — SODIUM CHLORIDE: 0.9 INJECTION, SOLUTION INTRAVENOUS at 16:45

## 2024-07-31 RX ADMIN — CEFAZOLIN SODIUM 2000 MG: 2 SOLUTION INTRAVENOUS at 16:49

## 2024-07-31 RX ADMIN — PROPOFOL 150 MG: 10 INJECTION, EMULSION INTRAVENOUS at 16:58

## 2024-07-31 RX ADMIN — FENTANYL CITRATE 50 MCG: 50 INJECTION, SOLUTION INTRAMUSCULAR; INTRAVENOUS at 16:58

## 2024-07-31 RX ADMIN — TRAMADOL HYDROCHLORIDE 50 MG: 50 TABLET, COATED ORAL at 21:37

## 2024-07-31 RX ADMIN — HYDROMORPHONE HYDROCHLORIDE 0.25 MG: 1 INJECTION, SOLUTION INTRAMUSCULAR; INTRAVENOUS; SUBCUTANEOUS at 17:36

## 2024-07-31 RX ADMIN — PANTOPRAZOLE SODIUM 40 MG: 40 TABLET, DELAYED RELEASE ORAL at 21:26

## 2024-07-31 RX ADMIN — FENTANYL CITRATE 25 MCG: 50 INJECTION INTRAMUSCULAR; INTRAVENOUS at 18:38

## 2024-07-31 RX ADMIN — OXYBUTYNIN CHLORIDE 5 MG: 5 TABLET, EXTENDED RELEASE ORAL at 19:49

## 2024-07-31 RX ADMIN — ONDANSETRON 4 MG: 2 INJECTION INTRAMUSCULAR; INTRAVENOUS at 17:10

## 2024-07-31 RX ADMIN — TAMSULOSIN HYDROCHLORIDE 0.4 MG: 0.4 CAPSULE ORAL at 21:26

## 2024-07-31 RX ADMIN — FENTANYL CITRATE 25 MCG: 50 INJECTION INTRAMUSCULAR; INTRAVENOUS at 18:25

## 2024-07-31 RX ADMIN — HEPARIN SODIUM 5000 UNITS: 5000 INJECTION INTRAVENOUS; SUBCUTANEOUS at 21:26

## 2024-07-31 RX ADMIN — LIDOCAINE HYDROCHLORIDE 50 MG: 10 INJECTION, SOLUTION EPIDURAL; INFILTRATION; INTRACAUDAL; PERINEURAL at 16:58

## 2024-07-31 RX ADMIN — LATANOPROST 1 DROP: 50 SOLUTION OPHTHALMIC at 21:27

## 2024-07-31 NOTE — ANESTHESIA POSTPROCEDURE EVALUATION
Post-Op Assessment Note    CV Status:  Stable    Pain management: adequate       Mental Status:  Alert and awake   Hydration Status:  Euvolemic   PONV Controlled:  Controlled   Airway Patency:  Patent     Post Op Vitals Reviewed: Yes    No anethesia notable event occurred.                BP   131/60   Temp   97.9   Pulse  80   Resp   16   SpO2   95

## 2024-07-31 NOTE — ANESTHESIA PREPROCEDURE EVALUATION
Procedure:  TRANSURETHRAL RESECTION OF BLADDER TUMOR (TURBT), possible intravesical instillation of gemcitabine (Bladder)    Relevant Problems   CARDIO   (+) CAD (coronary artery disease)   (+) HTN (hypertension)      ENDO   (+) Hypothyroid   (+) Type 2 diabetes mellitus, with long-term current use of insulin (HCC)      GI/HEPATIC   (+) Gastroesophageal reflux disease      /RENAL   (+) Stage 3b chronic kidney disease (HCC)      MUSCULOSKELETAL   (+) Chronic low back pain with sciatica      NEURO/PSYCH   (+) Anxiety   (+) Chronic low back pain with sciatica      MDT DC PM/ACTIVE SYSTEM IS MRI CONDITIONAL   DEVICE INTERROGATED IN THE [facility] OFFICE:  BATTERY VOLTAGE ADEQUATE (11.9 YR.).  AP 7.5% -LBB 82.8% (>40%/AVB/DDDR 60 PPM, -180 MS, MVP ON).  ALL LEAD PARAMETERS WITHIN NORMAL LIMITS.  NO SIGNIFICANT HIGH RATE EPISODES.  NO PROGRAMMING CHANGES MADE TO DEVICE PARAMETERS.  INCISION CLEAN AND DRY WITH EDGES APPROXIMATED (SEE MEDIA).  AQUACEL AND STERI-STRIPS REMOVED; WOUND CARE AND RESTRICTIONS REVIEWED WITH PATIENT.  NORMAL DEVICE FUNCTION.  RG       Physical Exam    Airway    Mallampati score: II  TM Distance: >3 FB  Neck ROM: full     Dental   No notable dental hx     Cardiovascular  Rhythm: regular, No weak pulses    Pulmonary   No stridor    Other Findings        Anesthesia Plan  ASA Score- 3     Anesthesia Type- general with ASA Monitors.         Additional Monitors:     Airway Plan: LMA.           Plan Factors-    Chart reviewed. EKG reviewed.  Existing labs reviewed. Patient summary reviewed.          Obstructive sleep apnea risk education given perioperatively.        Induction- intravenous.    Postoperative Plan- Plan for postoperative opioid use. Planned trial extubation    Perioperative Resuscitation Plan - Level 1 - Full Code.       Informed Consent- Anesthetic plan and risks discussed with patient.  I personally reviewed this patient with the CRNA. Discussed and agreed on the  Anesthesia Plan with the CRNA..

## 2024-07-31 NOTE — OP NOTE
OPERATIVE REPORT  PATIENT NAME: Trent Ocasio    :  1945  MRN: [id number]  Pt Location: [facility] OR ROOM 02    SURGERY DATE: 2024    Surgeons and Role:     * Nick Patel DO - Primary    Preop Diagnosis:  Bladder tumor [D49.4]    Post-Op Diagnosis Codes:     * Bladder tumor [D49.4]    Procedure(s):  TRANSURETHRAL RESECTION OF BLADDER TUMOR (TURBT). intravesical instillation of gemcitabine    Specimen(s):  ID Type Source Tests Collected by Time Destination   1 : bladder tumor Tissue Urinary Bladder TISSUE EXAM Nick AndersonDO ranjit 2024 1725        Estimated Blood Loss:   Minimal    Drains:  Urethral Catheter Latex 20 Fr. (Active)   Number of days: 41       Urethral Catheter Double-lumen 20 Fr. (Active)   Number of days: 0       Anesthesia Type:   General    Operative Indications:  Bladder tumor [D49.4]        79 y.o. old male with bladder cancer     Anticoagulation: Aspirin 81 mg     He was admitted around May 2024 with CHF.  He was found to have an elevated creatinine of 2.76.  He had a workup with nephrology which included a kidney bladder ultrasound.     Kidney bladder ultrasound 2024: New ovoid structure projecting from the posterior bladder with central hypoechoic area measuring about 3.2 cm     He had office cystoscopy with Dr. Oliveira on 2024 which demonstrated large posterior wall bladder mass        OR TURBT on 24:  Cellules/diverticuli: large dome chimney, very hard to reach with scope     Bladder lesion #1  Location: Posterior aspect of the dome chimney  Size: 4 cm  Appearance: Papillary  Predicted depth: Ta  -Tumor extremely difficult to reach with the resectoscope due to the fact that it was in the dome chimney.  I was only able to resect about 50% of the tumor with maximal downward pressure on the lower abdomen and pushing all the way in with the resectoscope  -Repeat case must be done with extra long scope next time     Due to incomplete resection of the  tumor, gemcitabine was not given  --Path:  A. Urinary Bladder, Transurethral Resection of Bladder Tumor:  - Fragments of papillary urothelial carcinoma, high-grade, with foci suspicious for superficial invasion  - Muscularis propria (detrusor muscle) present and not involved        Patient got bradycardic in PACU.  Cardiology team assessed him.  He was admitted to medicine.  During admission he had pacemaker placed on 6/21/2024.     He was cleared by cardiology for repeat trip to the operating room.            Operative Findings:        No meatal stenosis  No urethral strictures  Prostate: Mild to moderately enlarged  Bilateral ureteral orifices in orthotopic positions  No bladder stones  Trabeculations: Mild to moderate  Cellules/diverticuli: Large dome chimney, very hard to reach with scope      Bladder lesion #1  Location: Posterior aspect of the dome chimney  Size: 4 cm  Appearance: Papillary    Extra long resectoscope was reserved for the case.  Unfortunately, the extra long lens was not included in the set.  We therefore had to use standard bipolar resectoscope.  2 assistants pushed down very hard on the abdomen and I pushed very hard into the dome with the scope.  Eventually able to resect the entire tumor.    2000 mg of gemcitabine were administered intravesically at the end of the case    For future TURBT, only extra long resectoscope should be used            Complications:   None    Procedure and Technique:            Patient was identified in the preop holding area.  Consent was obtained.  Risks and benefits of the procedure were explained to the patient.  Patient was in agreement.  Patient was brought back to the OR and placed supine on the table.  Bilateral lower extremities were placed and turned on.  Patient underwent smooth induction of general anesthesia.  IV Ancef was administered for preoperative antibiotics.  Patient's legs were placed in stirrups.  Patient was in dorsolithotomy position.  Area  was prepped and draped in sterile fashion.  Timeout was performed by the OR team.    Case began with insertion of resectoscope.  Pan cystourethroscopy was performed.  See the above findings for details.      The lesions listed in the findings section above were noted.        The lesions above were resected using bipolar loop.  Bladder tumor chips from each of the lesions above were sent for pathology.  The resection sites were thoroughly cauterized using loop after tissue was resected.    The resection sites were then examined with the inflow off.  Hemostasis was excellent.      Scope was removed. 20 [place] Negron catheter was placed.  There was return of clear urine. 10 cc of urine were placed in the balloon.     2000 mg of gemcitabine were then intravesically injected through the catheter.  Catheter was then clamped.    Patient was uneventfully awoken from anesthesia.  Patient was brought to PACU in good condition.             A qualified resident physician was not available.    Patient Disposition:  PACU         SIGNATURE: Nick Marx Amanda,   DATE: July 31, 2024  TIME: 6:10 PM      PLAN  -20 [place] two-way Negron catheter placed with 10 cc.  Gemcitabine instilled into the bladder.  Should sit for 1 hour.  Can be unclamped at 7 PM.  -Patient to be placed on observation overnight.  Trial of void in the morning.

## 2024-07-31 NOTE — H&P
UROLOGY H&P NOTE     Patient Identifiers: Trent Ocasio (MRN [id number])    Date of Service: 7/31/2024    History of Present Illness:     Trent Ocasio is a 79 y.o. old with a history of bladder cancer    Past Medical, Past Surgical History:     Past Medical History:   Diagnosis Date    Acute respiratory failure with hypoxia (HCC) 03/28/2024    Chronic kidney disease     Diabetes mellitus (HCC)     GERD (gastroesophageal reflux disease)     Hypertension     Respiratory acidosis 03/31/2024   :    Past Surgical History:   Procedure Laterality Date    APPENDECTOMY      CARDIAC CATHETERIZATION Left 05/24/2024    Procedure: Cardiac Left Heart Cath;  Surgeon: Qiana Torres MD;  [facility];  Service: Cardiology    CARDIAC CATHETERIZATION N/A 05/24/2024    Procedure: Cardiac Coronary Angiogram;  Surgeon: Qiana Torres MD;  [facility];  Service: Cardiology    CARDIAC CATHETERIZATION  05/24/2024    Procedure: Cardiac catheterization;  Surgeon: Qiana Torres MD;  [facility];  Service: Cardiology    CARDIAC ELECTROPHYSIOLOGY PROCEDURE N/A 6/21/2024    Procedure: Cardiac pacer implant;  Surgeon: Solomon Quintero MD;  [facility];  Service: Cardiology    NE CYSTO W/REMOVAL OF LESIONS SMALL N/A 6/20/2024    Procedure: TRANSURETHRAL RESECTION OF BLADDER TUMOR (TURBT);  Surgeon: Nick Patel DO;  [facility];  Service: Urology   :    Medications, Allergies:     Current Facility-Administered Medications   Medication Dose Route Frequency    [START ON 8/1/2024] ceFAZolin (ANCEF) IVPB (premix in dextrose) 2,000 mg 50 mL  2,000 mg Intravenous On Call To OR    dextrose 50 % IV solution 25 mL  25 mL Intravenous Once    gemcitabine (GEMZAR) 2,000 mg in sodium chloride 0.9 % 47.4 mL bladder instillation  2,000 mg Intravesical Once    sodium chloride 0.9 % infusion  50 mL/hr Intravenous Continuous       Allergies:  Allergies  "  Allergen Reactions    Canagliflozin GI Intolerance     Yeast infection  Yeast infection      Lisinopril Other (See Comments)     Pt unsure of reaction     Lovastatin GI Intolerance   :    Social and Family History:   Social History:   Social History     Tobacco Use    Smoking status: Former     Passive exposure: Past    Smokeless tobacco: Never   Vaping Use    Vaping status: Never Used   Substance Use Topics    Alcohol use: Yes     Comment: rarely    Drug use: Never   .    Social History     Tobacco Use   Smoking Status Former    Passive exposure: Past   Smokeless Tobacco Never       Family History:  History reviewed. No pertinent family history.:     Review of Systems:     General: Fever, chills, or night sweats: negative  Cardiac: Negative for chest pain.    Pulmonary: Negative for shortness of breath.  Gastrointestinal: Abdominal pain negative.  Nausea, vomiting, or diarrhea negative,  Genitourinary: See HPI above.  Patient does not have hematuria.  All other systems queried were negative.    Physical Exam:   General: Patient is pleasant and in NAD. Awake and alert  /60   Pulse 81   Temp (!) 97.1 °F (36.2 °C) (Temporal)   Resp 17   Ht 5' 10\" (1.778 m)   Wt 109 kg (240 lb 8.4 oz)   SpO2 98%   BMI 34.51 kg/m² Temp (24hrs), Av.1 °F (36.2 °C), Min:97.1 °F (36.2 °C), Max:97.1 °F (36.2 °C)  current; Temperature: (!) 97.1 °F (36.2 °C)  No intake/output data recorded.  Cardiac: Peripheral edema: negative  Pulmonary: Non-labored breathing  Abdomen: Soft, non-tender, non-distended.  No surgical scars.  No masses, tenderness, hernias noted.    Genitourinary: Negative CVA tenderness, negative suprapubic tenderness.        Labs:     Lab Results   Component Value Date    HGB 10.5 (L) 2024    HCT 33.4 (L) 2024    WBC 5.68 2024     2024   ]    Lab Results   Component Value Date    K 4.2 2024     2024    CO2 28 2024    BUN 30 (H) 2024    CREATININE " 1.95 (H) 06/22/2024    CALCIUM 8.8 06/22/2024   ]    Imaging:   I personally reviewed the images and report of the following studies, and reviewed them with the patient:    Reviewed, see below      ASSESSMENT:     79 y.o. old male with bladder cancer    Anticoagulation: Aspirin 81 mg     He was admitted around May 2024 with CHF.  He was found to have an elevated creatinine of 2.76.  He had a workup with nephrology which included a kidney bladder ultrasound.     Kidney bladder ultrasound 5/14/2024: New ovoid structure projecting from the posterior bladder with central hypoechoic area measuring about 3.2 cm     He had office cystoscopy with Dr. Oliveira on 5/22/2024 which demonstrated large posterior wall bladder mass       OR TURBT on 6/20/24:  Cellules/diverticuli: large dome chimney, very hard to reach with scope     Bladder lesion #1  Location: Posterior aspect of the dome chimney  Size: 4 cm  Appearance: Papillary  Predicted depth: Ta  -Tumor extremely difficult to reach with the resectoscope due to the fact that it was in the dome chimney.  I was only able to resect about 50% of the tumor with maximal downward pressure on the lower abdomen and pushing all the way in with the resectoscope  -Repeat case must be done with extra long scope next time     Due to incomplete resection of the tumor, gemcitabine was not given  --Path:  A. Urinary Bladder, Transurethral Resection of Bladder Tumor:  - Fragments of papillary urothelial carcinoma, high-grade, with foci suspicious for superficial invasion  - Muscularis propria (detrusor muscle) present and not involved       Patient got bradycardic in PACU.  Cardiology team assessed him.  He was admitted to medicine.  During admission he had pacemaker placed on 6/21/2024.    He was cleared by cardiology for repeat trip to the operating room.        Patient was consented today for transurethral resection of bladder tumor, possible intravesical instillation of chemotherapy  agent.    I explained that the procedure would be performed under general anesthesia in the operating room. I explained that during procedure, we would place scope into bladder. We would then look around bladder for any tumors or any areas of bladder that appeared suspicious for bladder cancer.     If the lesions or concerning areas were flat or generally small, we would remove tissue using biopsy forceps. This tissue would be sent for pathology. We would then coagulate the area with hot current device known as Bugbee to stop any bleeding. We would additionally use Bugbee to treat surrounding areas that appeared suspicious for cancer. I explained that this process could help kill superficial cancer cells and prevent recurrence.    If the lesions were larger, they would resected with hot current loop that could more formally cut into bladder tissue for a more thorough resection of the lesion. I explained that these pieces of tumor would then be collected through the scope and sent for pathology as well. I explained that the loop could also be used to coagulate adjacent areas of the bladder that appeared suspicious for bladder cancer.    We reviewed the basic risks of procedure which include but are not limited to: risks of general anesthesia, bleeding, infection, need for repeat procedures, need for hospitalization, worsening voiding symptoms, need for prolonged Negron catheter, bladder injury, urethral injury, injury to ureteral orifices, need for ureteral stent placement.    I explained that depending of the appearance of the tumor that I remove during surgery, I may opt to instill intravesical chemotherapy into the bladder. I explained that this means that at the end of the procedure while the patient is still asleep, I would place Negron catheter into the bladder. I would then inject chemotherapy agent through the catheter and into the bladder. I would then clamp the catheter so that the medicine could sit inside.  Patient would then be transferred to PACU with the chemotherapy agent inside the bladder. After an hour, the catheter would be unclamped and Negron catheter would be removed. Patient would then have a trial of void prior to leaving hospital.    I explained that treatment with these chemotherapy agents in the bladder after surgery for low or intermediate risk bladder cancer has been shown to decrease rates of recurrence for bladder cancer.    I explained that if the resection for surgery was too deep into the bladder wall, we would not be able to give the intravesical chemotherapy.    I explained that the chemotherapy agents that would be placed in the bladder would be either gemcitabine or mitomycin. We reviewed the risks of these intravesical medications which include but are not limited to: nausea, vomiting, flu-like symptoms, uncomfortable voiding symptoms, dermatitis, swelling, hematuria, breathing difficulties, bladder wall necrosis, damage to nearby structures.    We will plan to schedule patient for surgery. I explained to patient that we would schedule PATs for procedure.    Patient verbalized understanding. All questions were answered.    I explained to patient that surgical scheduler would reach out to confirm a date.          PLAN:     -preop ancef  -OR plan as above  -dc home from pacu versus obs pending course of case

## 2024-07-31 NOTE — ANESTHESIA PREPROCEDURE EVALUATION
Procedure:  TRANSURETHRAL RESECTION OF BLADDER TUMOR (TURBT), possible intravesical instillation of gemcitabine (Bladder)    Relevant Problems   CARDIO   (+) CAD (coronary artery disease)   (+) HTN (hypertension)      ENDO   (+) Hypothyroid   (+) Type 2 diabetes mellitus, with long-term current use of insulin (HCC)      GI/HEPATIC   (+) Gastroesophageal reflux disease      /RENAL   (+) Stage 3b chronic kidney disease (HCC)      MUSCULOSKELETAL   (+) Chronic low back pain with sciatica      NEURO/PSYCH   (+) Anxiety   (+) Chronic low back pain with sciatica      MDT DC PM/ACTIVE SYSTEM IS MRI CONDITIONAL   DEVICE INTERROGATED IN THE [facility] OFFICE:  BATTERY VOLTAGE ADEQUATE (11.9 YR.).  AP 7.5% -LBB 82.8% (>40%/AVB/DDDR 60 PPM, -180 MS, MVP ON).  ALL LEAD PARAMETERS WITHIN NORMAL LIMITS.  NO SIGNIFICANT HIGH RATE EPISODES.  NO PROGRAMMING CHANGES MADE TO DEVICE PARAMETERS.  INCISION CLEAN AND DRY WITH EDGES APPROXIMATED (SEE MEDIA).  AQUACEL AND STERI-STRIPS REMOVED; WOUND CARE AND RESTRICTIONS REVIEWED WITH PATIENT.  NORMAL DEVICE FUNCTION.  RG       Physical Exam    Airway    Mallampati score: II  TM Distance: >3 FB  Neck ROM: full     Dental   No notable dental hx     Cardiovascular  Rhythm: regular, No weak pulses    Pulmonary   No stridor    Other Findings      Anesthesia Plan  ASA Score- 3     Anesthesia Type- general with ASA Monitors.         Additional Monitors:     Airway Plan: LMA.           Plan Factors-    Chart reviewed. EKG reviewed.  Existing labs reviewed. Patient summary reviewed.          Obstructive sleep apnea risk education given perioperatively.        Induction- intravenous.    Postoperative Plan- Plan for postoperative opioid use. Planned trial extubation    Perioperative Resuscitation Plan - Level 1 - Full Code.       Informed Consent- Anesthetic plan and risks discussed with patient.  I personally reviewed this patient with the CRNA. Discussed and agreed on the  Anesthesia Plan with the CRNA..

## 2024-07-31 NOTE — TELEPHONE ENCOUNTER
Status post TURBT, gemcitabine at [facility] on 7/31/2024    Getting admitted for observation overnight.  Having trial of void on postoperative day 1.    He needs 2-week follow-up with me for pathology review

## 2024-08-01 VITALS
TEMPERATURE: 98.8 F | HEIGHT: 70 IN | HEART RATE: 89 BPM | WEIGHT: 240.52 LBS | RESPIRATION RATE: 16 BRPM | DIASTOLIC BLOOD PRESSURE: 62 MMHG | OXYGEN SATURATION: 97 % | BODY MASS INDEX: 34.43 KG/M2 | SYSTOLIC BLOOD PRESSURE: 138 MMHG

## 2024-08-01 LAB
GLUCOSE SERPL-MCNC: 166 MG/DL (ref 65–140)
GLUCOSE SERPL-MCNC: 201 MG/DL (ref 65–140)

## 2024-08-01 PROCEDURE — 82948 REAGENT STRIP/BLOOD GLUCOSE: CPT

## 2024-08-01 PROCEDURE — 99238 HOSP IP/OBS DSCHRG MGMT 30/<: CPT

## 2024-08-01 PROCEDURE — NC001 PR NO CHARGE: Performed by: UROLOGY

## 2024-08-01 RX ORDER — TRAMADOL HYDROCHLORIDE 50 MG/1
50 TABLET ORAL EVERY 6 HOURS PRN
Qty: 20 TABLET | Refills: 0 | Status: SHIPPED | OUTPATIENT
Start: 2024-08-01 | End: 2024-08-06

## 2024-08-01 RX ADMIN — HEPARIN SODIUM 5000 UNITS: 5000 INJECTION INTRAVENOUS; SUBCUTANEOUS at 06:01

## 2024-08-01 RX ADMIN — TRAMADOL HYDROCHLORIDE 50 MG: 50 TABLET, COATED ORAL at 06:05

## 2024-08-01 RX ADMIN — EZETIMIBE 10 MG: 10 TABLET ORAL at 09:28

## 2024-08-01 RX ADMIN — LOSARTAN POTASSIUM 50 MG: 50 TABLET, FILM COATED ORAL at 09:28

## 2024-08-01 RX ADMIN — ASPIRIN 81 MG: 81 TABLET, CHEWABLE ORAL at 09:28

## 2024-08-01 RX ADMIN — LEVOTHYROXINE SODIUM 50 MCG: 0.05 TABLET ORAL at 09:28

## 2024-08-01 RX ADMIN — INSULIN LISPRO 1 UNITS: 100 INJECTION, SOLUTION INTRAVENOUS; SUBCUTANEOUS at 09:32

## 2024-08-01 RX ADMIN — TORSEMIDE 40 MG: 20 TABLET ORAL at 09:28

## 2024-08-01 RX ADMIN — AMLODIPINE BESYLATE 10 MG: 10 TABLET ORAL at 09:28

## 2024-08-01 RX ADMIN — ISOSORBIDE MONONITRATE 30 MG: 30 TABLET, EXTENDED RELEASE ORAL at 09:28

## 2024-08-01 NOTE — PROGRESS NOTES
"Progress Note - Urology  Trent Ocasio 1945, 79 y.o. male MRN: [id number]    Unit/Bed#: -[facility] Encounter: [id number]      Assessment & Plan:  Bladder tumor  -POD1 TURBT, intravesicular instillation of gemcitabine by Dr. Patel  -Vital signs stable, afebrile  -Negron catheter removed  -Feeling well.  -Patient was on 2 L, weaned.  Monitor oxygen sats  -Has not ambulated.  Discussed with wife and nurse patient to ambulate  -Pending weaned oxygen and void trial, discharge later today  -Pathology review outpatient      Subjective:   HPI: Seen at bedside with his wife.  Negron catheter removed.  Urine is maria victoria.  Patient denies any abdominal pain.  He is not on home oxygen    Review of Systems   Constitutional:  Negative for chills and fever.   Respiratory:  Negative for cough and shortness of breath.    Cardiovascular:  Negative for chest pain and palpitations.   Gastrointestinal:  Negative for abdominal pain and vomiting.   Genitourinary:  Negative for dysuria and hematuria.   Musculoskeletal:  Negative for arthralgias and back pain.   Skin:  Negative for color change and rash.   Neurological:  Negative for seizures and syncope.   All other systems reviewed and are negative.        Vitals: Blood pressure 138/62, pulse 89, temperature 98.8 °F (37.1 °C), resp. rate 16, height 5' 10\" (1.778 m), weight 109 kg (240 lb 8.4 oz), SpO2 97%.,Body mass index is 34.51 kg/m².    Physical Exam  Constitutional:       General: He is not in acute distress.     Appearance: Normal appearance. He is obese. He is not ill-appearing or toxic-appearing.   HENT:      Head: Normocephalic and atraumatic.      Right Ear: External ear normal.      Left Ear: External ear normal.      Nose: Nose normal.      Mouth/Throat:      Mouth: Mucous membranes are moist.   Eyes:      General: No scleral icterus.     Conjunctiva/sclera: Conjunctivae normal.   Cardiovascular:      Rate and Rhythm: Normal rate and regular rhythm.      Pulses: Normal " pulses.      Heart sounds: Normal heart sounds. No murmur heard.     No gallop.   Pulmonary:      Effort: Pulmonary effort is normal. No respiratory distress.      Breath sounds: Normal breath sounds. No wheezing or rales.   Abdominal:      General: Abdomen is flat.      Palpations: Abdomen is soft.      Tenderness: There is no abdominal tenderness.   Genitourinary:     Comments: Negron draining maria victoria to pink-tinged  Musculoskeletal:         General: Normal range of motion.      Cervical back: Normal range of motion.   Skin:     General: Skin is warm.      Findings: No rash.   Neurological:      General: No focal deficit present.      Mental Status: He is alert and oriented to person, place, and time. Mental status is at baseline.   Psychiatric:         Mood and Affect: Mood normal.         Behavior: Behavior normal.         Thought Content: Thought content normal.         Judgment: Judgment normal.           Labs:  Recent Labs     07/31/24  1839   WBC 4.58       Recent Labs     07/31/24  1839   HGB 9.9*     Recent Labs     07/31/24  1839   HCT 30.6*     Recent Labs     07/31/24  1839   CREATININE 1.81*       History:    Past Medical History:   Diagnosis Date    Acute respiratory failure with hypoxia (HCC) 03/28/2024    Chronic kidney disease     Diabetes mellitus (HCC)     GERD (gastroesophageal reflux disease)     Hypertension     Respiratory acidosis 03/31/2024     Social History     Socioeconomic History    Marital status: /Civil Union     Spouse name: None    Number of children: None    Years of education: None    Highest education level: None   Occupational History    None   Tobacco Use    Smoking status: Former     Passive exposure: Past    Smokeless tobacco: Never   Vaping Use    Vaping status: Never Used   Substance and Sexual Activity    Alcohol use: Yes     Comment: rarely    Drug use: Never    Sexual activity: Yes     Partners: Female   Other Topics Concern    None   Social History Narrative     None     Social Determinants of Health     Financial Resource Strain: Low Risk  (8/20/2023)    [facility]    Overall Financial Resource Strain (CARDIA)     Difficulty of Paying Living Expenses: Not hard at all   Food Insecurity: No Food Insecurity (7/31/2024)    Hunger Vital Sign     Worried About Running Out of Food in the Last Year: Never true     Ran Out of Food in the Last Year: Never true   Transportation Needs: No Transportation Needs (7/31/2024)    PRAPARE - Transportation     Lack of Transportation (Medical): No     Lack of Transportation (Non-Medical): No   Physical Activity: Not on file   Stress: Stress Concern Present (8/20/2023)    Received from [facility], [facility]    [employer] - Occupational Stress Questionnaire     Feeling of Stress : To some extent   Social Connections: Unknown (6/18/2024)    Received from [facility]    Social [place]     How often do you feel lonely or isolated from those around you? (Adult - for ages 18 years and over): Not on file   Intimate Partner Violence: Not At Risk (8/20/2023)    Received from [facility], [facility]    Humiliation, Afraid, Rape, and Kick questionnaire     Fear of Current or Ex-Partner: No     Emotionally Abused: No     Physically Abused: No     Sexually Abused: No   Housing Stability: Low Risk  (7/31/2024)    Housing Stability Vital Sign     Unable to Pay for Housing in the Last Year: No     Number of Times Moved in the Last Year: 1     Homeless in the Last Year: No     Past Surgical History:   Procedure Laterality Date    APPENDECTOMY      CARDIAC CATHETERIZATION Left 05/24/2024    Procedure: Cardiac Left Heart Cath;  Surgeon: Qiana Torres MD;  [facility];  Service: Cardiology    CARDIAC CATHETERIZATION N/A 05/24/2024    Procedure: Cardiac Coronary Angiogram;   Surgeon: Qiana Torres MD;  [facility];  Service: Cardiology    CARDIAC CATHETERIZATION  05/24/2024    Procedure: Cardiac catheterization;  Surgeon: Qiana Torres MD;  [facility];  Service: Cardiology    CARDIAC ELECTROPHYSIOLOGY PROCEDURE N/A 6/21/2024    Procedure: Cardiac pacer implant;  Surgeon: Solomon Quintero MD;  [facility];  Service: Cardiology    GA CYSTO W/REMOVAL OF LESIONS SMALL N/A 6/20/2024    Procedure: TRANSURETHRAL RESECTION OF BLADDER TUMOR (TURBT);  Surgeon: Nick Patel DO;  [facility];  Service: Urology    GA CYSTO W/REMOVAL OF LESIONS SMALL N/A 7/31/2024    Procedure: TRANSURETHRAL RESECTION OF BLADDER TUMOR (TURBT), intravesical instillation of gemcitabine;  Surgeon: Nick Patel DO;  [facility];  Service: Urology     History reviewed. No pertinent family history.    Catie High PA-C  Date: 8/1/2024 Time: 11:09 AM

## 2024-08-01 NOTE — PLAN OF CARE
Problem: PAIN - ADULT  Goal: Verbalizes/displays adequate comfort level or baseline comfort level  Description: Interventions:  - Encourage patient to monitor pain and request assistance  - Assess pain using appropriate pain scale  - Administer analgesics based on type and severity of pain and evaluate response  - Implement non-pharmacological measures as appropriate and evaluate response  - Consider cultural and social influences on pain and pain management  - Notify physician/advanced practitioner if interventions unsuccessful or patient reports new pain  Outcome: Progressing     Problem: INFECTION - ADULT  Goal: Absence or prevention of progression during hospitalization  Description: INTERVENTIONS:  - Assess and monitor for signs and symptoms of infection  - Monitor lab/diagnostic results  - Monitor all insertion sites, i.e. indwelling lines, tubes, and drains  - Monitor endotracheal if appropriate and nasal secretions for changes in amount and color  - [place] appropriate cooling/warming therapies per order  - Administer medications as ordered  - Instruct and encourage patient and family to use good hand hygiene technique  - Identify and instruct in appropriate isolation precautions for identified infection/condition  Outcome: Progressing  Goal: Absence of fever/infection during neutropenic period  Description: INTERVENTIONS:  - Monitor WBC    Outcome: Progressing     Problem: SAFETY ADULT  Goal: Maintain or return to baseline ADL function  Description: INTERVENTIONS:  -  Assess patient's ability to carry out ADLs; assess patient's baseline for ADL function and identify physical deficits which impact ability to perform ADLs (bathing, care of mouth/teeth, toileting, grooming, dressing, etc.)  - Assess/evaluate cause of self-care deficits   - Assess range of motion  - Assess patient's mobility; develop plan if impaired  - Assess patient's need for assistive devices and provide as appropriate  - Encourage  maximum independence but intervene and supervise when necessary  - Involve family in performance of ADLs  - Assess for home care needs following discharge   - Consider OT consult to assist with ADL evaluation and planning for discharge  - Provide patient education as appropriate  Outcome: Progressing     Problem: DISCHARGE PLANNING  Goal: Discharge to home or other facility with appropriate resources  Description: INTERVENTIONS:  - Identify barriers to discharge w/patient and caregiver  - Arrange for needed discharge resources and transportation as appropriate  - Identify discharge learning needs (meds, wound care, etc.)  - Arrange for interpretive services to assist at discharge as needed  - Refer to Case Management Department for coordinating discharge planning if the patient needs post-hospital services based on physician/advanced practitioner order or complex needs related to functional status, cognitive ability, or social support system  Outcome: Progressing     Problem: Knowledge Deficit  Goal: Patient/family/caregiver demonstrates understanding of disease process, treatment plan, medications, and discharge instructions  Description: Complete learning assessment and assess knowledge base.  Interventions:  - Provide teaching at level of understanding  - Provide teaching via preferred learning methods  Outcome: Progressing

## 2024-08-01 NOTE — PLAN OF CARE
Problem: PAIN - ADULT  Goal: Verbalizes/displays adequate comfort level or baseline comfort level  Description: Interventions:  - Encourage patient to monitor pain and request assistance  - Assess pain using appropriate pain scale  - Administer analgesics based on type and severity of pain and evaluate response  - Implement non-pharmacological measures as appropriate and evaluate response  - Consider cultural and social influences on pain and pain management  - Notify physician/advanced practitioner if interventions unsuccessful or patient reports new pain  Outcome: Progressing     Problem: INFECTION - ADULT  Goal: Absence or prevention of progression during hospitalization  Description: INTERVENTIONS:  - Assess and monitor for signs and symptoms of infection  - Monitor lab/diagnostic results  - Monitor all insertion sites, i.e. indwelling lines, tubes, and drains  - Monitor endotracheal if appropriate and nasal secretions for changes in amount and color  - [place] appropriate cooling/warming therapies per order  - Administer medications as ordered  - Instruct and encourage patient and family to use good hand hygiene technique  - Identify and instruct in appropriate isolation precautions for identified infection/condition  Outcome: Progressing  Goal: Absence of fever/infection during neutropenic period  Description: INTERVENTIONS:  - Monitor WBC    Outcome: Progressing     Problem: SAFETY ADULT  Goal: Patient will remain free of falls  Description: INTERVENTIONS:  - Educate patient/family on patient safety including physical limitations  - Instruct patient to call for assistance with activity   - Consult OT/PT to assist with strengthening/mobility   - Keep Call bell within reach  - Keep bed low and locked with side rails adjusted as appropriate  - Keep care items and personal belongings within reach  - Initiate and maintain comfort rounds  - Make Fall Risk Sign visible to staff  - Apply yellow socks and bracelet  for high fall risk patients  - Consider moving patient to room near nurses station  Outcome: Progressing  Goal: Maintain or return to baseline ADL function  Description: INTERVENTIONS:  -  Assess patient's ability to carry out ADLs; assess patient's baseline for ADL function and identify physical deficits which impact ability to perform ADLs (bathing, care of mouth/teeth, toileting, grooming, dressing, etc.)  - Assess/evaluate cause of self-care deficits   - Assess range of motion  - Assess patient's mobility; develop plan if impaired  - Assess patient's need for assistive devices and provide as appropriate  - Encourage maximum independence but intervene and supervise when necessary  - Involve family in performance of ADLs  - Assess for home care needs following discharge   - Consider OT consult to assist with ADL evaluation and planning for discharge  - Provide patient education as appropriate  Outcome: Progressing  Goal: Maintains/Returns to pre admission functional level  Description: INTERVENTIONS:  - Perform AM-PAC 6 Click Basic Mobility/ Daily Activity assessment daily.  - Set and communicate daily mobility goal to care team and patient/family/caregiver.   - Collaborate with rehabilitation services on mobility goals if consulted  - Out of bed for toileting  - Record patient progress and toleration of activity level   Outcome: Progressing     Problem: DISCHARGE PLANNING  Goal: Discharge to home or other facility with appropriate resources  Description: INTERVENTIONS:  - Identify barriers to discharge w/patient and caregiver  - Arrange for needed discharge resources and transportation as appropriate  - Identify discharge learning needs (meds, wound care, etc.)  - Arrange for interpretive services to assist at discharge as needed  - Refer to Case Management Department for coordinating discharge planning if the patient needs post-hospital services based on physician/advanced practitioner order or complex needs  related to functional status, cognitive ability, or social support system  Outcome: Progressing     Problem: Knowledge Deficit  Goal: Patient/family/caregiver demonstrates understanding of disease process, treatment plan, medications, and discharge instructions  Description: Complete learning assessment and assess knowledge base.  Interventions:  - Provide teaching at level of understanding  - Provide teaching via preferred learning methods  Outcome: Progressing

## 2024-08-01 NOTE — CASE MANAGEMENT
Case Management Assessment & Discharge Planning Note    Patient name Trent Ocasio  Location /-01 MRN [id number]  : 1945 Date 2024       Current Admission Date: 2024  Current Admission Diagnosis:Malignant 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 (HCC)   Patient Active Problem List    Diagnosis Date Noted Date Diagnosed    Chronic low back pain with sciatica 2024     Gastroesophageal reflux disease 2024     Bladder tumor 2024     Hypoglycemia 2024     S/P cardiac cath 2024     Bradycardia 2024     Malignant 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 (HCC) 2024     Abnormal stress test 2024     CAD (coronary artery disease) 2024     Stage 3b chronic kidney disease (HCC) 2024     Chronic heart failure with preserved ejection fraction (HFpEF) (HCC) 2024     History of prostate cancer 2024     Anxiety 2024     HTN (hypertension) 2024     Hypothyroid 2024     Type 2 diabetes mellitus, with long-term current use of insulin ([facility]) 2024     Elevated troponin 2024       LOS (days): 0  Geometric Mean LOS (GMLOS) (days):   Days to GMLOS:     OBJECTIVE:              Current admission status: Outpatient Surgery       Preferred Pharmacy:   [id number] - [place] - [id number]  [id number]  [place] [address]  Phone: [phone] Fax: [phone]    [facility] [address]  [address] [facility] [address]  Phone: [phone] Fax: [phone]    Primary Care Provider: Micky Hylton MD    Primary Insurance: [facility] REP  Secondary Insurance:     ASSESSMENT:  Active Health Care Proxies       KAJAL OCASIO Health Care Representative - Spouse   Primary Phone: [phone] (Mobile)                           Readmission Root Cause  30 Day Readmission: No    Patient Information  Admitted from::  Home  Mental Status: Alert  During Assessment patient was accompanied by: Spouse  Assessment information provided by:: Patient  Primary Caregiver: Self  Support Systems: Spouse/significant other  County of Residence: [place]  What city do you live in?: [place]  Home entry access options. Select all that apply.: Stairs  Number of steps to enter home.: 2  Type of Current Residence: 2 story home  Upon entering residence, is there a bedroom on the main floor (no further steps)?: No  A bedroom is located on the following floor levels of residence (select all that apply):: 2nd Floor  Upon entering residence, is there a bathroom on the main floor (no further steps)?: Yes  Number of steps to 2nd floor from main floor: One Flight  Living Arrangements: Lives w/ Spouse/significant other  Is patient a ?: Yes  Is patient active with [facility] ( [employer])?: No    Activities of Daily Living Prior to Admission  Functional Status: Independent  Completes ADLs independently?: Yes  Ambulates independently?: Yes  Does patient use assisted devices?: Yes  Assisted Devices (DME) used: Straight Cane  Does patient currently own DME?: Yes  What DME does the patient currently own?: Straight Cane  Does patient have a history of Outpatient Therapy (PT/OT)?: Yes  Does the patient have a history of Short-Term Rehab?: No  Does patient have a history of HHC?: No  Does patient currently have HHC?: No         Patient Information Continued  Income Source: SSI/SSD  Does patient have prescription coverage?: Yes  Does patient receive dialysis treatments?: No  Does patient have a history of substance abuse?: No  Does patient have a history of Mental Health Diagnosis?: No    PHQ 2/9 Screening   Reviewed PHQ 2/9 Depression Screening Score?: No    Means of Transportation  Means of Transport to Appts:: Drives Self      Social Determinants of Health (SDOH)      Flowsheet Row Most Recent Value   Housing Stability    In the last 12 months, was there a  time when you were not able to pay the mortgage or rent on time? N   In the past 12 months, how many times have you moved where you were living? 0   At any time in the past 12 months, were you homeless or living in a shelter (including now)? N   Transportation Needs    In the past 12 months, has lack of transportation kept you from medical appointments or from getting medications? no   In the past 12 months, has lack of transportation kept you from meetings, work, or from getting things needed for daily living? No   Food Insecurity    Within the past 12 months, you worried that your food would run out before you got the money to buy more. Never true   Within the past 12 months, the food you bought just didn't last and you didn't have money to get more. Never true   Utilities    In the past 12 months has the electric, gas, oil, or water company threatened to shut off services in your home? No            DISCHARGE DETAILS:    Discharge planning discussed with:: Patient  Freedom of Choice: Yes  Comments - Freedom of Choice: CM discussed freedom of choice as it pertains to discharge planning, Patient declined having any needs and declined needing any resources.  CM contacted family/caregiver?: No- see comments ([facility])  Were Treatment Team discharge recommendations reviewed with patient/caregiver?: Yes  Did patient/caregiver verbalize understanding of patient care needs?: Yes  Were patient/caregiver advised of the risks associated with not following Treatment Team discharge recommendations?: Yes         Requested Home Health Care         Is the patient interested in HHC at discharge?: No    DME Referral Provided  Referral made for DME?: No    Other Referral/Resources/Interventions Provided:  Interventions: None Indicated, Declines resources    Would you like to participate in our [facility] service program?  : No - Declined    Treatment Team Recommendation: Home  Discharge Destination Plan:: Home  Transport at  Discharge : Self

## 2024-08-01 NOTE — DISCHARGE SUMMARY
Discharge Summary - Trent Ocasio 79 y.o. male MRN: [id number]    Unit/Bed#: -01 Encounter: [id number]    Admission Date: 7/31/2024     Discharge Date: 08/01/24    HPI: Trent Ocasio is a 79 y.o. male who presented for TURBT by Dr. Patel     Procedure(s):  TRANSURETHRAL RESECTION OF BLADDER TUMOR (TURBT), intravesical instillation of gemcitabine  Surgeon(s):  Nick Patel,   7/31/2024    Hospital Course: Patient presented for TURBT, operation went as planned.  See op note for more details.  Patient was admitted postoperatively.  He had a trial of void on postop day 1.  Urinating without difficulties.  He was on 2 L of oxygen in the morning, weaned.        Patient stable on the afternoon of 8/1/2024 for discharge home    Discharge Diagnosis: Malignant 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 (HCC)    Condition at Discharge: good    Discharge Medications:  See after visit summary for reconciled discharge medications provided to patient and family.  Patient was discharged home on home medications with the addition of Tylenol, tramadol    Discharge instructions/Information to patient and family:   See after visit summary for written and verbal information which has been provided to patient and family.      Provisions for Follow-Up Care:  See after visit summary for information related to follow-up care and any pertinent home health orders.      Disposition: Home    Planned Readmission: No    Discharge Statement   I spent 10 minutes discharging the patient. This time was spent on the day of discharge. I had direct contact with the patient on the day of discharge. Additional documentation is required if more than 30 minutes were spent on discharge.     Signature:   Catie High PA-C  Date: 8/1/2024 Time: 1:56 PM

## 2024-08-02 NOTE — TELEPHONE ENCOUNTER
Spoke to patient, patient is urinating well without difficulties. Advised patient to increase water intake and ER precautions over the weekend for fevers or chills. Confirmed path review appt. Advised to contact the office with any questions or concerns. Patient verbalized understanding and thankful for call.

## 2024-08-06 PROCEDURE — 88307 TISSUE EXAM BY PATHOLOGIST: CPT | Performed by: PATHOLOGY

## 2024-08-06 PROCEDURE — 88342 IMHCHEM/IMCYTCHM 1ST ANTB: CPT | Performed by: PATHOLOGY

## 2024-08-06 PROCEDURE — 88341 IMHCHEM/IMCYTCHM EA ADD ANTB: CPT | Performed by: PATHOLOGY

## 2024-08-07 NOTE — PROGRESS NOTES
[facility] Regular Visit  Name: Trent Ocasio      : 1945      MRN: [id number]  Encounter Provider: Nick Patel DO  Encounter Date: 2024   Encounter department: [facility] [place]    Verification of patient location:    Patient is located at Home in the following state in which I hold an active license [place]    Assessment & Plan   1. 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  I explained that this meant that the cancer was not muscle invasive and but did invade a cell layer called the lamina propria.    I reviewed the AUA risk stratification table.             I explained that based on cystoscopic and pathologic findings, patient has high risk disease.                 We reviewed the treatment options for HGT1 disease. Understaging of such tumors is a great and primary concern, especially if no muscle is present in the original TUR specimen.  Even if muscularis propria is present in the original specimen re-resection (i.e. 2nd TURBT approximately 4 weeks following initial resection) is currently the standard of care for patients with T1 disease.  Data clearly show that at re-resection 45-75% of patients will exhibit residual tumor cells, while 30 to 40% will be upstaged to muscle-invasive disease. Repeat resection may also serve a prognostic purpose, since those that have residual tumor on re-resection have been shown to have a much higher rate of disease progression than patients in whom no residual cancer is found (14 vs 76%).  I explained that even  technically successful resection and re-resection, can understage patients with T1 bladder cancer.  As such, management of patients with T1 bladder cancer is nuanced, since historical data suggest that roughly 30% of patients will never recur, 30% will require cystectomy, and 30% will succumb to metastatic urothelial carcinoma. I  "therefore explained to the patient that typical standard of care for this scenario would be repeat TURBT to obtain more tissue for pathology review.     Due to the aggressive nature of HGT1 disease, the concept of \"early cystectomy\" has been proposed, and five-year disease specific survival in these \"early cystectomy\" cohorts are reported to be between 80 to 90%. Nevertheless, although retrospective data suggest a significant survival benefit to early cystectomy (e.g. 76.7% vs 64.5% 10-year cancer specific survival in one study), the risk- benefit balances in these particular situations are extremely complex and require tailoring to each patient's circumstances, wishes, and priorities.  Risks and quality of life compromises of cystectomy must be balanced against oncologic risks, since 50% to 70% of patients are overtreated with early cystectomy.  An initial trial for BCG in the setting of newly identified T1 disease is another option, since progression within the first 6th months of diagnosis is extremely rare (on the order of 4%).  Nevertheless, in certain situations, immediate cystectomy in the setting of T1 disease is strongly recommended. These include: (1) extensive unresectable tumor (2) T1 disease in a bladder diverticulum (3) presence of hydronephrosis (4) non-urothelial carcinoma histology and (5) carcinoma in the prostatic urethra and (5) as some experts argue presence of lymphovascular invasion.  Multifocality, presence of CIS, presence of T1 disease on re-resection, carcinoma that abuts the muscle of the bladder, and large tumor size are also risk factors that can tip the scales toward cystectomy.         If an immediate cystectomy is considered, I explained that there are 3 basic categories of modern urinary diversions: (1) incontinent bowel conduit (e.g. ileal conduit) (2) continent cutaneous stomal reservoire (e.g. [place] pouch) (3) continent orthotopic urethral diversion (ileal neopbladder).      I " explained that an ileal conduit is the simplest, most time-tested urinary diversion that requires the least operative time and arguably is associated with the fewest complications. I described that a short piece of ileum is anastamosed to the ureters and brought onto the skin.  A life-long urostomy appliance to collect urine is required.   Although time-tested, short-term and long-term issues with ileal conduit diversions are not uncommon.  I quoted the best data available, which in my view is the 2003 [facility] series that included long-term bladder cancer survivors who had at least 5 years of follow-up.  In this series, issues that were seen with ileal conduits were as follows: ureteral stricture/obstruction (~15%), recurrent pyelonephritis (~ 25%), urinary calculi (this complication often occured beyond 5 to 10 years after surgery, ~10% ), impaired kidney function due to obstruction or ureteral reflux (~27%, only 2% required dialysis all of whom had compromised kidney function prior to surgery), and stomal complications (~25%) such as parastomal hernia, stensosis, and bleeding/skin irritation.  Furthermore up to 25% of patients had bowel complications, which included small bowel obstruction, fistula formation, and diarrhea.  I explained that although many issues that arise can be handled conservatively, some do require re-operation.      Then we discussed the ileal neobladder.  I explained that the neobladder is constructed from detubularized bowel, which is then sewn into a spherical reservoir for urine storage.  The neobladder is anastamosed to the ureters and to the urethra, to mimic functions of the native bladder. The goal is for the patient to void spontaneously using the Valsalva menuver.  Choice of proceeding with the neobladder vs. ileal conduit must be balanced against additional issues and risks.  Along with with risks listed for ileal conduit diversion, I explained the additional potential  problems that can arise with the ileal neobladder.   I described risks of urinary leaks from a number of suture lines and explained that in general these can be managed conservatively.  I stressed that a life-long risk of pouch perforation exists.  We discussed risks of incontinence and hypercontinence.  I explained that it is critical to integrate these risks into the decision to proceed with the neobladder.  I quoted the risks of day-time incontinence to be on the order of 10%, while the risk of nighttime incontinence to be approximately 20%.  With regard to hypercontinence and poor pouch emptying, I explained that approximately 5% of male patients will require life-long intermittent catheterization.  Such risks can reach 30% in female patients with neobladders.  Furthemore, neobladder-vaginal fistula formation is a great concern when a neobladder is constructed in women (~5%).  I explained that continent urinary diversions are contra-indicated in patients with renal insufficiency, but did explain that metabolic disturbances are possible even in patients with intact renal function.    With regard to [place] pouch, we discussed that this diversion consists of a pouch constructed from detubularized right colon.  The pouch is catheterized through the native ileocecal valve via a segment of the ileum that is brought onto the skin.  I explained that all risks that pertain to ileal conduits and neobladders generally apply to patients who undergo a cutaneous continent diversion.  These include risks of stomal stenosis, pouch stomal formation, incontinence, and metabolic disturbances. Furthermore, because, the ileocecal valve is resected, some post-operative diarrhea can be expected.  Such symptoms usually can be controlled with over-the-counter medications.  We reviewed the life-long catheterization and irrigation regimen that is required with this diversion.    Other general risks of the surgery were discussed in detail  including medical and anesthetic complications (e.g. heart attack, stroke, deep vein thrombosis, pulmonary embolism, infection (pneumonia, etc), bleeding with possible need for transfusion, adjacent organ involvement or injury, wound complications, reaction to medications).  We reviewed what to expect with regard to incisions, post-operative pain, and risks of subsequent hernias.         I explained that if we chose to proceed with intravesical therapy, BCG with a maintenance protocol is the optimal strategy for patients with non-muscle invasive, high risk disease. Generally, BCG is administered as a 6 week induction course approximately 3-4 weeks after TUR followed by 3 week maintenance boosters at 3, 6, 12, 18, 24, 30, 36 months.  Strict cystoscopic and upper tract surveillance must be maintained throughout this period.  We discussed the fact that this is a rather grueling regimen and that we will need to approach this undertaking one step at a time.  For patients who are unable to tolerate BCG, intravesical therapy with Mitomycin remains an option.  Although Mitomycin is better tolerated than BCG, its efficacy - even with a maintenance protocol - is inferior to that of BCG.    We reviewed the side effects of BCG.  I explained the side effects are including but not limited to:  Inflammatory cystitis, severe BCG cystitis, flu-like symptoms, fevers, granulomatous prostatitis, BCG sepsis.  I explained that if patient did develop BCG sepsis, that hospitalization with IV antibiotics would be required.  I explained that BCG had superior efficacy compared to intravesical chemotherapy such as gemcitabine for patients with high risk nonmuscle invasive bladder cancer.    I also explained that BCG should not be administered in the setting of active urinary tract infection, traumatic catheterization, or active gross hematuria in order to minimize the risk of systemic BCG infection.    We reviewed that BCG usually needs to sit  in the bladder for about 2 hours to reach peak efficacy.  We also reviewed that patients are asked not to drink 4-6 hours prior to treatment.    I explained that studies have demonstrated that this regimen has been shown to reduce recurrence and progression of bladder cancer.        PLAN  -Will set up for induction BCG  -Will do cysto after BCG induction                  Encounter provider Nick Patel DO    The patient was identified by name and date of birth. Trent Ocasio was informed that this is a telemedicine visit and that the visit is being conducted through Telephone.  My office door was closed. No one else was in the room.  He acknowledged consent and understanding of privacy and security of the video platform. The patient has agreed to participate and understands they can discontinue the visit at any time.    Patient is aware this is a billable service.     History of Present Illness     Trent Ocasio is a 79 y.o. male who presents with bladder cancer        79 y.o. old male with bladder cancer     Anticoagulation: Aspirin 81 mg     He was admitted around May 2024 with CHF.  He was found to have an elevated creatinine of 2.76.  He had a workup with nephrology which included a kidney bladder ultrasound.     Kidney bladder ultrasound 5/14/2024: New ovoid structure projecting from the posterior bladder with central hypoechoic area measuring about 3.2 cm     He had office cystoscopy with Dr. Oliveira on 5/22/2024 which demonstrated large posterior wall bladder mass        OR TURBT on 6/20/24:  Cellules/diverticuli: large dome chimney, very hard to reach with scope     Bladder lesion #1  Location: Posterior aspect of the dome chimney  Size: 4 cm  Appearance: Papillary  Predicted depth: Ta  -Tumor extremely difficult to reach with the resectoscope due to the fact that it was in the dome chimney.  I was only able to resect about 50% of the tumor with maximal downward pressure on the lower abdomen  and pushing all the way in with the resectoscope  -Repeat case must be done with extra long scope next time     Due to incomplete resection of the tumor, gemcitabine was not given  --Path:  A. Urinary Bladder, Transurethral Resection of Bladder Tumor:  - Fragments of papillary urothelial carcinoma, high-grade, with foci suspicious for superficial invasion  - Muscularis propria (detrusor muscle) present and not involved        Patient got bradycardic in PACU.  Cardiology team assessed him.  He was admitted to medicine.  During admission he had pacemaker placed on 6/21/2024.     He was cleared by cardiology for repeat trip to the operating room.    OR 8/8/24:  Bladder lesion #1  Location: Posterior aspect of the dome chimney  Size: 4 cm  --Path:  A. Urinary Bladder, Bladder tumor, Transurethral Resection:  - High-grade papillary urothelial carcinoma with focal superficial invasion.  - Muscularis propria (detrusor muscle) is present, negative for tumor.     Extra long resectoscope was reserved for the case.  Unfortunately, the extra long lens was not included in the set.  We therefore had to use standard bipolar resectoscope.  2 assistants pushed down very hard on the abdomen and I pushed very hard into the dome with the scope.  Eventually able to resect the entire tumor.     2000 mg of gemcitabine were administered intravesically at the end of the case     For future TURBT, only extra long resectoscope should be used      Admitted overnight for obs. Passed TOV on POD1.        Review of Systems   Constitutional:  Negative for chills and fever.   Respiratory:  Negative for cough and shortness of breath.    Gastrointestinal:  Negative for abdominal pain.   Genitourinary:  Negative for dysuria and hematuria.   Neurological:  Negative for dizziness and headaches.   Psychiatric/Behavioral:  Negative for agitation and behavioral problems.        Objective     There were no vitals taken for this visit.  Physical  Exam  Constitutional:       General: He is not in acute distress.  Pulmonary:      Effort: No respiratory distress.   Neurological:      Mental Status: He is alert and oriented to person, place, and time.   Psychiatric:         Mood and Affect: Mood normal.         Behavior: Behavior normal.         Visit Time  Total Visit Duration: 30 minutes

## 2024-08-08 ENCOUNTER — TELEMEDICINE (OUTPATIENT)
Dept: UROLOGY | Facility: CLINIC | Age: 79
End: 2024-08-08
Payer: COMMERCIAL

## 2024-08-08 ENCOUNTER — TELEPHONE (OUTPATIENT)
Dept: UROLOGY | Facility: CLINIC | Age: 79
End: 2024-08-08

## 2024-08-08 DIAGNOSIS — C67.9 MALIGNANT NEOPLASM OF URINARY BLADDER, UNSPECIFIED SITE (HCC): Primary | ICD-10-CM

## 2024-08-08 DIAGNOSIS — M54.30 SCIATICA, UNSPECIFIED LATERALITY: Primary | ICD-10-CM

## 2024-08-08 PROCEDURE — 99214 OFFICE O/P EST MOD 30 MIN: CPT | Performed by: UROLOGY

## 2024-08-08 NOTE — TELEPHONE ENCOUNTER
Patient asked for referral to pain medicine for sciatica     Referral placed for pain medicine     Please give patient info to call for appointment

## 2024-08-08 NOTE — TELEPHONE ENCOUNTER
Received: Today  Nick Patel, DO   HGT1  Can we do bcg induction then set up for cysto after induction?

## 2024-08-09 NOTE — TELEPHONE ENCOUNTER
Called and spoke to Trent, provided him with the phone number for pain management. Pt wrote it down and I let him know that the nurse in the office will call him back to schedule the BCBG that was discussed during his phone appt yesterday with

## 2024-08-09 NOTE — TELEPHONE ENCOUNTER
Will call pt with the address and phone number for pain management     [phone]  [address]   [facility] [address] ([facility])

## 2024-09-05 ENCOUNTER — TELEPHONE (OUTPATIENT)
Dept: NEPHROLOGY | Facility: CLINIC | Age: 79
End: 2024-09-05

## 2024-09-05 NOTE — TELEPHONE ENCOUNTER
Called left voice message to remind patient to get non-fasting labs done 1 week prior to 09/19/24 scheduled follow-up appointment with Dr.Adeem Gilbert. also advised as a reminder If labs / testing are not done in the appropriate time for scheduled appointment, appointment may need to be rescheduled.

## 2024-09-05 NOTE — ASSESSMENT & PLAN NOTE
Had post cath hypoglycemia which may be due to n.p.o. status and taking partial dose of antihyperglycemic agents at home.  Now improving, sugars actually elevated.  Can resume Premeal and bedtime checks.  Resume basal 20 units insulin at nighttime with insulin sliding scale during the day   Satisfactory

## 2024-09-09 ENCOUNTER — APPOINTMENT (OUTPATIENT)
Dept: LAB | Facility: CLINIC | Age: 79
End: 2024-09-09
Payer: COMMERCIAL

## 2024-09-09 DIAGNOSIS — N18.32 STAGE 3B CHRONIC KIDNEY DISEASE (HCC): ICD-10-CM

## 2024-09-09 LAB
25(OH)D3 SERPL-MCNC: 56 NG/ML (ref 30–100)
ALBUMIN SERPL BCG-MCNC: 4.3 G/DL (ref 3.5–5)
ALP SERPL-CCNC: 57 U/L (ref 34–104)
ALT SERPL W P-5'-P-CCNC: 10 U/L (ref 7–52)
ANION GAP SERPL CALCULATED.3IONS-SCNC: 11 MMOL/L (ref 4–13)
AST SERPL W P-5'-P-CCNC: 15 U/L (ref 13–39)
BASOPHILS # BLD AUTO: 0.03 THOUSANDS/ÂΜL (ref 0–0.1)
BASOPHILS NFR BLD AUTO: 1 % (ref 0–1)
BILIRUB SERPL-MCNC: 0.29 MG/DL (ref 0.2–1)
BUN SERPL-MCNC: 31 MG/DL (ref 5–25)
CALCIUM SERPL-MCNC: 10 MG/DL (ref 8.4–10.2)
CHLORIDE SERPL-SCNC: 105 MMOL/L (ref 96–108)
CO2 SERPL-SCNC: 25 MMOL/L (ref 21–32)
CREAT SERPL-MCNC: 1.69 MG/DL (ref 0.6–1.3)
CREAT UR-MCNC: 94.3 MG/DL
EOSINOPHIL # BLD AUTO: 0.21 THOUSAND/ÂΜL (ref 0–0.61)
EOSINOPHIL NFR BLD AUTO: 4 % (ref 0–6)
ERYTHROCYTE [DISTWIDTH] IN BLOOD BY AUTOMATED COUNT: 14.8 % (ref 11.6–15.1)
GFR SERPL CREATININE-BSD FRML MDRD: 37 ML/MIN/1.73SQ M
GLUCOSE P FAST SERPL-MCNC: 83 MG/DL (ref 65–99)
HCT VFR BLD AUTO: 36 % (ref 36.5–49.3)
HGB BLD-MCNC: 11.7 G/DL (ref 12–17)
IMM GRANULOCYTES # BLD AUTO: 0.03 THOUSAND/UL (ref 0–0.2)
IMM GRANULOCYTES NFR BLD AUTO: 1 % (ref 0–2)
LYMPHOCYTES # BLD AUTO: 0.78 THOUSANDS/ÂΜL (ref 0.6–4.47)
LYMPHOCYTES NFR BLD AUTO: 15 % (ref 14–44)
MCH RBC QN AUTO: 32.4 PG (ref 26.8–34.3)
MCHC RBC AUTO-ENTMCNC: 32.5 G/DL (ref 31.4–37.4)
MCV RBC AUTO: 100 FL (ref 82–98)
MICROALBUMIN UR-MCNC: 158.8 MG/L
MICROALBUMIN/CREAT 24H UR: 168 MG/G CREATININE (ref 0–30)
MONOCYTES # BLD AUTO: 0.62 THOUSAND/ÂΜL (ref 0.17–1.22)
MONOCYTES NFR BLD AUTO: 12 % (ref 4–12)
NEUTROPHILS # BLD AUTO: 3.6 THOUSANDS/ÂΜL (ref 1.85–7.62)
NEUTS SEG NFR BLD AUTO: 67 % (ref 43–75)
NRBC BLD AUTO-RTO: 0 /100 WBCS
PHOSPHATE SERPL-MCNC: 3.5 MG/DL (ref 2.3–4.1)
PLATELET # BLD AUTO: 259 THOUSANDS/UL (ref 149–390)
PMV BLD AUTO: 11 FL (ref 8.9–12.7)
POTASSIUM SERPL-SCNC: 4.4 MMOL/L (ref 3.5–5.3)
PROT SERPL-MCNC: 7.4 G/DL (ref 6.4–8.4)
PTH-INTACT SERPL-MCNC: 110.1 PG/ML (ref 12–88)
RBC # BLD AUTO: 3.61 MILLION/UL (ref 3.88–5.62)
SODIUM SERPL-SCNC: 141 MMOL/L (ref 135–147)
WBC # BLD AUTO: 5.27 THOUSAND/UL (ref 4.31–10.16)

## 2024-09-09 PROCEDURE — 80053 COMPREHEN METABOLIC PANEL: CPT

## 2024-09-09 PROCEDURE — 82306 VITAMIN D 25 HYDROXY: CPT

## 2024-09-09 PROCEDURE — 82043 UR ALBUMIN QUANTITATIVE: CPT

## 2024-09-09 PROCEDURE — 83970 ASSAY OF PARATHORMONE: CPT

## 2024-09-09 PROCEDURE — 84100 ASSAY OF PHOSPHORUS: CPT

## 2024-09-09 PROCEDURE — 36415 COLL VENOUS BLD VENIPUNCTURE: CPT

## 2024-09-09 PROCEDURE — 82570 ASSAY OF URINE CREATININE: CPT

## 2024-09-09 PROCEDURE — 85025 COMPLETE CBC W/AUTO DIFF WBC: CPT

## 2024-09-10 ENCOUNTER — TELEPHONE (OUTPATIENT)
Dept: PAIN MEDICINE | Facility: CLINIC | Age: 79
End: 2024-09-10

## 2024-09-12 NOTE — PROGRESS NOTES
Assessment:  1. Chronic bilateral low back pain with bilateral sciatica    2. Lumbar spondylolysis        Plan:  Orders Placed This Encounter   Procedures    XR spine lumbar 2 or 3 views injury     Standing Status:   Future     Standing Expiration Date:   9/13/2028     Scheduling Instructions:      Bring along any outside films relating to this procedure.          MRI lumbar spine wo contrast     OPEN MRI    PATIENT HAS PACEMAKER. PLEASE REVIEW. APPEARS TO BE MRI CONDITIONAL.     Standing Status:   Future     Standing Expiration Date:   9/13/2028     Scheduling Instructions:      There is no preparation for this test. Please leave your jewelry and valuables at home, wedding rings are the exception. Magnetic nail polish must be removed prior to arrival for your test. Please bring your insurance cards, a form of photo ID and a list of your medications with you. Arrive 15       minutes prior to your appointment time in order to register. Please bring any prior CT or MRI studies of this area that were not performed at a [facility].            To schedule this appointment, please contact Central Scheduling at [phone].            Prior to your appointment, please make sure you complete the MRI Screening Form when you e-Check in for your appointment. This will be available starting 7 days before your appointment in [employer]. You may receive an e-mail with an activation code if you do not have a [employer] account. If you do not       have access to a device, we will complete your screening at your appointment.     Order Specific Question:   What is the patient's sedation requirement?     Answer:   No Sedation     Order Specific Question:   Does the patient have metallic implants, such as a pacemaker or shunt?     Answer:   Yes     Comments:   PACEMAKER- MRI CONDITIONAL     Order Specific Question:   Does this procedure require the 3T MRI?     Answer:   No     Order Specific Question:   Release to patient  through [employer]     Answer:   Immediate     Order Specific Question:   Is order priority selected as STAT?     Answer:   No     Order Specific Question:   Reason for Exam     Answer:   lumbar radiculopathy       No orders of the defined types were placed in this encounter.      My impressions and treatment recommendations were discussed in detail with the patient, who verbalized understanding and had no further questions.    79-year-old male presents her office chief complaint of lower back pain with radiation down the lower extremities, greater on the right.  There is notable pain in the  right lateral calf.  He is neurologically intact on exam today.    Unfortunately he was sent here without any imaging or pulmonary workup.  I did see a report for CT abdomen and pelvis from 2014 which was mentioning lumbar spondylolysis and degenerative disease of the lumbar spine.  He does have notable pain with lumbar extension and is likely suffering from lumbar radiculopathy.    Will order MRI of the lumbar spine and x-ray.  This will help to determine next course of action which may include epidural steroid injection.  Discussed with him today.    Patient will need to open MRI.  Additionally, patient has pacemaker but review in epic appears to show that device is MRI conditional.  He will discuss this with radiology team    [place] Prescription Drug Monitoring Program report was reviewed and was appropriate     Complete risks and benefits including bleeding, infection, tissue reaction, nerve injury and allergic reaction were discussed. The approach was demonstrated using models and literature was provided. Verbal and written consent was obtained.    Discharge instructions were provided. I personally saw and examined the patient and I agree with the above discussed plan of care.    History of Present Illness:    Trent Ocasio is a 79 y.o. male who presents to [facility] for initial evaluation  of the above stated pain complaints. The patient has a past medical and chronic pain history as outlined in the assessment section. He was referred by Nick Patel, DO  [address]  [address]  [place] [address] .    Here with chief complaint of bilateral buttock and leg pain, greater on the right side.  Chronic issue for 5 years.  Undetermined cause.  Severe over the past month.    Rates pain as a 4 out of 10 but escalates to a 9 out of 10 with activity.  It is constant.  Shooting, sharp, pins-and-needles, dull/aching in nature.    Decreases standing, sitting.  No change with walking, exercise, relaxation, coughing, sneezing.    Patient has history of CKD 3, hypertension, type 2 diabetes, CHF, prostate cancer status post radiation treatment.    There is no recent imaging of the lumbar spine.  In 2014 had CT abdomen and pelvis which was notable for spondylolysis of L5 with spondylolisthesis as well as evidence of bilateral hip osteoarthritis.S    Review of Systems:    Review of Systems   Endocrine: Positive for polyuria.           Past Medical History:   Diagnosis Date    Acute respiratory failure with hypoxia (HCC) 03/28/2024    Chronic kidney disease     Diabetes mellitus (HCC)     GERD (gastroesophageal reflux disease)     Hypertension     Respiratory acidosis 03/31/2024       Past Surgical History:   Procedure Laterality Date    APPENDECTOMY      CARDIAC CATHETERIZATION Left 05/24/2024    Procedure: Cardiac Left Heart Cath;  Surgeon: Qiana Torres MD;  Location: [facility];  Service: Cardiology    CARDIAC CATHETERIZATION N/A 05/24/2024    Procedure: Cardiac Coronary Angiogram;  Surgeon: Qiana Torres MD;  Location: [facility];  Service: Cardiology    CARDIAC CATHETERIZATION  05/24/2024    Procedure: Cardiac catheterization;  Surgeon: Qiana Torres MD;  [facility]: [facility];  Service: Cardiology    CARDIAC ELECTROPHYSIOLOGY PROCEDURE N/A 6/21/2024    Procedure:  Cardiac pacer implant;  Surgeon: Solomon Quintero MD;  [facility]: [facility];  Service: Cardiology    SD CYSTO W/REMOVAL OF LESIONS SMALL N/A 6/20/2024    Procedure: TRANSURETHRAL RESECTION OF BLADDER TUMOR (TURBT);  Surgeon: Nick Patel DO;  Location: [facility];  Service: Urology    SD CYSTO W/REMOVAL OF LESIONS SMALL N/A 7/31/2024    Procedure: TRANSURETHRAL RESECTION OF BLADDER TUMOR (TURBT), intravesical instillation of gemcitabine;  Surgeon: Nick Patel DO;  Location: [facility];  Service: Urology       History reviewed. No pertinent family history.    Social History     Occupational History    Not on file   Tobacco Use    Smoking status: Former     Passive exposure: Past    Smokeless tobacco: Never   Vaping Use    Vaping status: Never Used   Substance and Sexual Activity    Alcohol use: Yes     Comment: rarely    Drug use: Never    Sexual activity: Yes     Partners: Female         Current Outpatient Medications:     amLODIPine (NORVASC) 10 mg tablet, Take 10 mg by mouth daily, Disp: , Rfl:     aspirin 81 mg chewable tablet, Chew 81 mg daily, Disp: , Rfl:     calcitriol (ROCALTROL) 0.25 mcg capsule, Take 1 capsule (0.25 mcg total) by mouth daily, Disp: 90 capsule, Rfl: 3    cholecalciferol 25 MCG (1000 UT) tablet, Take 1,000 Units by mouth daily, Disp: , Rfl:     ezetimibe (ZETIA) 10 mg tablet, Take 10 mg by mouth daily, Disp: , Rfl:     glipiZIDE (GLUCOTROL) 10 mg tablet, Take 10 mg by mouth 2 (two) times a day before meals, Disp: , Rfl:     insulin degludec (Tresiba FlexTouch) 100 units/mL injection pen, Inject 20 Units under the skin daily at bedtime, Disp: , Rfl:     isosorbide mononitrate (IMDUR) 30 mg 24 hr tablet, Take 1 tablet (30 mg total) by mouth daily, Disp: 90 tablet, Rfl: 1    latanoprost (XALATAN) 0.005 % ophthalmic solution, Administer 1 drop to both eyes daily at bedtime, Disp: , Rfl:     levothyroxine 50 mcg tablet, Take 50 mcg by mouth daily, Disp: , Rfl:      "losartan (COZAAR) 50 mg tablet, Take 50 mg by mouth daily, Disp: , Rfl:     metFORMIN (GLUCOPHAGE) 500 mg tablet, Take 500 mg by mouth 2 (two) times a day with meals, Disp: , Rfl:     pantoprazole (PROTONIX) 40 mg tablet, Take 40 mg by mouth daily at bedtime, Disp: , Rfl:     pioglitazone (ACTOS) 45 mg tablet, Take 45 mg by mouth daily, Disp: , Rfl:     simvastatin (ZOCOR) 40 mg tablet, Take 40 mg by mouth daily at bedtime, Disp: , Rfl:     torsemide (DEMADEX) 20 mg tablet, Take 2 tablets (40 mg total) by mouth daily (Patient not taking: Reported on 9/13/2024), Disp: 30 tablet, Rfl: 0    Allergies   Allergen Reactions    Canagliflozin GI Intolerance     Yeast infection  Yeast infection      Lisinopril Other (See Comments)     Pt unsure of reaction     Lovastatin GI Intolerance       Physical Exam:    /70   Pulse 86   Ht 5' 10\" (1.778 m)   Wt 109 kg (240 lb)   BMI 34.44 kg/m²     Constitutional: normal, well developed, well nourished, alert, in no distress and non-toxic and no overt pain behavior.  Eyes: anicteric  HEENT: grossly intact  Neck: supple, symmetric, trachea midline and no masses   Pulmonary:even and unlabored  Cardiovascular:No edema or pitting edema present  Skin:Normal without rashes or lesions and well hydrated  Psychiatric:Mood and affect appropriate  Neurologic:Cranial Nerves II-XII grossly intact  Musculoskeletal:normal    Lumbar Spine Exam    Appearance:  Normal lordosis  Sensory:  no sensory deficits noted  Range of Motion:  Notable pain with lumbar extension  Motor Strength:  Left hip flexion:  5/5  Left hip extension:  5/5  Right hip flexion:  5/5  Right hip extension:  5/5  Left knee flexion:  5/5  Left knee extension:  5/5  Right knee flexion:  5/5  Right knee extension:  5/5  Left foot dorsiflexion:  5/5  Left foot plantar flexion:  5/5  Right foot dorsiflexion:  5/5  Right foot plantar flexion:  5/5  Reflexes:  Left Patellar:  2+   Right Patellar:  2+   Left Achilles:  2+   Right " Achilles:  2+   Special Tests:  Left Straight Leg Test:  negative  Right Straight Leg Test:  negative        Imaging  XR spine lumbar 2 or 3 views injury    (Results Pending)   MRI lumbar spine wo contrast    (Results Pending)       Orders Placed This Encounter   Procedures    XR spine lumbar 2 or 3 views injury    MRI lumbar spine wo contrast

## 2024-09-13 ENCOUNTER — CONSULT (OUTPATIENT)
Dept: PAIN MEDICINE | Facility: CLINIC | Age: 79
End: 2024-09-13
Payer: COMMERCIAL

## 2024-09-13 VITALS
DIASTOLIC BLOOD PRESSURE: 70 MMHG | WEIGHT: 240 LBS | HEIGHT: 70 IN | SYSTOLIC BLOOD PRESSURE: 135 MMHG | HEART RATE: 86 BPM | BODY MASS INDEX: 34.36 KG/M2

## 2024-09-13 DIAGNOSIS — M54.42 CHRONIC BILATERAL LOW BACK PAIN WITH BILATERAL SCIATICA: ICD-10-CM

## 2024-09-13 DIAGNOSIS — G89.29 CHRONIC BILATERAL LOW BACK PAIN WITH BILATERAL SCIATICA: ICD-10-CM

## 2024-09-13 DIAGNOSIS — M43.06 LUMBAR SPONDYLOLYSIS: ICD-10-CM

## 2024-09-13 DIAGNOSIS — M54.41 CHRONIC BILATERAL LOW BACK PAIN WITH BILATERAL SCIATICA: ICD-10-CM

## 2024-09-13 PROCEDURE — 99204 OFFICE O/P NEW MOD 45 MIN: CPT | Performed by: STUDENT IN AN ORGANIZED HEALTH CARE EDUCATION/TRAINING PROGRAM

## 2024-09-13 NOTE — PATIENT INSTRUCTIONS
"Patient Education     Epidural injection   The Basics   Written by the doctors and editors at [facility]   What is an epidural injection? -- An epidural injection can be used to treat a condition called \"radiculopathy.\" This is the medical term for the pain, weakness, numbness, or tingling that happens when nerves coming from the spinal cord get pinched or damaged.  The doctor injects medicines into the space outside the covering of the spinal cord (figure 1). This is similar to an \"epidural\" that is used for pain relief during labor and childbirth.  Epidural injections can be given into different parts of your back:   Cervical epidural injection - Used to help with pain in the head or arms.   Thoracic epidural injection - Used to help with pain in the upper or middle back.   Lumbar epidural injection - Used to help with pain in the lower back or legs.  How do I prepare for an epidural injection? -- The doctor or nurse will tell you if you need to do anything special to prepare. Before your procedure, your doctor will do an exam. They might send you to get tests, such as:   X-ray, ultrasound, or other imaging tests - Imaging tests create pictures of the inside of the body.  Your doctor will also ask you about your \"health history.\" This involves asking you questions about any health problems you have or had in the past, past surgeries, and any medicines you take. Tell them about:   Any medicines you are taking - This includes any prescription or \"over-the-counter\" medicines you use, plus any herbal supplements you take. It helps to write down and bring a list of any medicines you take, or bring a bag with all of your medicines with you.   Any allergies you have   Any bleeding problems you have - Certain medicines, including some herbs and supplements, can increase the risk of bleeding. Some health conditions also increase this risk.  You will also get information about:   Eating and drinking before your procedure - In " "some cases, you might need to \"fast\" before surgery. This means not eating or drinking anything for a period of time. In other cases, you might be allowed to have liquids until a short time before the procedure. Whether you need to fast, and for how long, depends on the procedure you are having.   What help you will need when you go home - For example, you might need to have someone else bring you home or stay with you for some time while you recover.  Ask the doctor or nurse if you have questions or if there is anything you do not understand.  What happens during an epidural injection? -- When it is time for the procedure:   You might get an \"IV,\" which is a thin tube that goes into a vein. This can be used to give you fluids and medicines.   You will get anesthesia medicines to numb the area where the doctor will give the injection. This is to make sure that you do not feel pain during the procedure. You might also get medicines to make you relax and feel sleepy, called \"sedatives.\"   The doctors and nurses will monitor your breathing, blood pressure, and heart rate during the procedure.   The doctor might use a continuous X-ray called \"fluoroscopy.\" This is to help make sure that the medicines are injected into the right place. The doctor might also inject a dye to see where to give the medicine.   The doctor will place a needle through your skin and inject the medicine into a space near your spine. Then, they will remove the needle and cover the area with a clean bandage.   The procedure takes 15 to 30 minutes.  What happens after an epidural injection? -- After your procedure, the staff will watch you closely for a short time. It might take a few days before you feel the effects of the epidural injection.  Before you go home, make sure that you know what problems to look out for and when to call the doctor. Make sure that you understand your doctor's or nurse's instructions. Ask questions about anything you do " "not understand.  For the rest of the day after your procedure:   Try to rest. Limit activities like exercise or driving.   The doctor might recommend an over-the-counter pain medicine. These include acetaminophen (sample brand name: Tylenol), ibuprofen (sample brand names: Advil, Motrin), and naproxen (sample brand name: Aleve).   Ice can help with pain and swelling. Put a cold gel pack, bag of ice, or bag of frozen vegetables on the injection site area every 1 to 2 hours, for 15 minutes each time. Put a thin towel between the ice (or other cold object) and the skin.  What are the risks of an epidural injection? -- Your doctor will talk to you about all of the possible risks, and answer your questions. Possible risks include:   Bleeding   Infection   Headache   Nerve injury  When should I call the doctor? -- Call for emergency help right away (in the [place] and [place], call 9-1-1) if:   You can't move your arms or legs.  Call for advice if:   You have a fever of 100.4°F (38°C) or higher, or chills.   You have redness or swelling around the injection site.   You have a headache.   Your arms or legs are numb, weak, or tingly.  All topics are updated as new evidence becomes available and our peer review process is complete.  This topic retrieved from [employer] on: May 15, 2024.  Topic 679288 Version 1.0  Release: 32.4.3 - C32.134  © 2024 UpToDate, Inc. and/or its affiliates. All rights reserved.  figure 1: Epidural injection     Duringan epidural injection, the doctor inserts a needle between 2 of the bones thatmake up the spine. Then, they inject medicines into the area around the spinalcord. This is an illustration of a \"lumbar\" epidural injection, which is given into the low back. The doctor can place the needle in other areas to treat other types of pain.  Graphic 050887 Version 1.0  Consumer Information Use and Disclaimer   Disclaimer: This generalized information is a limited summary of diagnosis, treatment, and/or " medication information. It is not meant to be comprehensive and should be used as a tool to help the user understand and/or assess potential diagnostic and treatment options. It does NOT include all information about conditions, treatments, medications, side effects, or risks that may apply to a specific patient. It is not intended to be medical advice or a substitute for the medical advice, diagnosis, or treatment of a health care provider based on the health care provider's examination and assessment of a patient's specific and unique circumstances. Patients must speak with a health care provider for complete information about their health, medical questions, and treatment options, including any risks or benefits regarding use of medications. This information does not endorse any treatments or medications as safe, effective, or approved for treating a specific patient. UpToDate, Inc. and its affiliates disclaim any warranty or liability relating to this information or the use thereof.The use of this information is governed by the Terms of Use, available at https://www.[employer]pl[employer]za.com/en/know/clinical-effectiveness-terms. 2024© UpToDate, Inc. and its affiliates and/or licensors. All rights reserved.  Copyright   © 2024 UpToDate, Inc. and/or its affiliates. All rights reserved.

## 2024-09-18 ENCOUNTER — TELEPHONE (OUTPATIENT)
Age: 79
End: 2024-09-18

## 2024-09-19 ENCOUNTER — OFFICE VISIT (OUTPATIENT)
Dept: NEPHROLOGY | Facility: CLINIC | Age: 79
End: 2024-09-19
Payer: COMMERCIAL

## 2024-09-19 ENCOUNTER — TELEPHONE (OUTPATIENT)
Age: 79
End: 2024-09-19

## 2024-09-19 VITALS
RESPIRATION RATE: 16 BRPM | TEMPERATURE: 96.9 F | OXYGEN SATURATION: 96 % | WEIGHT: 234 LBS | HEART RATE: 96 BPM | DIASTOLIC BLOOD PRESSURE: 66 MMHG | HEIGHT: 70 IN | BODY MASS INDEX: 33.5 KG/M2 | SYSTOLIC BLOOD PRESSURE: 136 MMHG

## 2024-09-19 DIAGNOSIS — N18.9 HISTORY OF ANEMIA DUE TO CKD: ICD-10-CM

## 2024-09-19 DIAGNOSIS — R80.1 PERSISTENT PROTEINURIA: ICD-10-CM

## 2024-09-19 DIAGNOSIS — N18.32 STAGE 3B CHRONIC KIDNEY DISEASE (HCC): ICD-10-CM

## 2024-09-19 DIAGNOSIS — R80.1 PERSISTENT PROTEINURIA: Primary | ICD-10-CM

## 2024-09-19 DIAGNOSIS — I10 PRIMARY HYPERTENSION: Primary | ICD-10-CM

## 2024-09-19 DIAGNOSIS — N25.81 SECONDARY HYPERPARATHYROIDISM OF RENAL ORIGIN (HCC): ICD-10-CM

## 2024-09-19 DIAGNOSIS — Z86.2 HISTORY OF ANEMIA DUE TO CKD: ICD-10-CM

## 2024-09-19 PROCEDURE — G2211 COMPLEX E/M VISIT ADD ON: HCPCS | Performed by: INTERNAL MEDICINE

## 2024-09-19 PROCEDURE — 99214 OFFICE O/P EST MOD 30 MIN: CPT | Performed by: INTERNAL MEDICINE

## 2024-09-19 RX ORDER — LOSARTAN POTASSIUM 25 MG/1
75 TABLET ORAL DAILY
Qty: 270 TABLET | Refills: 6 | Status: SHIPPED | OUTPATIENT
Start: 2024-09-19 | End: 2024-09-19

## 2024-09-19 RX ORDER — LOSARTAN POTASSIUM 25 MG/1
25 TABLET ORAL DAILY
Qty: 90 TABLET | Refills: 5 | Status: SHIPPED | OUTPATIENT
Start: 2024-09-19

## 2024-09-19 RX ORDER — LOSARTAN POTASSIUM 50 MG/1
50 TABLET ORAL DAILY
Qty: 90 TABLET | Refills: 5 | Status: SHIPPED | OUTPATIENT
Start: 2024-09-19

## 2024-09-19 NOTE — LETTER
2024     Micky Hylton MD  [address] [place] [id number]    Patient: Trent Ocasio   YOB: 1945   Date of Visit: 2024       Dear Dr. Hylton:    Thank you for referring Trent Ocasio to me for evaluation. Below are my notes for this consultation.    If you have questions, please do not hesitate to call me. I look forward to following your patient along with you.         Sincerely,        Nikki Gilbert MD        CC: No Recipients    Nikki Gilbert MD  2024 10:59 AM  Incomplete  Ambulatory Visit  Name: Trent Ocasio      : 1945      MRN: [id number]  Encounter Provider: Nikki Gilbert MD  Encounter Date: 2024   Encounter department: [facility]    Assessment & Plan  Persistent proteinuria  Urine albumin creatinine ratio remains abnormal.  Increase losartan to 75 mg daily.  Orders:  •  losartan (COZAAR) 25 mg tablet; Take 3 tablets (75 mg total) by mouth daily    Stage 3b chronic kidney disease (HCC)  Lab Results   Component Value Date    EGFR 37 2024    EGFR 34 2024    EGFR 31 2024    CREATININE 1.69 (H) 2024    CREATININE 1.81 (H) 2024    CREATININE 1.95 (H) 2024   Noted significant improvement in serum creatinine down to 1.69 mg/dL EGFR of 37 and improvement noted.  Strict glycemic control as well as blood pressure control necessary to prevent progression of CKD.    Orders:  •  Albumin; Standing  •  Albumin / creatinine urine ratio; Standing  •  Calcium; Standing  •  CBC and differential; Standing  •  Comprehensive metabolic panel; Standing  •  Phosphorus; Standing  •  PTH, intact; Standing  •  Urinalysis with microscopic; Future  •  Vitamin D 25 hydroxy; Standing    Secondary hyperparathyroidism of renal origin (HCC)  PTH intact improved to 110.  25-hydroxy vitamin D levels are normal.  Patient will be kept on cholecalciferol.  Normal calcium levels noted.       History of  "anemia due to CKD  Hemoglobin has improved to 11 g/dL and within acceptable range.         History of Present Illness    Trent Ocasio is a 79 y.o. male who presents to renal office for management of CKD.  Last seen in May 2024.  Patient was diagnosed with bladder cancer and underwent transurethral resection of bladder tumor by urology during this interim.  States that torsemide was discontinued altogether.  Patient follows up with [facility] endocrinology for management of diabetes mellitus.  Denies any shortness of breath, chest pain, abdominal pain.    History obtained from : patient  Review of Systems        Objective    /66   Pulse 96   Temp (!) 96.9 °F (36.1 °C) (Temporal)   Resp 16   Ht 5' 10\" (1.778 m)   Wt 106 kg (234 lb)   SpO2 96%   BMI 33.58 kg/m²     Physical Exam  Administrative Statements  I have spent a total time of 40 minutes in caring for this patient on the day of the visit/encounter including Diagnostic results, Prognosis, Risks and benefits of tx options, Instructions for management, Patient and family education, Risk factor reductions, and Impressions.   "

## 2024-09-19 NOTE — PROGRESS NOTES
Ambulatory Visit  Name: Trent Ocasio      : 1945      MRN: [id number]  Encounter Provider: Nikki Gilbert MD  Encounter Date: 2024   Encounter department: [facility]    Assessment & Plan  Persistent proteinuria  Urine albumin creatinine ratio remains abnormal.  Increase losartan to 75 mg daily.  Orders:    losartan (COZAAR) 25 mg tablet; Take 3 tablets (75 mg total) by mouth daily    Stage 3b chronic kidney disease (HCC)  Lab Results   Component Value Date    EGFR 37 2024    EGFR 34 2024    EGFR 31 2024    CREATININE 1.69 (H) 2024    CREATININE 1.81 (H) 2024    CREATININE 1.95 (H) 2024   Noted significant improvement in serum creatinine down to 1.69 mg/dL EGFR of 37 and improvement noted.  Strict glycemic control as well as blood pressure control necessary to prevent progression of CKD.    Orders:    Albumin; Standing    Albumin / creatinine urine ratio; Standing    Calcium; Standing    CBC and differential; Standing    Comprehensive metabolic panel; Standing    Phosphorus; Standing    PTH, intact; Standing    Urinalysis with microscopic; Future    Vitamin D 25 hydroxy; Standing    Secondary hyperparathyroidism of renal origin (HCC)  PTH intact improved to 110.  25-hydroxy vitamin D levels are normal.  Patient will be kept on cholecalciferol.  Normal calcium levels noted.       History of anemia due to CKD  Hemoglobin has improved to 11 g/dL and within acceptable range.         History of Present Illness     Trent Ocasio is a 79 y.o. male who presents to renal office for management of CKD.  Last seen in May 2024.  Patient was diagnosed with bladder cancer and underwent transurethral resection of bladder tumor by urology during this interim.  States that torsemide was discontinued altogether.  Patient follows up with [facility] endocrinology for management of diabetes mellitus.  Denies any shortness of breath, chest pain,  "abdominal pain.    History obtained from : patient  Review of Systems        Objective     /66   Pulse 96   Temp (!) 96.9 °F (36.1 °C) (Temporal)   Resp 16   Ht 5' 10\" (1.778 m)   Wt 106 kg (234 lb)   SpO2 96%   BMI 33.58 kg/m²     Physical Exam  Administrative Statements   I have spent a total time of 40 minutes in caring for this patient on the day of the visit/encounter including Diagnostic results, Prognosis, Risks and benefits of tx options, Instructions for management, Patient and family education, Risk factor reductions, and Impressions.  "

## 2024-09-19 NOTE — ASSESSMENT & PLAN NOTE
Lab Results   Component Value Date    EGFR 37 09/09/2024    EGFR 34 07/31/2024    EGFR 31 06/22/2024    CREATININE 1.69 (H) 09/09/2024    CREATININE 1.81 (H) 07/31/2024    CREATININE 1.95 (H) 06/22/2024   Noted significant improvement in serum creatinine down to 1.69 mg/dL EGFR of 37 and improvement noted.  Strict glycemic control as well as blood pressure control necessary to prevent progression of CKD.    Orders:    Albumin; Standing    Albumin / creatinine urine ratio; Standing    Calcium; Standing    CBC and differential; Standing    Comprehensive metabolic panel; Standing    Phosphorus; Standing    PTH, intact; Standing    Urinalysis with microscopic; Future    Vitamin D 25 hydroxy; Standing

## 2024-09-19 NOTE — TELEPHONE ENCOUNTER
Pharmacy called- they received the script for the Losartan 25 mg 3 tabs daily but his insurance will not cover this.    They need two separate scripts, 1 for 50 mg and the other for 25 mg. Please advise.    [facility]

## 2024-09-20 RX ORDER — LOSARTAN POTASSIUM 25 MG/1
25 TABLET ORAL DAILY
Qty: 30 TABLET | Refills: 2 | Status: SHIPPED | OUTPATIENT
Start: 2024-09-20

## 2024-09-20 RX ORDER — LOSARTAN POTASSIUM 50 MG/1
50 TABLET ORAL DAILY
Qty: 30 TABLET | Refills: 2 | Status: SHIPPED | OUTPATIENT
Start: 2024-09-20

## 2024-09-24 DIAGNOSIS — M54.41 CHRONIC BILATERAL LOW BACK PAIN WITH BILATERAL SCIATICA: Primary | ICD-10-CM

## 2024-09-24 DIAGNOSIS — G89.29 CHRONIC BILATERAL LOW BACK PAIN WITH BILATERAL SCIATICA: Primary | ICD-10-CM

## 2024-09-24 DIAGNOSIS — M54.42 CHRONIC BILATERAL LOW BACK PAIN WITH BILATERAL SCIATICA: Primary | ICD-10-CM

## 2024-09-24 DIAGNOSIS — M47.816 LUMBAR SPONDYLOSIS: ICD-10-CM

## 2024-09-24 NOTE — TELEPHONE ENCOUNTER
Caller: Patient     Doctor:     Reason for call: Patient found an open MRI facility but due to him having a pacemaker he cannot go there and he must go to a hospital facility     He is not sure there are any hospital facilities that offer the open mri .     If cannot do open MRI in a hospital can he have an MRI with sedation      Please advise     Call back#: [phone]     
Changed to CT scan. Cancel MRI. Though not as good as MRI, we can likely still see what we need without having him have to undergo anesthesia which has risks in itself  
[facility]'s does not have an open MRI  Please advise on request for MRI with sedation    
S/W pt and his wife and advised the same  CB with questions or concerns  
8

## 2024-09-30 ENCOUNTER — HOSPITAL ENCOUNTER (OUTPATIENT)
Dept: RADIOLOGY | Facility: HOSPITAL | Age: 79
Discharge: HOME | End: 2024-09-30
Payer: COMMERCIAL

## 2024-09-30 ENCOUNTER — HOSPITAL ENCOUNTER (OUTPATIENT)
Dept: CT IMAGING | Facility: HOSPITAL | Age: 79
Discharge: HOME | End: 2024-09-30
Attending: STUDENT IN AN ORGANIZED HEALTH CARE EDUCATION/TRAINING PROGRAM
Payer: COMMERCIAL

## 2024-09-30 DIAGNOSIS — M54.41 CHRONIC BILATERAL LOW BACK PAIN WITH BILATERAL SCIATICA: ICD-10-CM

## 2024-09-30 DIAGNOSIS — G89.29 CHRONIC BILATERAL LOW BACK PAIN WITH BILATERAL SCIATICA: ICD-10-CM

## 2024-09-30 DIAGNOSIS — M54.42 CHRONIC BILATERAL LOW BACK PAIN WITH BILATERAL SCIATICA: ICD-10-CM

## 2024-09-30 PROCEDURE — 72100 X-RAY EXAM L-S SPINE 2/3 VWS: CPT

## 2024-09-30 PROCEDURE — 72131 CT LUMBAR SPINE W/O DYE: CPT

## 2024-10-10 ENCOUNTER — TELEPHONE (OUTPATIENT)
Dept: PAIN MEDICINE | Facility: CLINIC | Age: 79
End: 2024-10-10

## 2024-10-10 NOTE — TELEPHONE ENCOUNTER
Caller:Trent    Doctor: Vitaliy     Reason for call: patient calling for MRI and xray results please advise     Thank you     Call back#: [phone]

## 2024-10-10 NOTE — TELEPHONE ENCOUNTER
There are  a number of findings on the imaging. Recommend making appt with me or MG to discuss. Next available.

## 2024-10-15 ENCOUNTER — PROCEDURE VISIT (OUTPATIENT)
Dept: UROLOGY | Facility: CLINIC | Age: 79
End: 2024-10-15
Payer: COMMERCIAL

## 2024-10-15 VITALS
BODY MASS INDEX: 34.07 KG/M2 | HEIGHT: 70 IN | SYSTOLIC BLOOD PRESSURE: 138 MMHG | WEIGHT: 238 LBS | HEART RATE: 87 BPM | OXYGEN SATURATION: 98 % | TEMPERATURE: 97.3 F | DIASTOLIC BLOOD PRESSURE: 76 MMHG

## 2024-10-15 DIAGNOSIS — D49.4 BLADDER TUMOR: Primary | ICD-10-CM

## 2024-10-15 LAB
SL AMB  POCT GLUCOSE, UA: NORMAL
SL AMB LEUKOCYTE ESTERASE,UA: NORMAL
SL AMB POCT BILIRUBIN,UA: NORMAL
SL AMB POCT BLOOD,UA: 50
SL AMB POCT CLARITY,UA: CLEAR
SL AMB POCT COLOR,UA: YELLOW
SL AMB POCT KETONES,UA: NORMAL
SL AMB POCT NITRITE,UA: NORMAL
SL AMB POCT PH,UA: 5
SL AMB POCT SPECIFIC GRAVITY,UA: 1.02
SL AMB POCT URINE PROTEIN: 30
SL AMB POCT UROBILINOGEN: 0.2

## 2024-10-15 PROCEDURE — 51720 TREATMENT OF BLADDER LESION: CPT

## 2024-10-15 PROCEDURE — 81002 URINALYSIS NONAUTO W/O SCOPE: CPT

## 2024-10-15 NOTE — PROGRESS NOTES
"10/15/2024    Trent Ocasio  1945  [id number]    Diagnosis  Chief Complaint    Bladder Cancer         Patient presents for BCG 1 of 6 managed by Dr. Patel    Plan  Orders Placed This Encounter   Procedures    POCT urine dip     Plan to return as scheduled for BCG treatments   Patient instructed to call with persistent hematuria, fever, or other concerns.    Procedure BCG treatment 1 of 6  Vitals:    10/15/24 1019   BP: 138/76   Pulse: 87   Temp: (!) 97.3 °F (36.3 °C)   SpO2: 98%   Weight: 108 kg (238 lb)   Height: 5' 10\" (1.778 m)       Recent Results (from the past 4 hour(s))   POCT urine dip    Collection Time: 10/15/24 10:28 AM   Result Value Ref Range    LEUKOCYTE ESTERASE,UA -     NITRITE,UA -     SL AMB POCT UROBILINOGEN 0.2     POCT URINE PROTEIN 30      PH,UA 5.0     BLOOD,UA 50     SPECIFIC GRAVITY,UA 1.020     KETONES,UA -     BILIRUBIN,UA -     GLUCOSE, UA -      COLOR,UA yellow     CLARITY,UA clear            Bladder instillation     Date/Time  10/15/2024 10:30 AM     Performed by  Marylin Goldstein RN   Authorized by  Benji Ennis MD     [facility] Protocol   procedure performed by consultantConsent: Verbal consent obtained.  Risks and benefits: risks, benefits and alternatives were discussed  Consent given by: patient  Patient understanding: patient states understanding of the procedure being performed  Patient consent: the patient's understanding of the procedure matches consent given  Procedure consent: procedure consent matches procedure scheduled  Patient identity confirmed: verbally with patient     Procedure Details   Procedure Notes: Educated pt on BCG aftercare protocol. Pt verbalized understanding.   Patient tolerance: patient tolerated the procedure well with no immediate complications         Sterile technique employed. Patient prepped and identified. 16F Straight catheter placed. Bladder drained, residual approximately 100mL. The following solution was instilled directly into the " bladder via catheter: 1 Vial BCG. Catheter removed. Patient discharged. Patient tolerated procedure.     Administrations This Visit       BCG (RADU BCG) intravesical suspension 50 mg       Admin Date  10/15/2024 Action  Given Dose  50 mg Route  Intravesical Documented By  NI Martinez, RN

## 2024-10-18 ENCOUNTER — OFFICE VISIT (OUTPATIENT)
Dept: CARDIOLOGY CLINIC | Facility: CLINIC | Age: 79
End: 2024-10-18
Payer: COMMERCIAL

## 2024-10-18 ENCOUNTER — IN-CLINIC DEVICE VISIT (OUTPATIENT)
Dept: CARDIOLOGY CLINIC | Facility: CLINIC | Age: 79
End: 2024-10-18
Payer: COMMERCIAL

## 2024-10-18 VITALS
HEART RATE: 100 BPM | SYSTOLIC BLOOD PRESSURE: 130 MMHG | WEIGHT: 238 LBS | RESPIRATION RATE: 16 BRPM | DIASTOLIC BLOOD PRESSURE: 70 MMHG | BODY MASS INDEX: 34.07 KG/M2 | HEIGHT: 70 IN | OXYGEN SATURATION: 96 %

## 2024-10-18 DIAGNOSIS — Z95.0 PRESENCE OF PERMANENT CARDIAC PACEMAKER: Primary | ICD-10-CM

## 2024-10-18 DIAGNOSIS — I10 PRIMARY HYPERTENSION: ICD-10-CM

## 2024-10-18 DIAGNOSIS — I47.29 NSVT (NONSUSTAINED VENTRICULAR TACHYCARDIA) (HCC): ICD-10-CM

## 2024-10-18 DIAGNOSIS — R00.1 SINUS BRADYCARDIA: ICD-10-CM

## 2024-10-18 DIAGNOSIS — E78.2 MIXED HYPERLIPIDEMIA: ICD-10-CM

## 2024-10-18 DIAGNOSIS — I50.32 CHRONIC HEART FAILURE WITH PRESERVED EJECTION FRACTION (HFPEF) (HCC): Primary | ICD-10-CM

## 2024-10-18 DIAGNOSIS — I25.10 CORONARY ARTERY DISEASE INVOLVING NATIVE CORONARY ARTERY OF NATIVE HEART WITHOUT ANGINA PECTORIS: ICD-10-CM

## 2024-10-18 DIAGNOSIS — Z95.0 STATUS POST PLACEMENT OF CARDIAC PACEMAKER: ICD-10-CM

## 2024-10-18 PROCEDURE — 99214 OFFICE O/P EST MOD 30 MIN: CPT | Performed by: INTERNAL MEDICINE

## 2024-10-18 PROCEDURE — 93280 PM DEVICE PROGR EVAL DUAL: CPT | Performed by: INTERNAL MEDICINE

## 2024-10-18 RX ORDER — METOPROLOL SUCCINATE 50 MG/1
50 TABLET, EXTENDED RELEASE ORAL DAILY
Qty: 90 TABLET | Refills: 3 | Status: SHIPPED | OUTPATIENT
Start: 2024-10-18

## 2024-10-18 NOTE — PROGRESS NOTES
MDT DC PM/ACTIVE SYSTEM IS MRI CONDITIONAL   DEVICE INTERROGATED IN THE [facility] OFFICE:  BATTERY VOLTAGE ADEQUATE (12.5 YR.).  AP 8.9%  98.8% (AVB/DDDR 60 PPM, -180 MS, MVP ON).  ALL LEAD PARAMETERS WITHIN NORMAL LIMITS.  3 EPISODES OF NSVT (#S 3 & 4 - 26 @ 182 BPM, 8 @ 188 BPM, 9 @ 207 BPM).  EF 55% (ECHO 03/2024).  PATIENT TAKES ASA AND METOPROLOL (STARTED TODAY DURING O/V WITH DR KIM).  NO PROGRAMMING CHANGES MADE TO DEVICE PARAMETERS.  NORMAL DEVICE FUNCTION.  RG

## 2024-10-18 NOTE — PROGRESS NOTES
CARDIOLOGY OFFICE VISIT  [facility]  [address], [place] [address]  Tel: [phone]      NAME: Trent Ocasio  AGE: 79 y.o.  SEX: male  : 1945  MRN: [id number]      Chief Complaint:  Chief Complaint   Patient presents with    Follow-up         History of Present Illness:   Patient comes for follow up. States he is doing okay from cardiac stand point and denies chest pain / pressure, SOB, palpitations, lightheadedness, syncope, swelling feet, orthopnea, PND, claudication.    Chronic HFpEF - on torsemide 40 mg daily, losartan 75 mg daily. Metoprolol succinate started today.  States he watches his salt intake closely and weighs himself occasionally    MV CAD with  of prox LAD. There are right to left collaterals visualized. Distal LCx lesion has a 80% stenosis (2024)  -  States is doing well cardiac wise with no cardiac symptoms.  Taking ASA, statin, Zetia, Imdur regularly.  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  Taking medications regularly.  Denies lightheadedness, headache, medication side effects.      Mixed hyperlipidemia -  Has had hyperlipidemia for many years.  Taking statin regularly along with diet control.  Denies myalgia.  PCP closely monitoring the blood work.    Obesity  CKD 3b  DM 2  H/O CVA  Bladder tumor      Past Medical History:  Past Medical History:   Diagnosis Date    Acute respiratory failure with hypoxia (HCC) 2024    Chronic kidney disease     Diabetes mellitus (HCC)     GERD (gastroesophageal reflux disease)     Hypertension     Respiratory acidosis 2024         Past Surgical History:  Past Surgical History:    Procedure Laterality Date    APPENDECTOMY      CARDIAC CATHETERIZATION Left 05/24/2024    Procedure: Cardiac Left Heart Cath;  Surgeon: Qiana Torres MD;  Location: [facility];  Service: Cardiology    CARDIAC CATHETERIZATION N/A 05/24/2024    Procedure: Cardiac Coronary Angiogram;  Surgeon: Qiana Torres MD;  Location: [facility];  Service: Cardiology    CARDIAC CATHETERIZATION  05/24/2024    Procedure: Cardiac catheterization;  Surgeon: Qiana Torres MD;  Location: [facility];  Service: Cardiology    CARDIAC ELECTROPHYSIOLOGY PROCEDURE N/A 6/21/2024    Procedure: Cardiac pacer implant;  Surgeon: Solomon Quintero MD;  Location: [facility];  Service: Cardiology    PA CYSTO W/REMOVAL OF LESIONS SMALL N/A 6/20/2024    Procedure: TRANSURETHRAL RESECTION OF BLADDER TUMOR (TURBT);  Surgeon: Nick Patel DO;  Location: [facility];  Service: Urology    PA CYSTO W/REMOVAL OF LESIONS SMALL N/A 7/31/2024    Procedure: TRANSURETHRAL RESECTION OF BLADDER TUMOR (TURBT), intravesical instillation of gemcitabine;  Surgeon: Nick Patel DO;  Location: [facility];  Service: Urology         Family History:  History reviewed. No pertinent family history.      Social History:  Social History     Socioeconomic History    Marital status: /Civil Union     Spouse name: None    Number of children: None    Years of education: None    Highest education level: None   Occupational History    None   Tobacco Use    Smoking status: Former     Passive exposure: Past    Smokeless tobacco: Never   Vaping Use    Vaping status: Never Used   Substance and Sexual Activity    Alcohol use: Yes     Comment: rarely    Drug use: Never    Sexual activity: Yes     Partners: Female   Other Topics Concern    None   Social History Narrative    None     Social Determinants of Health     Financial Resource Strain: Low Risk  (8/20/2024)    Received from [facility]    Overall  Financial Resource Strain (CARDIA)     Difficulty of Paying Living Expenses: Not hard at all   Food Insecurity: No Food Insecurity (8/20/2024)    Received from [facility]    Hunger Vital Sign     Worried About Running Out of Food in the Last Year: Never true     Ran Out of Food in the Last Year: Never true   Transportation Needs: No Transportation Needs (8/20/2024)    Received from [facility]    PRAPARE - Transportation     Lack of Transportation (Medical): No     Lack of Transportation (Non-Medical): No   Physical Activity: Not on file   Stress: No Stress Concern Present (8/20/2024)    Received from [facility]    [employer] - Occupational Stress Questionnaire     Feeling of Stress : Only a little   Social Connections: Feeling Socially Integrated (8/20/2024)    Received from [facility]    OASIS : Social Isolation     How often do you feel lonely or isolated from those around you?: Never   Intimate Partner Violence: Not At Risk (8/20/2024)    Received from [facility]    Humiliation, Afraid, Rape, and Kick questionnaire     Fear of Current or Ex-Partner: No     Emotionally Abused: No     Physically Abused: No     Sexually Abused: No   Housing Stability: Low Risk  (8/20/2024)    Received from [facility]    Housing Stability Vital Sign     Unable to Pay for Housing in the Last Year: No     Number of Times Moved in the Last Year: 0     Homeless in the Last Year: No         Active Problems:  Patient Active Problem List   Diagnosis    Chronic heart failure with preserved ejection fraction (HFpEF) (HCC)    History of prostate cancer    Anxiety    HTN (hypertension)    Hypothyroid    Type 2 diabetes mellitus, with long-term current use of insulin (HCC)    Elevated troponin    Stage 3b chronic kidney disease (HCC)    Abnormal stress test    CAD (coronary artery disease)    Malignant  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 (HCC)    Hypoglycemia    S/P cardiac cath    Bradycardia    Bladder tumor    Gastroesophageal reflux disease    Chronic low back pain with sciatica         The following portions of the patient's history were reviewed and updated as appropriate: past medical history, past surgical history, past family history,  past social history, current medications, allergies and problem list.      Review of Systems:  Constitutional: Denies fever, chills  Eyes: Denies eye redness, eye discharge  ENT: Denies sneezing, nasal discharge, sore throat   Respiratory: Denies cough, expectoration, shortness of breath  Cardiovascular: Denies chest pain, palpitations  Gastrointestinal: Denies abdominal pain, nausea, vomiting, diarrhea  Genito-Urinary: Denies dysuria, incontinence  Musculoskeletal: Denies muscle pain  Neurologic: Denies lightheadedness, syncope, headache, seizures  Endocrine: Denies polydipsia, temperature intolerance  Allergy and Immunology: Denies hives, insect bite sensitivity  Hematological and Lymphatic: Denies bleeding problems, swollen glands   Psychological: Denies suicidal ideation, panic  Dermatological: Denies pruritus, rash, skin lesion changes      Vitals:  Vitals:    10/18/24 1106   BP: 130/70   Pulse: 100   Resp: 16   SpO2: 96%       Body mass index is 34.15 kg/m².    Weight (last 2 days)       Date/Time Weight    10/18/24 1106 108 (238)              Physical Examination:  General: Patient is not in acute distress. Awake, alert, oriented in time, place and person. Responding to commands  Head: Normocephalic. Atraumatic  Eyes: Both pupils normal sized, round and reactive to light. Nonicteric  ENT: Normal external ear canals  Neck: Supple. JVP not raised. Trachea central. No thyromegaly  Lungs: Bilateral bronchovascular breath sounds with no crackles or rhonchi  Chest wall: No tenderness  Cardiovascular: RRR. S1 and S2 normal. No murmur, rub or  gallop  Gastrointestinal: Abdomen soft, nontender. No guarding or rigidity. Liver and spleen not palpable. Bowel sounds present  Neurologic: Patient is awake, alert, oriented in time, place and person. Responding to commands. Moving all extremities  Integumentary:  No skin rash  Lymphatic: No cervical lymphadenopathy  Back: Symmetric. No CVA tenderness  Extremities: No clubbing, cyanosis or edema      Laboratory Results:  CBC with diff:   Lab Results   Component Value Date    WBC 5.27 09/09/2024    RBC 3.61 (L) 09/09/2024    HGB 11.7 (L) 09/09/2024    HCT 36.0 (L) 09/09/2024     (H) 09/09/2024    MCH 32.4 09/09/2024    RDW 14.8 09/09/2024     09/09/2024       CMP:  Lab Results   Component Value Date    CREATININE 1.69 (H) 09/09/2024    CREATININE 1.47 (H) 02/16/2024    BUN 31 (H) 09/09/2024    BUN 30 (H) 02/16/2024    K 4.4 09/09/2024    K 4.9 02/16/2024     09/09/2024     02/16/2024    CO2 25 09/09/2024    CO2 29 02/16/2024    ALKPHOS 57 09/09/2024    ALKPHOS 43 02/16/2024    ALT 10 09/09/2024    ALT 11 02/16/2024    AST 15 09/09/2024    AST 15 02/16/2024    BILIDIR 0.11 03/28/2024    BILIDIR 0.1 07/17/2023       Lab Results   Component Value Date    HGBA1C 7.1 (H) 06/20/2024    HGBA1C 7.3 (H) 02/16/2024    MG 2.0 06/21/2024    MG 2.1 03/16/2022    PHOS 3.5 09/09/2024    PHOS 3.2 03/16/2022       Cardiac testing:   Results for orders placed during the hospital encounter of 03/28/24    Echo complete    Interpretation Summary    Left Ventricle: Left ventricular cavity size is normal. Wall thickness is mildly increased. The left ventricular ejection fraction is 55%. Systolic function is normal. Wall motion is normal. Diastolic function is normal.    Right Ventricle: Right ventricular cavity size is normal. Systolic function is normal.    Pericardium: There is a trivial pericardial effusion.      Results for orders placed during the hospital encounter of 03/28/24    NM myocardial perfusion  spect (rx stress and/or rest)    Interpretation Summary    Stress ECG: No ST deviation is noted. The ECG was negative for ischemia.    Perfusion Defect Conclusion: There is no evidence of transient ischemic dilation (TID). TID index 1.22.    Perfusion: There is a left ventricular perfusion defect that is medium to large in size that is predominantly fixed in the apex, apical lateral, apical septal as well as inferior walls.  There appears to be a small reversible apical anterior defect.    Stress Function: Left ventricular function post-stress is normal. Stress ejection fraction is 53%.    Stress Combined Conclusion: Left ventricular perfusion is abnormal.    Medications:    Current Outpatient Medications:     aspirin 81 mg chewable tablet, Chew 81 mg daily, Disp: , Rfl:     calcitriol (ROCALTROL) 0.25 mcg capsule, Take 1 capsule (0.25 mcg total) by mouth daily, Disp: 90 capsule, Rfl: 3    cholecalciferol 25 MCG (1000 UT) tablet, Take 1,000 Units by mouth daily, Disp: , Rfl:     ezetimibe (ZETIA) 10 mg tablet, Take 10 mg by mouth daily, Disp: , Rfl:     glipiZIDE (GLUCOTROL) 10 mg tablet, Take 10 mg by mouth 2 (two) times a day before meals, Disp: , Rfl:     insulin degludec (Tresiba FlexTouch) 100 units/mL injection pen, Inject 15 Units under the skin daily at bedtime, Disp: , Rfl:     isosorbide mononitrate (IMDUR) 30 mg 24 hr tablet, Take 1 tablet (30 mg total) by mouth daily, Disp: 90 tablet, Rfl: 1    latanoprost (XALATAN) 0.005 % ophthalmic solution, Administer 1 drop to both eyes daily at bedtime, Disp: , Rfl:     levothyroxine 50 mcg tablet, Take 50 mcg by mouth daily, Disp: , Rfl:     losartan (COZAAR) 25 mg tablet, Take 1 tablet (25 mg total) by mouth daily, Disp: 30 tablet, Rfl: 2    losartan (COZAAR) 50 mg tablet, Take 1 tablet (50 mg total) by mouth daily, Disp: 30 tablet, Rfl: 2    metFORMIN (GLUCOPHAGE) 500 mg tablet, Take 500 mg by mouth 2 (two) times a day with meals, Disp: , Rfl:     metoprolol  succinate (TOPROL-XL) 50 mg 24 hr tablet, Take 1 tablet (50 mg total) by mouth daily, Disp: 90 tablet, Rfl: 3    pantoprazole (PROTONIX) 40 mg tablet, Take 40 mg by mouth daily at bedtime, Disp: , Rfl:     pioglitazone (ACTOS) 45 mg tablet, Take 45 mg by mouth daily, Disp: , Rfl:     simvastatin (ZOCOR) 40 mg tablet, Take 40 mg by mouth daily at bedtime, Disp: , Rfl:     torsemide (DEMADEX) 20 mg tablet, Take 2 tablets (40 mg total) by mouth daily, Disp: 30 tablet, Rfl: 0    Current Facility-Administered Medications:     BCG (RADU BCG) intravesical suspension 50 mg, 50 mg, Intravesical, Weekly, , 50 mg at 10/15/24 1115      Allergies:  Allergies   Allergen Reactions    Canagliflozin GI Intolerance     Yeast infection  Yeast infection      Lisinopril Other (See Comments)     Pt unsure of reaction     Lovastatin GI Intolerance         Assessment and Plan:  1. Chronic heart failure with preserved ejection fraction (HFpEF) ([facility])  Clinically euvolemic.  Continue torsemide 40 mg daily, losartan 75 mg daily.  Start metoprolol succinate 50 mg daily.  Low-salt diet.  Daily weight    2. Coronary artery disease involving native coronary artery of native heart without angina pectoris  Continue aspirin 81 mg daily, Imdur, simvastatin, Zetia.  Add metoprolol succinate 50 mg daily,    3. Primary hypertension  Discontinue amlodipine and start metoprolol succinate  Monitor BP at home and call if abnormal  - metoprolol succinate (TOPROL-XL) 50 mg 24 hr tablet; Take 1 tablet (50 mg total) by mouth daily  Dispense: 90 tablet; Refill: 3    4. Mixed hyperlipidemia  Continue simvastatin and Zetia.  His PCP closely monitors his blood work    5. Sinus bradycardia  6. Status post placement of cardiac pacemaker  Continue regular pacemaker interrogation.  Based on today's pacemaker interrogation, beta-blocker started    7. NSVT (nonsustained ventricular tachycardia) ([facility])  Start metoprolol succinate 50 mg daily  - metoprolol succinate  (TOPROL-XL) 50 mg 24 hr tablet; Take 1 tablet (50 mg total) by mouth daily  Dispense: 90 tablet; Refill: 3      Recommend aggressive risk factor modification and therapeutic lifestyle changes.  Low-salt, low-calorie, low-fat, low-cholesterol diet with regular exercise and to optimize weight.  I will defer the ordering and monitoring of necessity lab studies to you, but I am available and happy to review and manage any of the data at your request in the future.    Discussed concepts of atherosclerosis, including signs and symptoms of cardiac disease.    Previous studies were reviewed.    Safety measures were reviewed.  Questions were entertained and answered.  Patient was advised to report any problems requiring medical attention.    Follow-up with PCP and appropriate specialist and lab work as discussed.    Return for follow up visit as scheduled or earlier, if needed.  Thank you for allowing me to participate in the care and evaluation of your patient.  Should you have any questions, please feel free to contact me.    Jay Taylor MD  10/18/2024,11:50 AM

## 2024-10-22 ENCOUNTER — PROCEDURE VISIT (OUTPATIENT)
Dept: UROLOGY | Facility: CLINIC | Age: 79
End: 2024-10-22
Payer: COMMERCIAL

## 2024-10-22 VITALS
TEMPERATURE: 97.4 F | BODY MASS INDEX: 32.93 KG/M2 | DIASTOLIC BLOOD PRESSURE: 82 MMHG | SYSTOLIC BLOOD PRESSURE: 132 MMHG | HEIGHT: 70 IN | WEIGHT: 230 LBS | OXYGEN SATURATION: 94 % | HEART RATE: 74 BPM

## 2024-10-22 DIAGNOSIS — C67.9 MALIGNANT NEOPLASM OF URINARY BLADDER, UNSPECIFIED SITE (HCC): Primary | ICD-10-CM

## 2024-10-22 LAB
SL AMB  POCT GLUCOSE, UA: NORMAL
SL AMB LEUKOCYTE ESTERASE,UA: NORMAL
SL AMB POCT BILIRUBIN,UA: NORMAL
SL AMB POCT BLOOD,UA: NORMAL
SL AMB POCT CLARITY,UA: CLEAR
SL AMB POCT COLOR,UA: YELLOW
SL AMB POCT KETONES,UA: NORMAL
SL AMB POCT NITRITE,UA: NORMAL
SL AMB POCT PH,UA: 5
SL AMB POCT SPECIFIC GRAVITY,UA: 1.02
SL AMB POCT URINE PROTEIN: 30
SL AMB POCT UROBILINOGEN: 0.2

## 2024-10-22 PROCEDURE — 81002 URINALYSIS NONAUTO W/O SCOPE: CPT

## 2024-10-22 PROCEDURE — 51720 TREATMENT OF BLADDER LESION: CPT

## 2024-10-22 NOTE — PROGRESS NOTES
"10/22/2024    Trent Ocasio  1945  [id number]    Diagnosis      Patient presents for BCG 2 of 6 managed by our office    Plan  Orders Placed This Encounter   Procedures    POCT urine dip     Plan to return as scheduled for BCG treatments  Patient instructed to call with persistent hematuria, fever, or other concerns.    Procedure BCG treatment 2 of 6  Vitals:    10/22/24 1005   BP: 132/82   Pulse: 74   Temp: (!) 97.4 °F (36.3 °C)   SpO2: 94%   Weight: 104 kg (230 lb)   Height: 5' 10\" (1.778 m)       Recent Results (from the past 4 hour(s))   POCT urine dip    Collection Time: 10/22/24 10:11 AM   Result Value Ref Range    LEUKOCYTE ESTERASE,UA -     NITRITE,UA -     SL AMB POCT UROBILINOGEN 0.2     POCT URINE PROTEIN 30      PH,UA 5.0     BLOOD,UA -     SPECIFIC GRAVITY,UA 1.020     KETONES,UA -     BILIRUBIN,UA +     GLUCOSE, UA -      COLOR,UA yellow     CLARITY,UA clear                Bladder instillation     Date/Time  10/22/2024 10:30 AM     Performed by  Marylin Goldstein RN   Authorized by  Nick Patel DO     Universal Protocol   procedure performed by consultantConsent: Verbal consent obtained.  Risks and benefits: risks, benefits and alternatives were discussed  Consent given by: patient  Patient understanding: patient states understanding of the procedure being performed  Patient consent: the patient's understanding of the procedure matches consent given  Procedure consent: procedure consent matches procedure scheduled  Patient identity confirmed: verbally with patient      Local anesthesia used: no     Anesthesia   Local anesthesia used: no     Procedure Details   Patient tolerance: patient tolerated the procedure well with no immediate complications         Medication prepared per 's guidelines. Sterile technique employed. Patient prepped and identified. 16F Straight catheter placed. Bladder drained, residual approximately 20mL. The following solution was instilled directly into " the bladder via catheter: 1 Vial BCG. Catheter removed. Patient discharged. Patient tolerated procedure.     Administrations This Visit       BCG (RADU BCG) intravesical suspension 50 mg       Admin Date  10/22/2024 Action  Given Dose  50 mg Route  Intravesical Documented By  Marylin Goldstein, NI Goldstein, RN

## 2024-10-29 ENCOUNTER — PROCEDURE VISIT (OUTPATIENT)
Dept: UROLOGY | Facility: CLINIC | Age: 79
End: 2024-10-29
Payer: COMMERCIAL

## 2024-10-29 VITALS
WEIGHT: 238 LBS | HEART RATE: 73 BPM | TEMPERATURE: 97.7 F | SYSTOLIC BLOOD PRESSURE: 146 MMHG | HEIGHT: 70 IN | DIASTOLIC BLOOD PRESSURE: 68 MMHG | BODY MASS INDEX: 34.07 KG/M2 | OXYGEN SATURATION: 96 %

## 2024-10-29 DIAGNOSIS — C67.9 MALIGNANT NEOPLASM OF URINARY BLADDER, UNSPECIFIED SITE (HCC): Primary | ICD-10-CM

## 2024-10-29 LAB
SL AMB  POCT GLUCOSE, UA: NORMAL
SL AMB LEUKOCYTE ESTERASE,UA: NORMAL
SL AMB POCT BILIRUBIN,UA: NORMAL
SL AMB POCT BLOOD,UA: NORMAL
SL AMB POCT CLARITY,UA: CLEAR
SL AMB POCT COLOR,UA: YELLOW
SL AMB POCT KETONES,UA: NORMAL
SL AMB POCT NITRITE,UA: NORMAL
SL AMB POCT PH,UA: 5
SL AMB POCT SPECIFIC GRAVITY,UA: 1.02
SL AMB POCT URINE PROTEIN: NORMAL
SL AMB POCT UROBILINOGEN: 0.2

## 2024-10-29 PROCEDURE — 81002 URINALYSIS NONAUTO W/O SCOPE: CPT

## 2024-10-29 PROCEDURE — 51720 TREATMENT OF BLADDER LESION: CPT

## 2024-10-29 NOTE — PROGRESS NOTES
"10/29/2024    Trent Ocasio  1945  [id number]    Diagnosis      Patient presents for BCG 3 of 6 managed by our office    Plan  Orders Placed This Encounter   Procedures    POCT urine dip     Plan to follow up with  next BCG as scheduled   Patient instructed to call with persistent hematuria, fever, or other concerns.    Procedure BCG treatment 3 of 6  Vitals:    10/29/24 1008   BP: 146/68   Pulse: 73   Temp: 97.7 °F (36.5 °C)   SpO2: 96%   Weight: 108 kg (238 lb)   Height: 5' 10\" (1.778 m)       Recent Results (from the past 4 hour(s))   POCT urine dip    Collection Time: 10/29/24 10:10 AM   Result Value Ref Range    LEUKOCYTE ESTERASE,UA -     NITRITE,UA -     SL AMB POCT UROBILINOGEN 0.2     POCT URINE PROTEIN +      PH,UA 5.0     BLOOD,UA 5-10     SPECIFIC GRAVITY,UA 1.020     KETONES,UA -     BILIRUBIN,UA -     GLUCOSE, UA -      COLOR,UA yellow     CLARITY,UA clear            Bladder instillation     Date/Time  10/29/2024 10:30 AM     Performed by  Marylni Goldstein RN   Authorized by  Washington Lucas MD     Universal Protocol   procedure performed by consultantConsent: Verbal consent obtained.  Risks and benefits: risks, benefits and alternatives were discussed  Consent given by: patient  Patient understanding: patient states understanding of the procedure being performed  Patient consent: the patient's understanding of the procedure matches consent given  Procedure consent: procedure consent matches procedure scheduled  Patient identity confirmed: verbally with patient      Local anesthesia used: no     Anesthesia   Local anesthesia used: no     Procedure Details   Patient tolerance: patient tolerated the procedure well with no immediate complications           Medication prepared per 's instructions. Sterile technique employed. Patient prepped and identified. 16F Straight catheter placed. Bladder drained, residual approximately 20mL. The following solution was instilled directly into " the bladder via catheter: 1 Vial BCG. Catheter removed. Patient discharged. Patient tolerated procedure.     Administrations This Visit       BCG (RADU BCG) intravesical suspension 50 mg       Admin Date  10/29/2024 Action  Given Dose  50 mg Route  Intravesical Documented By  Marylin Goldstein, NI Goldstein, RN

## 2024-11-05 ENCOUNTER — PROCEDURE VISIT (OUTPATIENT)
Dept: UROLOGY | Facility: CLINIC | Age: 79
End: 2024-11-05
Payer: COMMERCIAL

## 2024-11-05 VITALS
HEART RATE: 69 BPM | OXYGEN SATURATION: 96 % | BODY MASS INDEX: 34.07 KG/M2 | SYSTOLIC BLOOD PRESSURE: 158 MMHG | HEIGHT: 70 IN | TEMPERATURE: 97.7 F | DIASTOLIC BLOOD PRESSURE: 80 MMHG | WEIGHT: 238 LBS

## 2024-11-05 DIAGNOSIS — C67.9 MALIGNANT NEOPLASM OF URINARY BLADDER, UNSPECIFIED SITE (HCC): Primary | ICD-10-CM

## 2024-11-05 PROCEDURE — 51720 TREATMENT OF BLADDER LESION: CPT

## 2024-11-05 PROCEDURE — 81002 URINALYSIS NONAUTO W/O SCOPE: CPT

## 2024-11-05 NOTE — PROGRESS NOTES
"11/5/2024    Trent Ocasio  1945  [id number]    Diagnosis  Chief Complaint    Bladder Cancer         Patient presents for BCG 4 of 6 managed by our office    Plan  Orders Placed This Encounter   Procedures    POCT urine dip     Plan to follow up with next BCG treatment as scheduled   Patient instructed to call with persistent hematuria, fever, or other concerns.    Procedure BCG treatment 4 of 6  Vitals:    11/05/24 1008   BP: 158/80   Pulse: 69   Temp: 97.7 °F (36.5 °C)   SpO2: 96%   Weight: 108 kg (238 lb)   Height: 5' 10\" (1.778 m)       Recent Results (from the past 4 hour(s))   POCT urine dip    Collection Time: 11/05/24 10:09 AM   Result Value Ref Range    LEUKOCYTE ESTERASE,UA -     NITRITE,UA -     SL AMB POCT UROBILINOGEN 0.2     POCT URINE PROTEIN +      PH,UA 5.0     BLOOD,UA 5-10     SPECIFIC GRAVITY,UA 1.020     KETONES,UA -     BILIRUBIN,UA -     GLUCOSE, UA -      COLOR,UA yellow     CLARITY,UA clear                Bladder instillation     Date/Time  11/5/2024 11:00 AM     Performed by  Marylin Goldstein RN   Authorized by  Hardik Berman MD     [place] Protocol   procedure performed by consultantConsent: Verbal consent obtained.  Risks and benefits: risks, benefits and alternatives were discussed  Consent given by: patient  Patient understanding: patient states understanding of the procedure being performed  Patient consent: the patient's understanding of the procedure matches consent given  Procedure consent: procedure consent matches procedure scheduled  Patient identity confirmed: verbally with patient      Local anesthesia used: no     Anesthesia   Local anesthesia used: no     Procedure Details   Patient tolerance: patient tolerated the procedure well with no immediate complications         Sterile technique employed. Patient prepped and identified. 16F Straight catheter placed. Bladder drained, residual approximately 30mL. The following solution was instilled directly into the bladder via " catheter: 1 Vial BCG. Catheter removed. Patient discharged. Patient tolerated procedure.     Administrations This Visit       BCG (RADU BCG) intravesical suspension 50 mg       Admin Date  11/05/2024 Action  Given Dose  50 mg Route  Intravesical Documented By  NI Martinez, RN

## 2024-11-11 ENCOUNTER — OFFICE VISIT (OUTPATIENT)
Dept: PAIN MEDICINE | Facility: CLINIC | Age: 79
End: 2024-11-11
Payer: COMMERCIAL

## 2024-11-11 ENCOUNTER — TELEPHONE (OUTPATIENT)
Dept: RADIOLOGY | Facility: CLINIC | Age: 79
End: 2024-11-11

## 2024-11-11 VITALS
BODY MASS INDEX: 34.07 KG/M2 | HEART RATE: 73 BPM | SYSTOLIC BLOOD PRESSURE: 133 MMHG | DIASTOLIC BLOOD PRESSURE: 81 MMHG | HEIGHT: 70 IN | WEIGHT: 238 LBS

## 2024-11-11 DIAGNOSIS — D17.79 EPIDURAL LIPOMATOSIS: ICD-10-CM

## 2024-11-11 DIAGNOSIS — M48.062 SPINAL STENOSIS OF LUMBAR REGION WITH NEUROGENIC CLAUDICATION: Primary | ICD-10-CM

## 2024-11-11 DIAGNOSIS — M43.00 SPONDYLOLYSIS: ICD-10-CM

## 2024-11-11 PROCEDURE — 99214 OFFICE O/P EST MOD 30 MIN: CPT | Performed by: STUDENT IN AN ORGANIZED HEALTH CARE EDUCATION/TRAINING PROGRAM

## 2024-11-11 NOTE — PATIENT INSTRUCTIONS
"Patient Education     Epidural injection   The Basics   Written by the doctors and editors at [facility]   What is an epidural injection? -- An epidural injection can be used to treat a condition called \"radiculopathy.\" This is the medical term for the pain, weakness, numbness, or tingling that happens when nerves coming from the spinal cord get pinched or damaged.  The doctor injects medicines into the space outside the covering of the spinal cord (figure 1). This is similar to an \"epidural\" that is used for pain relief during labor and childbirth.  Epidural injections can be given into different parts of your back:   Cervical epidural injection - Used to help with pain in the head or arms.   Thoracic epidural injection - Used to help with pain in the upper or middle back.   Lumbar epidural injection - Used to help with pain in the lower back or legs.  How do I prepare for an epidural injection? -- The doctor or nurse will tell you if you need to do anything special to prepare. Before your procedure, your doctor will do an exam. They might send you to get tests, such as:   X-ray, ultrasound, or other imaging tests - Imaging tests create pictures of the inside of the body.  Your doctor will also ask you about your \"health history.\" This involves asking you questions about any health problems you have or had in the past, past surgeries, and any medicines you take. Tell them about:   Any medicines you are taking - This includes any prescription or \"over-the-counter\" medicines you use, plus any herbal supplements you take. It helps to write down and bring a list of any medicines you take, or bring a bag with all of your medicines with you.   Any allergies you have   Any bleeding problems you have - Certain medicines, including some herbs and supplements, can increase the risk of bleeding. Some health conditions also increase this risk.  You will also get information about:   Eating and drinking before your procedure - In " "some cases, you might need to \"fast\" before surgery. This means not eating or drinking anything for a period of time. In other cases, you might be allowed to have liquids until a short time before the procedure. Whether you need to fast, and for how long, depends on the procedure you are having.   What help you will need when you go home - For example, you might need to have someone else bring you home or stay with you for some time while you recover.  Ask the doctor or nurse if you have questions or if there is anything you do not understand.  What happens during an epidural injection? -- When it is time for the procedure:   You might get an \"IV,\" which is a thin tube that goes into a vein. This can be used to give you fluids and medicines.   You will get anesthesia medicines to numb the area where the doctor will give the injection. This is to make sure that you do not feel pain during the procedure. You might also get medicines to make you relax and feel sleepy, called \"sedatives.\"   The doctors and nurses will monitor your breathing, blood pressure, and heart rate during the procedure.   The doctor might use a continuous X-ray called \"fluoroscopy.\" This is to help make sure that the medicines are injected into the right place. The doctor might also inject a dye to see where to give the medicine.   The doctor will place a needle through your skin and inject the medicine into a space near your spine. Then, they will remove the needle and cover the area with a clean bandage.   The procedure takes 15 to 30 minutes.  What happens after an epidural injection? -- After your procedure, the staff will watch you closely for a short time. It might take a few days before you feel the effects of the epidural injection.  Before you go home, make sure that you know what problems to look out for and when to call the doctor. Make sure that you understand your doctor's or nurse's instructions. Ask questions about anything you do " "not understand.  For the rest of the day after your procedure:   Try to rest. Limit activities like exercise or driving.   The doctor might recommend an over-the-counter pain medicine. These include acetaminophen (sample brand name: Tylenol), ibuprofen (sample brand names: Advil, Motrin), and naproxen (sample brand name: Aleve).   Ice can help with pain and swelling. Put a cold gel pack, bag of ice, or bag of frozen vegetables on the injection site area every 1 to 2 hours, for 15 minutes each time. Put a thin towel between the ice (or other cold object) and the skin.  What are the risks of an epidural injection? -- Your doctor will talk to you about all of the possible risks, and answer your questions. Possible risks include:   Bleeding   Infection   Headache   Nerve injury  When should I call the doctor? -- Call for emergency help right away (in the [place] and [place], call 9-1-1) if:   You can't move your arms or legs.  Call for advice if:   You have a fever of 100.4°F (38°C) or higher, or chills.   You have redness or swelling around the injection site.   You have a headache.   Your arms or legs are numb, weak, or tingly.  All topics are updated as new evidence becomes available and our peer review process is complete.  This topic retrieved from [employer] on: May 15, 2024.  Topic 848365 Version 1.0  Release: 32.4.3 - C32.134  © 2024 UpToDate, Inc. and/or its affiliates. All rights reserved.  figure 1: Epidural injection     Duringan epidural injection, the doctor inserts a needle between 2 of the bones thatmake up the spine. Then, they inject medicines into the area around the spinalcord. This is an illustration of a \"lumbar\" epidural injection, which is given into the low back. The doctor can place the needle in other areas to treat other types of pain.  Graphic 275764 Version 1.0  Consumer Information Use and Disclaimer   Disclaimer: This generalized information is a limited summary of diagnosis, treatment, and/or " medication information. It is not meant to be comprehensive and should be used as a tool to help the user understand and/or assess potential diagnostic and treatment options. It does NOT include all information about conditions, treatments, medications, side effects, or risks that may apply to a specific patient. It is not intended to be medical advice or a substitute for the medical advice, diagnosis, or treatment of a health care provider based on the health care provider's examination and assessment of a patient's specific and unique circumstances. Patients must speak with a health care provider for complete information about their health, medical questions, and treatment options, including any risks or benefits regarding use of medications. This information does not endorse any treatments or medications as safe, effective, or approved for treating a specific patient. UpToDate, Inc. and its affiliates disclaim any warranty or liability relating to this information or the use thereof.The use of this information is governed by the Terms of Use, available at https://www.[employer].com/en/know/clinical-effectiveness-terms. 2024© UpToDate, Inc. and its affiliates and/or licensors. All rights reserved.  Copyright   © 2024 UpToDate, Inc. and/or its affiliates. All rights reserved.

## 2024-11-11 NOTE — PROGRESS NOTES
Assessment:  1. Spinal stenosis of lumbar region with neurogenic claudication    2. Epidural lipomatosis    3. Spondylolysis        Plan:    79-year-old male here to discuss CT scan.  Notable multilevel degenerative disease, epidural lipomatosis and advanced multilevel spinal canal narrowing, particularly at the L4-5 and L5-S1 levels.  He is also endorsing L5 radiculopathy on the right.  We discussed bilateral L5 TFESI under fluoroscopic guidance over the symptoms.  I did discuss the need for possibly more than 1 injection to provide maximal relief.  If this injection is not helpful, then may consider caudal epidural steroid injection    [place] Prescription Drug Monitoring Program report was reviewed and was appropriate     Complete risks and benefits including bleeding, infection, tissue reaction, nerve injury and allergic reaction were discussed. The approach was demonstrated using models and literature was provided. Verbal and written consent was obtained.    My impressions and treatment recommendations were discussed in detail with the patient who verbalized understanding and had no further questions.  Discharge instructions were provided. I personally saw and examined the patient and I agree with the above discussed plan of care.    Orders Placed This Encounter   Procedures    FL spine and pain procedure     Standing Status:   Future     Standing Expiration Date:   11/11/2028     Order Specific Question:   Reason for Exam:     Answer:   b/l l5 tfesi depo     Order Specific Question:   Anticoagulant hold needed?     Answer:   no     No orders of the defined types were placed in this encounter.      History of Present Illness:  Trent Ocasio is a 79 y.o. male who presents for a follow up office visit in regards to Back Pain (B/L back pain ).   The patient’s current symptoms include bilateral low back pain with sciatica.  6 out of 10 pain.  Worse in the morning.  Constant and sharp in nature.  Here to  review results of CAT scan.    He specifies bilateral low back pain with radiation into right leg. Reports increased back pain with walking.     I have personally reviewed and/or updated the patient's past medical history, past surgical history, family history, social history, current medications, allergies, and vital signs today.     Review of Systems   Musculoskeletal:  Positive for gait problem.        DROM  Pain in Extremity Legs        Patient Active Problem List   Diagnosis    Chronic heart failure with preserved ejection fraction (HFpEF) ([facility])    History of prostate cancer    Anxiety    HTN (hypertension)    Hypothyroid    Type 2 diabetes mellitus, with long-term current use of insulin ([facility])    Elevated troponin    Stage 3b chronic kidney disease ([facility])    Abnormal stress test    CAD (coronary artery disease)    Malignant 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 ([facility])    Hypoglycemia    S/P cardiac cath    Bradycardia    Bladder tumor    Gastroesophageal reflux disease    Chronic low back pain with sciatica       Past Medical History:   Diagnosis Date    Acute respiratory failure with hypoxia ([facility]) 03/28/2024    Chronic kidney disease     Diabetes mellitus ([facility])     GERD (gastroesophageal reflux disease)     Hypertension     Respiratory acidosis 03/31/2024       Past Surgical History:   Procedure Laterality Date    APPENDECTOMY      CARDIAC CATHETERIZATION Left 05/24/2024    Procedure: Cardiac Left Heart Cath;  Surgeon: Qiana Torres MD;  Location: [facility];  Service: Cardiology    CARDIAC CATHETERIZATION N/A 05/24/2024    Procedure: Cardiac Coronary Angiogram;  Surgeon: Qiana Torres MD;  Location: [facility];  Service: Cardiology    CARDIAC CATHETERIZATION  05/24/2024    Procedure: Cardiac catheterization;  Surgeon: Qiana Torres MD;  Location: [facility];  Service: Cardiology    CARDIAC ELECTROPHYSIOLOGY PROCEDURE N/A 6/21/2024    Procedure: Cardiac pacer implant;   Surgeon: Solomon Quintero MD;  Location: [facility];  Service: Cardiology    CO CYSTO W/REMOVAL OF LESIONS SMALL N/A 6/20/2024    Procedure: TRANSURETHRAL RESECTION OF BLADDER TUMOR (TURBT);  Surgeon: Nick Patel DO;  Location: [facility];  Service: Urology    CO CYSTO W/REMOVAL OF LESIONS SMALL N/A 7/31/2024    Procedure: TRANSURETHRAL RESECTION OF BLADDER TUMOR (TURBT), intravesical instillation of gemcitabine;  Surgeon: Nick Patel DO;  Location: [facility];  Service: Urology       History reviewed. No pertinent family history.    Social History     Occupational History    Not on file   Tobacco Use    Smoking status: Former     Passive exposure: Past    Smokeless tobacco: Never   Vaping Use    Vaping status: Never Used   Substance and Sexual Activity    Alcohol use: Yes     Comment: rarely    Drug use: Never    Sexual activity: Yes     Partners: Female       Current Outpatient Medications on File Prior to Visit   Medication Sig    aspirin 81 mg chewable tablet Chew 81 mg daily    calcitriol (ROCALTROL) 0.25 mcg capsule Take 1 capsule (0.25 mcg total) by mouth daily    cholecalciferol 25 MCG (1000 UT) tablet Take 1,000 Units by mouth daily    ezetimibe (ZETIA) 10 mg tablet Take 10 mg by mouth daily    glipiZIDE (GLUCOTROL) 10 mg tablet Take 10 mg by mouth 2 (two) times a day before meals    insulin degludec (Tresiba FlexTouch) 100 units/mL injection pen Inject 15 Units under the skin daily at bedtime    isosorbide mononitrate (IMDUR) 30 mg 24 hr tablet Take 1 tablet (30 mg total) by mouth daily    latanoprost (XALATAN) 0.005 % ophthalmic solution Administer 1 drop to both eyes daily at bedtime    levothyroxine 50 mcg tablet Take 50 mcg by mouth daily    losartan (COZAAR) 25 mg tablet Take 1 tablet (25 mg total) by mouth daily    losartan (COZAAR) 50 mg tablet Take 1 tablet (50 mg total) by mouth daily    metFORMIN (GLUCOPHAGE) 500 mg tablet Take 500 mg by mouth 2 (two) times a day  "with meals    metoprolol succinate (TOPROL-XL) 50 mg 24 hr tablet Take 1 tablet (50 mg total) by mouth daily    pantoprazole (PROTONIX) 40 mg tablet Take 40 mg by mouth daily at bedtime    pioglitazone (ACTOS) 45 mg tablet Take 45 mg by mouth daily    simvastatin (ZOCOR) 40 mg tablet Take 40 mg by mouth daily at bedtime    torsemide (DEMADEX) 20 mg tablet Take 2 tablets (40 mg total) by mouth daily     Current Facility-Administered Medications on File Prior to Visit   Medication    BCG (RADU BCG) intravesical suspension 50 mg       Allergies   Allergen Reactions    Canagliflozin GI Intolerance     Yeast infection  Yeast infection      Lisinopril Other (See Comments)     Pt unsure of reaction     Lovastatin GI Intolerance       Physical Exam:    /81   Pulse 73   Ht 5' 10\" (1.778 m)   Wt 108 kg (238 lb)   BMI 34.15 kg/m²     Constitutional:normal, well developed, well nourished, alert, in no distress and non-toxic and no overt pain behavior.  Eyes:anicteric  HEENT:grossly intact  Neck:supple, symmetric, trachea midline and no masses   Pulmonary:even and unlabored  Cardiovascular:No edema or pitting edema present  Skin:Normal without rashes or lesions and well hydrated  Psychiatric:Mood and affect appropriate  Neurologic:Cranial Nerves II-XII grossly intact  Musculoskeletal:normal    Imaging    CT LUMBAR SPINE WITHOUT CONTRAST  9/30/24     INDICATION:   M54.42: Lumbago with sciatica, left side  M54.41: Lumbago with sciatica, right side  G89.29: Other chronic pain.     COMPARISON: X-rays from September 30, 2024     TECHNIQUE:  Contiguous axial images through the lumbar spine were obtained. Sagittal and coronal reconstructions were performed.     IV Contrast: None     Radiation dose length product (DLP) for this visit:  1406 mGy-cm .  This examination, like all CT scans performed in the [facility], was performed utilizing techniques to minimize radiation dose exposure, including the use of " iterative   reconstruction and automated exposure control.     IMAGE QUALITY:  Diagnostic.     FINDINGS:     ALIGNMENT:  There are 5 lumbar type vertebral bodies. Bilateral pars defects at L5 with 7-8 mm of anterolisthesis of L5 on S1. Mild levoscoliosis.     VERTEBRAE:  No fracture.  No lytic or blastic lesion. Prominent multilevel ventral osteophytosis.     DEGENERATIVE CHANGES:     Lower Thoracic spine:  Normal lower thoracic disc spaces.     Lumbar Spine:  L1-2: Mild bilateral facet hypertrophy and ligamentum flavum thickening. No stenosis.     L2-3: Grade 1 retrolisthesis. Disc height loss. Mild bilateral facet arthropathy and ligamentum flavum thickening. Moderate canal narrowing and bilateral subarticular crowding. Bilateral foraminal disc osteophyte results in mild left and mild to moderate   right foraminal narrowing.     L3-4: No central canal stenosis or foraminal stenosis. Mild bilateral facet arthropathy.     L4-5: Epidural fat circumferentially narrows the thecal sac. Bilateral facet arthropathy, ligamentum flavum thickening. Disc height loss with broad disc osteophyte and left paracentral extrusion. Severe thecal sac narrowing as above. Moderate bilateral   foraminal stenosis.     L5-S1: Epidural fat circumferentially narrows the thecal sac. Pars defects with anterolisthesis and unroofing of the disc. Broad disc osteophyte. Potentially severe bilateral foraminal stenosis. Mild bilateral facet arthropathy.     PARASPINAL SOFT TISSUES:  Normal.     OTHER: None.     IMPRESSION:     L5-S1 spondylolisthesis as discussed. Along with epidural fat, there is severe narrowing of the thecal sac at this level and potentially severe bilateral foraminal stenosis.     L4-5 epidural lipomatosis and spondylosis as discussed results in severe canal stenosis and moderate bilateral foraminal stenosis.       XR SPINE LUMBAR 2 OR 3 VIEWS INJURY  9/30/24     INDICATION: M54.42: Lumbago with sciatica, left side  M54.41:  Lumbago with sciatica, right side  G89.29: Other chronic pain.     COMPARISON: Same day CT lumbar spine     FINDINGS:     Bilateral L5 pars interarticularis defects, better seen on same-day CT. Grade 1 anterolisthesis L5-S1.Otherwise, no acute fracture or traumatic subluxation. Intact pedicles.     Five non-rib-bearing lumbar vertebral bodies.     Moderate to severe multilevel degenerative changes evidenced by prominent anterior osteophytes, disc base narrowing, and facet arthropathy, most prominent at L4-5 and L5-S1.     Unremarkable soft tissues.     IMPRESSION:     Grade 1 anterolisthesis L5-S1 with bilateral pars interarticularis defects of L5, better seen on same-day CT.     Multilevel spinal degenerative changes.

## 2024-11-11 NOTE — H&P (VIEW-ONLY)
Assessment:  1. Spinal stenosis of lumbar region with neurogenic claudication    2. Epidural lipomatosis    3. Spondylolysis        Plan:    79-year-old male here to discuss CT scan.  Notable multilevel degenerative disease, epidural lipomatosis and advanced multilevel spinal canal narrowing, particularly at the L4-5 and L5-S1 levels.  He is also endorsing L5 radiculopathy on the right.  We discussed bilateral L5 TFESI under fluoroscopic guidance over the symptoms.  I did discuss the need for possibly more than 1 injection to provide maximal relief.  If this injection is not helpful, then may consider caudal epidural steroid injection    [place] Prescription Drug Monitoring Program report was reviewed and was appropriate     Complete risks and benefits including bleeding, infection, tissue reaction, nerve injury and allergic reaction were discussed. The approach was demonstrated using models and literature was provided. Verbal and written consent was obtained.    My impressions and treatment recommendations were discussed in detail with the patient who verbalized understanding and had no further questions.  Discharge instructions were provided. I personally saw and examined the patient and I agree with the above discussed plan of care.    Orders Placed This Encounter   Procedures    FL spine and pain procedure     Standing Status:   Future     Standing Expiration Date:   11/11/2028     Order Specific Question:   Reason for Exam:     Answer:   b/l l5 tfesi depo     Order Specific Question:   Anticoagulant hold needed?     Answer:   no     No orders of the defined types were placed in this encounter.      History of Present Illness:  Trent Ocasio is a 79 y.o. male who presents for a follow up office visit in regards to Back Pain (B/L back pain ).   The patient’s current symptoms include bilateral low back pain with sciatica.  6 out of 10 pain.  Worse in the morning.  Constant and sharp in nature.  Here to  review results of CAT scan.    He specifies bilateral low back pain with radiation into right leg. Reports increased back pain with walking.     I have personally reviewed and/or updated the patient's past medical history, past surgical history, family history, social history, current medications, allergies, and vital signs today.     Review of Systems   Musculoskeletal:  Positive for gait problem.        DROM  Pain in Extremity Legs        Patient Active Problem List   Diagnosis    Chronic heart failure with preserved ejection fraction (HFpEF) ([facility])    History of prostate cancer    Anxiety    HTN (hypertension)    Hypothyroid    Type 2 diabetes mellitus, with long-term current use of insulin ([facility])    Elevated troponin    Stage 3b chronic kidney disease ([facility])    Abnormal stress test    CAD (coronary artery disease)    Malignant 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 ([facility])    Hypoglycemia    S/P cardiac cath    Bradycardia    Bladder tumor    Gastroesophageal reflux disease    Chronic low back pain with sciatica       Past Medical History:   Diagnosis Date    Acute respiratory failure with hypoxia ([facility]) 03/28/2024    Chronic kidney disease     Diabetes mellitus ([facility])     GERD (gastroesophageal reflux disease)     Hypertension     Respiratory acidosis 03/31/2024       Past Surgical History:   Procedure Laterality Date    APPENDECTOMY      CARDIAC CATHETERIZATION Left 05/24/2024    Procedure: Cardiac Left Heart Cath;  Surgeon: Qiana Torres MD;  Location: [facility];  Service: Cardiology    CARDIAC CATHETERIZATION N/A 05/24/2024    Procedure: Cardiac Coronary Angiogram;  Surgeon: Qiana Torres MD;  Location: [facility];  Service: Cardiology    CARDIAC CATHETERIZATION  05/24/2024    Procedure: Cardiac catheterization;  Surgeon: Qiana Torres MD;  Location: [facility];  Service: Cardiology    CARDIAC ELECTROPHYSIOLOGY PROCEDURE N/A 6/21/2024    Procedure: Cardiac pacer implant;   Surgeon: Solomon Quintero MD;  Location: [facility];  Service: Cardiology    KS CYSTO W/REMOVAL OF LESIONS SMALL N/A 6/20/2024    Procedure: TRANSURETHRAL RESECTION OF BLADDER TUMOR (TURBT);  Surgeon: Nick Patel DO;  Location: [facility];  Service: Urology    KS CYSTO W/REMOVAL OF LESIONS SMALL N/A 7/31/2024    Procedure: TRANSURETHRAL RESECTION OF BLADDER TUMOR (TURBT), intravesical instillation of gemcitabine;  Surgeon: Nick Patel DO;  Location: [facility];  Service: Urology       History reviewed. No pertinent family history.    Social History     Occupational History    Not on file   Tobacco Use    Smoking status: Former     Passive exposure: Past    Smokeless tobacco: Never   Vaping Use    Vaping status: Never Used   Substance and Sexual Activity    Alcohol use: Yes     Comment: rarely    Drug use: Never    Sexual activity: Yes     Partners: Female       Current Outpatient Medications on File Prior to Visit   Medication Sig    aspirin 81 mg chewable tablet Chew 81 mg daily    calcitriol (ROCALTROL) 0.25 mcg capsule Take 1 capsule (0.25 mcg total) by mouth daily    cholecalciferol 25 MCG (1000 UT) tablet Take 1,000 Units by mouth daily    ezetimibe (ZETIA) 10 mg tablet Take 10 mg by mouth daily    glipiZIDE (GLUCOTROL) 10 mg tablet Take 10 mg by mouth 2 (two) times a day before meals    insulin degludec (Tresiba FlexTouch) 100 units/mL injection pen Inject 15 Units under the skin daily at bedtime    isosorbide mononitrate (IMDUR) 30 mg 24 hr tablet Take 1 tablet (30 mg total) by mouth daily    latanoprost (XALATAN) 0.005 % ophthalmic solution Administer 1 drop to both eyes daily at bedtime    levothyroxine 50 mcg tablet Take 50 mcg by mouth daily    losartan (COZAAR) 25 mg tablet Take 1 tablet (25 mg total) by mouth daily    losartan (COZAAR) 50 mg tablet Take 1 tablet (50 mg total) by mouth daily    metFORMIN (GLUCOPHAGE) 500 mg tablet Take 500 mg by mouth 2 (two) times a day  "with meals    metoprolol succinate (TOPROL-XL) 50 mg 24 hr tablet Take 1 tablet (50 mg total) by mouth daily    pantoprazole (PROTONIX) 40 mg tablet Take 40 mg by mouth daily at bedtime    pioglitazone (ACTOS) 45 mg tablet Take 45 mg by mouth daily    simvastatin (ZOCOR) 40 mg tablet Take 40 mg by mouth daily at bedtime    torsemide (DEMADEX) 20 mg tablet Take 2 tablets (40 mg total) by mouth daily     Current Facility-Administered Medications on File Prior to Visit   Medication    BCG (RADU BCG) intravesical suspension 50 mg       Allergies   Allergen Reactions    Canagliflozin GI Intolerance     Yeast infection  Yeast infection      Lisinopril Other (See Comments)     Pt unsure of reaction     Lovastatin GI Intolerance       Physical Exam:    /81   Pulse 73   Ht 5' 10\" (1.778 m)   Wt 108 kg (238 lb)   BMI 34.15 kg/m²     Constitutional:normal, well developed, well nourished, alert, in no distress and non-toxic and no overt pain behavior.  Eyes:anicteric  HEENT:grossly intact  Neck:supple, symmetric, trachea midline and no masses   Pulmonary:even and unlabored  Cardiovascular:No edema or pitting edema present  Skin:Normal without rashes or lesions and well hydrated  Psychiatric:Mood and affect appropriate  Neurologic:Cranial Nerves II-XII grossly intact  Musculoskeletal:normal    Imaging    CT LUMBAR SPINE WITHOUT CONTRAST  9/30/24     INDICATION:   M54.42: Lumbago with sciatica, left side  M54.41: Lumbago with sciatica, right side  G89.29: Other chronic pain.     COMPARISON: X-rays from September 30, 2024     TECHNIQUE:  Contiguous axial images through the lumbar spine were obtained. Sagittal and coronal reconstructions were performed.     IV Contrast: None     Radiation dose length product (DLP) for this visit:  1406 mGy-cm .  This examination, like all CT scans performed in the [facility], was performed utilizing techniques to minimize radiation dose exposure, including the use of " iterative   reconstruction and automated exposure control.     IMAGE QUALITY:  Diagnostic.     FINDINGS:     ALIGNMENT:  There are 5 lumbar type vertebral bodies. Bilateral pars defects at L5 with 7-8 mm of anterolisthesis of L5 on S1. Mild levoscoliosis.     VERTEBRAE:  No fracture.  No lytic or blastic lesion. Prominent multilevel ventral osteophytosis.     DEGENERATIVE CHANGES:     Lower Thoracic spine:  Normal lower thoracic disc spaces.     Lumbar Spine:  L1-2: Mild bilateral facet hypertrophy and ligamentum flavum thickening. No stenosis.     L2-3: Grade 1 retrolisthesis. Disc height loss. Mild bilateral facet arthropathy and ligamentum flavum thickening. Moderate canal narrowing and bilateral subarticular crowding. Bilateral foraminal disc osteophyte results in mild left and mild to moderate   right foraminal narrowing.     L3-4: No central canal stenosis or foraminal stenosis. Mild bilateral facet arthropathy.     L4-5: Epidural fat circumferentially narrows the thecal sac. Bilateral facet arthropathy, ligamentum flavum thickening. Disc height loss with broad disc osteophyte and left paracentral extrusion. Severe thecal sac narrowing as above. Moderate bilateral   foraminal stenosis.     L5-S1: Epidural fat circumferentially narrows the thecal sac. Pars defects with anterolisthesis and unroofing of the disc. Broad disc osteophyte. Potentially severe bilateral foraminal stenosis. Mild bilateral facet arthropathy.     PARASPINAL SOFT TISSUES:  Normal.     OTHER: None.     IMPRESSION:     L5-S1 spondylolisthesis as discussed. Along with epidural fat, there is severe narrowing of the thecal sac at this level and potentially severe bilateral foraminal stenosis.     L4-5 epidural lipomatosis and spondylosis as discussed results in severe canal stenosis and moderate bilateral foraminal stenosis.       XR SPINE LUMBAR 2 OR 3 VIEWS INJURY  9/30/24     INDICATION: M54.42: Lumbago with sciatica, left side  M54.41:  Lumbago with sciatica, right side  G89.29: Other chronic pain.     COMPARISON: Same day CT lumbar spine     FINDINGS:     Bilateral L5 pars interarticularis defects, better seen on same-day CT. Grade 1 anterolisthesis L5-S1.Otherwise, no acute fracture or traumatic subluxation. Intact pedicles.     Five non-rib-bearing lumbar vertebral bodies.     Moderate to severe multilevel degenerative changes evidenced by prominent anterior osteophytes, disc base narrowing, and facet arthropathy, most prominent at L4-5 and L5-S1.     Unremarkable soft tissues.     IMPRESSION:     Grade 1 anterolisthesis L5-S1 with bilateral pars interarticularis defects of L5, better seen on same-day CT.     Multilevel spinal degenerative changes.

## 2024-11-11 NOTE — TELEPHONE ENCOUNTER
Pt is scheduled for TFESI on 12/10/24 @1:50pm -- pt does not have a  available-- would like to know if he is able to drive himself.  Pt is willing to wait a longer time before leaving (would like to know how long).    Please advise.

## 2024-11-12 ENCOUNTER — PROCEDURE VISIT (OUTPATIENT)
Dept: UROLOGY | Facility: CLINIC | Age: 79
End: 2024-11-12
Payer: COMMERCIAL

## 2024-11-12 ENCOUNTER — PATIENT MESSAGE (OUTPATIENT)
Dept: RADIOLOGY | Facility: CLINIC | Age: 79
End: 2024-11-12

## 2024-11-12 VITALS
BODY MASS INDEX: 33.93 KG/M2 | HEART RATE: 72 BPM | OXYGEN SATURATION: 97 % | HEIGHT: 70 IN | TEMPERATURE: 97.5 F | SYSTOLIC BLOOD PRESSURE: 146 MMHG | WEIGHT: 237 LBS | DIASTOLIC BLOOD PRESSURE: 80 MMHG

## 2024-11-12 DIAGNOSIS — C67.9 MALIGNANT NEOPLASM OF URINARY BLADDER, UNSPECIFIED SITE (HCC): Primary | ICD-10-CM

## 2024-11-12 PROCEDURE — 51720 TREATMENT OF BLADDER LESION: CPT

## 2024-11-12 PROCEDURE — 81002 URINALYSIS NONAUTO W/O SCOPE: CPT

## 2024-11-12 NOTE — PROGRESS NOTES
"11/12/2024    Trent Ocasio  1945  [id number]    Diagnosis  Chief Complaint    Bladder Cancer         Patient presents for BCG 5 of 6 managed by our office    Plan  Orders Placed This Encounter   Procedures    POCT urine dip     Plan to follow up with next BCG treatment as scheduled   Patient instructed to call with persistent hematuria, fever, or other concerns.    Procedure BCG treatment 5 of 6  Vitals:    11/12/24 1037   BP: 146/80   Pulse: 72   Temp: 97.5 °F (36.4 °C)   SpO2: 97%   Weight: 108 kg (237 lb)   Height: 5' 10\" (1.778 m)       Recent Results (from the past 4 hour(s))   POCT urine dip    Collection Time: 11/12/24 10:37 AM   Result Value Ref Range    LEUKOCYTE ESTERASE,UA -     NITRITE,UA -     SL AMB POCT UROBILINOGEN 0.2     POCT URINE PROTEIN 30+      PH,UA 5.0     BLOOD,UA +     SPECIFIC GRAVITY,UA 1.020     KETONES,UA -     BILIRUBIN,UA +     GLUCOSE, UA -      COLOR,UA yellow     CLARITY,UA clear                Bladder instillation     Date/Time  11/12/2024 10:30 AM     Performed by  Marylin Goldstein RN   Authorized by  Mainor Lizama MD     [place] Protocol   procedure performed by consultantConsent: Verbal consent obtained.  Risks and benefits: risks, benefits and alternatives were discussed  Consent given by: patient  Patient understanding: patient states understanding of the procedure being performed  Patient consent: the patient's understanding of the procedure matches consent given  Procedure consent: procedure consent matches procedure scheduled  Patient identity confirmed: verbally with patient      Local anesthesia used: no     Anesthesia   Local anesthesia used: no     Procedure Details   Patient tolerance: patient tolerated the procedure well with no immediate complications         Sterile technique employed. Patient prepped and identified. 16F Straight catheter placed. Bladder drained, residual approximately 15mL. The following solution was instilled directly into the " bladder via catheter: 1 Vial BCG. Catheter removed. Patient discharged. Patient tolerated procedure.     Administrations This Visit       BCG (RADU BCG) intravesical suspension 50 mg       Admin Date  11/12/2024 Action  Given Dose  50 mg Route  Intravesical Documented By  NI Martinez, RN

## 2024-11-12 NOTE — PATIENT COMMUNICATION
Pt is scheduled for TFESI with Dr Burks on 12/10/24    Pt reports he is diabetic but does not wear a glucose monitor    Pt reports he does not take prescription blood thinners (per chart, takes 81mg aspirin, which does not require hold for procedure type)    Pt was ok'd by Dr Burks to drive himself, however, was made aware he may need to wait 2+ hours after procedure to be able to drive.    Pt given instructions in office and via myc message    Have you completed PT/HEP/Chiro in the past 6 months for dedicated area? No   If yes, how long did you complete?  What was the frequency?  Did it provide relief?  If no, reason therapy was not completed?

## 2024-11-13 NOTE — TELEPHONE ENCOUNTER
Caller: kamilah Newman    Doctor: Vitaliy    Reason for call: pt returned call and stated he will wait the 2 hours after his procedure    Call back#: [phone]

## 2024-11-17 DIAGNOSIS — I25.10 CORONARY ARTERY DISEASE INVOLVING NATIVE CORONARY ARTERY OF NATIVE HEART, UNSPECIFIED WHETHER ANGINA PRESENT: ICD-10-CM

## 2024-11-18 RX ORDER — ISOSORBIDE MONONITRATE 30 MG/1
30 TABLET, EXTENDED RELEASE ORAL DAILY
Qty: 90 TABLET | Refills: 0 | Status: SHIPPED | OUTPATIENT
Start: 2024-11-18

## 2024-11-18 NOTE — TELEPHONE ENCOUNTER
Patient called to request a refill for their isosorbide mononitrate (IMDUR) 30 mg 24 hr tablet. Advised a refill was requested on 11/17/2024 and is pending approval. Patient verbalized understanding and is in agreement.

## 2024-11-19 ENCOUNTER — PROCEDURE VISIT (OUTPATIENT)
Dept: UROLOGY | Facility: CLINIC | Age: 79
End: 2024-11-19
Payer: COMMERCIAL

## 2024-11-19 VITALS
BODY MASS INDEX: 33.29 KG/M2 | WEIGHT: 232 LBS | DIASTOLIC BLOOD PRESSURE: 70 MMHG | TEMPERATURE: 97.5 F | OXYGEN SATURATION: 97 % | SYSTOLIC BLOOD PRESSURE: 118 MMHG | HEART RATE: 86 BPM

## 2024-11-19 DIAGNOSIS — C67.9 MALIGNANT NEOPLASM OF URINARY BLADDER, UNSPECIFIED SITE (HCC): Primary | ICD-10-CM

## 2024-11-19 LAB
SL AMB  POCT GLUCOSE, UA: NORMAL
SL AMB LEUKOCYTE ESTERASE,UA: NORMAL
SL AMB POCT BILIRUBIN,UA: NORMAL
SL AMB POCT BLOOD,UA: NORMAL
SL AMB POCT CLARITY,UA: CLEAR
SL AMB POCT COLOR,UA: YELLOW
SL AMB POCT KETONES,UA: NORMAL
SL AMB POCT NITRITE,UA: NORMAL
SL AMB POCT PH,UA: 5
SL AMB POCT SPECIFIC GRAVITY,UA: 1.01
SL AMB POCT URINE PROTEIN: 30
SL AMB POCT UROBILINOGEN: 0.2

## 2024-11-19 PROCEDURE — 81002 URINALYSIS NONAUTO W/O SCOPE: CPT

## 2024-11-19 PROCEDURE — 51720 TREATMENT OF BLADDER LESION: CPT

## 2024-11-19 NOTE — PROGRESS NOTES
11/19/2024    Trent Ocasio  1945  [id number]    Diagnosis  Chief Complaint    BCG #6         Patient presents for BCG 6 of 6 managed by our office    Plan  Orders Placed This Encounter   Procedures    POCT urine dip     Plan to follow up as scheduled for cystoscopy in January   Patient instructed to call with persistent hematuria, fever, or other concerns.    Procedure BCG treatment 6 of 6  Vitals:    11/19/24 1037   BP: 118/70   Pulse: 86   Temp: 97.5 °F (36.4 °C)   SpO2: 97%   Weight: 105 kg (232 lb)       Recent Results (from the past 4 hours)   POCT urine dip    Collection Time: 11/19/24 10:47 AM   Result Value Ref Range    LEUKOCYTE ESTERASE,UA -     NITRITE,UA -     SL AMB POCT UROBILINOGEN 0.2     POCT URINE PROTEIN 30      PH,UA 5.0     BLOOD,UA 5-10     SPECIFIC GRAVITY,UA 1.015     KETONES,UA -     BILIRUBIN,UA -     GLUCOSE, UA -      COLOR,UA yellow     CLARITY,UA clear                Bladder instillation     Date/Time  11/19/2024 10:30 AM     Performed by  Marylin Goldstein RN   Authorized by  Nick Patel DO     [place] Protocol   procedure performed by consultantConsent: Verbal consent obtained.  Risks and benefits: risks, benefits and alternatives were discussed  Consent given by: patient  Patient understanding: patient states understanding of the procedure being performed  Patient consent: the patient's understanding of the procedure matches consent given  Procedure consent: procedure consent matches procedure scheduled  Patient identity confirmed: verbally with patient      Local anesthesia used: no     Anesthesia   Local anesthesia used: no     Procedure Details   Procedure Notes: Pt's last BCG treatment. He does not report any side effects from the treatment. Pt scheduled for cystoscopy follow up in January.   Patient tolerance: patient tolerated the procedure well with no immediate complications         Sterile technique employed. Patient prepped and identified. 16F Straight  catheter placed. Bladder drained, residual approximately 20mL. The following solution was instilled directly into the bladder via catheter: 1 Vial BCG. Catheter removed. Patient discharged. Patient tolerated procedure.     Administrations This Visit       BCG (RADU BCG) intravesical suspension 50 mg       Admin Date  11/19/2024 Action  Given Dose  50 mg Route  Intravesical Documented By  NI Martinez, RN

## 2024-12-10 ENCOUNTER — HOSPITAL ENCOUNTER (OUTPATIENT)
Dept: RADIOLOGY | Facility: CLINIC | Age: 79
Discharge: HOME | End: 2024-12-10
Payer: COMMERCIAL

## 2024-12-10 VITALS
HEART RATE: 83 BPM | RESPIRATION RATE: 18 BRPM | DIASTOLIC BLOOD PRESSURE: 79 MMHG | SYSTOLIC BLOOD PRESSURE: 167 MMHG | OXYGEN SATURATION: 96 %

## 2024-12-10 DIAGNOSIS — M48.062 SPINAL STENOSIS OF LUMBAR REGION WITH NEUROGENIC CLAUDICATION: ICD-10-CM

## 2024-12-10 DIAGNOSIS — D17.79 EPIDURAL LIPOMATOSIS: ICD-10-CM

## 2024-12-10 PROCEDURE — 64483 NJX AA&/STRD TFRM EPI L/S 1: CPT | Performed by: STUDENT IN AN ORGANIZED HEALTH CARE EDUCATION/TRAINING PROGRAM

## 2024-12-10 RX ORDER — BUPIVACAINE HCL/PF 2.5 MG/ML
2 VIAL (ML) INJECTION ONCE
Status: COMPLETED | OUTPATIENT
Start: 2024-12-10 | End: 2024-12-10

## 2024-12-10 RX ORDER — METHYLPREDNISOLONE ACETATE 80 MG/ML
80 INJECTION, SUSPENSION INTRA-ARTICULAR; INTRALESIONAL; INTRAMUSCULAR; PARENTERAL; SOFT TISSUE ONCE
Status: COMPLETED | OUTPATIENT
Start: 2024-12-10 | End: 2024-12-10

## 2024-12-10 RX ADMIN — BUPIVACAINE HYDROCHLORIDE 2 ML: 2.5 INJECTION, SOLUTION EPIDURAL; INFILTRATION; INTRACAUDAL at 14:03

## 2024-12-10 RX ADMIN — METHYLPREDNISOLONE ACETATE 80 MG: 80 INJECTION, SUSPENSION INTRA-ARTICULAR; INTRALESIONAL; INTRAMUSCULAR; SOFT TISSUE at 14:03

## 2024-12-10 RX ADMIN — IOHEXOL 1 ML: 300 INJECTION, SOLUTION INTRAVENOUS at 14:02

## 2024-12-10 NOTE — INTERVAL H&P NOTE
Update: (This section must be completed if the H&P was completed greater than 24 hrs to procedure or admission)    H&P reviewed. After examining the patient, I find no changed to the H&P since it had been written.    Patient re-evaluated. Accept as history and physical.    Rivera Burks MD/December 10, 2024/1:49 PM

## 2024-12-10 NOTE — DISCHARGE INSTR - LAB
Epidural Steroid Injection   WHAT YOU NEED TO KNOW:   An epidural steroid injection (VIRA) is a procedure to inject steroid medicine into the epidural space. The epidural space is between your spinal cord and vertebrae. Steroids reduce inflammation and fluid buildup in your spine that may be causing pain. You may be given pain medicine along with the steroids.          ACTIVITY  Do not drive or operate machinery today.  No strenuous activity today - bending, lifting, etc.  You may resume normal activites starting tomorrow - start slowly and as tolerated.  You may shower today, but no tub baths or hot tubs.  You may have numbness for several hours from the local anesthetic. Please use caution and common sense, especially with weight-bearing activities.    CARE OF THE INJECTION SITE  If you have soreness or pain, apply ice to the area today (20 minutes on/20 minutes off).  Starting tomorrow, you may use warm, moist heat or ice if needed.  You may have an increase or change in your discomfort for 36-48 hours after your treatment.  Apply ice and continue with any pain medication you have been prescribed.  Notify the [facility] if you have any of the following: redness, drainage, swelling, headache, stiff neck or fever above 100°F.    SPECIAL INSTRUCTIONS  Our office will contact you in approximately 14 days for a progress report.    MEDICATIONS  Continue to take all routine medications.  Our office may have instructed you to hold some medications.    As no general anesthesia was used in today's procedure, you should not experience any side effects related to anesthesia.     If you are diabetic, the steroids used in today's injection may temporarily increase your blood sugar levels after the first few days after your injection. Please keep a close eye on your sugars and alert the doctor who manages your diabetes if your sugars are significantly high from your baseline or you are symptomatic.     If you have a  problem specifically related to your procedure, please call our office at [phone].  Problems not related to your procedure should be directed to your primary care physician.

## 2024-12-30 ENCOUNTER — PATIENT MESSAGE (OUTPATIENT)
Dept: PAIN MEDICINE | Facility: CLINIC | Age: 79
End: 2024-12-30

## 2024-12-30 DIAGNOSIS — M48.062 SPINAL STENOSIS OF LUMBAR REGION WITH NEUROGENIC CLAUDICATION: Primary | ICD-10-CM

## 2024-12-30 NOTE — PATIENT COMMUNICATION
Pt is scheduled for caudal VIRA with Dr Burks on 1/21/25     Pt reports he is diabetic but does not wear a glucose monitor     Pt reports he does not take prescription blood thinners (per chart, takes 81mg aspirin, which does not require hold for procedure type)     Pt given instruction review via myc message (repeat)     Have you completed PT/HEP/Chiro in the past 6 months for dedicated area? No  - pt has had another injection in past for pain relief  If yes, how long did you complete?  What was the frequency?  Did it provide relief?  If no, reason therapy was not completed?

## 2025-01-08 NOTE — PROGRESS NOTES
79 y.o. old male with bladder cancer     Anticoagulation: Aspirin 81 mg     He was admitted around May 2024 with CHF.  He was found to have an elevated creatinine of 2.76.  He had a workup with nephrology which included a kidney bladder ultrasound.     Kidney bladder ultrasound 5/14/2024: New ovoid structure projecting from the posterior bladder with central hypoechoic area measuring about 3.2 cm     He had office cystoscopy with Dr. Oliveira on 5/22/2024 which demonstrated large posterior wall bladder mass        OR TURBT on 6/20/24:  Cellules/diverticuli: large dome chimney, very hard to reach with scope     Bladder lesion #1  Location: Posterior aspect of the dome chimney  Size: 4 cm  Appearance: Papillary  Predicted depth: Ta  -Tumor extremely difficult to reach with the resectoscope due to the fact that it was in the dome chimney.  I was only able to resect about 50% of the tumor with maximal downward pressure on the lower abdomen and pushing all the way in with the resectoscope  -Repeat case must be done with extra long scope next time     Due to incomplete resection of the tumor, gemcitabine was not given  --Path:  A. Urinary Bladder, Transurethral Resection of Bladder Tumor:  - Fragments of papillary urothelial carcinoma, high-grade, with foci suspicious for superficial invasion  - Muscularis propria (detrusor muscle) present and not involved        Patient got bradycardic in PACU.  Cardiology team assessed him.  He was admitted to medicine.  During admission he had pacemaker placed on 6/21/2024.     He was cleared by cardiology for repeat trip to the operating room.       OR 8/8/24:  Bladder lesion #1  Location: Posterior aspect of the dome chimney  Size: 4 cm  --Path:  A. Urinary Bladder, Bladder tumor, Transurethral Resection:  - High-grade papillary urothelial carcinoma with focal superficial invasion.  - Muscularis propria (detrusor muscle) is present, negative for tumor.     Extra long resectoscope was  "reserved for the case.  Unfortunately, the extra long lens was not included in the set.  We therefore had to use standard bipolar resectoscope.  2 assistants pushed down very hard on the abdomen and I pushed very hard into the dome with the scope.  Eventually able to resect the entire tumor.     2000 mg of gemcitabine were administered intravesically at the end of the case      BCG induction October through November 2024      PVR 17 cc on 1/13/2025    Urine dip 1/13/2025: Negative leukocyte esterase, negative nitrite, positive blood        5 separate dome lesions about 5-10 mm  2 cm R lat wall  2 cm R post dome         Cystoscopy     Date/Time  1/13/2025 2:00 PM     Performed by  Nick Patel DO   Authorized by  Nick Patel DO     Universal Protocol:  procedure performed by consultantConsent: Verbal consent obtained. Written consent obtained.  Risks and benefits: risks, benefits and alternatives were discussed  Consent given by: patient  Time out: Immediately prior to procedure a \"time out\" was called to verify the correct patient, procedure, equipment, support staff and site/side marked as required.  Timeout called at: 1/13/2025 3:11 PM.  Patient understanding: patient states understanding of the procedure being performed  Patient consent: the patient's understanding of the procedure matches consent given  Procedure consent: procedure consent matches procedure scheduled  Relevant documents: relevant documents present and verified  Required items: required blood products, implants, devices, and special equipment available  Patient identity confirmed: verbally with patient      Procedure Details:  Procedure type: cystoscopy    Patient tolerance: Patient tolerated the procedure well with no immediate complications    Additional Procedure Details: Office Cystoscopy Procedure Note    Indication:    Urothelial carcinoma of the bladder    Informed consent   The risks, benefits, complications, " treatment options, and expected outcomes were discussed with the patient. The patient concurred with the proposed plan and provided informed consent.    Anesthesia  Lidocaine jelly 2%    Antibiotic prophylaxis   None    Procedure  The patient was placed in the supineposition, was prepped and draped in the usual manner using sterile technique, and 2% lidocaine jelly instilled into the urethra.  A 17 F flexible cystoscope was then inserted into the urethra and the urethra and bladder carefully examined.  The following findings were noted:    Findings:  Urethra:  Normal  Prostate:  Mod BL lateral lobe hypertrophy, mod median lobe, no lesions  Bladder:  Mod to severe trabeculations; no stones; large dome chimney; 2 cm R posterior lateral wall papillary lesion; 2 cm posterior dome papillary lesion; about 5 separate papillary lesions in dome chimney ranging from 5-10 mm  Ureteral orifices:  orthotopic  Other findings:  None, retroflexed view confirms    Specimens: None                 Complications:    None; patient tolerated the procedure well           Disposition: To home            Condition: Stable            Unfortunately saw multiple papillary tumors so we will need to proceed with TURBT    Patient was consented in office today for transurethral resection of bladder tumor, possible intravesical instillation of chemotherapy agent.    I explained that the procedure would be performed under general anesthesia in the operating room. I explained that during procedure, we would place scope into bladder. We would then look around bladder for any tumors or any areas of bladder that appeared suspicious for bladder cancer.     If the lesions or concerning areas were flat or generally small, we would remove tissue using biopsy forceps. This tissue would be sent for pathology. We would then coagulate the area with hot current device known as Bugbee to stop any bleeding. We would additionally use Bugbee to treat surrounding  areas that appeared suspicious for cancer. I explained that this process could help kill superficial cancer cells and prevent recurrence.    If the lesions were larger, they would resected with hot current loop that could more formally cut into bladder tissue for a more thorough resection of the lesion. I explained that these pieces of tumor would then be collected through the scope and sent for pathology as well. I explained that the loop could also be used to coagulate adjacent areas of the bladder that appeared suspicious for bladder cancer.    We reviewed the basic risks of procedure which include but are not limited to: risks of general anesthesia, bleeding, infection, need for repeat procedures, need for hospitalization, worsening voiding symptoms, need for prolonged Negron catheter, bladder injury, urethral injury, injury to ureteral orifices, need for ureteral stent placement.    I explained that depending of the appearance of the tumor that I remove during surgery, I may opt to instill intravesical chemotherapy into the bladder. I explained that this means that at the end of the procedure while the patient is still asleep, I would place Negron catheter into the bladder. I would then inject chemotherapy agent through the catheter and into the bladder. I would then clamp the catheter so that the medicine could sit inside. Patient would then be transferred to PACU with the chemotherapy agent inside the bladder. After an hour, the catheter would be unclamped and Negron catheter would be removed. Patient would then have a trial of void prior to leaving hospital.    I explained that treatment with these chemotherapy agents in the bladder after surgery for low or intermediate risk bladder cancer has been shown to decrease rates of recurrence for bladder cancer.    I explained that if the resection for surgery was too deep into the bladder wall, we would not be able to give the intravesical chemotherapy.    I  explained that the chemotherapy agents that would be placed in the bladder would be either gemcitabine or mitomycin. We reviewed the risks of these intravesical medications which include but are not limited to: nausea, vomiting, flu-like symptoms, uncomfortable voiding symptoms, dermatitis, swelling, hematuria, breathing difficulties, bladder wall necrosis, damage to nearby structures.    We will plan to schedule patient for surgery. I explained to patient that we would schedule PATs for procedure.    Patient verbalized understanding. All questions were answered.    I explained to patient that surgical scheduler would reach out to confirm a date.      Explained that normally bladder cancer patients need CT urogram for staging. Since he has CKD and cannot get contrast, explained that we will do CTAP wo and get BL RGPG in OR. Explained that this imaging is not as accurate and there is a theoretical risk of missing diagnosis of UTUC.          PLAN  -Fu CTAP wo  -Fu u cyto  -Consented for TURBT, BL RGPG,  -PATs, Ucx  -Cards clearance  -Does NOT need to hold [id number]

## 2025-01-10 ENCOUNTER — TELEPHONE (OUTPATIENT)
Age: 80
End: 2025-01-10

## 2025-01-10 NOTE — TELEPHONE ENCOUNTER
Spoke with patient, calling to inquire if he needs to be home for the remote device check scheduled for 1-20-25 at 1115am.     Advised Trent he does not need to be home at this time, that the report is actually run early morning ~4-6am, and the only thing he needs to do is be near his box.

## 2025-01-13 ENCOUNTER — PROCEDURE VISIT (OUTPATIENT)
Dept: UROLOGY | Facility: CLINIC | Age: 80
End: 2025-01-13
Payer: COMMERCIAL

## 2025-01-13 ENCOUNTER — TELEPHONE (OUTPATIENT)
Dept: UROLOGY | Facility: CLINIC | Age: 80
End: 2025-01-13

## 2025-01-13 VITALS
BODY MASS INDEX: 34.16 KG/M2 | SYSTOLIC BLOOD PRESSURE: 132 MMHG | HEIGHT: 70 IN | HEART RATE: 78 BPM | OXYGEN SATURATION: 92 % | WEIGHT: 238.6 LBS | RESPIRATION RATE: 18 BRPM | DIASTOLIC BLOOD PRESSURE: 76 MMHG | TEMPERATURE: 97.5 F

## 2025-01-13 DIAGNOSIS — C67.9 MALIGNANT NEOPLASM OF URINARY BLADDER, UNSPECIFIED SITE (HCC): Primary | ICD-10-CM

## 2025-01-13 LAB
POST-VOID RESIDUAL VOLUME, ML POC: 17 ML
SL AMB  POCT GLUCOSE, UA: NORMAL
SL AMB LEUKOCYTE ESTERASE,UA: NORMAL
SL AMB POCT BILIRUBIN,UA: NORMAL
SL AMB POCT BLOOD,UA: NORMAL
SL AMB POCT CLARITY,UA: YELLOW
SL AMB POCT COLOR,UA: NORMAL
SL AMB POCT KETONES,UA: NORMAL
SL AMB POCT NITRITE,UA: NORMAL
SL AMB POCT PH,UA: 5
SL AMB POCT SPECIFIC GRAVITY,UA: 1.01
SL AMB POCT URINE PROTEIN: NORMAL
SL AMB POCT UROBILINOGEN: 0.2

## 2025-01-13 PROCEDURE — 88112 CYTOPATH CELL ENHANCE TECH: CPT | Performed by: PATHOLOGY

## 2025-01-13 PROCEDURE — 51798 US URINE CAPACITY MEASURE: CPT | Performed by: UROLOGY

## 2025-01-13 PROCEDURE — 52000 CYSTOURETHROSCOPY: CPT | Performed by: UROLOGY

## 2025-01-13 PROCEDURE — 81002 URINALYSIS NONAUTO W/O SCOPE: CPT | Performed by: UROLOGY

## 2025-01-13 RX ORDER — SODIUM CHLORIDE, SODIUM LACTATE, POTASSIUM CHLORIDE, CALCIUM CHLORIDE 600; 310; 30; 20 MG/100ML; MG/100ML; MG/100ML; MG/100ML
75 INJECTION, SOLUTION INTRAVENOUS CONTINUOUS
OUTPATIENT
Start: 2025-01-13

## 2025-01-13 NOTE — TELEPHONE ENCOUNTER
BOOK IT  TURBT  I forgot to put BL RGPG on the case request, but I need that too, so will need x ray  pats, ucx, need extra long bipolar resectoscope  does not need to hold asa81    34 mins  RB  cards clearance

## 2025-01-13 NOTE — TELEPHONE ENCOUNTER
Per Eva note on 1/13/2025    Return for devika will call for surgery;   pls help schedule CT.    Call and let pt know he needs to call and schedule a CT.   CPAP

## 2025-01-14 NOTE — TELEPHONE ENCOUNTER
Called and spoke to pt- let him know he missed checking out, and  wanted him to have a CT done- provided pt with the phone number to call [facility] and schedule his CT-     Will monitor to see if the pt scheduled the CT- if not will call back to remind him to call and get it scheduled.

## 2025-01-15 ENCOUNTER — PREP FOR PROCEDURE (OUTPATIENT)
Dept: UROLOGY | Facility: CLINIC | Age: 80
End: 2025-01-15

## 2025-01-15 DIAGNOSIS — R39.89 SUSPECTED UTI: ICD-10-CM

## 2025-01-15 DIAGNOSIS — E11.65 TYPE 2 DIABETES MELLITUS WITH HYPERGLYCEMIA, WITH LONG-TERM CURRENT USE OF INSULIN (HCC): ICD-10-CM

## 2025-01-15 DIAGNOSIS — Z01.812 PRE-OPERATIVE LABORATORY EXAMINATION: ICD-10-CM

## 2025-01-15 DIAGNOSIS — Z79.4 TYPE 2 DIABETES MELLITUS WITH HYPERGLYCEMIA, WITH LONG-TERM CURRENT USE OF INSULIN (HCC): ICD-10-CM

## 2025-01-15 DIAGNOSIS — D49.4 BLADDER TUMOR: ICD-10-CM

## 2025-01-15 DIAGNOSIS — Z01.810 PRE-OPERATIVE CARDIOVASCULAR EXAMINATION: Primary | ICD-10-CM

## 2025-01-15 DIAGNOSIS — Z01.818 OTHER SPECIFIED PRE-OPERATIVE EXAMINATION: ICD-10-CM

## 2025-01-15 PROCEDURE — 88112 CYTOPATH CELL ENHANCE TECH: CPT | Performed by: PATHOLOGY

## 2025-01-15 NOTE — TELEPHONE ENCOUNTER
Patient has an gerri with Dr. Taylor for a 6 month follow up March 31st. Can we do the preop clearance at that time or does he need to be seen near his surgery date?    Please advise.

## 2025-01-15 NOTE — TELEPHONE ENCOUNTER
Patient is scheduled for surgery on 5/8/25 and will need a cardiac clearance prior can you please call and schedule with patient

## 2025-01-15 NOTE — TELEPHONE ENCOUNTER
Spoke with patient and confirmed surgery date of:05/08/25  Type of surgery:TURBT Carlos retrograde  Operating physician: Dr. Patel  Location of surgery: [facility]    Verbally went over prep with patient on: 01/15/25  NPO  Bowel prep? No  Hospital calls afternoon prior with arrival time -Calls Friday afternoon for Monday surgeries  Patient needs ride to and from surgery (outpatient/inpatient)   Pre-op testing to be done 2 weeks prior to surgery  Cbc bmp A1c uc ekg  Blood thinners:   Pat will call a week prior   Clearances needed: cardiac    Mailed/emailed to patient on:01/15/25  Copy of packet scanned into Media on:01/15/25  Labs in packet  Soap / Bowel prep in packet  Pre-op & Post-op in packet  Dates of H&P and post-op if needed    Consent: in Media

## 2025-01-16 ENCOUNTER — RESULTS FOLLOW-UP (OUTPATIENT)
Dept: CARDIOLOGY CLINIC | Facility: CLINIC | Age: 80
End: 2025-01-16

## 2025-01-16 NOTE — RESULT ENCOUNTER NOTE
Normal Device Function  Brief NSVT  Brief AF    Increase beta-blocker if tolerated  Check monthly for next 3 months to look for any further A-fib
What Type Of Note Output Would You Prefer (Optional)?: Standard Output
How Severe Is Your Skin Lesion?: mild
Has Your Skin Lesion Been Treated?: not been treated
Is This A New Presentation, Or A Follow-Up?: Growths

## 2025-01-20 ENCOUNTER — RESULTS FOLLOW-UP (OUTPATIENT)
Dept: NON INVASIVE DIAGNOSTICS | Facility: HOSPITAL | Age: 80
End: 2025-01-20

## 2025-01-20 ENCOUNTER — HOSPITAL ENCOUNTER (OUTPATIENT)
Dept: CT IMAGING | Facility: HOSPITAL | Age: 80
Discharge: HOME | End: 2025-01-20
Attending: UROLOGY
Payer: COMMERCIAL

## 2025-01-20 ENCOUNTER — REMOTE DEVICE CLINIC VISIT (OUTPATIENT)
Dept: CARDIOLOGY CLINIC | Facility: CLINIC | Age: 80
End: 2025-01-20
Payer: COMMERCIAL

## 2025-01-20 DIAGNOSIS — C67.9 MALIGNANT NEOPLASM OF URINARY BLADDER, UNSPECIFIED SITE (HCC): ICD-10-CM

## 2025-01-20 DIAGNOSIS — I44.30 AVB (ATRIOVENTRICULAR BLOCK): Primary | ICD-10-CM

## 2025-01-20 PROCEDURE — 93294 REM INTERROG EVL PM/LDLS PM: CPT | Performed by: STUDENT IN AN ORGANIZED HEALTH CARE EDUCATION/TRAINING PROGRAM

## 2025-01-20 PROCEDURE — 74176 CT ABD & PELVIS W/O CONTRAST: CPT

## 2025-01-20 PROCEDURE — 93296 REM INTERROG EVL PM/IDS: CPT | Performed by: STUDENT IN AN ORGANIZED HEALTH CARE EDUCATION/TRAINING PROGRAM

## 2025-01-20 NOTE — PROGRESS NOTES
Results for orders placed or performed in visit on 01/20/25   Cardiac EP device report    Narrative    MDT DC PM/ACTIVE SYSTEM IS MRI CONDITIONAL  CARELINK TRANSMISSION: BATTERY VOLTAGE ADEQUATE (12.1 YRS). AP: 16.4%. : 99.5% (>40%~MVP-ON/60~AVB). ALL AVAILABLE LEAD PARAMETERS WITHIN NORMAL LIMITS. NO SIGNIFICANT HIGH RATE EPISODES. NORMAL DEVICE FUNCTION. CH

## 2025-01-21 ENCOUNTER — HOSPITAL ENCOUNTER (OUTPATIENT)
Dept: RADIOLOGY | Facility: CLINIC | Age: 80
Discharge: HOME | End: 2025-01-21
Payer: COMMERCIAL

## 2025-01-21 VITALS
HEART RATE: 85 BPM | DIASTOLIC BLOOD PRESSURE: 86 MMHG | RESPIRATION RATE: 18 BRPM | SYSTOLIC BLOOD PRESSURE: 154 MMHG | OXYGEN SATURATION: 94 %

## 2025-01-21 DIAGNOSIS — M48.062 SPINAL STENOSIS OF LUMBAR REGION WITH NEUROGENIC CLAUDICATION: ICD-10-CM

## 2025-01-21 PROCEDURE — 62323 NJX INTERLAMINAR LMBR/SAC: CPT | Performed by: STUDENT IN AN ORGANIZED HEALTH CARE EDUCATION/TRAINING PROGRAM

## 2025-01-21 RX ORDER — METHYLPREDNISOLONE ACETATE 80 MG/ML
80 INJECTION, SUSPENSION INTRA-ARTICULAR; INTRALESIONAL; INTRAMUSCULAR; PARENTERAL; SOFT TISSUE ONCE
Status: COMPLETED | OUTPATIENT
Start: 2025-01-21 | End: 2025-01-21

## 2025-01-21 RX ADMIN — METHYLPREDNISOLONE ACETATE 80 MG: 80 INJECTION, SUSPENSION INTRA-ARTICULAR; INTRALESIONAL; INTRAMUSCULAR; SOFT TISSUE at 11:28

## 2025-01-21 RX ADMIN — IOHEXOL 1 ML: 300 INJECTION, SOLUTION INTRAVENOUS at 11:23

## 2025-01-21 NOTE — H&P
History of Present Illness: The patient is a 79 y.o. male who presents with complaints of back and buttock pain    Past Medical History:   Diagnosis Date    Acute respiratory failure with hypoxia (HCC) 03/28/2024    Chronic kidney disease     Diabetes mellitus (HCC)     GERD (gastroesophageal reflux disease)     Hypertension     Respiratory acidosis 03/31/2024       Past Surgical History:   Procedure Laterality Date    APPENDECTOMY      CARDIAC CATHETERIZATION Left 05/24/2024    Procedure: Cardiac Left Heart Cath;  Surgeon: Qiana Torres MD;  Location: [facility];  Service: Cardiology    CARDIAC CATHETERIZATION N/A 05/24/2024    Procedure: Cardiac Coronary Angiogram;  Surgeon: Qiana Torres MD;  Location: [facility];  Service: Cardiology    CARDIAC CATHETERIZATION  05/24/2024    Procedure: Cardiac catheterization;  Surgeon: Qiana Torres MD;  Location: [facility];  Service: Cardiology    CARDIAC ELECTROPHYSIOLOGY PROCEDURE N/A 6/21/2024    Procedure: Cardiac pacer implant;  Surgeon: Solomon Quintero MD;  Location: [facility];  Service: Cardiology    IN CYSTO W/REMOVAL OF LESIONS SMALL N/A 6/20/2024    Procedure: TRANSURETHRAL RESECTION OF BLADDER TUMOR (TURBT);  Surgeon: Nick Patel DO;  Location: [facility];  Service: Urology    IN CYSTO W/REMOVAL OF LESIONS SMALL N/A 7/31/2024    Procedure: TRANSURETHRAL RESECTION OF BLADDER TUMOR (TURBT), intravesical instillation of gemcitabine;  Surgeon: Nick Patel DO;  Location: [facility];  Service: Urology         Current Outpatient Medications:     aspirin 81 mg chewable tablet, Chew 81 mg daily, Disp: , Rfl:     calcitriol (ROCALTROL) 0.25 mcg capsule, Take 1 capsule (0.25 mcg total) by mouth daily, Disp: 90 capsule, Rfl: 3    cholecalciferol 25 MCG (1000 UT) tablet, Take 1,000 Units by mouth daily, Disp: , Rfl:     ezetimibe (ZETIA) 10 mg tablet, Take 10 mg by mouth daily, Disp: , Rfl:     glipiZIDE  (GLUCOTROL) 10 mg tablet, Take 10 mg by mouth 2 (two) times a day before meals, Disp: , Rfl:     insulin degludec (Tresiba FlexTouch) 100 units/mL injection pen, Inject 15 Units under the skin daily at bedtime, Disp: , Rfl:     isosorbide mononitrate (IMDUR) 30 mg 24 hr tablet, Take 1 tablet (30 mg total) by mouth daily, Disp: 90 tablet, Rfl: 0    latanoprost (XALATAN) 0.005 % ophthalmic solution, Administer 1 drop to both eyes daily at bedtime, Disp: , Rfl:     levothyroxine 50 mcg tablet, Take 50 mcg by mouth daily, Disp: , Rfl:     losartan (COZAAR) 25 mg tablet, Take 1 tablet (25 mg total) by mouth daily, Disp: 30 tablet, Rfl: 2    losartan (COZAAR) 50 mg tablet, Take 1 tablet (50 mg total) by mouth daily, Disp: 30 tablet, Rfl: 2    metFORMIN (GLUCOPHAGE) 500 mg tablet, Take 500 mg by mouth 2 (two) times a day with meals, Disp: , Rfl:     metoprolol succinate (TOPROL-XL) 50 mg 24 hr tablet, Take 1 tablet (50 mg total) by mouth daily, Disp: 90 tablet, Rfl: 3    pantoprazole (PROTONIX) 40 mg tablet, Take 40 mg by mouth daily at bedtime, Disp: , Rfl:     pioglitazone (ACTOS) 45 mg tablet, Take 45 mg by mouth daily, Disp: , Rfl:     simvastatin (ZOCOR) 40 mg tablet, Take 40 mg by mouth daily at bedtime, Disp: , Rfl:     torsemide (DEMADEX) 20 mg tablet, Take 2 tablets (40 mg total) by mouth daily, Disp: 30 tablet, Rfl: 0    Allergies   Allergen Reactions    Canagliflozin GI Intolerance     Yeast infection  Yeast infection      Lisinopril Other (See Comments)     Pt unsure of reaction     Lovastatin GI Intolerance       Physical Exam: There were no vitals filed for this visit.  General: Awake, Alert, Oriented x 3, Mood and affect appropriate  Respiratory: Respirations even and unlabored  Cardiovascular: Peripheral pulses intact; no edema  Musculoskeletal Exam: back non erythematous no lesions    ASA Score: 3         Assessment:   1. Spinal stenosis of lumbar region with neurogenic claudication        Plan: caudal  epidural steroid injection

## 2025-01-21 NOTE — DISCHARGE INSTR - LAB
Epidural Steroid Injection   WHAT YOU NEED TO KNOW:   An epidural steroid injection (VIRA) is a procedure to inject steroid medicine into the epidural space. The epidural space is between your spinal cord and vertebrae. Steroids reduce inflammation and fluid buildup in your spine that may be causing pain. You may be given pain medicine along with the steroids.          ACTIVITY  Do not drive or operate machinery today.  No strenuous activity today - bending, lifting, etc.  You may resume normal activites starting tomorrow - start slowly and as tolerated.  You may shower today, but no tub baths or hot tubs.  You may have numbness for several hours from the local anesthetic. Please use caution and common sense, especially with weight-bearing activities.    CARE OF THE INJECTION SITE  If you have soreness or pain, apply ice to the area today (20 minutes on/20 minutes off).  Starting tomorrow, you may use warm, moist heat or ice if needed.  You may have an increase or change in your discomfort for 36-48 hours after your treatment.  Apply ice and continue with any pain medication you have been prescribed.  Notify the [facility] if you have any of the following: redness, drainage, swelling, headache, stiff neck or fever above 100°F.    SPECIAL INSTRUCTIONS  Our office will contact you in approximately 14 days for a progress report.    MEDICATIONS  Continue to take all routine medications.  Our office may have instructed you to hold some medications.    As no general anesthesia was used in today's procedure, you should not experience any side effects related to anesthesia.     If you are diabetic, the steroids used in today's injection may temporarily increase your blood sugar levels after the first few days after your injection. Please keep a close eye on your sugars and alert the doctor who manages your diabetes if your sugars are significantly high from your baseline or you are symptomatic.     If you have a  problem specifically related to your procedure, please call our office at [phone].  Problems not related to your procedure should be directed to your primary care physician.

## 2025-01-27 ENCOUNTER — TELEPHONE (OUTPATIENT)
Dept: NON INVASIVE DIAGNOSTICS | Facility: HOSPITAL | Age: 80
End: 2025-01-27

## 2025-01-27 DIAGNOSIS — I48.0 PAROXYSMAL ATRIAL FIBRILLATION (HCC): Primary | ICD-10-CM

## 2025-01-27 DIAGNOSIS — I47.29 NSVT (NONSUSTAINED VENTRICULAR TACHYCARDIA) (HCC): ICD-10-CM

## 2025-01-27 DIAGNOSIS — I10 PRIMARY HYPERTENSION: ICD-10-CM

## 2025-01-27 RX ORDER — METOPROLOL SUCCINATE 50 MG/1
75 TABLET, EXTENDED RELEASE ORAL DAILY
Qty: 90 TABLET | Refills: 2 | Status: SHIPPED | OUTPATIENT
Start: 2025-01-27

## 2025-01-27 NOTE — TELEPHONE ENCOUNTER
Per Dr. Taylor's request, called patient to discuss recent episodes of NSVT and new onset atrial fibrillation on recent device interrogation. Dr. Taylor recommend uptitration of Toprol and initiation of anticoagulation.  I advised him that his recent device interrogation demonstrated episodes of NSVT, as well as new onset atrial fibrillation.  I advised him that we recommend increasing his Toprol from 50 mg daily to 75 mg daily.  He reports he has been tolerating Toprol well thus far.  We reviewed potential side effects of uptitration of Toprol, and he was advised to notify our office if he experiences this.  I advised him that his device report additionally showed new diagnosis of atrial fibrillation.  Thromboembolic risk associate with atrial fibrillation was reviewed.  [id number].  Recommend initiation of low-dose Eliquis given age and kidney function. Risks, benefits, and alternative of anticoagulation to prevent thromboembolic risk from atrial fibrillation discussed at length. Patient advised to report any bleeding issues.  He denies hematuria, hematochezia, melena, history of GI bleed, frequent falls at home or hitting his head at home.  He is agreeable to begin low-dose Eliquis.  He was advised to obtain labs 1-2 weeks after initiation of Eliquis, and to inform our office of any bleeding issues.  Message sent to schedule patient for follow-up, to review report in person and assess how he is doing on his medications.  All questions answered.

## 2025-01-28 ENCOUNTER — TELEPHONE (OUTPATIENT)
Dept: CARDIOLOGY CLINIC | Facility: CLINIC | Age: 80
End: 2025-01-28

## 2025-01-28 NOTE — TELEPHONE ENCOUNTER
----- Message from Paris Redman PA-C sent at 1/27/2025  4:20 PM EST -----  Please schedule patient for follow-up with Dr. Taylor or available AP in 3-4 weeks to review how he is doing after initiation of Toprol and Eliquis; review NSVT and new onset atrial fibrillation on recent device report.

## 2025-02-15 DIAGNOSIS — I25.10 CORONARY ARTERY DISEASE INVOLVING NATIVE CORONARY ARTERY OF NATIVE HEART, UNSPECIFIED WHETHER ANGINA PRESENT: ICD-10-CM

## 2025-02-17 RX ORDER — ISOSORBIDE MONONITRATE 30 MG/1
30 TABLET, EXTENDED RELEASE ORAL DAILY
Qty: 90 TABLET | Refills: 1 | Status: SHIPPED | OUTPATIENT
Start: 2025-02-17

## 2025-02-17 NOTE — PROGRESS NOTES
Pain Medicine Follow-Up Note    Assessment:  1. Spinal stenosis of lumbar region with neurogenic claudication    2. Chronic low back pain with sciatica, sciatica laterality unspecified, unspecified back pain laterality    3. Anterolisthesis of lumbar spine    4. Facet arthropathy, lumbar        Plan:  Orders Placed This Encounter   Procedures   • MM ALL_Prescribed Meds and Special Instructions     [facility] Is HYDROCODONE/APAP prescribed?:   Yes   • MM DT_Alprazolam Definitive Test   • MM DT_Amphetamine Definitive Test   • MM DT_Buprenorphine Definitive Test   • MM DT_Butalbital Definitive Test   • MM DT_Clonazepam Definitive Test   • MM DT_Cocaine Definitive Test   • MM DT_Codeine Definitive Test   • MM DT_Dextromethorphan Definitive Test   • MM Diazepam Definitive Test   • MM DT_Ethyl Glucuronide/Ethyl Sulfate Definitive Test   • MM DT_Fentanyl Definitive Test   • MM DT_Heroin Definitive Test   • MM DT_Hydrocodone Definitive Test   • MM DT_Hydromorphone Definitive Test   • MM DT_Kratom Definitive Test   • MM DT_Levorphanol Definitive Test   • MM DT_MDMA Definitive Test   • MM DT_Meperidine Definitive Test   • MM DT_Methadone Definitive Test   • MM DT_Methamphetamine Definitive Test   • MM DT_Methylphenidate Definitive Test   • MM DT_Morphine Definitive Test   • MM Lorazepam Definitive Test   • MM DT_Oxazepam Definitive Test   • MM DT_Oxycodone Definitive Test   • MM DT_Oxymorphone Definitive Test   • MM DT_Phencyclidine Definitive Test   • MM DT_Phenobarbital Definitive Test   • MM DT_Phentermine Definitive Test   • MM DT_Secobarbital Definitive Test   • MM DT_Spice Definitive Test   • MM DT_Tapentadol Definitive Test   • MM DT_Temazapam Definitive Test   • MM DT_THC Definitive Test   • MM DT_Tramadol Definitive Test   • MM DT_Validity Creatinine       New Medications Ordered This Visit   Medications   • HYDROcodone-acetaminophen (NORCO) 5-325 mg per tablet     Sig: Take 1 tablet by mouth 2 (two) times a day as  needed for pain Max Daily Amount: 2 tablets     Dispense:  14 tablet     Refill:  0     Initiating opioid agreement       My impressions and treatment recommendations were discussed in detail with the patient who verbalized understanding and had no further questions.      Patient returns to the office after having a caudal epidural steroid injection on 1/21/2025, unfortunately the patient did not have any pain relief following this injection.  Discussed with the patient that he may want to have a surgical evaluation at this time the patient wishes to avoid surgery.  Discussed medications it appears the patient has trialed gabapentin however he does not recall why it was discontinued.  Patient did discuss tramadol stating that when it was prescribed to him after having a bladder procedure it helped a touch with the pain he was experiencing but he did not ease any of his back pain or lumbar radiculopathy.  NSAIDs are contraindicated.  Advised the patient that we can trial hydrocodone/acetaminophen 5/325 mg tablet patient may take 1 tablet up to twice a day for pain.  Patient understands that this is an opioid medication a controlled substance that requires an opioid agreement.  Hydrocodone/acetaminophen 5/325 mg tablet e-prescribed to the patient's pharmacy to be filled today.    [place] Prescription Drug Monitoring Program report was reviewed and was appropriate     A urine drug screen was collected at today's office visit as part of our medication management protocol. The point of care testing results were appropriate for what was being prescribed. The specimen will be sent for confirmatory testing. The drug screen is medically necessary because the patient is either dependent on opioid medication or is being considered for opioid medication therapy and the results could impact ongoing or future treatment. The drug screen is to evaluate for the presences or absence of prescribed, non-prescribed, and/or illicit  drugs/substances.    There are risks associated with opioid medications, including dependence, addiction and tolerance. The patient understands and agrees to use these medications only as prescribed. Potential side effects of the medications include, but are not limited to, constipation, drowsiness, addiction, impaired judgment and risk of fatal overdose if not taken as prescribed. The patient was warned against driving while taking sedation medications.  Sharing medications is a felony. At this point in time, the patient is showing no signs of addiction, abuse, diversion or suicidal ideation.    An opioid contract was reviewed with the patient.  The patient was made aware they are only to receive opioid medication from our office, and must take the medication as prescribed.  If the medication is lost or stolen, it will not be replaced.  We also do not condone the use of illegal substances or alcohol with opioid medication.  Random urine drug screens and pill counts will also be performed at office visits. Lastly, the patient was informed that office visits are needed for refills.  Patient was agreeable and signed the contract.      Follow-up is planned in 4 weeks time or sooner as warranted.  Discharge instructions were provided. I personally saw and examined the patient and I agree with the above discussed plan of care.    History of Present Illness:    Trent Ocasio is a 80 y.o. male who presents to [facility] for interval re-evaluation of the above stated pain complaints. The patient has a past medical and chronic pain history as outlined in the assessment section. He was last seen on 1/21/2025.    At today's visit patient states that their pain symptoms are worse with a pain score of 9/10 on the verbal numeric pain scale.  The patient's pain is worse in the morning and at night.  The patient's pain is constant in nature.  And the quality of the patient's pain is described as sharp  and shooting.  The patient's pain is located in the mid to right low back radiating down his right posterior leg to his foot.  The patient is currently using Tylenol as needed for pain which is not providing him any pain relief at this time.    Pain Contract Signed:  2/18/2025  Last Urine Drug Screen:  2/18/2025    Other than as stated above, the patient denies any interval changes in medications, medical condition, mental condition, symptoms, or allergies since the last office visit.         Review of Systems:    Review of Systems   Respiratory:  Negative for shortness of breath.    Cardiovascular:  Negative for chest pain.   Gastrointestinal:  Negative for constipation, diarrhea, nausea and vomiting.   Musculoskeletal:  Positive for gait problem. Negative for arthralgias, joint swelling and myalgias.        DROM  Pain in Extremity leg    Skin:  Negative for rash.   Neurological:  Negative for dizziness, seizures and weakness.   All other systems reviewed and are negative.        Past Medical History:   Diagnosis Date   • Acute respiratory failure with hypoxia (HCC) 03/28/2024   • Chronic kidney disease    • Diabetes mellitus (HCC)    • GERD (gastroesophageal reflux disease)    • Hypertension    • Respiratory acidosis 03/31/2024       Past Surgical History:   Procedure Laterality Date   • APPENDECTOMY     • CARDIAC CATHETERIZATION Left 05/24/2024    Procedure: Cardiac Left Heart Cath;  Surgeon: Qiana Torres MD;  Location: [facility];  Service: Cardiology   • CARDIAC CATHETERIZATION N/A 05/24/2024    Procedure: Cardiac Coronary Angiogram;  Surgeon: Qiana Torres MD;  Location: [facility];  Service: Cardiology   • CARDIAC CATHETERIZATION  05/24/2024    Procedure: Cardiac catheterization;  Surgeon: Qiana oTrres MD;  Location: [facility];  Service: Cardiology   • CARDIAC ELECTROPHYSIOLOGY PROCEDURE N/A 6/21/2024    Procedure: Cardiac pacer implant;  Surgeon: Solomon Quintero MD;   Location: [facility];  Service: Cardiology   • MN CYSTO W/REMOVAL OF LESIONS SMALL N/A 6/20/2024    Procedure: TRANSURETHRAL RESECTION OF BLADDER TUMOR (TURBT);  Surgeon: Nick Patel DO;  Location: [facility];  Service: Urology   • MN CYSTO W/REMOVAL OF LESIONS SMALL N/A 7/31/2024    Procedure: TRANSURETHRAL RESECTION OF BLADDER TUMOR (TURBT), intravesical instillation of gemcitabine;  Surgeon: Nick Patel DO;  Location: [facility];  Service: Urology       History reviewed. No pertinent family history.    Social History     Occupational History   • Not on file   Tobacco Use   • Smoking status: Former     Passive exposure: Past   • Smokeless tobacco: Never   Vaping Use   • Vaping status: Never Used   Substance and Sexual Activity   • Alcohol use: Yes     Comment: rarely   • Drug use: Never   • Sexual activity: Yes     Partners: Female         Current Outpatient Medications:   •  apixaban (Eliquis) 2.5 mg, Take 1 tablet (2.5 mg total) by mouth 2 (two) times a day, Disp: 90 tablet, Rfl: 3  •  aspirin 81 mg chewable tablet, Chew 81 mg daily, Disp: , Rfl:   •  calcitriol (ROCALTROL) 0.25 mcg capsule, Take 1 capsule (0.25 mcg total) by mouth daily, Disp: 90 capsule, Rfl: 3  •  cholecalciferol 25 MCG (1000 UT) tablet, Take 1,000 Units by mouth daily, Disp: , Rfl:   •  ezetimibe (ZETIA) 10 mg tablet, Take 10 mg by mouth daily, Disp: , Rfl:   •  glipiZIDE (GLUCOTROL) 10 mg tablet, Take 10 mg by mouth 2 (two) times a day before meals, Disp: , Rfl:   •  HYDROcodone-acetaminophen (NORCO) 5-325 mg per tablet, Take 1 tablet by mouth 2 (two) times a day as needed for pain Max Daily Amount: 2 tablets, Disp: 14 tablet, Rfl: 0  •  insulin degludec (Tresiba FlexTouch) 100 units/mL injection pen, Inject 15 Units under the skin daily at bedtime, Disp: , Rfl:   •  latanoprost (XALATAN) 0.005 % ophthalmic solution, Administer 1 drop to both eyes daily at bedtime, Disp: , Rfl:   •  levothyroxine 50 mcg  "tablet, Take 50 mcg by mouth daily, Disp: , Rfl:   •  losartan (COZAAR) 25 mg tablet, Take 1 tablet (25 mg total) by mouth daily, Disp: 30 tablet, Rfl: 2  •  losartan (COZAAR) 50 mg tablet, Take 1 tablet (50 mg total) by mouth daily, Disp: 30 tablet, Rfl: 2  •  metFORMIN (GLUCOPHAGE) 500 mg tablet, Take 500 mg by mouth 2 (two) times a day with meals, Disp: , Rfl:   •  metoprolol succinate (TOPROL-XL) 50 mg 24 hr tablet, Take 1.5 tablets (75 mg total) by mouth daily, Disp: 90 tablet, Rfl: 2  •  pantoprazole (PROTONIX) 40 mg tablet, Take 40 mg by mouth daily at bedtime, Disp: , Rfl:   •  pioglitazone (ACTOS) 45 mg tablet, Take 45 mg by mouth daily, Disp: , Rfl:   •  simvastatin (ZOCOR) 40 mg tablet, Take 40 mg by mouth daily at bedtime, Disp: , Rfl:   •  torsemide (DEMADEX) 20 mg tablet, Take 2 tablets (40 mg total) by mouth daily, Disp: 30 tablet, Rfl: 0  •  isosorbide mononitrate (IMDUR) 30 mg 24 hr tablet, Take 1 tablet (30 mg total) by mouth daily, Disp: 90 tablet, Rfl: 1    Allergies   Allergen Reactions   • Canagliflozin GI Intolerance     Yeast infection  Yeast infection     • Lisinopril Other (See Comments)     Pt unsure of reaction    • Lovastatin GI Intolerance       Physical Exam:    Ht 5' 10\" (1.778 m)   Wt 108 kg (238 lb 1.6 oz)   BMI 34.16 kg/m²     Constitutional:normal, well developed, well nourished, alert, in no distress and non-toxic and no overt pain behavior. and obese  Eyes:anicteric  HEENT:grossly intact  Neck:supple, symmetric, trachea midline and no masses   Pulmonary:even and unlabored  Cardiovascular:No edema or pitting edema present  Skin:Normal without rashes or lesions and well hydrated  Psychiatric:Mood and affect appropriate  Neurologic:Cranial Nerves II-XII grossly intact  Musculoskeletal:antalgic gait        Orders Placed This Encounter   Procedures   • MM ALL_Prescribed Meds and Special Instructions   • MM DT_Alprazolam Definitive Test   • MM DT_Amphetamine Definitive Test   • MM " DT_Buprenorphine Definitive Test   • MM DT_Butalbital Definitive Test   • MM DT_Clonazepam Definitive Test   • MM DT_Cocaine Definitive Test   • MM DT_Codeine Definitive Test   • MM DT_Dextromethorphan Definitive Test   • MM Diazepam Definitive Test   • MM DT_Ethyl Glucuronide/Ethyl Sulfate Definitive Test   • MM DT_Fentanyl Definitive Test   • MM DT_Heroin Definitive Test   • MM DT_Hydrocodone Definitive Test   • MM DT_Hydromorphone Definitive Test   • MM DT_Kratom Definitive Test   • MM DT_Levorphanol Definitive Test   • MM DT_MDMA Definitive Test   • MM DT_Meperidine Definitive Test   • MM DT_Methadone Definitive Test   • MM DT_Methamphetamine Definitive Test   • MM DT_Methylphenidate Definitive Test   • MM DT_Morphine Definitive Test   • MM Lorazepam Definitive Test   • MM DT_Oxazepam Definitive Test   • MM DT_Oxycodone Definitive Test   • MM DT_Oxymorphone Definitive Test   • MM DT_Phencyclidine Definitive Test   • MM DT_Phenobarbital Definitive Test   • MM DT_Phentermine Definitive Test   • MM DT_Secobarbital Definitive Test   • MM DT_Spice Definitive Test   • MM DT_Tapentadol Definitive Test   • MM DT_Temazapam Definitive Test   • MM DT_THC Definitive Test   • MM DT_Tramadol Definitive Test   • MM DT_Validity Creatinine       This document was created using speech voice recognition software.   Grammatical errors, random word insertions, pronoun errors, and incomplete sentences are an occasional consequence of this system due to software limitations, ambient noise, and hardware issues.   Any formal questions or concerns about content, text, or information contained within the body of this dictation should be directly addressed to the provider for clarification.

## 2025-02-18 ENCOUNTER — OFFICE VISIT (OUTPATIENT)
Dept: PAIN MEDICINE | Facility: CLINIC | Age: 80
End: 2025-02-18
Payer: COMMERCIAL

## 2025-02-18 VITALS — BODY MASS INDEX: 34.09 KG/M2 | WEIGHT: 238.1 LBS | HEIGHT: 70 IN

## 2025-02-18 DIAGNOSIS — M54.40 CHRONIC LOW BACK PAIN WITH SCIATICA, SCIATICA LATERALITY UNSPECIFIED, UNSPECIFIED BACK PAIN LATERALITY: ICD-10-CM

## 2025-02-18 DIAGNOSIS — M47.816 FACET ARTHROPATHY, LUMBAR: ICD-10-CM

## 2025-02-18 DIAGNOSIS — G89.29 CHRONIC LOW BACK PAIN WITH SCIATICA, SCIATICA LATERALITY UNSPECIFIED, UNSPECIFIED BACK PAIN LATERALITY: ICD-10-CM

## 2025-02-18 DIAGNOSIS — M48.062 SPINAL STENOSIS OF LUMBAR REGION WITH NEUROGENIC CLAUDICATION: Primary | ICD-10-CM

## 2025-02-18 DIAGNOSIS — M43.16 ANTEROLISTHESIS OF LUMBAR SPINE: ICD-10-CM

## 2025-02-18 PROCEDURE — 99214 OFFICE O/P EST MOD 30 MIN: CPT

## 2025-02-18 RX ORDER — HYDROCODONE BITARTRATE AND ACETAMINOPHEN 5; 325 MG/1; MG/1
1 TABLET ORAL 2 TIMES DAILY PRN
Qty: 14 TABLET | Refills: 0 | Status: SHIPPED | OUTPATIENT
Start: 2025-02-18

## 2025-02-24 ENCOUNTER — TELEPHONE (OUTPATIENT)
Dept: RADIOLOGY | Facility: CLINIC | Age: 80
End: 2025-02-24

## 2025-02-24 LAB
6MAM UR QL CFM: NEGATIVE NG/ML
7AMINOCLONAZEPAM UR QL CFM: NEGATIVE NG/ML
A-OH ALPRAZ UR QL CFM: NEGATIVE NG/ML
ACCEPTABLE CREAT UR QL: NORMAL MG/DL
AMPHET UR QL CFM: NEGATIVE NG/ML
AMPHET UR QL CFM: NEGATIVE NG/ML
BUPRENORPHINE UR QL CFM: NEGATIVE NG/ML
BUTALBITAL UR QL CFM: NEGATIVE NG/ML
BZE UR QL CFM: NEGATIVE NG/ML
CODEINE UR QL CFM: NEGATIVE NG/ML
EDDP UR QL CFM: NEGATIVE NG/ML
ETHYL GLUCURONIDE UR QL CFM: NEGATIVE NG/ML
ETHYL SULFATE UR QL SCN: NEGATIVE NG/ML
FENTANYL UR QL CFM: NEGATIVE NG/ML
GLIADIN IGG SER IA-ACNC: NEGATIVE NG/ML
HYDROCODONE UR QL CFM: ABNORMAL NG/ML
HYDROCODONE UR QL CFM: ABNORMAL NG/ML
HYDROMORPHONE UR QL CFM: ABNORMAL NG/ML
LORAZEPAM UR QL CFM: NEGATIVE NG/ML
MDMA UR QL CFM: NEGATIVE NG/ML
ME-PHENIDATE UR QL CFM: NEGATIVE NG/ML
MEPERIDINE UR QL CFM: NEGATIVE NG/ML
METHADONE UR QL CFM: NEGATIVE NG/ML
METHAMPHET UR QL CFM: NEGATIVE NG/ML
MORPHINE UR QL CFM: NEGATIVE NG/ML
MORPHINE UR QL CFM: NEGATIVE NG/ML
NORBUPRENORPHINE UR QL CFM: NEGATIVE NG/ML
NORDIAZEPAM UR QL CFM: NEGATIVE NG/ML
NORFENTANYL UR QL CFM: NEGATIVE NG/ML
NORHYDROCODONE UR QL CFM: ABNORMAL NG/ML
NORHYDROCODONE UR QL CFM: ABNORMAL NG/ML
NORMEPERIDINE UR QL CFM: NEGATIVE NG/ML
NOROXYCODONE UR QL CFM: NEGATIVE NG/ML
OXAZEPAM UR QL CFM: NEGATIVE NG/ML
OXYCODONE UR QL CFM: NEGATIVE NG/ML
OXYMORPHONE UR QL CFM: NEGATIVE NG/ML
OXYMORPHONE UR QL CFM: NEGATIVE NG/ML
PARA-FLUOROFENTANYL QUANTIFICATION: NORMAL NG/ML
PCP UR QL CFM: NEGATIVE NG/ML
PHENOBARB UR QL CFM: NEGATIVE NG/ML
RESULT ALL_PRESCRIBED MEDS AND SPECIAL INSTRUCTIONS: NORMAL
SECOBARBITAL UR QL CFM: NEGATIVE NG/ML
SL AMB 5F-ADB-M7 METABOLITE QUANTIFICATION: NEGATIVE NG/ML
SL AMB 7-OH-MITRAGYNINE (KRATOM ALKALOID) QUANTIFICATION: NEGATIVE NG/ML
SL AMB AB-FUBINACA-M3 METABOLITE QUANTIFICATION: NEGATIVE NG/ML
SL AMB ACETYL FENTANYL QUANTIFICATION: NORMAL NG/ML
SL AMB ACETYL NORFENTANYL QUANTIFICATION: NORMAL NG/ML
SL AMB ACRYL FENTANYL QUANTIFICATION: NORMAL NG/ML
SL AMB CARFENTANIL QUANTIFICATION: NORMAL NG/ML
SL AMB CTHC (MARIJUANA METABOLITE) QUANTIFICATION: NEGATIVE NG/ML
SL AMB DEXTROMETHORPHAN QUANTIFICATION: NEGATIVE NG/ML
SL AMB DEXTRORPHAN (DEXTROMETHORPHAN METABOLITE) QUANT: NEGATIVE NG/ML
SL AMB DEXTRORPHAN (DEXTROMETHORPHAN METABOLITE) QUANT: NEGATIVE NG/ML
SL AMB JWH018 METABOLITE QUANTIFICATION: NEGATIVE NG/ML
SL AMB JWH073 METABOLITE QUANTIFICATION: NEGATIVE NG/ML
SL AMB MDMB-FUBINACA-M1 METABOLITE QUANTIFICATION: NEGATIVE NG/ML
SL AMB N-DESMETHYL-TRAMADOL QUANTIFICATION: NEGATIVE NG/ML
SL AMB PHENTERMINE QUANTIFICATION: NEGATIVE NG/ML
SL AMB RCS4 METABOLITE QUANTIFICATION: NEGATIVE NG/ML
SL AMB RITALINIC ACID QUANTIFICATION: NEGATIVE NG/ML
SPECIMEN DRAWN SERPL: NEGATIVE NG/ML
TAPENTADOL UR QL CFM: NEGATIVE NG/ML
TEMAZEPAM UR QL CFM: NEGATIVE NG/ML
TEMAZEPAM UR QL CFM: NEGATIVE NG/ML
TRAMADOL UR QL CFM: NEGATIVE NG/ML
URATE/CREAT 24H UR: ABNORMAL NG/ML

## 2025-02-24 NOTE — TELEPHONE ENCOUNTER
----- Message from Minerva JEAN BAPTISTE sent at 2/24/2025 12:05 PM EST -----  Regarding: Medication refill needed pt is f/u  Patient was advised during 2/18 ov to reach out and let MG know if the Hydrocodone-acetaminophen was helpful. He was prescribed 14 tablets. Pt came into the office stating he is doing well with the medication and would like to have a refill. He will be out of medication after tomorrow, his pharmacy was verified and is [facility]. He has a follow up scheduled for 3/18.

## 2025-02-25 DIAGNOSIS — G89.29 CHRONIC LOW BACK PAIN WITH SCIATICA, SCIATICA LATERALITY UNSPECIFIED, UNSPECIFIED BACK PAIN LATERALITY: ICD-10-CM

## 2025-02-25 DIAGNOSIS — M54.40 CHRONIC LOW BACK PAIN WITH SCIATICA, SCIATICA LATERALITY UNSPECIFIED, UNSPECIFIED BACK PAIN LATERALITY: ICD-10-CM

## 2025-02-25 DIAGNOSIS — M48.062 SPINAL STENOSIS OF LUMBAR REGION WITH NEUROGENIC CLAUDICATION: ICD-10-CM

## 2025-02-25 RX ORDER — HYDROCODONE BITARTRATE AND ACETAMINOPHEN 5; 325 MG/1; MG/1
1 TABLET ORAL 2 TIMES DAILY PRN
Qty: 60 TABLET | Refills: 0 | Status: SHIPPED | OUTPATIENT
Start: 2025-02-25 | End: 2025-03-18

## 2025-02-27 ENCOUNTER — OFFICE VISIT (OUTPATIENT)
Dept: CARDIOLOGY CLINIC | Facility: CLINIC | Age: 80
End: 2025-02-27
Payer: COMMERCIAL

## 2025-02-27 VITALS
SYSTOLIC BLOOD PRESSURE: 138 MMHG | RESPIRATION RATE: 16 BRPM | DIASTOLIC BLOOD PRESSURE: 72 MMHG | HEART RATE: 82 BPM | BODY MASS INDEX: 33.79 KG/M2 | WEIGHT: 236 LBS | OXYGEN SATURATION: 96 % | HEIGHT: 70 IN

## 2025-02-27 DIAGNOSIS — Z95.0 STATUS POST PLACEMENT OF CARDIAC PACEMAKER: ICD-10-CM

## 2025-02-27 DIAGNOSIS — I50.32 CHRONIC HEART FAILURE WITH PRESERVED EJECTION FRACTION (HFPEF) (HCC): ICD-10-CM

## 2025-02-27 DIAGNOSIS — N18.32 TYPE 2 DIABETES MELLITUS WITH STAGE 3B CHRONIC KIDNEY DISEASE, UNSPECIFIED WHETHER LONG TERM INSULIN USE (HCC): ICD-10-CM

## 2025-02-27 DIAGNOSIS — I48.0 PAROXYSMAL ATRIAL FIBRILLATION (HCC): Primary | ICD-10-CM

## 2025-02-27 DIAGNOSIS — I47.29 NSVT (NONSUSTAINED VENTRICULAR TACHYCARDIA) (HCC): ICD-10-CM

## 2025-02-27 DIAGNOSIS — I25.10 CORONARY ARTERY DISEASE INVOLVING NATIVE CORONARY ARTERY OF NATIVE HEART WITHOUT ANGINA PECTORIS: ICD-10-CM

## 2025-02-27 DIAGNOSIS — I10 PRIMARY HYPERTENSION: ICD-10-CM

## 2025-02-27 DIAGNOSIS — E78.2 MIXED HYPERLIPIDEMIA: ICD-10-CM

## 2025-02-27 DIAGNOSIS — E11.22 TYPE 2 DIABETES MELLITUS WITH STAGE 3B CHRONIC KIDNEY DISEASE, UNSPECIFIED WHETHER LONG TERM INSULIN USE (HCC): ICD-10-CM

## 2025-02-27 PROBLEM — E66.01 MORBID (SEVERE) OBESITY DUE TO EXCESS CALORIES (HCC): Status: ACTIVE | Noted: 2025-02-27

## 2025-02-27 PROBLEM — I50.33 ACUTE ON CHRONIC DIASTOLIC (CONGESTIVE) HEART FAILURE (HCC): Status: ACTIVE | Noted: 2025-02-27

## 2025-02-27 PROBLEM — N18.4 CHRONIC KIDNEY DISEASE, STAGE 4 (SEVERE) (HCC): Status: ACTIVE | Noted: 2025-02-27

## 2025-02-27 PROCEDURE — 99214 OFFICE O/P EST MOD 30 MIN: CPT

## 2025-02-27 RX ORDER — ISOSORBIDE MONONITRATE 60 MG/1
60 TABLET, EXTENDED RELEASE ORAL DAILY
Qty: 90 TABLET | Refills: 1 | Status: SHIPPED | OUTPATIENT
Start: 2025-02-27

## 2025-02-27 NOTE — ASSESSMENT & PLAN NOTE
Wt Readings from Last 3 Encounters:   02/27/25 107 kg (236 lb)   02/18/25 108 kg (238 lb 1.6 oz)   01/13/25 108 kg (238 lb 9.6 oz)

## 2025-02-27 NOTE — PROGRESS NOTES
CARDIOLOGY OFFICE VISIT  [facility]  [address] [place] [id number]  Tel: [phone]      NAME: Trent Ocasio  AGE: 80 y.o.  SEX: male  : 1945  MRN: [id number]      Chief Complaint:  Chief Complaint   Patient presents with    Follow-up       Assessment and Plan:    Assessment & Plan  Paroxysmal atrial fibrillation (HCC)  Pathophysiology and management of atrial fibrillation discussed  Device interrogation 2025 demonstrated 11 minutes of atrial fibrillation (new diagnosis).  Subsequent device interrogation 2025 without evidence of AT/AF.  Rate control: Continue Toprol 75 mg daily.  Continue anticoagulation with low-dose Eliquis (age, kidney function).  [id number]. Risks, benefits, and alternative of anticoagulation to prevent thromboembolic risk from atrial fibrillation discussed at length. Patient advised to report any bleeding issues.  CBC ordered.  Message sent to device clinic to set up patient for monthly device checks x 3 months for monitoring.    NSVT (nonsustained ventricular tachycardia) (HCC)  Pathophysiology and management of NSVT discussed  Device interrogation 2025 demonstrated 3 NSVT episodes, one which lasted 9 seconds.  Subsequent device interrogation 2025 demonstrated no further episodes of NSVT.  Continue Toprol 75 mg daily.  BMP & Mag levels ordered.  Last echocardiogram demonstrated preserved EF with no significant valvular abnormalities.  Limited echocardiogram ordered to reassess EF.  Uptitrate Imdur for optimization of CAD management.  Message sent to device clinic to set up patient for monthly device checks x 3 months for monitoring.    Coronary artery disease involving native coronary artery of native heart without angina pectoris  Multivessel CAD with  of proximal LAD with collaterals  He has previously been offered CABG versus high risk PCI versus medical management,  and has opted for medical management  Uptitrate Imdur to 60 mg daily for optimization of medical management of CAD in the setting of NSVT.  Potential side effects including transient headaches reviewed.  He was advised to monitor his blood pressure at home, and notify our office of low blood pressures or side effects.  Continue aspirin, Toprol 75 mg daily, Zetia, simvastatin    Chronic heart failure with preserved ejection fraction (HFpEF) ([facility])  Wt Readings from Last 3 Encounters:   02/27/25 107 kg (236 lb)   02/18/25 108 kg (238 lb 1.6 oz)   01/13/25 108 kg (238 lb 9.6 oz)   EF 55%  Appears euvolemic on exam with stable mild lower extremity pitting edema.  Diuretic: He reports he has not been taking torsemide for approximately 4 months, with no worsening of lower extremity edema or dyspnea, and with weight remaining stable.  Torsemide removed from medication list.  GDMT: Continue Toprol 75 mg daily, losartan 75 mg daily.  Recommend low-sodium diet and daily weights.  Patient was advised to call our office for weight gain, lower extremity edema and to proceed to the ER for dyspnea.    Status post placement of cardiac pacemaker  Sinus bradycardia & high degree AV block s/p MDT DC PPM (6/2024)  Continue to follow with the device clinic    Primary hypertension  Uptitrate Imdur to 60 mg daily per above, with close monitoring of ambulatory BPs.  Continue losartan 75 mg daily, Toprol 75 mg daily.  Ambulatory BP monitoring and low-sodium diet advised.    Mixed hyperlipidemia  Continue Zetia, simvastatin.    Type 2 diabetes mellitus with stage 3b chronic kidney disease, unspecified whether long term insulin use ([facility])    Lab Results   Component Value Date    HGBA1C 7.4 (H) 02/11/2025   Continue to follow with nephrology/endocrinology/PCP         Follow-up: Previously scheduled appointment on 4/14/2025 or sooner as needed.   All questions and concerns addressed.   Patient was advised to notify our office with onset of new or  worsening cardiac symptoms.    1. Paroxysmal atrial fibrillation (HCC)  CBC and Platelet    Echo follow up/limited w/ contrast if indicated      2. NSVT (nonsustained ventricular tachycardia) ([facility])  Basic metabolic panel    Magnesium    Echo follow up/limited w/ contrast if indicated      3. Chronic heart failure with preserved ejection fraction (HFpEF) ([facility])        4. Status post placement of cardiac pacemaker        5. Primary hypertension        6. Mixed hyperlipidemia        7. Type 2 diabetes mellitus with stage 3b chronic kidney disease, unspecified whether long term insulin use ([facility])        8. Coronary artery disease involving native coronary artery of native heart without angina pectoris  isosorbide mononitrate (IMDUR) 60 mg 24 hr tablet          History of Present Illness:     Trent Ocasio is a 80 y.o. male with PMHx of recently diagnosed paroxysmal atrial fibrillation, chronic HFpEF, multivessel CAD with  of proximal LAD, NSVT, sinus bradycardia & high degree AV block s/p MDT DC PPM (6/2024), hypertension, hyperlipidemia, CKD 3B, DM2 (A1c 7.4; follows with [facility] endocrinology), history of CVA, bladder cancer, hypothyroidism who presents for follow-up on recent device interrogation.  This patient is known to Dr. Taylor.    Since his last cardiology appointment, he underwent device interrogation 1/16/2025 which demonstrated one 9-second episode of NSVT, as well as 2 additional brief episodes of NSVT, and 1 episode of atrial fibrillation lasting 11 minutes.  Per Dr. Taylor's request, his Toprol was uptitrated to 75 mg daily, and he was initiated on anticoagulation with low-dose Eliquis given age and kidney function.  Device interrogation 1/20/2025 demonstrated no significant high rate episodes.  He was advised to undergo monthly device interrogations for the subsequent 3 months.    He did note palpitations that have resolved since increasing metoprolol. Denies side effects with increased metoprolol. Denies  chest pain, notes stable GAONA for several years. Denies orthopnea, PND, notes stable mild lower extremity edema. Weight has been stable per chart review. Denies lightheadedness, syncope. He has not been taking torsemide for about 4 months, and denies worsening dyspnea, LE edema, or weight gain.   They deny hematuria, hematochezia, melena, significant bruising/bleeding.     They report compliance with their home cardiac medications and deny side effects.  Recent labs and cardiac testing reviewed.     Recent Cardiac Work-Up:  He underwent cardiac pacemaker implant 6/2024  Cardiac catheterization 5/2024: 100%  proximal LAD with right to left collaterals visualized.  Distal LCx with 80% stenosis.  CABG versus medical therapy versus high risk PCI were offered, and patient opted for medical therapy at that time.  It appears he did not see the CT surgeons in office.  Echocardiogram 3/2024: EF 55%    Review of Systems:   Review of Systems   Constitutional:  Negative for diaphoresis, fever and unexpected weight change.   HENT:  Negative for ear pain and sore throat.    Eyes:  Negative for pain and redness.   Respiratory:  Positive for shortness of breath (Stable for several years). Negative for cough.    Cardiovascular:  Positive for palpitations (Resolved after uptitration of metoprolol) and leg swelling (Stable). Negative for chest pain.   Gastrointestinal:  Negative for blood in stool and vomiting.   Genitourinary:  Negative for hematuria.   Skin:  Negative for color change and rash.   Neurological:  Negative for dizziness, syncope and light-headedness.   Hematological:  Does not bruise/bleed easily.   Psychiatric/Behavioral:  Negative for agitation and behavioral problems.    All other systems reviewed and are negative.        Vitals:  Vitals:    02/27/25 1053   BP: 138/72   BP Location: Left arm   Patient Position: Sitting   Cuff Size: Standard   Pulse: 82   Resp: 16   SpO2: 96%   Weight: 107 kg (236 lb)   Height: 5'  "10\" (1.778 m)        Body mass index is 33.86 kg/m².    Weight (last 2 days)       Date/Time Weight    02/27/25 1053 107 (236)              Physical Exam:   GEN: Alert and oriented x 3, in no acute distress.  Well appearing and well nourished.   HEENT: Sclera anicteric, conjunctivae pink, mucous membranes moist. Oropharynx clear.   NECK: Supple, no carotid bruits, no significant JVD. Trachea midline.   HEART: Regular rhythm, normal S1 and S2, no murmurs, clicks, gallops or rubs. PMI nondisplaced, no thrills.   LUNGS: Clear to auscultation bilaterally; no wheezes, rales, or rhonchi. No increased work of breathing or signs of respiratory distress.   ABDOMEN: Soft, nontender, nondistended.   EXTREMITIES: Trace-1+ pitting edema to bilateral lower extremities skin warm and well perfused, no clubbing, cyanosis.  NEURO: No focal findings. Normal speech. Mood and affect normal.   SKIN: Normal without suspicious lesions on exposed skin.      EKG Reviewed Personally: 6/21/2024: Sinus rhythm with first-degree AV block, low voltage QRS, RBBB, cannot rule out septal infarct, QTc 467 ms    The ASCVD Risk score (Marko DK, et al., 2019) failed to calculate for the following reasons:    The 2019 ASCVD risk score is only valid for ages 40 to 79    Risk score cannot be calculated because patient has a medical history suggesting prior/existing ASCVD    Active Problems:  Patient Active Problem List   Diagnosis    Chronic heart failure with preserved ejection fraction (HFpEF) (HCC)    History of prostate cancer    Anxiety    HTN (hypertension)    Hypothyroid    Type 2 diabetes mellitus, with long-term current use of insulin (HCC)    Elevated troponin    Stage 3b chronic kidney disease (HCC)    Abnormal stress test    CAD (coronary artery disease)    Malignant 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 (HCC)    Hypoglycemia    S/P cardiac cath    Bradycardia    Bladder tumor    Gastroesophageal reflux disease    Chronic low back pain " with sciatica    Chronic kidney disease, stage 4 (severe) (HCC)    Acute on chronic diastolic (congestive) heart failure (HCC)    Morbid (severe) obesity due to excess calories (HCC)    Type 2 diabetes mellitus with diabetic chronic kidney disease, unspecified CKD stage, unspecified whether long term insulin use (HCC)       Past Medical History:  Past Medical History:   Diagnosis Date    Acute respiratory failure with hypoxia (HCC) 03/28/2024    Chronic kidney disease     Diabetes mellitus (HCC)     GERD (gastroesophageal reflux disease)     Hypertension     Respiratory acidosis 03/31/2024         Past Surgical History:  Past Surgical History:   Procedure Laterality Date    APPENDECTOMY      CARDIAC CATHETERIZATION Left 05/24/2024    Procedure: Cardiac Left Heart Cath;  Surgeon: Qiana Torres MD;  Location: [facility];  Service: Cardiology    CARDIAC CATHETERIZATION N/A 05/24/2024    Procedure: Cardiac Coronary Angiogram;  Surgeon: Qiana Torres MD;  Location: [facility];  Service: Cardiology    CARDIAC CATHETERIZATION  05/24/2024    Procedure: Cardiac catheterization;  Surgeon: Qiana Torres MD;  Location: [facility];  Service: Cardiology    CARDIAC ELECTROPHYSIOLOGY PROCEDURE N/A 6/21/2024    Procedure: Cardiac pacer implant;  Surgeon: Solomon Quintero MD;  Location: [facility];  Service: Cardiology    VT CYSTO W/REMOVAL OF LESIONS SMALL N/A 6/20/2024    Procedure: TRANSURETHRAL RESECTION OF BLADDER TUMOR (TURBT);  Surgeon: Nick Patel DO;  Location: [facility];  Service: Urology    VT CYSTO W/REMOVAL OF LESIONS SMALL N/A 7/31/2024    Procedure: TRANSURETHRAL RESECTION OF BLADDER TUMOR (TURBT), intravesical instillation of gemcitabine;  Surgeon: Nick Patel DO;  Location: [facility];  Service: Urology         Family History:  History reviewed. No pertinent family history.      Social History:  Social History     Socioeconomic History    Marital  status: /Civil Union     Spouse name: None    Number of children: None    Years of education: None    Highest education level: None   Occupational History    None   Tobacco Use    Smoking status: Former     Passive exposure: Past    Smokeless tobacco: Never   Vaping Use    Vaping status: Never Used   Substance and Sexual Activity    Alcohol use: Yes     Comment: rarely    Drug use: Never    Sexual activity: Yes     Partners: Female   Other Topics Concern    None   Social History Narrative    None     Social Drivers of Health     Financial Resource Strain: Low Risk  (8/20/2024)    Received from [facility]    Overall Financial Resource Strain (CARDIA)     Difficulty of Paying Living Expenses: Not hard at all   Food Insecurity: No Food Insecurity (8/20/2024)    Received from [facility]    Hunger Vital Sign     Worried About Running Out of Food in the Last Year: Never true     Ran Out of Food in the Last Year: Never true   Transportation Needs: No Transportation Needs (8/20/2024)    Received from [facility]    PRAPARE - Transportation     Lack of Transportation (Medical): No     Lack of Transportation (Non-Medical): No   Physical Activity: Not on file   Stress: No Stress Concern Present (8/20/2024)    Received from [facility]    [employer] [place] [employer] - Occupational Stress Questionnaire     Feeling of Stress : Only a little   Social Connections: Feeling Socially Integrated (8/20/2024)    Received from [facility]    OASIS : Social Isolation     How often do you feel lonely or isolated from those around you?: Never   Intimate Partner Violence: Not At Risk (8/20/2024)    [facility]    Humiliation, Afraid, Rape, and Kick questionnaire     Fear of Current or Ex-Partner: No     Emotionally Abused: No     Physically Abused: No     Sexually Abused:  "No   Housing Stability: Low Risk  (8/20/2024)    Received from [facility]    Housing Stability Vital Sign     Unable to Pay for Housing in the Last Year: No     Number of Times Moved in the Last Year: 0     Homeless in the Last Year: No           The following portions of the patient's history were reviewed and updated as appropriate: past medical history, past surgical history, past family history,  past social history, current medications, allergies and problem list.        Laboratory Results:  CBC with diff:   Lab Results   Component Value Date    WBC 5.27 09/09/2024    RBC 3.61 (L) 09/09/2024    HGB 11.7 (L) 09/09/2024    HCT 36.0 (L) 09/09/2024     (H) 09/09/2024    MCH 32.4 09/09/2024    RDW 14.8 09/09/2024     09/09/2024       CMP:  Lab Results   Component Value Date    CREATININE 1.7 (H) 02/11/2025    BUN 35 (H) 02/11/2025    K 4.7 02/11/2025     02/11/2025    CO2 29 02/11/2025    ALKPHOS 47 02/11/2025    ALT 11 02/11/2025    AST 14 02/11/2025    BILIDIR 0.11 03/28/2024    BILIDIR 0.1 07/17/2023       Lab Results   Component Value Date    HGBA1C 7.4 (H) 02/11/2025    MG 2.0 06/21/2024    MG 2.1 03/16/2022    PHOS 3.5 09/09/2024    PHOS 3.2 03/16/2022       No results found for: \"TROPONINI\", \"CKMB\", \"CKTOTAL\"    Lipid Profile:   No results found for: \"CHOL\"  No results found for: \"HDL\"  No results found for: \"LDLCALC\"  No results found for: \"TRIG\"    Cardiac testing:   Results for orders placed during the hospital encounter of 03/28/24    Echo complete    Interpretation Summary    Left Ventricle: Left ventricular cavity size is normal. Wall thickness is mildly increased. The left ventricular ejection fraction is 55%. Systolic function is normal. Wall motion is normal. Diastolic function is normal.    Right Ventricle: Right ventricular cavity size is normal. Systolic function is normal.    Pericardium: There is a trivial pericardial effusion.    No results found for this " or any previous visit.    No results found for this or any previous visit.    No results found for this or any previous visit.    Results for orders placed during the hospital encounter of 03/28/24    NM myocardial perfusion spect (rx stress and/or rest)    Interpretation Summary    Stress ECG: No ST deviation is noted. The ECG was negative for ischemia.    Perfusion Defect Conclusion: There is no evidence of transient ischemic dilation (TID). TID index 1.22.    Perfusion: There is a left ventricular perfusion defect that is medium to large in size that is predominantly fixed in the apex, apical lateral, apical septal as well as inferior walls.  There appears to be a small reversible apical anterior defect.    Stress Function: Left ventricular function post-stress is normal. Stress ejection fraction is 53%.    Stress Combined Conclusion: Left ventricular perfusion is abnormal.    No results found for this or any previous visit.    No results found for this or any previous visit.    No results found for this or any previous visit.        Medications:    Current Outpatient Medications:     apixaban (Eliquis) 2.5 mg, Take 1 tablet (2.5 mg total) by mouth 2 (two) times a day, Disp: 90 tablet, Rfl: 3    aspirin 81 mg chewable tablet, Chew 81 mg daily, Disp: , Rfl:     calcitriol (ROCALTROL) 0.25 mcg capsule, Take 1 capsule (0.25 mcg total) by mouth daily, Disp: 90 capsule, Rfl: 3    cholecalciferol 25 MCG (1000 UT) tablet, Take 1,000 Units by mouth daily, Disp: , Rfl:     ezetimibe (ZETIA) 10 mg tablet, Take 10 mg by mouth daily, Disp: , Rfl:     glipiZIDE (GLUCOTROL) 10 mg tablet, Take 10 mg by mouth 2 (two) times a day before meals, Disp: , Rfl:     HYDROcodone-acetaminophen (NORCO) 5-325 mg per tablet, Take 1 tablet by mouth 2 (two) times a day as needed for pain for up to 29 days Max Daily Amount: 2 tablets, Disp: 60 tablet, Rfl: 0    insulin degludec (Tresiba FlexTouch) 100 units/mL injection pen, Inject 15 Units  under the skin daily at bedtime, Disp: , Rfl:     isosorbide mononitrate (IMDUR) 60 mg 24 hr tablet, Take 1 tablet (60 mg total) by mouth daily, Disp: 90 tablet, Rfl: 1    latanoprost (XALATAN) 0.005 % ophthalmic solution, Administer 1 drop to both eyes daily at bedtime, Disp: , Rfl:     levothyroxine 50 mcg tablet, Take 50 mcg by mouth daily, Disp: , Rfl:     losartan (COZAAR) 25 mg tablet, Take 1 tablet (25 mg total) by mouth daily, Disp: 30 tablet, Rfl: 2    losartan (COZAAR) 50 mg tablet, Take 1 tablet (50 mg total) by mouth daily, Disp: 30 tablet, Rfl: 2    metFORMIN (GLUCOPHAGE) 500 mg tablet, Take 500 mg by mouth 2 (two) times a day with meals, Disp: , Rfl:     metoprolol succinate (TOPROL-XL) 50 mg 24 hr tablet, Take 1.5 tablets (75 mg total) by mouth daily, Disp: 90 tablet, Rfl: 2    pantoprazole (PROTONIX) 40 mg tablet, Take 40 mg by mouth daily at bedtime, Disp: , Rfl:     pioglitazone (ACTOS) 45 mg tablet, Take 45 mg by mouth daily, Disp: , Rfl:     simvastatin (ZOCOR) 40 mg tablet, Take 40 mg by mouth daily at bedtime, Disp: , Rfl:       Allergies:  Allergies   Allergen Reactions    Canagliflozin GI Intolerance     Yeast infection  Yeast infection      Lisinopril Other (See Comments)     Pt unsure of reaction     Lovastatin GI Intolerance           Recommend aggressive risk factor modification and therapeutic lifestyle changes.  Low-salt, low-calorie, low-fat, low-cholesterol diet with regular exercise and to optimize weight.    Discussed concepts of cardiovascular disease, including signs and symptoms of heart disease.    Previous studies were reviewed.    Safety measures were reviewed.  Questions were entertained and answered.  Patient was advised to report any problems requiring medical attention.    Follow-up with PCP and appropriate specialist and lab work/testing as discussed.    Return for follow up visit as scheduled or earlier, if needed.  Thank you for allowing me to participate in the care  and evaluation of your patient.  Should you have any questions, please feel free to contact me.    Paris Redman PA-C  2/27/2025,1:33 PM      ** Please Note: Fluency DirectDictation voice to text software may have been used in the creation of this document. **

## 2025-02-27 NOTE — ASSESSMENT & PLAN NOTE
Uptitrate Imdur to 60 mg daily per above, with close monitoring of ambulatory BPs.  Continue losartan 75 mg daily, Toprol 75 mg daily.  Ambulatory BP monitoring and low-sodium diet advised.

## 2025-02-27 NOTE — ASSESSMENT & PLAN NOTE
Wt Readings from Last 3 Encounters:   02/27/25 107 kg (236 lb)   02/18/25 108 kg (238 lb 1.6 oz)   01/13/25 108 kg (238 lb 9.6 oz)   EF 55%  Appears euvolemic on exam with stable mild lower extremity pitting edema.  Diuretic: He reports he has not been taking torsemide for approximately 4 months, with no worsening of lower extremity edema or dyspnea, and with weight remaining stable.  Torsemide removed from medication list.  GDMT: Continue Toprol 75 mg daily, losartan 75 mg daily.  Recommend low-sodium diet and daily weights.  Patient was advised to call our office for weight gain, lower extremity edema and to proceed to the ER for dyspnea.

## 2025-02-27 NOTE — ASSESSMENT & PLAN NOTE
Lab Results   Component Value Date    EGFR 40 (L) 02/11/2025    EGFR 37 09/09/2024    EGFR 34 07/31/2024    CREATININE 1.7 (H) 02/11/2025    CREATININE 1.69 (H) 09/09/2024    CREATININE 1.81 (H) 07/31/2024

## 2025-02-27 NOTE — ASSESSMENT & PLAN NOTE
Multivessel CAD with  of proximal LAD with collaterals  He has previously been offered CABG versus high risk PCI versus medical management, and has opted for medical management  Uptitrate Imdur to 60 mg daily for optimization of medical management of CAD in the setting of NSVT.  Potential side effects including transient headaches reviewed.  He was advised to monitor his blood pressure at home, and notify our office of low blood pressures or side effects.  Continue aspirin, Toprol 75 mg daily, Zetia, simvastatin

## 2025-02-27 NOTE — ASSESSMENT & PLAN NOTE
Lab Results   Component Value Date    HGBA1C 7.4 (H) 02/11/2025   Continue to follow with nephrology/endocrinology/PCP

## 2025-03-17 NOTE — PROGRESS NOTES
Pain Medicine Follow-Up Note    Assessment:  1. Chronic pain syndrome    2. Right hip pain    3. Foraminal stenosis of lumbosacral region    4. Chronic right-sided low back pain with right-sided sciatica    5. Long-term current use of opiate analgesic        Plan:  Orders Placed This Encounter   Procedures   • XR hip/pelv 2-3 vws right if performed     Standing Status:   Future     Number of Occurrences:   1     Expected Date:   3/18/2025     Expiration Date:   3/18/2029     Scheduling Instructions:      Bring along any outside films relating to this procedure.         • [place] spine and pain procedure     Standing Status:   Future     Expected Date:   3/18/2025     Expiration Date:   3/18/2029     Reason for Exam::   right hip injection     Anticoagulant hold needed?:   no       New Medications Ordered This Visit   Medications   • oxyCODONE-acetaminophen (PERCOCET) 5-325 mg per tablet     Sig: Take 1 tablet by mouth 2 (two) times a day as needed for moderate pain Max Daily Amount: 2 tablets     Dispense:  30 tablet     Refill:  0     Rotating to oxycodone       My impressions and treatment recommendations were discussed in detail with the patient who verbalized understanding and had no further questions.      Patient returns to the office with worsening bilateral low back pain that radiates down his right lateral leg.  Patient had a failed epidural steroid injection on 1/21/2025, will focus on medication management.  Patient was initiated on hydrocodone/acetaminophen 5/325 mg tablet on 2/18/2025, patient initially reported improvement of his pain symptoms and was provided an additional refill of medication.  Today the patient states that he does not notice any significant difference in his pain after using this medication.  Advised the patient that we can rotate to oxycodone/acetaminophen 5/325 mg tablet again would start with twice a day dosing if this proved to be ineffective due to frequency we would then increase  it to 3 times a day dosing if the patient notifies us.  Discussed adding additional medications patient states that previously tried gabapentin but just found that he slept all day on the medication.  Has not trialed pregabalin to his knowledge may consider adding this medication in the future.  Oxycodone/acetaminophen 5/325 mg tablet patient may take 1 tablet up to twice a day as needed for moderate pain dispense 30 tablets to be filled today.  Patient educated that he is not to take any additional hydrocodone/acetaminophen tablets.     Patient also complaining more of right groin/thigh pain, I will get a updated x-ray of his right hip and also advised that he should trial a right intra-articular hip injection. Complete risks and benefits including bleeding, infection, tissue reaction, nerve injury and allergic reaction were discussed. The approach was demonstrated using models and literature was provided. Verbal and written consent was obtained. Patient is unsure about following up with a surgeon since the patient does not feel he would be a surgical candidate due to his heart.    [place] Prescription Drug Monitoring Program report was reviewed and was appropriate     There are risks associated with opioid medications, including dependence, addiction and tolerance. The patient understands and agrees to use these medications only as prescribed. Potential side effects of the medications include, but are not limited to, constipation, drowsiness, addiction, impaired judgment and risk of fatal overdose if not taken as prescribed. The patient was warned against driving while taking sedation medications.  Sharing medications is a felony. At this point in time, the patient is showing no signs of addiction, abuse, diversion or suicidal ideation.    Follow-up is planned in 4 weeks time or sooner as warranted.  Discharge instructions were provided. I personally saw and examined the patient and I agree with the above  discussed plan of care.    History of Present Illness:    Trent Ocasio is a 80 y.o. male who presents to [facility] for interval re-evaluation of the above stated pain complaints. The patient has a past medical and chronic pain history as outlined in the assessment section. He was last seen on 2/18/2025.    At today's visit patient states that their pain symptoms are worse with a pain score of 7/10 on the verbal numeric pain scale.  The patient's pain is worse in the morning, evening, and at night.  The patient's pain is intermittent in nature.  And the quality of the patient's pain is described as sharp and shooting.  The patient's pain is located in the right low back radiating down his right lateral leg, and right groin.  Patient states he Darya pain relief he is now obtained from his current pain relievers is not enough to make a difference in his life due to it only reducing his pain symptoms by 10%.  Patient denies any side effects using hydrocodone/acetaminophen, but also reports no efficacy therefore we will rotate to oxycodone/acetaminophen.    Pain Contract Signed: 2/18/2025  Last Urine Drug Screen: 2/18/2025    Other than as stated above, the patient denies any interval changes in medications, medical condition, mental condition, symptoms, or allergies since the last office visit.         Review of Systems:    Review of Systems   Respiratory:  Negative for shortness of breath.    Cardiovascular:  Negative for chest pain.   Gastrointestinal:  Negative for constipation, diarrhea, nausea and vomiting.   Musculoskeletal:  Positive for back pain and gait problem. Negative for arthralgias, joint swelling and myalgias.        DROM   Skin:  Negative for rash.   Neurological:  Positive for dizziness. Negative for seizures and weakness.   All other systems reviewed and are negative.        Past Medical History:   Diagnosis Date   • Acute respiratory failure with hypoxia (HCC) 03/28/2024    • Chronic kidney disease    • Diabetes mellitus (HCC)    • GERD (gastroesophageal reflux disease)    • Hypertension    • Respiratory acidosis 03/31/2024       Past Surgical History:   Procedure Laterality Date   • APPENDECTOMY     • CARDIAC CATHETERIZATION Left 05/24/2024    Procedure: Cardiac Left Heart Cath;  Surgeon: Qiana Torres MD;  Location: [facility];  Service: Cardiology   • CARDIAC CATHETERIZATION N/A 05/24/2024    Procedure: Cardiac Coronary Angiogram;  Surgeon: Qiana Torres MD;  Location: [facility];  Service: Cardiology   • CARDIAC CATHETERIZATION  05/24/2024    Procedure: Cardiac catheterization;  Surgeon: Qiana Torres MD;  Location: [facility];  Service: Cardiology   • CARDIAC ELECTROPHYSIOLOGY PROCEDURE N/A 6/21/2024    Procedure: Cardiac pacer implant;  Surgeon: Solomon Quintero MD;  Location: [facility];  Service: Cardiology   • IL CYSTO W/REMOVAL OF LESIONS SMALL N/A 6/20/2024    Procedure: TRANSURETHRAL RESECTION OF BLADDER TUMOR (TURBT);  Surgeon: Nick Patel DO;  Location: [facility];  Service: Urology   • IL CYSTO W/REMOVAL OF LESIONS SMALL N/A 7/31/2024    Procedure: TRANSURETHRAL RESECTION OF BLADDER TUMOR (TURBT), intravesical instillation of gemcitabine;  Surgeon: Nick Patel DO;  Location: [facility] OR;  Service: Urology       History reviewed. No pertinent family history.    Social History     Occupational History   • Not on file   Tobacco Use   • Smoking status: Former     Passive exposure: Past   • Smokeless tobacco: Never   Vaping Use   • Vaping status: Never Used   Substance and Sexual Activity   • Alcohol use: Yes     Comment: rarely   • Drug use: Never   • Sexual activity: Yes     Partners: Female         Current Outpatient Medications:   •  apixaban (Eliquis) 2.5 mg, Take 1 tablet (2.5 mg total) by mouth 2 (two) times a day, Disp: 90 tablet, Rfl: 3  •  aspirin 81 mg chewable tablet, Chew 81 mg daily, Disp: ,  Rfl:   •  calcitriol (ROCALTROL) 0.25 mcg capsule, Take 1 capsule (0.25 mcg total) by mouth daily, Disp: 90 capsule, Rfl: 3  •  cholecalciferol 25 MCG (1000 UT) tablet, Take 1,000 Units by mouth daily, Disp: , Rfl:   •  ezetimibe (ZETIA) 10 mg tablet, Take 10 mg by mouth daily, Disp: , Rfl:   •  glipiZIDE (GLUCOTROL) 10 mg tablet, Take 10 mg by mouth 2 (two) times a day before meals, Disp: , Rfl:   •  insulin degludec (Tresiba FlexTouch) 100 units/mL injection pen, Inject 15 Units under the skin daily at bedtime, Disp: , Rfl:   •  isosorbide mononitrate (IMDUR) 60 mg 24 hr tablet, Take 1 tablet (60 mg total) by mouth daily, Disp: 90 tablet, Rfl: 1  •  latanoprost (XALATAN) 0.005 % ophthalmic solution, Administer 1 drop to both eyes daily at bedtime, Disp: , Rfl:   •  levothyroxine 50 mcg tablet, Take 50 mcg by mouth daily, Disp: , Rfl:   •  losartan (COZAAR) 25 mg tablet, Take 1 tablet (25 mg total) by mouth daily, Disp: 30 tablet, Rfl: 2  •  losartan (COZAAR) 50 mg tablet, Take 1 tablet (50 mg total) by mouth daily, Disp: 30 tablet, Rfl: 2  •  metFORMIN (GLUCOPHAGE) 500 mg tablet, Take 500 mg by mouth 2 (two) times a day with meals, Disp: , Rfl:   •  metoprolol succinate (TOPROL-XL) 50 mg 24 hr tablet, Take 1.5 tablets (75 mg total) by mouth daily, Disp: 90 tablet, Rfl: 2  •  oxyCODONE-acetaminophen (PERCOCET) 5-325 mg per tablet, Take 1 tablet by mouth 2 (two) times a day as needed for moderate pain Max Daily Amount: 2 tablets, Disp: 30 tablet, Rfl: 0  •  pantoprazole (PROTONIX) 40 mg tablet, Take 40 mg by mouth daily at bedtime, Disp: , Rfl:   •  pioglitazone (ACTOS) 45 mg tablet, Take 45 mg by mouth daily, Disp: , Rfl:   •  simvastatin (ZOCOR) 40 mg tablet, Take 40 mg by mouth daily at bedtime, Disp: , Rfl:     Allergies   Allergen Reactions   • Canagliflozin GI Intolerance     Yeast infection  Yeast infection     • Lisinopril Other (See Comments)     Pt unsure of reaction    • Lovastatin GI Intolerance  "      Physical Exam:    Ht 5' 10\" (1.778 m)   Wt 107 kg (235 lb)   BMI 33.72 kg/m²     Constitutional:normal, well developed, well nourished, alert, in no distress and non-toxic and no overt pain behavior. and obese  Eyes:anicteric  HEENT:grossly intact  Neck:supple, symmetric, trachea midline and no masses   Pulmonary:even and unlabored  Cardiovascular:No edema or pitting edema present  Skin:Normal without rashes or lesions and well hydrated  Psychiatric:Mood and affect appropriate  Neurologic:Cranial Nerves II-XII grossly intact  Musculoskeletal:antalgic, stooped posture, and ambulates with cane      Imaging  FL spine and pain procedure    (Results Pending)         Orders Placed This Encounter   Procedures   • XR hip/pelv 2-3 vws right if performed   • FL spine and pain procedure       This document was created using speech voice recognition software.   Grammatical errors, random word insertions, pronoun errors, and incomplete sentences are an occasional consequence of this system due to software limitations, ambient noise, and hardware issues.   Any formal questions or concerns about content, text, or information contained within the body of this dictation should be directly addressed to the provider for clarification.  "

## 2025-03-18 ENCOUNTER — APPOINTMENT (OUTPATIENT)
Dept: RADIOLOGY | Facility: CLINIC | Age: 80
End: 2025-03-18
Payer: COMMERCIAL

## 2025-03-18 ENCOUNTER — TELEPHONE (OUTPATIENT)
Dept: PAIN MEDICINE | Facility: CLINIC | Age: 80
End: 2025-03-18

## 2025-03-18 ENCOUNTER — TELEPHONE (OUTPATIENT)
Age: 80
End: 2025-03-18

## 2025-03-18 ENCOUNTER — PATIENT MESSAGE (OUTPATIENT)
Dept: PAIN MEDICINE | Facility: CLINIC | Age: 80
End: 2025-03-18

## 2025-03-18 ENCOUNTER — OFFICE VISIT (OUTPATIENT)
Dept: PAIN MEDICINE | Facility: CLINIC | Age: 80
End: 2025-03-18
Payer: COMMERCIAL

## 2025-03-18 VITALS — BODY MASS INDEX: 33.64 KG/M2 | HEIGHT: 70 IN | WEIGHT: 235 LBS

## 2025-03-18 DIAGNOSIS — M48.07 FORAMINAL STENOSIS OF LUMBOSACRAL REGION: ICD-10-CM

## 2025-03-18 DIAGNOSIS — M25.551 RIGHT HIP PAIN: ICD-10-CM

## 2025-03-18 DIAGNOSIS — Z79.891 LONG-TERM CURRENT USE OF OPIATE ANALGESIC: ICD-10-CM

## 2025-03-18 DIAGNOSIS — G89.4 CHRONIC PAIN SYNDROME: Primary | ICD-10-CM

## 2025-03-18 DIAGNOSIS — G89.29 CHRONIC RIGHT-SIDED LOW BACK PAIN WITH RIGHT-SIDED SCIATICA: ICD-10-CM

## 2025-03-18 DIAGNOSIS — M54.41 CHRONIC RIGHT-SIDED LOW BACK PAIN WITH RIGHT-SIDED SCIATICA: ICD-10-CM

## 2025-03-18 PROCEDURE — 99214 OFFICE O/P EST MOD 30 MIN: CPT

## 2025-03-18 PROCEDURE — 73502 X-RAY EXAM HIP UNI 2-3 VIEWS: CPT

## 2025-03-18 RX ORDER — OXYCODONE AND ACETAMINOPHEN 5; 325 MG/1; MG/1
1 TABLET ORAL 2 TIMES DAILY PRN
Qty: 30 TABLET | Refills: 0 | Status: SHIPPED | OUTPATIENT
Start: 2025-03-18

## 2025-03-18 NOTE — TELEPHONE ENCOUNTER
Pt is scheduled for right hip injection on 4/3/25 @ 11:20am.  Would like to know if he is able to drive himself, as he is the only  in the family.    Please advise.

## 2025-03-18 NOTE — TELEPHONE ENCOUNTER
Caller: pharmacy    Doctor/Office: Dr SALINAS    Call regarding :  medication     Call was transferred to: nafisa

## 2025-03-18 NOTE — TELEPHONE ENCOUNTER
Caller: Pharmacist    Doctor: Dr. Bukrs    Reason for call: Pharmacist asked for a rush as the patient is on his way to pickup Need to clarify medication to fill.     Please assist.    Call back#: [phone]

## 2025-03-18 NOTE — TELEPHONE ENCOUNTER
Caller: Patient    Doctor: Dr. Burks    Reason for call: Patient Called, Regarding his meds.    Pharmacist inform patient, an RN from [facility] Spoke to them already regarding his script.     Patient meds are good to go    Call back#:

## 2025-03-18 NOTE — TELEPHONE ENCOUNTER
Caller: pharmacy    Doctor: renny    Reason for call: pharmacy needs to speak to someone about verbal authorization to give pt the oxy since he still has a week left of the hydrocodone.    Call back#: [phone]

## 2025-03-18 NOTE — TELEPHONE ENCOUNTER
S/W pharmacist and advised the Percocet is a replacement for the Norco and pt is aware not to take both Rx's.  Provider is aware it is early and Rx'd 2 weeks worth to trial  May want to cx NORCO Rx

## 2025-03-18 NOTE — PATIENT INSTRUCTIONS

## 2025-03-18 NOTE — PATIENT COMMUNICATION
pt is scheduled for right hip injection with Dr Burks on 4/3/25     Pt reports he is diabetic but does not wear a glucose monitor     Hip injection does not require pts to hold meds     Pt given instructions in office and via myc message      Have you completed PT/HEP/Chiro in the past 6 months for dedicated area? No  - pt has had other injections in past for pain relief - pt has also tried meds for pain relief  If yes, how long did you complete?  What was the frequency?  Did it provide relief?  If no, reason therapy was not completed?

## 2025-03-19 ENCOUNTER — TELEPHONE (OUTPATIENT)
Dept: RADIOLOGY | Facility: CLINIC | Age: 80
End: 2025-03-19

## 2025-03-19 ENCOUNTER — RESULTS FOLLOW-UP (OUTPATIENT)
Dept: PAIN MEDICINE | Facility: CLINIC | Age: 80
End: 2025-03-19

## 2025-03-19 ENCOUNTER — TELEPHONE (OUTPATIENT)
Age: 80
End: 2025-03-19

## 2025-03-19 NOTE — TELEPHONE ENCOUNTER
Patient would like a copy of his standing lab orders mailed to his home address on file.    Please advise.

## 2025-03-25 ENCOUNTER — HOSPITAL ENCOUNTER (OUTPATIENT)
Dept: NON INVASIVE DIAGNOSTICS | Facility: HOSPITAL | Age: 80
Discharge: HOME | End: 2025-03-25
Payer: COMMERCIAL

## 2025-03-25 VITALS
BODY MASS INDEX: 33.64 KG/M2 | WEIGHT: 235 LBS | HEIGHT: 70 IN | HEART RATE: 72 BPM | SYSTOLIC BLOOD PRESSURE: 138 MMHG | DIASTOLIC BLOOD PRESSURE: 72 MMHG

## 2025-03-25 DIAGNOSIS — I47.29 NSVT (NONSUSTAINED VENTRICULAR TACHYCARDIA) (HCC): ICD-10-CM

## 2025-03-25 DIAGNOSIS — I48.0 PAROXYSMAL ATRIAL FIBRILLATION (HCC): ICD-10-CM

## 2025-03-25 LAB
BSA FOR ECHO PROCEDURE: 2.24 M2
E WAVE DECELERATION TIME: 188 MS
E/A RATIO: 1.09
FRACTIONAL SHORTENING: 27 (ref 28–44)
INTERVENTRICULAR SEPTUM IN DIASTOLE (PARASTERNAL SHORT AXIS VIEW): 1.1 CM
INTERVENTRICULAR SEPTUM: 1.1 CM (ref 0.6–1.1)
LEFT INTERNAL DIMENSION IN SYSTOLE: 3.7 CM (ref 2.1–4)
LEFT VENTRICLE DIASTOLIC VOLUME (MOD BIPLANE): 87 ML
LEFT VENTRICLE DIASTOLIC VOLUME INDEX (MOD BIPLANE): 38.8 ML/M2
LEFT VENTRICLE SYSTOLIC VOLUME (MOD BIPLANE): 39 ML
LEFT VENTRICLE SYSTOLIC VOLUME INDEX (MOD BIPLANE): 17.4 ML/M2
LEFT VENTRICULAR INTERNAL DIMENSION IN DIASTOLE: 5.1 CM (ref 3.5–6)
LEFT VENTRICULAR POSTERIOR WALL IN END DIASTOLE: 1 CM
LEFT VENTRICULAR STROKE VOLUME: 66 ML
LV EF BIPLANE MOD: 55 %
LV EF US.2D.A4C+ESTIMATED: 54 %
LVSV (TEICH): 66 ML
MV E'TISSUE VEL-SEP: 5 CM/S
MV PEAK A VEL: 0.64 M/S
MV PEAK E VEL: 70 CM/S
MV STENOSIS PRESSURE HALF TIME: 55 MS
MV VALVE AREA P 1/2 METHOD: 4
SL CV LV EF: 52
SL CV PED ECHO LEFT VENTRICLE DIASTOLIC VOLUME (MOD BIPLANE) 2D: 123 ML
SL CV PED ECHO LEFT VENTRICLE SYSTOLIC VOLUME (MOD BIPLANE) 2D: 57 ML

## 2025-03-25 PROCEDURE — 93308 TTE F-UP OR LMTD: CPT | Performed by: INTERNAL MEDICINE

## 2025-03-25 PROCEDURE — 93308 TTE F-UP OR LMTD: CPT

## 2025-03-27 ENCOUNTER — APPOINTMENT (OUTPATIENT)
Dept: LAB | Facility: CLINIC | Age: 80
End: 2025-03-27
Payer: COMMERCIAL

## 2025-03-27 ENCOUNTER — RESULTS FOLLOW-UP (OUTPATIENT)
Dept: CARDIOLOGY CLINIC | Facility: CLINIC | Age: 80
End: 2025-03-27

## 2025-03-27 DIAGNOSIS — N18.32 STAGE 3B CHRONIC KIDNEY DISEASE (HCC): ICD-10-CM

## 2025-03-27 DIAGNOSIS — M54.41 CHRONIC BILATERAL LOW BACK PAIN WITH BILATERAL SCIATICA: ICD-10-CM

## 2025-03-27 DIAGNOSIS — I48.0 PAROXYSMAL ATRIAL FIBRILLATION (HCC): ICD-10-CM

## 2025-03-27 DIAGNOSIS — G89.29 CHRONIC BILATERAL LOW BACK PAIN WITH BILATERAL SCIATICA: ICD-10-CM

## 2025-03-27 DIAGNOSIS — I47.29 NSVT (NONSUSTAINED VENTRICULAR TACHYCARDIA) (HCC): ICD-10-CM

## 2025-03-27 DIAGNOSIS — M54.42 CHRONIC BILATERAL LOW BACK PAIN WITH BILATERAL SCIATICA: ICD-10-CM

## 2025-03-27 LAB
25(OH)D3 SERPL-MCNC: 53.5 NG/ML (ref 30–100)
ALBUMIN SERPL BCG-MCNC: 4.4 G/DL (ref 3.5–5)
ALP SERPL-CCNC: 63 U/L (ref 34–104)
ALT SERPL W P-5'-P-CCNC: 12 U/L (ref 7–52)
ANION GAP SERPL CALCULATED.3IONS-SCNC: 12 MMOL/L (ref 4–13)
AST SERPL W P-5'-P-CCNC: 17 U/L (ref 13–39)
BACTERIA UR QL AUTO: ABNORMAL /HPF
BASOPHILS # BLD AUTO: 0.02 THOUSANDS/ÂΜL (ref 0–0.1)
BASOPHILS NFR BLD AUTO: 0 % (ref 0–1)
BILIRUB SERPL-MCNC: 0.3 MG/DL (ref 0.2–1)
BILIRUB UR QL STRIP: NEGATIVE
BUN SERPL-MCNC: 24 MG/DL (ref 5–25)
CALCIUM SERPL-MCNC: 9.5 MG/DL (ref 8.4–10.2)
CHLORIDE SERPL-SCNC: 108 MMOL/L (ref 96–108)
CLARITY UR: CLEAR
CO2 SERPL-SCNC: 24 MMOL/L (ref 21–32)
COLOR UR: ABNORMAL
CREAT SERPL-MCNC: 1.72 MG/DL (ref 0.6–1.3)
CREAT UR-MCNC: 102.7 MG/DL
EOSINOPHIL # BLD AUTO: 0.2 THOUSAND/ÂΜL (ref 0–0.61)
EOSINOPHIL NFR BLD AUTO: 4 % (ref 0–6)
ERYTHROCYTE [DISTWIDTH] IN BLOOD BY AUTOMATED COUNT: 14.3 % (ref 11.6–15.1)
GFR SERPL CREATININE-BSD FRML MDRD: 36 ML/MIN/1.73SQ M
GLUCOSE P FAST SERPL-MCNC: 86 MG/DL (ref 65–99)
GLUCOSE UR STRIP-MCNC: ABNORMAL MG/DL
HCT VFR BLD AUTO: 41.4 % (ref 36.5–49.3)
HGB BLD-MCNC: 13.2 G/DL (ref 12–17)
HGB UR QL STRIP.AUTO: ABNORMAL
IMM GRANULOCYTES # BLD AUTO: 0.02 THOUSAND/UL (ref 0–0.2)
IMM GRANULOCYTES NFR BLD AUTO: 0 % (ref 0–2)
KETONES UR STRIP-MCNC: NEGATIVE MG/DL
LEUKOCYTE ESTERASE UR QL STRIP: NEGATIVE
LYMPHOCYTES # BLD AUTO: 0.75 THOUSANDS/ÂΜL (ref 0.6–4.47)
LYMPHOCYTES NFR BLD AUTO: 14 % (ref 14–44)
MAGNESIUM SERPL-MCNC: 1.9 MG/DL (ref 1.9–2.7)
MCH RBC QN AUTO: 31.7 PG (ref 26.8–34.3)
MCHC RBC AUTO-ENTMCNC: 31.9 G/DL (ref 31.4–37.4)
MCV RBC AUTO: 99 FL (ref 82–98)
MICROALBUMIN UR-MCNC: 321.5 MG/L
MICROALBUMIN/CREAT 24H UR: 313 MG/G CREATININE (ref 0–30)
MONOCYTES # BLD AUTO: 0.46 THOUSAND/ÂΜL (ref 0.17–1.22)
MONOCYTES NFR BLD AUTO: 9 % (ref 4–12)
MUCOUS THREADS UR QL AUTO: ABNORMAL
NEUTROPHILS # BLD AUTO: 3.97 THOUSANDS/ÂΜL (ref 1.85–7.62)
NEUTS SEG NFR BLD AUTO: 73 % (ref 43–75)
NITRITE UR QL STRIP: NEGATIVE
NON-SQ EPI CELLS URNS QL MICRO: ABNORMAL /HPF
NRBC BLD AUTO-RTO: 0 /100 WBCS
PH UR STRIP.AUTO: 6.5 [PH]
PHOSPHATE SERPL-MCNC: 2.9 MG/DL (ref 2.3–4.1)
PLATELET # BLD AUTO: 304 THOUSANDS/UL (ref 149–390)
PMV BLD AUTO: 10.8 FL (ref 8.9–12.7)
POTASSIUM SERPL-SCNC: 4.4 MMOL/L (ref 3.5–5.3)
PROT SERPL-MCNC: 7.2 G/DL (ref 6.4–8.4)
PROT UR STRIP-MCNC: ABNORMAL MG/DL
PTH-INTACT SERPL-MCNC: 187.1 PG/ML (ref 12–88)
RBC # BLD AUTO: 4.17 MILLION/UL (ref 3.88–5.62)
RBC #/AREA URNS AUTO: ABNORMAL /HPF
SODIUM SERPL-SCNC: 144 MMOL/L (ref 135–147)
SP GR UR STRIP.AUTO: 1.01 (ref 1–1.03)
UROBILINOGEN UR STRIP-ACNC: <2 MG/DL
WBC # BLD AUTO: 5.42 THOUSAND/UL (ref 4.31–10.16)
WBC #/AREA URNS AUTO: ABNORMAL /HPF

## 2025-03-27 PROCEDURE — 83735 ASSAY OF MAGNESIUM: CPT

## 2025-03-27 NOTE — TELEPHONE ENCOUNTER
----- Message from Paris Redman PA-C sent at 3/27/2025  2:12 PM EDT -----  Please let him know his echocardiogram shows normal ejection fraction/heart pump function, and normal wall motion.  Will discuss further at his next cardiology appointment.  He should report any symptoms of concern.

## 2025-04-14 NOTE — PROGRESS NOTES
Pain Medicine Follow-Up Note    Assessment:  1. Chronic pain syndrome    2. Muscle spasm of back    3. Foraminal stenosis of lumbosacral region    4. Morbid (severe) obesity due to excess calories (HCC)    5. Chronic right-sided low back pain with right-sided sciatica    6. Long-term current use of opiate analgesic        Plan:    New Medications Ordered This Visit   Medications   • methocarbamol (ROBAXIN) 500 mg tablet     Sig: Take 1 tablet (500 mg total) by mouth 2 (two) times a day as needed for muscle spasms     Dispense:  60 tablet     Refill:  0   • oxyCODONE-acetaminophen (PERCOCET) 5-325 mg per tablet     Sig: Take 1 tablet by mouth 3 (three) times a day as needed for moderate pain Only take 3rd tablet on severe days Max Daily Amount: 3 tablets Do not start before April 25, 2025.     Dispense:  60 tablet     Refill:  0     Fill  on 4/25/2025       My impressions and treatment recommendations were discussed in detail with the patient who verbalized understanding and had no further questions.      The patient continues to report an overall reduction of his pain level and improvement with his functioning without significant side effects using oxycodone/acetaminophen 5/325 mg tablet patient has been using 1 tablet up to twice a day as needed for moderate pain.  Patient states that the medication does not last very long he is requesting if he can take 2 tablets at once, educated the patient that he should never double up his medications, patient states he has not he is requesting.  Advised the patient I would rather him take the medication more frequently then to increase the dosage advised the patient that he may take up to 3 tablets/day on severe days.  Patient also shared that he felt he was having slight hematuria when using the medications as prescribed.  Patient does have a known bladder tumor I feel that it was coincidental that he had bleeding with the use of this medication.  I did advise the patient to  contact his urologist regarding the symptoms.  Oxycodone/acetaminophen 5/325 mg tablet e-prescribed to the patient's pharmacy with a do not fill until date of 4/25/2025.  After near completion of the prescription he should contact the office to report how frequently he has been needing to use the medication for the next prescription to be written.    Patient also recalls in the past using a muscle relaxer and find it very beneficial, I will prescribe methocarbamol 500 mg tablet patient may take 1 tablet up to twice a day as needed for muscle spasms.  Patient educated not to take this medication with oxycodone/acetaminophen due to compounding side effects and increased chance of dizziness drowsiness and increased risk of falls.  Patient verbalized understanding.    [place] Prescription Drug Monitoring Program report was reviewed and was appropriate     There are risks associated with opioid medications, including dependence, addiction and tolerance. The patient understands and agrees to use these medications only as prescribed. Potential side effects of the medications include, but are not limited to, constipation, drowsiness, addiction, impaired judgment and risk of fatal overdose if not taken as prescribed. The patient was warned against driving while taking sedation medications.  Sharing medications is a felony. At this point in time, the patient is showing no signs of addiction, abuse, diversion or suicidal ideation.    Follow-up is planned in 8 weeks time or sooner as warranted.  Discharge instructions were provided. I personally saw and examined the patient and I agree with the above discussed plan of care.    History of Present Illness:    Trent Ocasio is a 80 y.o. male who presents to [facility] for interval re-evaluation of the above stated pain complaints. The patient has a past medical and chronic pain history as outlined in the assessment section. He was last seen on  3/18/2025.    At today's visit patient states that their pain symptoms are same with a pain score of 7/10 on the verbal numeric pain scale.  The patient's pain is worse in the morning, evening and at night.  The patient's pain is intermittent in nature.  And the quality of the patient's pain is described as sharp and throbbing.  The patient's pain is located in the right shoulder, bilateral low back and right lateral thigh.  Patient states he Darya pain relief he is now obtaining from his current pain relievers is enough to make a real difference in his life by reducing his pain symptoms by approximately 30%.  Patient initially felt that the blood in the urine is being caused by oxycodone/acetaminophen advised the patient that this is not a common side effect and advised him to follow-up with his urologist.    Pain Contract Signed:  02/18/25  Last Urine Drug Screen:  02/24/25    Other than as stated above, the patient denies any interval changes in medications, medical condition, mental condition, symptoms, or allergies since the last office visit.         Review of Systems:    Review of Systems   Respiratory:  Negative for shortness of breath.    Cardiovascular:  Negative for chest pain.   Gastrointestinal:  Negative for constipation, diarrhea, nausea and vomiting.   Genitourinary:  Positive for hematuria.   Musculoskeletal:  Positive for arthralgias, back pain and gait problem. Negative for joint swelling and myalgias.   Skin:  Negative for rash.   Neurological:  Negative for dizziness, seizures and weakness.   All other systems reviewed and are negative.        Past Medical History:   Diagnosis Date   • Acute respiratory failure with hypoxia (HCC) 03/28/2024   • Chronic kidney disease    • Diabetes mellitus (HCC)    • GERD (gastroesophageal reflux disease)    • Hypertension    • Respiratory acidosis 03/31/2024       Past Surgical History:   Procedure Laterality Date   • APPENDECTOMY     • CARDIAC CATHETERIZATION  Left 05/24/2024    Procedure: Cardiac Left Heart Cath;  Surgeon: Qiana Torres MD;  Location: [facility];  Service: Cardiology   • CARDIAC CATHETERIZATION N/A 05/24/2024    Procedure: Cardiac Coronary Angiogram;  Surgeon: Qiana Torres MD;  Location: [facility];  Service: Cardiology   • CARDIAC CATHETERIZATION  05/24/2024    Procedure: Cardiac catheterization;  Surgeon: Qiana Torres MD;  Location: [facility];  Service: Cardiology   • CARDIAC ELECTROPHYSIOLOGY PROCEDURE N/A 6/21/2024    Procedure: Cardiac pacer implant;  Surgeon: Solomon Quintero MD;  Location: [facility];  Service: Cardiology   • W[place] CYSTO W/REMOVAL OF LESIONS SMALL N/A 6/20/2024    Procedure: TRANSURETHRAL RESECTION OF BLADDER TUMOR (TURBT);  Surgeon: Nick Patel DO;  Location: [facility];  Service: Urology   • [place] CYSTO W/REMOVAL OF LESIONS SMALL N/A 7/31/2024    Procedure: TRANSURETHRAL RESECTION OF BLADDER TUMOR (TURBT), intravesical instillation of gemcitabine;  Surgeon: Nick Patel DO;  Location: [facility];  Service: Urology       No family history on file.    Social History     Occupational History   • Not on file   Tobacco Use   • Smoking status: Former     Passive exposure: Past   • Smokeless tobacco: Never   Vaping Use   • Vaping status: Never Used   Substance and Sexual Activity   • Alcohol use: Yes     Comment: rarely   • Drug use: Never   • Sexual activity: Yes     Partners: Female         Current Outpatient Medications:   •  apixaban (Eliquis) 2.5 mg, Take 1 tablet (2.5 mg total) by mouth 2 (two) times a day, Disp: 90 tablet, Rfl: 3  •  aspirin 81 mg chewable tablet, Chew 81 mg daily, Disp: , Rfl:   •  calcitriol (ROCALTROL) 0.25 mcg capsule, Take 1 capsule (0.25 mcg total) by mouth daily, Disp: 90 capsule, Rfl: 3  •  cholecalciferol 25 MCG (1000 UT) tablet, Take 1,000 Units by mouth daily, Disp: , Rfl:   •  ezetimibe (ZETIA) 10 mg tablet, Take 10 mg by mouth daily,  "Disp: , Rfl:   •  glipiZIDE (GLUCOTROL) 10 mg tablet, Take 10 mg by mouth 2 (two) times a day before meals, Disp: , Rfl:   •  insulin degludec (Tresiba FlexTouch) 100 units/mL injection pen, Inject 15 Units under the skin daily at bedtime, Disp: , Rfl:   •  isosorbide mononitrate (IMDUR) 60 mg 24 hr tablet, Take 1 tablet (60 mg total) by mouth daily, Disp: 90 tablet, Rfl: 1  •  latanoprost (XALATAN) 0.005 % ophthalmic solution, Administer 1 drop to both eyes daily at bedtime, Disp: , Rfl:   •  levothyroxine 50 mcg tablet, Take 50 mcg by mouth daily, Disp: , Rfl:   •  losartan (COZAAR) 25 mg tablet, Take 1 tablet (25 mg total) by mouth daily, Disp: 30 tablet, Rfl: 2  •  losartan (COZAAR) 50 mg tablet, Take 1 tablet (50 mg total) by mouth daily, Disp: 30 tablet, Rfl: 2  •  metFORMIN (GLUCOPHAGE) 500 mg tablet, Take 500 mg by mouth 2 (two) times a day with meals, Disp: , Rfl:   •  methocarbamol (ROBAXIN) 500 mg tablet, Take 1 tablet (500 mg total) by mouth 2 (two) times a day as needed for muscle spasms, Disp: 60 tablet, Rfl: 0  •  metoprolol succinate (TOPROL-XL) 50 mg 24 hr tablet, Take 1.5 tablets (75 mg total) by mouth daily, Disp: 90 tablet, Rfl: 2  •  [START ON 4/25/2025] oxyCODONE-acetaminophen (PERCOCET) 5-325 mg per tablet, Take 1 tablet by mouth 3 (three) times a day as needed for moderate pain Only take 3rd tablet on severe days Max Daily Amount: 3 tablets Do not start before April 25, 2025., Disp: 60 tablet, Rfl: 0  •  pantoprazole (PROTONIX) 40 mg tablet, Take 40 mg by mouth daily at bedtime, Disp: , Rfl:   •  pioglitazone (ACTOS) 45 mg tablet, Take 45 mg by mouth daily, Disp: , Rfl:   •  simvastatin (ZOCOR) 40 mg tablet, Take 40 mg by mouth daily at bedtime, Disp: , Rfl:     Allergies   Allergen Reactions   • Canagliflozin GI Intolerance     Yeast infection  Yeast infection     • Lisinopril Other (See Comments)     Pt unsure of reaction    • Lovastatin GI Intolerance       Physical Exam:    Ht 5' 10\" " (1.778 m)   Wt 107 kg (235 lb)   BMI 33.72 kg/m²     Constitutional:normal, well developed, well nourished, alert, in no distress and non-toxic and no overt pain behavior. and obese  Eyes:anicteric  HEENT:grossly intact  Neck:supple, symmetric, trachea midline and no masses   Pulmonary:even and unlabored  Cardiovascular:No edema or pitting edema present  Skin:Normal without rashes or lesions and well hydrated  Psychiatric:Mood and affect appropriate  Neurologic:Cranial Nerves II-XII grossly intact  Musculoskeletal:antalgic and ambulates with cane      This document was created using speech voice recognition software.   Grammatical errors, random word insertions, pronoun errors, and incomplete sentences are an occasional consequence of this system due to software limitations, ambient noise, and hardware issues.   Any formal questions or concerns about content, text, or information contained within the body of this dictation should be directly addressed to the provider for clarification.

## 2025-04-15 ENCOUNTER — OFFICE VISIT (OUTPATIENT)
Dept: PAIN MEDICINE | Facility: CLINIC | Age: 80
End: 2025-04-15

## 2025-04-15 ENCOUNTER — TELEPHONE (OUTPATIENT)
Dept: NEPHROLOGY | Facility: CLINIC | Age: 80
End: 2025-04-15

## 2025-04-15 VITALS — HEIGHT: 70 IN | BODY MASS INDEX: 33.64 KG/M2 | WEIGHT: 235 LBS

## 2025-04-15 DIAGNOSIS — G89.4 CHRONIC PAIN SYNDROME: Primary | ICD-10-CM

## 2025-04-15 DIAGNOSIS — M48.07 FORAMINAL STENOSIS OF LUMBOSACRAL REGION: ICD-10-CM

## 2025-04-15 DIAGNOSIS — E66.01 MORBID (SEVERE) OBESITY DUE TO EXCESS CALORIES (HCC): ICD-10-CM

## 2025-04-15 DIAGNOSIS — Z79.891 LONG-TERM CURRENT USE OF OPIATE ANALGESIC: ICD-10-CM

## 2025-04-15 DIAGNOSIS — M62.830 MUSCLE SPASM OF BACK: ICD-10-CM

## 2025-04-15 DIAGNOSIS — G89.29 CHRONIC RIGHT-SIDED LOW BACK PAIN WITH RIGHT-SIDED SCIATICA: ICD-10-CM

## 2025-04-15 DIAGNOSIS — M54.41 CHRONIC RIGHT-SIDED LOW BACK PAIN WITH RIGHT-SIDED SCIATICA: ICD-10-CM

## 2025-04-15 RX ORDER — METHOCARBAMOL 500 MG/1
500 TABLET, FILM COATED ORAL 2 TIMES DAILY PRN
Qty: 60 TABLET | Refills: 0 | Status: SHIPPED | OUTPATIENT
Start: 2025-04-15

## 2025-04-15 RX ORDER — OXYCODONE AND ACETAMINOPHEN 5; 325 MG/1; MG/1
1 TABLET ORAL 3 TIMES DAILY PRN
Qty: 60 TABLET | Refills: 0 | Status: SHIPPED | OUTPATIENT
Start: 2025-04-25 | End: 2025-04-15

## 2025-04-15 RX ORDER — OXYCODONE AND ACETAMINOPHEN 5; 325 MG/1; MG/1
1 TABLET ORAL 3 TIMES DAILY PRN
Qty: 60 TABLET | Refills: 0 | Status: SHIPPED | OUTPATIENT
Start: 2025-04-25

## 2025-04-15 NOTE — PATIENT INSTRUCTIONS

## 2025-04-16 ENCOUNTER — OFFICE VISIT (OUTPATIENT)
Dept: NEPHROLOGY | Facility: CLINIC | Age: 80
End: 2025-04-16
Payer: COMMERCIAL

## 2025-04-16 VITALS
HEIGHT: 70 IN | SYSTOLIC BLOOD PRESSURE: 134 MMHG | HEART RATE: 95 BPM | OXYGEN SATURATION: 98 % | RESPIRATION RATE: 16 BRPM | DIASTOLIC BLOOD PRESSURE: 70 MMHG | WEIGHT: 232 LBS | BODY MASS INDEX: 33.21 KG/M2 | TEMPERATURE: 97.6 F

## 2025-04-16 DIAGNOSIS — I50.33 ACUTE ON CHRONIC DIASTOLIC (CONGESTIVE) HEART FAILURE (HCC): ICD-10-CM

## 2025-04-16 DIAGNOSIS — N18.4 CHRONIC KIDNEY DISEASE, STAGE 4 (SEVERE) (HCC): ICD-10-CM

## 2025-04-16 DIAGNOSIS — E11.65 TYPE 2 DIABETES MELLITUS WITH HYPERGLYCEMIA, WITH LONG-TERM CURRENT USE OF INSULIN (HCC): ICD-10-CM

## 2025-04-16 DIAGNOSIS — N18.32 STAGE 3B CHRONIC KIDNEY DISEASE (HCC): Primary | ICD-10-CM

## 2025-04-16 DIAGNOSIS — I50.32 CHRONIC HEART FAILURE WITH PRESERVED EJECTION FRACTION (HFPEF) (HCC): ICD-10-CM

## 2025-04-16 DIAGNOSIS — I10 PRIMARY HYPERTENSION: ICD-10-CM

## 2025-04-16 DIAGNOSIS — Z79.4 TYPE 2 DIABETES MELLITUS WITH HYPERGLYCEMIA, WITH LONG-TERM CURRENT USE OF INSULIN (HCC): ICD-10-CM

## 2025-04-16 PROCEDURE — 99214 OFFICE O/P EST MOD 30 MIN: CPT | Performed by: INTERNAL MEDICINE

## 2025-04-16 PROCEDURE — G2211 COMPLEX E/M VISIT ADD ON: HCPCS | Performed by: INTERNAL MEDICINE

## 2025-04-16 NOTE — ASSESSMENT & PLAN NOTE
Patient blood pressure within acceptable range while on losartan 75 mg daily and metoprolol 50 mg daily.

## 2025-04-16 NOTE — ASSESSMENT & PLAN NOTE
Lab Results   Component Value Date    HGBA1C 7.4 (H) 02/11/2025     Patient's hemoglobin A1c is 7.4 and slightly above target.  Remains on Tresiba insulin, metformin and Actos.

## 2025-04-16 NOTE — ASSESSMENT & PLAN NOTE
Wt Readings from Last 3 Encounters:   04/16/25 105 kg (232 lb)   04/15/25 107 kg (235 lb)   03/25/25 107 kg (235 lb)     Patient is euvolemic

## 2025-04-16 NOTE — ASSESSMENT & PLAN NOTE
Lab Results   Component Value Date    EGFR 36 03/27/2025    EGFR 40 (L) 02/11/2025    EGFR 37 09/09/2024    CREATININE 1.72 (H) 03/27/2025    CREATININE 1.7 (H) 02/11/2025    CREATININE 1.69 (H) 09/09/2024       Orders:    Albumin; Standing    Albumin / creatinine urine ratio; Standing    Calcium; Standing    CBC and differential; Standing    Comprehensive metabolic panel; Standing    Phosphorus; Standing    PTH, intact; Standing    Urinalysis with microscopic; Future    Vitamin D 25 hydroxy; Standing    Etiology of CKD has been diabetic nephropathy, age-related nephron loss and hypertensive nephrosclerosis.  Furthermore patient suffered from acute kidney injury in the year 2024 with peak creatinine of 2.4 mg/dL.  Recently patient's serum creatinine has stabilized around 1.7 mg/dL EGFR of 36 as noted on March 27, 2025  Avoidance of NSAIDs, history glycemic control necessary to prevent progression of CKD.

## 2025-04-16 NOTE — PROGRESS NOTES
Name: Trent Ocasio      : 1945      MRN: [id number]  Encounter Provider: Nikki Gilbert MD  Encounter Date: 2025   Encounter department: [facility]  :  Assessment & Plan  Stage 3b chronic kidney disease (HCC)  Lab Results   Component Value Date    EGFR 36 2025    EGFR 40 (L) 2025    EGFR 37 2024    CREATININE 1.72 (H) 2025    CREATININE 1.7 (H) 2025    CREATININE 1.69 (H) 2024       Orders:    Albumin; Standing    Albumin / creatinine urine ratio; Standing    Calcium; Standing    CBC and differential; Standing    Comprehensive metabolic panel; Standing    Phosphorus; Standing    PTH, intact; Standing    Urinalysis with microscopic; Future    Vitamin D 25 hydroxy; Standing    Etiology of CKD has been diabetic nephropathy, age-related nephron loss and hypertensive nephrosclerosis.  Furthermore patient suffered from acute kidney injury in the year  with peak creatinine of 2.4 mg/dL.  Recently patient's serum creatinine has stabilized around 1.7 mg/dL EGFR of 36 as noted on 2025  Avoidance of NSAIDs, history glycemic control necessary to prevent progression of CKD.  Chronic heart failure with preserved ejection fraction (HFpEF) ([facility])  Wt Readings from Last 3 Encounters:   25 105 kg (232 lb)   04/15/25 107 kg (235 lb)   25 107 kg (235 lb)     Patient is euvolemic         Primary hypertension  Patient blood pressure within acceptable range while on losartan 75 mg daily and metoprolol 50 mg daily.       Type 2 diabetes mellitus with hyperglycemia, with long-term current use of insulin ([facility])    Lab Results   Component Value Date    HGBA1C 7.4 (H) 2025     Patient's hemoglobin A1c is 7.4 and slightly above target.  Remains on Tresiba insulin, metformin and Actos.           History of Present Illness   HPI  Trent Ocasio is a 80 y.o. male who presents to renal office for management of CKD.  States that  "he was diagnosed with recurrent bladder tumor and will be undergoing cystoscopy with possible resection of the bladder tumor in May 2025.  Denies any acute complaints.  History obtained from: patient    Review of Systems   Constitutional: Negative.    HENT: Negative.     Eyes: Negative.    Respiratory: Negative.     Cardiovascular: Negative.    Gastrointestinal: Negative.    Endocrine: Negative.    Genitourinary: Negative.    Musculoskeletal: Negative.    Skin: Negative.    Allergic/Immunologic: Negative.    Neurological: Negative.    Hematological: Negative.    All other systems reviewed and are negative.    Medical History Reviewed by provider this encounter:     .     Objective   /70 (Patient Position: Sitting, Cuff Size: Large)   Pulse 95   Temp 97.6 °F (36.4 °C) (Temporal)   Resp 16   Ht 5' 10\" (1.778 m)   Wt 105 kg (232 lb)   SpO2 98%   BMI 33.29 kg/m²      Physical Exam  HENT:      Head: Normocephalic and atraumatic.   Eyes:      Pupils: Pupils are equal, round, and reactive to light.   Neck:      Vascular: No JVD.   Cardiovascular:      Rate and Rhythm: Normal rate and regular rhythm.      Heart sounds: Normal heart sounds. No murmur heard.     No friction rub.   Pulmonary:      Effort: Pulmonary effort is normal.      Breath sounds: Normal breath sounds.   Abdominal:      General: Bowel sounds are normal. There is no distension.      Palpations: Abdomen is soft.      Tenderness: There is no abdominal tenderness. There is no rebound.   Musculoskeletal:         General: No tenderness.      Cervical back: Neck supple.   Skin:     General: Skin is dry.      Findings: No rash.   Neurological:      Mental Status: He is alert and oriented to person, place, and time.         Administrative Statements   I have spent a total time of 31 minutes in caring for this patient on the day of the visit/encounter including Diagnostic results, Prognosis, Risks and benefits of tx options, Patient and family " education, Importance of tx compliance, Risk factor reductions, Impressions, Counseling / Coordination of care, and Documenting in the medical record.

## 2025-04-20 DIAGNOSIS — N25.81 SECONDARY HYPERPARATHYROIDISM OF RENAL ORIGIN (HCC): ICD-10-CM

## 2025-04-21 ENCOUNTER — REMOTE DEVICE CLINIC VISIT (OUTPATIENT)
Dept: CARDIOLOGY CLINIC | Facility: CLINIC | Age: 80
End: 2025-04-21
Payer: COMMERCIAL

## 2025-04-21 DIAGNOSIS — I44.30 AVB (ATRIOVENTRICULAR BLOCK): Primary | ICD-10-CM

## 2025-04-21 PROCEDURE — 93294 REM INTERROG EVL PM/LDLS PM: CPT | Performed by: STUDENT IN AN ORGANIZED HEALTH CARE EDUCATION/TRAINING PROGRAM

## 2025-04-21 PROCEDURE — 93296 REM INTERROG EVL PM/IDS: CPT | Performed by: STUDENT IN AN ORGANIZED HEALTH CARE EDUCATION/TRAINING PROGRAM

## 2025-04-21 RX ORDER — CALCITRIOL 0.25 UG/1
0.25 CAPSULE, LIQUID FILLED ORAL DAILY
Qty: 90 CAPSULE | Refills: 1 | Status: SHIPPED | OUTPATIENT
Start: 2025-04-21

## 2025-04-21 NOTE — PROGRESS NOTES
Results for orders placed or performed in visit on 04/21/25   Cardiac EP device report    Narrative    MDT DC PM/ACTIVE SYSTEM IS MRI CONDITIONAL  CARELINK TRANSMISSION: BATTERY VOLTAGE ADEQUATE (12 YRS). AP: 28%. : 98.4% (>405~MVP-ON~AVB). ALL AVAILABLE LEAD PARAMETERS WITHIN NORMAL LIMITS. NO SIGNIFICANT HIGH RATE EPISODES. NORMAL DEVICE FUNCTION. CH

## 2025-04-22 ENCOUNTER — RESULTS FOLLOW-UP (OUTPATIENT)
Dept: NON INVASIVE DIAGNOSTICS | Facility: HOSPITAL | Age: 80
End: 2025-04-22

## 2025-04-28 ENCOUNTER — OFFICE VISIT (OUTPATIENT)
Dept: CARDIOLOGY CLINIC | Facility: CLINIC | Age: 80
End: 2025-04-28
Payer: COMMERCIAL

## 2025-04-28 VITALS
HEIGHT: 70 IN | DIASTOLIC BLOOD PRESSURE: 82 MMHG | RESPIRATION RATE: 16 BRPM | SYSTOLIC BLOOD PRESSURE: 132 MMHG | HEART RATE: 83 BPM | BODY MASS INDEX: 33.21 KG/M2 | WEIGHT: 232 LBS

## 2025-04-28 DIAGNOSIS — Z01.810 ENCOUNTER FOR PREPROCEDURAL CARDIOVASCULAR EXAMINATION: Primary | ICD-10-CM

## 2025-04-28 DIAGNOSIS — I47.29 NSVT (NONSUSTAINED VENTRICULAR TACHYCARDIA) (HCC): ICD-10-CM

## 2025-04-28 DIAGNOSIS — I25.10 CORONARY ARTERY DISEASE INVOLVING NATIVE CORONARY ARTERY OF NATIVE HEART WITHOUT ANGINA PECTORIS: ICD-10-CM

## 2025-04-28 DIAGNOSIS — Z79.01 ANTICOAGULANT LONG-TERM USE: ICD-10-CM

## 2025-04-28 DIAGNOSIS — I50.32 CHRONIC HEART FAILURE WITH PRESERVED EJECTION FRACTION (HFPEF) (HCC): ICD-10-CM

## 2025-04-28 DIAGNOSIS — Z95.0 S/P PLACEMENT OF CARDIAC PACEMAKER: ICD-10-CM

## 2025-04-28 DIAGNOSIS — E78.2 MIXED HYPERLIPIDEMIA: ICD-10-CM

## 2025-04-28 DIAGNOSIS — I48.0 PAROXYSMAL ATRIAL FIBRILLATION (HCC): ICD-10-CM

## 2025-04-28 DIAGNOSIS — I10 PRIMARY HYPERTENSION: ICD-10-CM

## 2025-04-28 DIAGNOSIS — E66.9 OBESITY (BMI 30-39.9): ICD-10-CM

## 2025-04-28 PROCEDURE — 99214 OFFICE O/P EST MOD 30 MIN: CPT | Performed by: INTERNAL MEDICINE

## 2025-04-28 PROCEDURE — 93000 ELECTROCARDIOGRAM COMPLETE: CPT | Performed by: INTERNAL MEDICINE

## 2025-04-28 NOTE — PROGRESS NOTES
CARDIOLOGY OFFICE VISIT  [facility]  [address] [place] [address], [place] [id number]  Tel: [phone]      NAME: Trent Ocasio  AGE: 80 y.o.  SEX: male  : 1945  MRN: [id number]      Chief Complaint:  Chief Complaint   Patient presents with    Pre-op Exam         History of Present Illness:   Patient comes for preop cardiac risk assessment prior to urological surgery on 25 followed by bilateral cataract surgeries in 2025. States he is doing okay from cardiac stand point and denies chest pain / pressure, SOB, palpitations, lightheadedness, syncope, swelling feet, orthopnea, PND, claudication.    Paroxysmal atrial fibrillation - Device interrogation on 2025 demonstrated 11 min 25  sec of atrial fibrillation (new diagnosis).  Subsequent device interrogations on 2025 and 2025 without evidence of AT/AF. Pt on Toprol-XL 75 mg daily for rate control. Anticoagulation with low-dose Eliquis (age, kidney function).  CHADS2-VASc = 8. Risks, benefits, and alternative of anticoagulation to prevent thromboembolic risk from atrial fibrillation were discussed at length.    Chronic HFpEF - on losartan 75 mg daily, metoprolol succinate 75 mg daily. States he watches his salt intake closely and weighs himself occasionally     MV CAD with  of prox LAD. There are right to left collaterals visualized. Distal LCx lesion has a 80% stenosis (2024)  -  States is doing well cardiac wise with no cardiac symptoms.  Taking ASA, BB, statin, Zetia, Imdur regularly.     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  2 EPISODES OF NSVT (6 @ 162 BPM, 6 @ 200 BPM). On BB    Primary hypertension -  Has been hypertensive for many years.  Taking medications regularly.  Denies  lightheadedness, headache, medication side effects.      Mixed hyperlipidemia -  Has had hyperlipidemia for many years.  Taking statin regularly along with diet control.  Denies myalgia.  PCP closely monitoring the blood work.    Obesity -  Trying to lose weight.      Past Medical History:  Past Medical History:   Diagnosis Date    Acute respiratory failure with hypoxia (HCC) 03/28/2024    Chronic kidney disease     Diabetes mellitus (HCC)     GERD (gastroesophageal reflux disease)     Hypertension     Respiratory acidosis 03/31/2024         Past Surgical History:  Past Surgical History:   Procedure Laterality Date    APPENDECTOMY      CARDIAC CATHETERIZATION Left 05/24/2024    Procedure: Cardiac Left Heart Cath;  Surgeon: Qiana Torres MD;  Location: [facility];  Service: Cardiology    CARDIAC CATHETERIZATION N/A 05/24/2024    Procedure: Cardiac Coronary Angiogram;  Surgeon: Qiana Torres MD;  [facility]: [facility];  Service: Cardiology    CARDIAC CATHETERIZATION  05/24/2024    Procedure: Cardiac catheterization;  Surgeon: Qiana Torres MD;  [facility];  Service: Cardiology    CARDIAC ELECTROPHYSIOLOGY PROCEDURE N/A 6/21/2024    Procedure: Cardiac pacer implant;  Surgeon: Solomon Quintero MD;  [facility]: [facility];  Service: Cardiology    VA CYSTO W/REMOVAL OF LESIONS SMALL N/A 6/20/2024    Procedure: TRANSURETHRAL RESECTION OF BLADDER TUMOR (TURBT);  Surgeon: Nick Patel DO;  Location: [facility];  Service: Urology    VA CYSTO W/REMOVAL OF LESIONS SMALL N/A 7/31/2024    Procedure: TRANSURETHRAL RESECTION OF BLADDER TUMOR (TURBT), intravesical instillation of gemcitabine;  Surgeon: Nick Patel DO;  Location: [facility];  Service: Urology         Family History:  No family history on file.      Social History:  Social History     Socioeconomic History    Marital status: /Civil Union     Spouse name: Not on file    Number of  children: Not on file    Years of education: Not on file    Highest education level: Not on file   Occupational History    Not on file   Tobacco Use    Smoking status: Former     Passive exposure: Past    Smokeless tobacco: Never   Vaping Use    Vaping status: Never Used   Substance and Sexual Activity    Alcohol use: Yes     Comment: rarely    Drug use: Never    Sexual activity: Yes     Partners: Female   Other Topics Concern    Not on file   Social History Narrative    Not on file     Social Drivers of Health     Financial Resource Strain: Low Risk  (8/20/2024)    Received from [facility]    Overall Financial Resource Strain (CARDIA)     Difficulty of Paying Living Expenses: Not hard at all   Food Insecurity: No Food Insecurity (8/20/2024)    Received from [facility]    Hunger Vital Sign     Worried About Running Out of Food in the Last Year: Never true     Ran Out of Food in the Last Year: Never true   Transportation Needs: No Transportation Needs (8/20/2024)    Received from [facility]    PRAPARE - Transportation     Lack of Transportation (Medical): No     Lack of Transportation (Non-Medical): No   Physical Activity: Not on file   Stress: No Stress Concern Present (8/20/2024)    Received from [facility]    [employer] - Occupational Stress Questionnaire     Feeling of Stress : Only a little   Social Connections: Feeling Socially Integrated (8/20/2024)    Received from [facility]    OASIS : Social Isolation     How often do you feel lonely or isolated from those around you?: Never   Intimate Partner Violence: Not At Risk (8/20/2024)    Received from [facility], [facility]    Humiliation, Afraid, Rape, and Kick questionnaire     Fear of Current or Ex-Partner: No     Emotionally Abused: No     Physically Abused: No     Sexually Abused: No   Housing  Stability: Low Risk  (8/20/2024)    Received from [facility]    Housing Stability Vital Sign     Unable to Pay for Housing in the Last Year: No     Number of Times Moved in the Last Year: 0     Homeless in the Last Year: No         Active Problems:  Patient Active Problem List   Diagnosis    Chronic heart failure with preserved ejection fraction (HFpEF) (HCC)    History of prostate cancer    Anxiety    HTN (hypertension)    Hypothyroid    Type 2 diabetes mellitus, with long-term current use of insulin (HCC)    Elevated troponin    Stage 3b chronic kidney disease (HCC)    Abnormal stress test    CAD (coronary artery disease)    Malignant 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 (HCC)    Hypoglycemia    S/P cardiac cath    Bradycardia    Bladder tumor    Gastroesophageal reflux disease    Chronic low back pain with sciatica    Chronic kidney disease, stage 4 (severe) (HCC)    Acute on chronic diastolic (congestive) heart failure (HCC)    Morbid (severe) obesity due to excess calories ([facility])    Type 2 diabetes mellitus with diabetic chronic kidney disease, unspecified CKD stage, unspecified whether long term insulin use ([facility])         The following portions of the patient's history were reviewed and updated as appropriate: past medical history, past surgical history, past family history,  past social history, current medications, allergies and problem list.      Review of Systems:  Constitutional: Denies fever, chills  Eyes: Denies eye redness, eye discharge  ENT: Denies sneezing, nasal discharge, sore throat   Respiratory: Denies cough, expectoration, shortness of breath  Cardiovascular: Denies chest pain, palpitations  Gastrointestinal: Denies abdominal pain, nausea, vomiting, diarrhea  Musculoskeletal: Denies muscle pain  Neurologic: Denies lightheadedness, syncope, headache, seizures  Endocrine: Denies polydipsia, temperature intolerance  Allergy and Immunology: Denies hives, insect bite  sensitivity  Hematological and Lymphatic: Denies bleeding problems, swollen glands   Psychological: Denies suicidal ideation, panic  Dermatological: Denies pruritus, rash      Vitals:  Vitals:    04/28/25 1023   BP: 132/82   Pulse: 83   Resp: 16       Body mass index is 33.29 kg/m².    Weight (last 2 days)       Date/Time Weight    04/28/25 1023 105 (232)              Physical Examination:  General: Patient is not in acute distress. Awake, alert, oriented in time, place and person. Responding to commands.  Using a cane for support  Head: Normocephalic. Atraumatic  Eyes: Both pupils normal sized, round and reactive to light. Nonicteric  ENT: Normal external ear canals  Neck: Supple. JVP not raised. Trachea central. No thyromegaly  Lungs: Bilateral bronchovascular breath sounds with no crackles or rhonchi  Chest wall: No tenderness  Cardiovascular: S1 and S2 normal. No murmur, rub or gallop  Gastrointestinal: Abdomen soft, nontender. No guarding or rigidity. Liver and spleen not palpable. Bowel sounds present  Neurologic: Patient is awake, alert, oriented in time, place and person. Responding to commands. Moving all extremities  Integumentary:  No skin rash  Lymphatic: No cervical lymphadenopathy  Back: Symmetric. No CVA tenderness  Extremities: No clubbing, cyanosis or edema      Laboratory Results:  CBC with diff:   Lab Results   Component Value Date    WBC 5.42 03/27/2025    RBC 4.17 03/27/2025    HGB 13.2 03/27/2025    HCT 41.4 03/27/2025    MCV 99 (H) 03/27/2025    MCH 31.7 03/27/2025    RDW 14.3 03/27/2025     03/27/2025       CMP:  Lab Results   Component Value Date    CREATININE 1.72 (H) 03/27/2025    CREATININE 1.7 (H) 02/11/2025    BUN 24 03/27/2025    BUN 35 (H) 02/11/2025    K 4.4 03/27/2025    K 4.7 02/11/2025     03/27/2025     02/11/2025    CO2 24 03/27/2025    CO2 29 02/11/2025    ALKPHOS 63 03/27/2025    ALKPHOS 45 03/03/2025    ALT 12 03/27/2025    ALT 11 03/03/2025    AST 17  03/27/2025    AST 14 03/03/2025    BILIDIR 0.1 03/03/2025       Lab Results   Component Value Date    HGBA1C 7.4 (H) 02/11/2025    MG 1.9 03/27/2025    MG 2.1 03/16/2022    PHOS 2.9 03/27/2025    PHOS 3.2 03/16/2022       Cardiac testing:   Results for orders placed during the hospital encounter of 03/28/24    Echo complete    Interpretation Summary    Left Ventricle: Left ventricular cavity size is normal. Wall thickness is mildly increased. The left ventricular ejection fraction is 55%. Systolic function is normal. Wall motion is normal. Diastolic function is normal.    Right Ventricle: Right ventricular cavity size is normal. Systolic function is normal.    Pericardium: There is a trivial pericardial effusion.    Results for orders placed during the hospital encounter of 03/25/25    Echo follow up/limited w/ contrast if indicated    Interpretation Summary    Limited echocardiogram for LVEF    Left Ventricle: Left ventricular cavity size is normal. Wall thickness is normal. The left ventricular ejection fraction is 52%. Systolic function is low normal. Wall motion is normal.    Results for orders placed during the hospital encounter of 03/28/24    NM myocardial perfusion spect (rx stress and/or rest)    Interpretation Summary    Stress ECG: No ST deviation is noted. The ECG was negative for ischemia.    Perfusion Defect Conclusion: There is no evidence of transient ischemic dilation (TID). TID index 1.22.    Perfusion: There is a left ventricular perfusion defect that is medium to large in size that is predominantly fixed in the apex, apical lateral, apical septal as well as inferior walls.  There appears to be a small reversible apical anterior defect.    Stress Function: Left ventricular function post-stress is normal. Stress ejection fraction is 53%.    Stress Combined Conclusion: Left ventricular perfusion is abnormal.      EKG: Reviewed by me.  4/28/2025.  Atrial sensed ventricular paced  rhythm      Medications:    Current Outpatient Medications:     apixaban (Eliquis) 2.5 mg, Take 1 tablet (2.5 mg total) by mouth 2 (two) times a day, Disp: 90 tablet, Rfl: 3    aspirin 81 mg chewable tablet, Chew 81 mg daily, Disp: , Rfl:     calcitriol (ROCALTROL) 0.25 mcg capsule, Take 1 capsule (0.25 mcg total) by mouth daily, Disp: 90 capsule, Rfl: 1    cholecalciferol 25 MCG (1000 UT) tablet, Take 1,000 Units by mouth daily, Disp: , Rfl:     ezetimibe (ZETIA) 10 mg tablet, Take 10 mg by mouth daily, Disp: , Rfl:     glipiZIDE (GLUCOTROL) 10 mg tablet, Take 10 mg by mouth 2 (two) times a day before meals, Disp: , Rfl:     insulin degludec (Tresiba FlexTouch) 100 units/mL injection pen, Inject 20 Units under the skin daily at bedtime, Disp: , Rfl:     isosorbide mononitrate (IMDUR) 60 mg 24 hr tablet, Take 1 tablet (60 mg total) by mouth daily, Disp: 90 tablet, Rfl: 1    latanoprost (XALATAN) 0.005 % ophthalmic solution, Administer 1 drop to both eyes daily at bedtime, Disp: , Rfl:     levothyroxine 50 mcg tablet, Take 50 mcg by mouth daily, Disp: , Rfl:     losartan (COZAAR) 25 mg tablet, Take 1 tablet (25 mg total) by mouth daily, Disp: 30 tablet, Rfl: 2    losartan (COZAAR) 50 mg tablet, Take 1 tablet (50 mg total) by mouth daily, Disp: 30 tablet, Rfl: 2    metFORMIN (GLUCOPHAGE) 500 mg tablet, Take 500 mg by mouth 2 (two) times a day with meals, Disp: , Rfl:     methocarbamol (ROBAXIN) 500 mg tablet, Take 1 tablet (500 mg total) by mouth 2 (two) times a day as needed for muscle spasms, Disp: 60 tablet, Rfl: 0    metoprolol succinate (TOPROL-XL) 50 mg 24 hr tablet, Take 1.5 tablets (75 mg total) by mouth daily, Disp: 90 tablet, Rfl: 2    oxyCODONE-acetaminophen (PERCOCET) 5-325 mg per tablet, Take 1 tablet by mouth 3 (three) times a day as needed for moderate pain Only take 3rd tablet on severe days Max Daily Amount: 3 tablets Do not start before April 25, 2025., Disp: 60 tablet, Rfl: 0    pantoprazole  (PROTONIX) 40 mg tablet, Take 40 mg by mouth daily at bedtime, Disp: , Rfl:     simvastatin (ZOCOR) 40 mg tablet, Take 40 mg by mouth daily at bedtime, Disp: , Rfl:       Allergies:  Allergies   Allergen Reactions    Canagliflozin GI Intolerance     Yeast infection  Yeast infection      Lisinopril Other (See Comments)     Pt unsure of reaction     Lovastatin GI Intolerance         Assessment and Plan:  1. Encounter for preprocedural cardiovascular examination (Primary)  Patient has no active cardiac conditions (such as unstable cardiac syndrome, decompensated heart failure, significant arrhythmias, severe valvular disease). Patient is at least a moderate cardiac risk patient going in for a urological surgery and then b/l cataract surgeries.  No further cardiac workup is needed at this time.  No cardiac contraindications for surgery.  Perioperative use of beta-blocker is highly recommended.     For his urological surgery, he may hold aspirin 1 week prior to procedure and Eliquis 2 days prior to procedure.  Postop resumption based on surgeon's decision.    - POCT ECG    2. Chronic heart failure with preserved ejection fraction (HFpEF) (HCC)  Euvolemic.  Continue metoprolol succinate, losartan.  Low-salt diet.   - POCT ECG    3. Coronary artery disease involving native coronary artery of native heart without angina pectoris  Stable.  Continue aspirin, statin, beta-blocker, Zetia, Imdur    4. Paroxysmal atrial fibrillation (HCC)  Continue beta-blocker for rate control and Eliquis for anticoagulation.    5. Anticoagulant long-term use  Continue Eliquis.  Denies bleeding from any site    6. NSVT (nonsustained ventricular tachycardia) (HCC)  No recent episodes.  Continue beta-blocker    7. S/P placement of cardiac pacemaker  Continue regular pacemaker interrogations.  Recent pacemaker interrogation discussed with the patient and spouse    8. Primary hypertension  Blood pressure stable.  Continue current medication.   Continue to monitor BP at home and call if abnormal    9. Mixed hyperlipidemia  Continue simvastatin and diet control.  His PCP closely monitors his blood work    Recommend aggressive risk factor modification and therapeutic lifestyle changes.  Low-salt, low-calorie, low-fat, low-cholesterol diet with regular exercise and to optimize weight.  I will defer the ordering and monitoring of necessity lab studies to you, but I am available and happy to review and manage any of the data at your request in the future.    Discussed concepts of atherosclerosis, including signs and symptoms of cardiac disease.    Previous studies were reviewed.    Safety measures were reviewed.  Questions were entertained and answered.  Patient was advised to report any problems requiring medical attention.    Follow-up with PCP and appropriate specialist and lab work as discussed.    Return for follow up visit as scheduled or earlier, if needed.  Thank you for allowing me to participate in the care and evaluation of your patient.  Should you have any questions, please feel free to contact me.    Jay Taylor MD  4/28/2025,11:04 AM

## 2025-04-29 ENCOUNTER — APPOINTMENT (OUTPATIENT)
Dept: LAB | Facility: CLINIC | Age: 80
End: 2025-04-29
Payer: COMMERCIAL

## 2025-04-29 DIAGNOSIS — Z79.4 TYPE 2 DIABETES MELLITUS WITH HYPERGLYCEMIA, WITH LONG-TERM CURRENT USE OF INSULIN (HCC): ICD-10-CM

## 2025-04-29 DIAGNOSIS — E11.65 TYPE 2 DIABETES MELLITUS WITH HYPERGLYCEMIA, WITH LONG-TERM CURRENT USE OF INSULIN (HCC): ICD-10-CM

## 2025-04-29 DIAGNOSIS — D49.4 BLADDER TUMOR: ICD-10-CM

## 2025-04-29 DIAGNOSIS — Z01.812 PRE-OPERATIVE LABORATORY EXAMINATION: ICD-10-CM

## 2025-04-29 DIAGNOSIS — R39.89 SUSPECTED UTI: ICD-10-CM

## 2025-04-29 LAB
ALBUMIN SERPL BCG-MCNC: 4 G/DL (ref 3.5–5)
ALP SERPL-CCNC: 69 U/L (ref 34–104)
ALT SERPL W P-5'-P-CCNC: 12 U/L (ref 7–52)
ANION GAP SERPL CALCULATED.3IONS-SCNC: 9 MMOL/L (ref 4–13)
AST SERPL W P-5'-P-CCNC: 13 U/L (ref 13–39)
BASOPHILS # BLD AUTO: 0.02 THOUSANDS/ÂΜL (ref 0–0.1)
BASOPHILS NFR BLD AUTO: 0 % (ref 0–1)
BILIRUB SERPL-MCNC: 0.35 MG/DL (ref 0.2–1)
BUN SERPL-MCNC: 26 MG/DL (ref 5–25)
CALCIUM SERPL-MCNC: 9.4 MG/DL (ref 8.4–10.2)
CHLORIDE SERPL-SCNC: 109 MMOL/L (ref 96–108)
CO2 SERPL-SCNC: 26 MMOL/L (ref 21–32)
CREAT SERPL-MCNC: 1.73 MG/DL (ref 0.6–1.3)
EOSINOPHIL # BLD AUTO: 0.18 THOUSAND/ÂΜL (ref 0–0.61)
EOSINOPHIL NFR BLD AUTO: 3 % (ref 0–6)
ERYTHROCYTE [DISTWIDTH] IN BLOOD BY AUTOMATED COUNT: 13.5 % (ref 11.6–15.1)
EST. AVERAGE GLUCOSE BLD GHB EST-MCNC: 177 MG/DL
GFR SERPL CREATININE-BSD FRML MDRD: 36 ML/MIN/1.73SQ M
GLUCOSE P FAST SERPL-MCNC: 159 MG/DL (ref 65–99)
HBA1C MFR BLD: 7.8 %
HCT VFR BLD AUTO: 39.9 % (ref 36.5–49.3)
HGB BLD-MCNC: 12.7 G/DL (ref 12–17)
IMM GRANULOCYTES # BLD AUTO: 0.02 THOUSAND/UL (ref 0–0.2)
IMM GRANULOCYTES NFR BLD AUTO: 0 % (ref 0–2)
LYMPHOCYTES # BLD AUTO: 0.6 THOUSANDS/ÂΜL (ref 0.6–4.47)
LYMPHOCYTES NFR BLD AUTO: 10 % (ref 14–44)
MCH RBC QN AUTO: 31.3 PG (ref 26.8–34.3)
MCHC RBC AUTO-ENTMCNC: 31.8 G/DL (ref 31.4–37.4)
MCV RBC AUTO: 98 FL (ref 82–98)
MONOCYTES # BLD AUTO: 0.46 THOUSAND/ÂΜL (ref 0.17–1.22)
MONOCYTES NFR BLD AUTO: 8 % (ref 4–12)
NEUTROPHILS # BLD AUTO: 4.53 THOUSANDS/ÂΜL (ref 1.85–7.62)
NEUTS SEG NFR BLD AUTO: 79 % (ref 43–75)
NRBC BLD AUTO-RTO: 0 /100 WBCS
PLATELET # BLD AUTO: 328 THOUSANDS/UL (ref 149–390)
PMV BLD AUTO: 10.9 FL (ref 8.9–12.7)
POTASSIUM SERPL-SCNC: 4.5 MMOL/L (ref 3.5–5.3)
PROT SERPL-MCNC: 6.6 G/DL (ref 6.4–8.4)
RBC # BLD AUTO: 4.06 MILLION/UL (ref 3.88–5.62)
SODIUM SERPL-SCNC: 144 MMOL/L (ref 135–147)
WBC # BLD AUTO: 5.81 THOUSAND/UL (ref 4.31–10.16)

## 2025-04-29 PROCEDURE — 85025 COMPLETE CBC W/AUTO DIFF WBC: CPT

## 2025-04-29 PROCEDURE — 80053 COMPREHEN METABOLIC PANEL: CPT

## 2025-04-29 PROCEDURE — 83036 HEMOGLOBIN GLYCOSYLATED A1C: CPT

## 2025-04-29 PROCEDURE — 87086 URINE CULTURE/COLONY COUNT: CPT

## 2025-04-29 PROCEDURE — 36415 COLL VENOUS BLD VENIPUNCTURE: CPT

## 2025-04-29 NOTE — PRE-PROCEDURE INSTRUCTIONS
Pre-Surgery Instructions:   Medication Instructions    apixaban (Eliquis) 2.5 mg Per patient, Per Cardiology Hold for 3 days. LD 5/5.     aspirin 81 mg chewable tablet Per patient, Per Cardiology Hold for 3 days. LD 5/5.    calcitriol (ROCALTROL) 0.25 mcg capsule Hold day of surgery.    cholecalciferol 25 MCG (1000 UT) tablet Stop taking 7 days prior to surgery.    ezetimibe (ZETIA) 10 mg tablet Take day of surgery.    glipiZIDE (GLUCOTROL) 10 mg tablet Hold day of surgery.    insulin degludec (Tresiba FlexTouch) 100 units/mL injection pen Take night before surgery    isosorbide mononitrate (IMDUR) 60 mg 24 hr tablet Take day of surgery.    latanoprost (XALATAN) 0.005 % ophthalmic solution Take night before surgery    levothyroxine 50 mcg tablet Take day of surgery.    losartan (COZAAR) 25 mg tablet Hold day of surgery.    losartan (COZAAR) 50 mg tablet Hold day of surgery.    metFORMIN (GLUCOPHAGE) 500 mg tablet Hold day of surgery.    methocarbamol (ROBAXIN) 500 mg tablet Uses PRN- OK to take day of surgery    metoprolol succinate (TOPROL-XL) 50 mg 24 hr tablet Take day of surgery.    oxyCODONE-acetaminophen (PERCOCET) 5-325 mg per tablet Uses PRN- OK to take day of surgery    pantoprazole (PROTONIX) 40 mg tablet Take day of surgery.    simvastatin (ZOCOR) 40 mg tablet Take day of surgery.   Medication instructions for day of surgery reviewed. Please take all instructed medications with only a sip of water.       You will receive a call one business day prior to surgery with an arrival time and hospital directions. If your surgery is scheduled on a Monday, the hospital will be calling you on the Friday prior to your surgery. If you have not heard from anyone by 8pm, please call the hospital supervisor through the hospital  at [phone]. ([place] [phone] or [place] [phone]).    Do not eat or drink anything after midnight the night before your surgery, including candy, mints, lifesavers, or  chewing gum. Do not drink alcohol 24hrs before your surgery. Try not to smoke at least 24hrs before your surgery.       Follow the pre surgery showering instructions as listed in the “My Surgical Experience Booklet” or otherwise provided by your surgeon's office. Do not use a blade to shave the surgical area 1 week before surgery. It is okay to use a clean electric clippers up to 24 hours before surgery. Do not apply any lotions, creams, including makeup, cologne, deodorant, or perfumes after showering on the day of your surgery. Do not use dry shampoo, hair spray, hair gel, or any type of hair products.     No contact lenses, eye make-up, or artificial eyelashes. Remove nail polish, including gel polish, and any artificial, gel, or acrylic nails if possible. Remove all jewelry including rings and body piercing jewelry.     Wear causal clothing that is easy to take on and off. Consider your type of surgery.    Keep any valuables, jewelry, piercings at home. Please bring any specially ordered equipment (sling, braces) if indicated.    Arrange for a responsible person to drive you to and from the hospital on the day of your surgery. Please confirm the visitor policy for the day of your procedure when you receive your phone call with an arrival time.     Call the surgeon's office with any new illnesses, exposures, or additional questions prior to surgery.    Please reference your “My Surgical Experience Booklet” for additional information to prepare for your upcoming surgery.

## 2025-05-01 LAB — BACTERIA UR CULT: NORMAL

## 2025-05-08 ENCOUNTER — APPOINTMENT (OUTPATIENT)
Dept: RADIOLOGY | Facility: HOSPITAL | Age: 80
End: 2025-05-08
Payer: COMMERCIAL

## 2025-05-08 ENCOUNTER — HOSPITAL ENCOUNTER (OUTPATIENT)
Facility: HOSPITAL | Age: 80
Setting detail: OUTPATIENT SURGERY
Discharge: HOME | End: 2025-05-09
Attending: UROLOGY | Admitting: UROLOGY
Payer: COMMERCIAL

## 2025-05-08 ENCOUNTER — ANESTHESIA EVENT (OUTPATIENT)
Dept: PERIOP | Facility: HOSPITAL | Age: 80
End: 2025-05-08
Payer: COMMERCIAL

## 2025-05-08 ENCOUNTER — ANESTHESIA (OUTPATIENT)
Dept: PERIOP | Facility: HOSPITAL | Age: 80
End: 2025-05-08
Payer: COMMERCIAL

## 2025-05-08 ENCOUNTER — TELEPHONE (OUTPATIENT)
Dept: UROLOGY | Facility: CLINIC | Age: 80
End: 2025-05-08

## 2025-05-08 DIAGNOSIS — D49.4 BLADDER TUMOR: Primary | ICD-10-CM

## 2025-05-08 DIAGNOSIS — C67.9 MALIGNANT NEOPLASM OF URINARY BLADDER, UNSPECIFIED SITE (HCC): ICD-10-CM

## 2025-05-08 LAB
GLUCOSE SERPL-MCNC: 182 MG/DL (ref 65–140)
GLUCOSE SERPL-MCNC: 185 MG/DL (ref 65–140)
GLUCOSE SERPL-MCNC: 224 MG/DL (ref 65–140)
GLUCOSE SERPL-MCNC: 229 MG/DL (ref 65–140)
GLUCOSE SERPL-MCNC: 259 MG/DL (ref 65–140)

## 2025-05-08 PROCEDURE — 88307 TISSUE EXAM BY PATHOLOGIST: CPT | Performed by: PATHOLOGY

## 2025-05-08 PROCEDURE — NC001 PR NO CHARGE: Performed by: UROLOGY

## 2025-05-08 PROCEDURE — 52235 CYSTOSCOPY AND TREATMENT: CPT | Performed by: UROLOGY

## 2025-05-08 PROCEDURE — 74420 UROGRAPHY RTRGR +-KUB: CPT

## 2025-05-08 PROCEDURE — 82948 REAGENT STRIP/BLOOD GLUCOSE: CPT

## 2025-05-08 RX ORDER — HYDROMORPHONE HCL/PF 1 MG/ML
0.5 SYRINGE (ML) INJECTION ONCE AS NEEDED
Status: COMPLETED | OUTPATIENT
Start: 2025-05-08 | End: 2025-05-08

## 2025-05-08 RX ORDER — INSULIN LISPRO 100 [IU]/ML
1-6 INJECTION, SOLUTION INTRAVENOUS; SUBCUTANEOUS
Status: DISCONTINUED | OUTPATIENT
Start: 2025-05-08 | End: 2025-05-09 | Stop reason: HOSPADM

## 2025-05-08 RX ORDER — ACETAMINOPHEN 325 MG/1
650 TABLET ORAL EVERY 6 HOURS PRN
Status: DISCONTINUED | OUTPATIENT
Start: 2025-05-08 | End: 2025-05-09 | Stop reason: HOSPADM

## 2025-05-08 RX ORDER — LIDOCAINE HYDROCHLORIDE 10 MG/ML
INJECTION, SOLUTION EPIDURAL; INFILTRATION; INTRACAUDAL; PERINEURAL AS NEEDED
Status: DISCONTINUED | OUTPATIENT
Start: 2025-05-08 | End: 2025-05-08

## 2025-05-08 RX ORDER — SODIUM CHLORIDE, SODIUM LACTATE, POTASSIUM CHLORIDE, CALCIUM CHLORIDE 600; 310; 30; 20 MG/100ML; MG/100ML; MG/100ML; MG/100ML
75 INJECTION, SOLUTION INTRAVENOUS CONTINUOUS
Status: DISCONTINUED | OUTPATIENT
Start: 2025-05-08 | End: 2025-05-09

## 2025-05-08 RX ORDER — PRAVASTATIN SODIUM 40 MG
40 TABLET ORAL
Status: DISCONTINUED | OUTPATIENT
Start: 2025-05-08 | End: 2025-05-09 | Stop reason: HOSPADM

## 2025-05-08 RX ORDER — LEVOTHYROXINE SODIUM 50 UG/1
50 TABLET ORAL DAILY
Status: DISCONTINUED | OUTPATIENT
Start: 2025-05-09 | End: 2025-05-09 | Stop reason: HOSPADM

## 2025-05-08 RX ORDER — PANTOPRAZOLE SODIUM 40 MG/1
40 TABLET, DELAYED RELEASE ORAL
Status: DISCONTINUED | OUTPATIENT
Start: 2025-05-08 | End: 2025-05-09 | Stop reason: HOSPADM

## 2025-05-08 RX ORDER — ONDANSETRON 2 MG/ML
4 INJECTION INTRAMUSCULAR; INTRAVENOUS EVERY 6 HOURS PRN
Status: DISCONTINUED | OUTPATIENT
Start: 2025-05-08 | End: 2025-05-09 | Stop reason: HOSPADM

## 2025-05-08 RX ORDER — ISOSORBIDE MONONITRATE 60 MG/1
60 TABLET, EXTENDED RELEASE ORAL DAILY
Status: DISCONTINUED | OUTPATIENT
Start: 2025-05-08 | End: 2025-05-09 | Stop reason: HOSPADM

## 2025-05-08 RX ORDER — FENTANYL CITRATE 50 UG/ML
INJECTION, SOLUTION INTRAMUSCULAR; INTRAVENOUS AS NEEDED
Status: DISCONTINUED | OUTPATIENT
Start: 2025-05-08 | End: 2025-05-08

## 2025-05-08 RX ORDER — LOSARTAN POTASSIUM 50 MG/1
50 TABLET ORAL DAILY
Status: DISCONTINUED | OUTPATIENT
Start: 2025-05-09 | End: 2025-05-09 | Stop reason: HOSPADM

## 2025-05-08 RX ORDER — CEFAZOLIN SODIUM 2 G/50ML
2000 SOLUTION INTRAVENOUS
Status: COMPLETED | OUTPATIENT
Start: 2025-05-08 | End: 2025-05-08

## 2025-05-08 RX ORDER — PROPOFOL 10 MG/ML
INJECTION, EMULSION INTRAVENOUS AS NEEDED
Status: DISCONTINUED | OUTPATIENT
Start: 2025-05-08 | End: 2025-05-08

## 2025-05-08 RX ORDER — LOSARTAN POTASSIUM 25 MG/1
25 TABLET ORAL DAILY
Status: DISCONTINUED | OUTPATIENT
Start: 2025-05-09 | End: 2025-05-09 | Stop reason: HOSPADM

## 2025-05-08 RX ORDER — ASPIRIN 81 MG/1
81 TABLET, CHEWABLE ORAL DAILY
Status: DISCONTINUED | OUTPATIENT
Start: 2025-05-08 | End: 2025-05-09 | Stop reason: HOSPADM

## 2025-05-08 RX ORDER — MAGNESIUM HYDROXIDE 1200 MG/15ML
LIQUID ORAL AS NEEDED
Status: DISCONTINUED | OUTPATIENT
Start: 2025-05-08 | End: 2025-05-08 | Stop reason: HOSPADM

## 2025-05-08 RX ORDER — METHOCARBAMOL 500 MG/1
500 TABLET, FILM COATED ORAL 2 TIMES DAILY PRN
Status: DISCONTINUED | OUTPATIENT
Start: 2025-05-08 | End: 2025-05-09 | Stop reason: HOSPADM

## 2025-05-08 RX ORDER — OXYBUTYNIN CHLORIDE 5 MG/1
5 TABLET, EXTENDED RELEASE ORAL ONCE
Status: COMPLETED | OUTPATIENT
Start: 2025-05-08 | End: 2025-05-08

## 2025-05-08 RX ORDER — OXYCODONE HYDROCHLORIDE 5 MG/1
5 TABLET ORAL EVERY 6 HOURS PRN
Refills: 0 | Status: DISCONTINUED | OUTPATIENT
Start: 2025-05-08 | End: 2025-05-09 | Stop reason: HOSPADM

## 2025-05-08 RX ORDER — LATANOPROST 50 UG/ML
1 SOLUTION/ DROPS OPHTHALMIC
Status: DISCONTINUED | OUTPATIENT
Start: 2025-05-08 | End: 2025-05-09 | Stop reason: HOSPADM

## 2025-05-08 RX ORDER — HEPARIN SODIUM 5000 [USP'U]/ML
5000 INJECTION, SOLUTION INTRAVENOUS; SUBCUTANEOUS EVERY 8 HOURS SCHEDULED
Status: DISCONTINUED | OUTPATIENT
Start: 2025-05-08 | End: 2025-05-09 | Stop reason: HOSPADM

## 2025-05-08 RX ORDER — SODIUM CHLORIDE 9 MG/ML
INJECTION, SOLUTION INTRAVENOUS AS NEEDED
Status: DISCONTINUED | OUTPATIENT
Start: 2025-05-08 | End: 2025-05-08 | Stop reason: HOSPADM

## 2025-05-08 RX ORDER — FENTANYL CITRATE/PF 50 MCG/ML
25 SYRINGE (ML) INJECTION
Status: COMPLETED | OUTPATIENT
Start: 2025-05-08 | End: 2025-05-08

## 2025-05-08 RX ORDER — EZETIMIBE 10 MG/1
10 TABLET ORAL DAILY
Status: DISCONTINUED | OUTPATIENT
Start: 2025-05-08 | End: 2025-05-09 | Stop reason: HOSPADM

## 2025-05-08 RX ORDER — ONDANSETRON 2 MG/ML
INJECTION INTRAMUSCULAR; INTRAVENOUS AS NEEDED
Status: DISCONTINUED | OUTPATIENT
Start: 2025-05-08 | End: 2025-05-08

## 2025-05-08 RX ORDER — CALCITRIOL 0.25 UG/1
0.25 CAPSULE, LIQUID FILLED ORAL DAILY
Status: DISCONTINUED | OUTPATIENT
Start: 2025-05-08 | End: 2025-05-09 | Stop reason: HOSPADM

## 2025-05-08 RX ADMIN — FENTANYL CITRATE 50 MCG: 50 INJECTION, SOLUTION INTRAMUSCULAR; INTRAVENOUS at 08:25

## 2025-05-08 RX ADMIN — ISOSORBIDE MONONITRATE 60 MG: 60 TABLET, EXTENDED RELEASE ORAL at 12:54

## 2025-05-08 RX ADMIN — ONDANSETRON 4 MG: 2 INJECTION INTRAMUSCULAR; INTRAVENOUS at 08:26

## 2025-05-08 RX ADMIN — ONDANSETRON 4 MG: 2 INJECTION INTRAMUSCULAR; INTRAVENOUS at 09:44

## 2025-05-08 RX ADMIN — FENTANYL CITRATE 25 MCG: 50 INJECTION, SOLUTION INTRAMUSCULAR; INTRAVENOUS at 07:58

## 2025-05-08 RX ADMIN — EZETIMIBE 10 MG: 10 TABLET ORAL at 12:54

## 2025-05-08 RX ADMIN — INSULIN LISPRO 2 UNITS: 100 INJECTION, SOLUTION INTRAVENOUS; SUBCUTANEOUS at 17:18

## 2025-05-08 RX ADMIN — PRAVASTATIN SODIUM 40 MG: 40 TABLET ORAL at 17:18

## 2025-05-08 RX ADMIN — PANTOPRAZOLE SODIUM 40 MG: 40 TABLET, DELAYED RELEASE ORAL at 21:36

## 2025-05-08 RX ADMIN — LATANOPROST 1 DROP: 50 SOLUTION OPHTHALMIC at 21:36

## 2025-05-08 RX ADMIN — CALCITRIOL CAPSULES 0.25 MCG 0.25 MCG: 0.25 CAPSULE ORAL at 12:54

## 2025-05-08 RX ADMIN — FENTANYL CITRATE 25 MCG: 50 INJECTION, SOLUTION INTRAMUSCULAR; INTRAVENOUS at 08:11

## 2025-05-08 RX ADMIN — INSULIN LISPRO 3 UNITS: 100 INJECTION, SOLUTION INTRAVENOUS; SUBCUTANEOUS at 21:36

## 2025-05-08 RX ADMIN — LIDOCAINE HYDROCHLORIDE 80 MG: 10 INJECTION, SOLUTION EPIDURAL; INFILTRATION; INTRACAUDAL; PERINEURAL at 07:58

## 2025-05-08 RX ADMIN — METFORMIN HYDROCHLORIDE 500 MG: 500 TABLET ORAL at 17:18

## 2025-05-08 RX ADMIN — PROPOFOL 150 MG: 10 INJECTION, EMULSION INTRAVENOUS at 07:58

## 2025-05-08 RX ADMIN — CEFAZOLIN SODIUM 2000 MG: 2 SOLUTION INTRAVENOUS at 08:00

## 2025-05-08 RX ADMIN — OXYBUTYNIN CHLORIDE 5 MG: 5 TABLET, EXTENDED RELEASE ORAL at 10:15

## 2025-05-08 RX ADMIN — HEPARIN SODIUM 5000 UNITS: 5000 INJECTION INTRAVENOUS; SUBCUTANEOUS at 13:00

## 2025-05-08 RX ADMIN — FENTANYL CITRATE 25 MCG: 50 INJECTION INTRAMUSCULAR; INTRAVENOUS at 09:30

## 2025-05-08 RX ADMIN — ASPIRIN 81 MG: 81 TABLET, CHEWABLE ORAL at 12:54

## 2025-05-08 RX ADMIN — HEPARIN SODIUM 5000 UNITS: 5000 INJECTION INTRAVENOUS; SUBCUTANEOUS at 21:35

## 2025-05-08 RX ADMIN — OXYCODONE HYDROCHLORIDE 5 MG: 5 TABLET ORAL at 21:36

## 2025-05-08 RX ADMIN — HYDROMORPHONE HYDROCHLORIDE 0.5 MG: 1 INJECTION, SOLUTION INTRAMUSCULAR; INTRAVENOUS; SUBCUTANEOUS at 10:06

## 2025-05-08 RX ADMIN — OXYCODONE HYDROCHLORIDE 5 MG: 5 TABLET ORAL at 10:25

## 2025-05-08 RX ADMIN — INSULIN LISPRO 2 UNITS: 100 INJECTION, SOLUTION INTRAVENOUS; SUBCUTANEOUS at 12:58

## 2025-05-08 RX ADMIN — FENTANYL CITRATE 25 MCG: 50 INJECTION INTRAMUSCULAR; INTRAVENOUS at 09:50

## 2025-05-08 RX ADMIN — METHOCARBAMOL 500 MG: 500 TABLET ORAL at 19:30

## 2025-05-08 NOTE — H&P
UROLOGY H&P NOTE     Patient Identifiers: Trent Ocasio (MRN [id number])    Date of Service: 5/8/2025    History of Present Illness:     Trent Ocasio is a 80 y.o. old with a history of bladder cancer    Past Medical, Past Surgical History:     Past Medical History:   Diagnosis Date    Acute respiratory failure with hypoxia (HCC) 03/28/2024    Chronic kidney disease     Diabetes mellitus (HCC)     GERD (gastroesophageal reflux disease)     Hypertension     Respiratory acidosis 03/31/2024   :    Past Surgical History:   Procedure Laterality Date    APPENDECTOMY      CARDIAC CATHETERIZATION Left 05/24/2024    Procedure: Cardiac Left Heart Cath;  Surgeon: Qiana Torres MD;  Location: [facility];  Service: Cardiology    CARDIAC CATHETERIZATION N/A 05/24/2024    Procedure: Cardiac Coronary Angiogram;  Surgeon: Qiana Torres MD;  [facility]: [facility];  Service: Cardiology    CARDIAC CATHETERIZATION  05/24/2024    Procedure: Cardiac catheterization;  Surgeon: Qiana Torres MD;  [facility]: [facility];  Service: Cardiology    CARDIAC ELECTROPHYSIOLOGY PROCEDURE N/A 6/21/2024    Procedure: Cardiac pacer implant;  Surgeon: Solomon Quintero MD;  Location: [facility];  Service: Cardiology    MO CYSTO W/REMOVAL OF LESIONS SMALL N/A 6/20/2024    Procedure: TRANSURETHRAL RESECTION OF BLADDER TUMOR (TURBT);  Surgeon: Nick Patel DO;  Location: [facility];  Service: Urology    MO CYSTO W/REMOVAL OF LESIONS SMALL N/A 7/31/2024    Procedure: TRANSURETHRAL RESECTION OF BLADDER TUMOR (TURBT), intravesical instillation of gemcitabine;  Surgeon: Nick Patel DO;  Location: [facility];  Service: Urology   :    Medications, Allergies:     Current Facility-Administered Medications:     ceFAZolin (ANCEF) IVPB (premix in dextrose) 2,000 mg 50 mL, On Call To OR    lactated ringers infusion, Continuous    Allergies:  Allergies   Allergen Reactions    Canagliflozin GI  "Intolerance     Yeast infection  Yeast infection      Lisinopril Other (See Comments)     Pt unsure of reaction     Lovastatin GI Intolerance   :    Social and Family History:   Social History:   Social History     Tobacco Use    Smoking status: Former     Passive exposure: Past    Smokeless tobacco: Never   Vaping Use    Vaping status: Never Used   Substance Use Topics    Alcohol use: Yes     Comment: rarely    Drug use: Never   .    Social History     Tobacco Use   Smoking Status Former    Passive exposure: Past   Smokeless Tobacco Never       Family History:  History reviewed. No pertinent family history.:     Review of Systems:     General: Fever, chills, or night sweats: negative  Cardiac: Negative for chest pain.    Pulmonary: Negative for shortness of breath.  Gastrointestinal: Abdominal pain negative.  Nausea, vomiting, or diarrhea negative,  Genitourinary: See HPI above.  Patient does not have hematuria.  All other systems queried were negative.    Physical Exam:   General: Patient is pleasant and in NAD. Awake and alert  /92   Pulse 87   Temp (!) 97 °F (36.1 °C) (Temporal)   Resp 18   Ht 5' 10\" (1.778 m)   Wt 105 kg (232 lb 5.8 oz)   SpO2 96%   BMI 33.34 kg/m² Temp (24hrs), Av °F (36.1 °C), Min:97 °F (36.1 °C), Max:97 °F (36.1 °C)  current; Temperature: (!) 97 °F (36.1 °C)  No intake/output data recorded.  Cardiac: Peripheral edema: negative  Pulmonary: Non-labored breathing  Abdomen: Soft, non-tender, non-distended.  No surgical scars.  No masses, tenderness, hernias noted.    Genitourinary: Negative CVA tenderness, negative suprapubic tenderness.      Labs:     Lab Results   Component Value Date    HGB 12.7 2025    HCT 39.9 2025    WBC 5.81 2025     2025   ]    Lab Results   Component Value Date    K 4.5 2025     (H) 2025    CO2 26 2025    BUN 26 (H) 2025    CREATININE 1.73 (H) 2025    CALCIUM 9.4 2025 "   ]    Imaging:     Any pertinent imaging was personally reviewed and discussed with patient. See below for details.    ASSESSMENT:     80 y.o. old male with bladder cancer    Anticoagulation: Aspirin 81 mg     He was admitted around May 2024 with CHF.  He was found to have an elevated creatinine of 2.76.  He had a workup with nephrology which included a kidney bladder ultrasound.     Kidney bladder ultrasound 5/14/2024: New ovoid structure projecting from the posterior bladder with central hypoechoic area measuring about 3.2 cm     He had office cystoscopy with Dr. Oliveira on 5/22/2024 which demonstrated large posterior wall bladder mass        OR TURBT on 6/20/24:  Cellules/diverticuli: large dome chimney, very hard to reach with scope     Bladder lesion #1  Location: Posterior aspect of the dome chimney  Size: 4 cm  Appearance: Papillary  Predicted depth: Ta  -Tumor extremely difficult to reach with the resectoscope due to the fact that it was in the dome chimney.  I was only able to resect about 50% of the tumor with maximal downward pressure on the lower abdomen and pushing all the way in with the resectoscope  -Repeat case must be done with extra long scope next time     Due to incomplete resection of the tumor, gemcitabine was not given  --Path:  A. Urinary Bladder, Transurethral Resection of Bladder Tumor:  - Fragments of papillary urothelial carcinoma, high-grade, with foci suspicious for superficial invasion  - Muscularis propria (detrusor muscle) present and not involved        Patient got bradycardic in PACU.  Cardiology team assessed him.  He was admitted to medicine.  During admission he had pacemaker placed on 6/21/2024.     He was cleared by cardiology for repeat trip to the operating room.        OR 8/8/24:  Bladder lesion #1  Location: Posterior aspect of the dome chimney  Size: 4 cm  --Path:  A. Urinary Bladder, Bladder tumor, Transurethral Resection:  - High-grade papillary urothelial carcinoma  with focal superficial invasion.  - Muscularis propria (detrusor muscle) is present, negative for tumor.     Extra long resectoscope was reserved for the case.  Unfortunately, the extra long lens was not included in the set.  We therefore had to use standard bipolar resectoscope.  2 assistants pushed down very hard on the abdomen and I pushed very hard into the dome with the scope.  Eventually able to resect the entire tumor.     2000 mg of gemcitabine were administered intravesically at the end of the case        BCG induction October through November 2024        PVR 17 cc on 1/13/2025     Urine dip 1/13/2025: Negative leukocyte esterase, negative nitrite, positive blood    Cysto 1/13/25:  Urethra:                      Normal  Prostate:                    Mod BL lateral lobe hypertrophy, mod median lobe, no lesions  Bladder:                     Mod to severe trabeculations; no stones; large dome chimney; 2 cm R posterior lateral wall papillary lesion; 2 cm posterior dome papillary lesion; about 5 separate papillary lesions in dome chimney ranging from 5-10 mm  Ureteral orifices:       orthotopic  Other findings:          None, retroflexed view confirms      CT abdomen pelvis without contrast 1/20/2025: Fat stranding around the bladder, nonspecific; no findings of metastatic disease        He was consented in the office for TURBT, bilateral retrograde pyelogram        PLAN:     Preoperative Ancef  OR plan as above  Due to patient transport issues, he will stay overnight

## 2025-05-08 NOTE — ANESTHESIA PREPROCEDURE EVALUATION
Procedure:  TRANSURETHRAL RESECTION OF BLADDER TUMOR (TURBT) (Bladder)    Relevant Problems   CARDIO   (+) CAD (coronary artery disease)   (+) HTN (hypertension)      ENDO   (+) Hypothyroid   (+) Type 2 diabetes mellitus, with long-term current use of insulin (HCC)      GI/HEPATIC   (+) Gastroesophageal reflux disease      /RENAL   (+) Chronic kidney disease, stage 4 (severe) (HCC)   (+) Stage 3b chronic kidney disease (HCC)   (+) Type 2 diabetes mellitus with diabetic chronic kidney disease, unspecified CKD stage, unspecified whether long term insulin use (HCC)      MUSCULOSKELETAL   (+) Chronic low back pain with sciatica      NEURO/PSYCH   (+) Anxiety   (+) Chronic low back pain with sciatica        Physical Exam    Airway    Mallampati score: III  TM Distance: >3 FB  Neck ROM: full     Dental       Cardiovascular  Cardiovascular exam normal    Pulmonary  Pulmonary exam normal     Other Findings      Diabetes mellitus (HCC)    Hypertension    Respiratory acidosis    Acute respiratory failure with hypoxia (HCC)    Chronic kidney disease    GERD (gastroesophageal reflux disease)          Limited echocardiogram for LVEF    Left Ventricle: Left ventricular cavity size is normal. Wall thickness is normal. The left ventricular ejection fraction is 52%. Systolic function is low normal. Wall motion is normal.         Anesthesia Plan  ASA Score- 3     Anesthesia Type- general with ASA Monitors.         Additional Monitors:     Airway Plan: LMA.    Comment: LMA 5 in the past  .       Plan Factors-Exercise tolerance (METS): >4 METS.    Chart reviewed. EKG reviewed. Imaging results reviewed. Existing labs reviewed. Patient summary reviewed.                  Induction- intravenous.    Postoperative Plan- Plan for postoperative opioid use. Planned trial extubation        Informed Consent- Anesthetic plan and risks discussed with patient.  I personally reviewed this patient with the CRNA. Discussed and agreed on the  Anesthesia Plan with the CRNA..      NPO Status:  Vitals Value Taken Time   Date of last liquid 05/08/25 05/08/25 0653   Time of last liquid 0530 05/08/25 0653   Date of last solid 05/07/25 05/08/25 0653   Time of last solid 1600 05/08/25 0653

## 2025-05-08 NOTE — OP NOTE
OPERATIVE REPORT  PATIENT NAME: Trent Ocasio    :  1945  MRN: [id number]  Pt Location: [facility] ROOM 01    SURGERY DATE: 2025    Surgeons and Role:     * Nick Patel DO - Primary    Preop Diagnosis:  Malignant neoplasm of urinary bladder, unspecified site (HCC) [C67.9]    Post-Op Diagnosis Codes:     * Malignant neoplasm of urinary bladder, unspecified site (HCC) [C67.9]    Procedure(s):        Cystourethroscopy  Bilateral retrograde pyelogram with live fluoroscopic interpretation  TURBT        Specimen(s):  ID Type Source Tests Collected by Time Destination   1 : Bladder tumor Tissue Urinary Bladder TISSUE EXAM Nick Patel DO 2025 0842        Estimated Blood Loss:   Minimal    Drains:  Urethral Catheter Latex 20 Fr. (Active)   Number of days: 322       Urethral Catheter Double-lumen 20 Fr. (Active)   Number of days: 0       Anesthesia Type:   General    Operative Indications:  Malignant neoplasm of urinary bladder, unspecified site (HCC) [C67.9]        80 y.o. old male with bladder cancer     Anticoagulation: Aspirin 81 mg     He was admitted around May 2024 with CHF.  He was found to have an elevated creatinine of 2.76.  He had a workup with nephrology which included a kidney bladder ultrasound.     Kidney bladder ultrasound 2024: New ovoid structure projecting from the posterior bladder with central hypoechoic area measuring about 3.2 cm     He had office cystoscopy with Dr. Oliveira on 2024 which demonstrated large posterior wall bladder mass        OR TURBT on 24:  Cellules/diverticuli: large dome chimney, very hard to reach with scope     Bladder lesion #1  Location: Posterior aspect of the dome chimney  Size: 4 cm  Appearance: Papillary  Predicted depth: Ta  -Tumor extremely difficult to reach with the resectoscope due to the fact that it was in the dome chimney.  I was only able to resect about 50% of the tumor with maximal downward pressure on the lower  abdomen and pushing all the way in with the resectoscope  -Repeat case must be done with extra long scope next time     Due to incomplete resection of the tumor, gemcitabine was not given  --Path:  A. Urinary Bladder, Transurethral Resection of Bladder Tumor:  - Fragments of papillary urothelial carcinoma, high-grade, with foci suspicious for superficial invasion  - Muscularis propria (detrusor muscle) present and not involved        Patient got bradycardic in PACU.  Cardiology team assessed him.  He was admitted to medicine.  During admission he had pacemaker placed on 6/21/2024.     He was cleared by cardiology for repeat trip to the operating room.        OR 8/8/24:  Bladder lesion #1  Location: Posterior aspect of the dome chimney  Size: 4 cm  --Path:  A. Urinary Bladder, Bladder tumor, Transurethral Resection:  - High-grade papillary urothelial carcinoma with focal superficial invasion.  - Muscularis propria (detrusor muscle) is present, negative for tumor.     Extra long resectoscope was reserved for the case.  Unfortunately, the extra long lens was not included in the set.  We therefore had to use standard bipolar resectoscope.  2 assistants pushed down very hard on the abdomen and I pushed very hard into the dome with the scope.  Eventually able to resect the entire tumor.     2000 mg of gemcitabine were administered intravesically at the end of the case        BCG induction October through November 2024        PVR 17 cc on 1/13/2025     Urine dip 1/13/2025: Negative leukocyte esterase, negative nitrite, positive blood     Cysto 1/13/25:  Urethra:                      Normal  Prostate:                    Mod BL lateral lobe hypertrophy, mod median lobe, no lesions  Bladder:                     Mod to severe trabeculations; no stones; large dome chimney; 2 cm R posterior lateral wall papillary lesion; 2 cm posterior dome papillary lesion; about 5 separate papillary lesions in dome chimney ranging from 5-10  mm  Ureteral orifices:       orthotopic  Other findings:          None, retroflexed view confirms        CT abdomen pelvis without contrast 1/20/2025: Fat stranding around the bladder, nonspecific; no findings of metastatic disease           He was consented in the office for TURBT, bilateral retrograde pyelogram          Operative Findings:          No meatal stenosis  Area of urethral narrowing to about 12 Jordanian, focal, at the bulbar urethra.  This area was able to be traversed with the scope with slow steady pressure.  Prostate: Moderately enlarged, fibrotic  Bilateral ureteral orifices in orthotopic positions  No bladder stones  Trabeculations: Severe  Cellules/diverticuli: Large dome chimney    Bilateral retrograde pyelogram demonstrated no significant hydronephrosis bilaterally.  No filling defects in ureters or kidneys.      Bladder lesion #1  Location: Right posterior dome chimney  Size: 2 cm  Appearance: Papillary  Predicted depth: Ta    Bladder lesion #2  Location: Left posterior dome chimney  Size: 3.5 cm  Appearance: Papillary  Predicted depth: Ta      Lesions very difficult to resect, even with the extra long bipolar resectoscope.  Required a lot of manual pressure on the abdomen to bring the tumors to the scope.          Complications:   None    Procedure and Technique:                Patient was identified in the preop holding area.  Consent was obtained.  Risks and benefits of the procedure were explained to the patient.  Patient was in agreement.  Patient was brought back to the OR and placed supine on the table.  Bilateral lower extremities were placed and turned on.  Patient underwent smooth induction of general anesthesia.  IV Ancef was administered for preoperative antibiotics.  Patient's legs were placed in stirrups.  Patient was in dorsolithotomy position.  Area was prepped and draped in sterile fashion.  Timeout was performed by the OR team.    Case began with insertion of 21 Jordanian  cystoscope.  Pan cystourethroscopy was performed.  See above findings for details.  Attention was first turned toward the right ureteral orifice.  5 Italian open-ended catheter was advanced toward the right ureteral orifice.  Wire was advanced through the 5 Italian open-ended catheter and into the right ureter under fluoroscopic guidance.  5 Italian open-ended catheter was then advanced into the distal ureter.  Wire was removed.  Gentle right retrograde pyelogram was performed.  See above findings for details.  The 5 Italian open-ended ureteral catheter was then removed and a left retrograde pyelogram was performed in a similar fashion.  Sequential emptying images on bilateral kidneys demonstrated good emptying of the bilateral collecting systems.    Case began with insertion of resectoscope.  Pan cystourethroscopy was performed.  See the above findings for details.    The lesions listed in the findings section above were noted.      The lesions above were resected using bipolar loop.  Bladder tumor chips from each of the lesions above were sent for pathology.  The resection sites were thoroughly cauterized using loop after tissue was resected.    The bipolar loop was then exchanged for the bipolar button.  The button was used to cauterize the resection sites for hemostasis.    The resection sites were then examined with the inflow off.  Hemostasis was excellent.      Scope was removed. 20 Italian Negron catheter was placed.  There was return of clear urine. 10 cc of urine were placed in the balloon.         Patient was uneventfully awoken from anesthesia.  Patient was brought to PACU in good condition.               A qualified resident physician was not available.    Patient Disposition:  PACU              SIGNATURE: Nick Patel,   DATE: May 8, 2025  TIME: 9:17 AM      PLAN  - Due to transportation issues, patient will stay overnight.  He has Negron catheter.  As long as urine looks clear on 5/9/2025, will  plan on trial of void at that time.  If passes trial of void, will be discharged.  - He will need to be scheduled for a postoperative pathology review with me in a few weeks

## 2025-05-08 NOTE — TELEPHONE ENCOUNTER
Patient status post TURBT on Ludwig    Has 20 Portuguese Negron catheter in place.  He is staying overnight, will likely passes trial of void on 5/9/2025 prior to discharge.    He needs a pathology review with me in 2 to 3 weeks

## 2025-05-08 NOTE — ANESTHESIA POSTPROCEDURE EVALUATION
Post-Op Assessment Note    CV Status:  Stable  Pain Score: 0    Pain management: adequate       Mental Status:  Alert and awake   PONV Controlled:  None   Airway Patency:  Patent  Two or more mitigation strategies used for obstructive sleep apnea   Post Op Vitals Reviewed: Yes    No anethesia notable event occurred.            Last Filed PACU Vitals:  Vitals Value Taken Time   Temp 98.5 °F (36.9 °C) 05/08/25 0924   Pulse 62 05/08/25 0937   /59 05/08/25 0930   Resp 8 05/08/25 0937   SpO2 94 % 05/08/25 0937   Vitals shown include unfiled device data.    Modified Kaity:     Vitals Value Taken Time   Activity 2 05/08/25 0930   Respiration 2 05/08/25 0930   Circulation 2 05/08/25 0930   Consciousness 1 05/08/25 0930   Oxygen Saturation 1 05/08/25 0930     Modified Kaity Score: 8

## 2025-05-09 VITALS
BODY MASS INDEX: 33.27 KG/M2 | HEIGHT: 70 IN | WEIGHT: 232.37 LBS | HEART RATE: 66 BPM | DIASTOLIC BLOOD PRESSURE: 81 MMHG | SYSTOLIC BLOOD PRESSURE: 180 MMHG | OXYGEN SATURATION: 97 % | RESPIRATION RATE: 18 BRPM | TEMPERATURE: 98.5 F

## 2025-05-09 LAB
GLUCOSE SERPL-MCNC: 225 MG/DL (ref 65–140)
GLUCOSE SERPL-MCNC: 257 MG/DL (ref 65–140)

## 2025-05-09 PROCEDURE — 82948 REAGENT STRIP/BLOOD GLUCOSE: CPT

## 2025-05-09 PROCEDURE — NC001 PR NO CHARGE: Performed by: UROLOGY

## 2025-05-09 PROCEDURE — 99238 HOSP IP/OBS DSCHRG MGMT 30/<: CPT | Performed by: NURSE PRACTITIONER

## 2025-05-09 RX ADMIN — INSULIN LISPRO 3 UNITS: 100 INJECTION, SOLUTION INTRAVENOUS; SUBCUTANEOUS at 11:37

## 2025-05-09 RX ADMIN — Medication 1000 UNITS: at 08:50

## 2025-05-09 RX ADMIN — LEVOTHYROXINE SODIUM 50 MCG: 0.05 TABLET ORAL at 08:50

## 2025-05-09 RX ADMIN — HEPARIN SODIUM 5000 UNITS: 5000 INJECTION INTRAVENOUS; SUBCUTANEOUS at 13:23

## 2025-05-09 RX ADMIN — HEPARIN SODIUM 5000 UNITS: 5000 INJECTION INTRAVENOUS; SUBCUTANEOUS at 05:52

## 2025-05-09 RX ADMIN — ASPIRIN 81 MG: 81 TABLET, CHEWABLE ORAL at 08:50

## 2025-05-09 RX ADMIN — OXYCODONE HYDROCHLORIDE 5 MG: 5 TABLET ORAL at 05:52

## 2025-05-09 RX ADMIN — LOSARTAN POTASSIUM 50 MG: 25 TABLET, FILM COATED ORAL at 08:50

## 2025-05-09 RX ADMIN — EZETIMIBE 10 MG: 10 TABLET ORAL at 08:50

## 2025-05-09 RX ADMIN — ISOSORBIDE MONONITRATE 60 MG: 60 TABLET, EXTENDED RELEASE ORAL at 08:50

## 2025-05-09 RX ADMIN — METFORMIN HYDROCHLORIDE 500 MG: 500 TABLET ORAL at 07:48

## 2025-05-09 RX ADMIN — INSULIN LISPRO 2 UNITS: 100 INJECTION, SOLUTION INTRAVENOUS; SUBCUTANEOUS at 07:46

## 2025-05-09 RX ADMIN — CALCITRIOL CAPSULES 0.25 MCG 0.25 MCG: 0.25 CAPSULE ORAL at 08:51

## 2025-05-09 RX ADMIN — METOPROLOL SUCCINATE 75 MG: 50 TABLET, EXTENDED RELEASE ORAL at 08:50

## 2025-05-09 RX ADMIN — LOSARTAN POTASSIUM 25 MG: 25 TABLET, FILM COATED ORAL at 08:52

## 2025-05-09 NOTE — DISCHARGE SUMMARY
Discharge Summary - Urology   Name: Trent Ocasio 80 y.o. male I MRN: [id number]  Unit/Bed#: -01 I Date of Admission: 5/8/2025   Date of Service: 5/9/2025 I Hospital Day: 0    Admission Date: 5/8/2025 0641  Discharge Date: 05/09/25  Admitting Diagnosis: Malignant neoplasm of urinary bladder, unspecified site (HCC) [C67.9]  Discharge Diagnosis:   Medical Problems       Resolved Problems  Date Reviewed: 4/28/2025   None         HPI: Trent Ocasio is an 80-year-old male with bladder cancer, status post TURBT 6/20/2024, unfortunately, there was incomplete resection of bladder tumor.  Complicated by patient's cardiovascular history with significant bradycardia requiring pacemaker placement.  He was taken back to the OR 8/8/2020 for for resection completion.  Pathology report indicated high-grade papillary urothelial carcinoma.  He underwent gemcitabine intravesically from October through November.  He continued surveillance cystoscopy and unfortunately developed new papillary lesions involving posterior lateral wall and posterior.  After an explanation of risks versus benefits and potential complications patient was agreeable to proceed with additional operative intervention.      Procedures Performed:     Cystourethroscopy, bilateral retrograde pyelography and TURBT.    Summary of Hospital Course: Patient was admitted electively to the service of Dr. Nick stoll and taken to the operative theater for the aforementioned procedure 5/8/2025.  For surgical details please refer to operative report dictated by surgeon of record.  Patient was transferred to the medical-surgical unit from PACU for postoperative observation and routine care.  He remained afebrile, hemodynamically stable and adequately pain controlled.  Negron catheter was removed early morning postoperative day 1.  Patient demonstrated adequate voiding.  Vital signs were stable and labs were acceptable.  He was deemed  stable for discharge the  afternoon of postoperative day 1 to follow-up for path report discussion in office 5/21.        Complications: None    Condition at Discharge: good       Discharge instructions/Information to patient and family:   See After Visit Summary (AVS) for information provided to patient and family.      Provisions for Follow-Up Care:  See after visit summary for information related to follow-up care and any pertinent home health orders.      PCP: Micky Hylton MD    Disposition: Home    Planned Readmission: No     Discharge Medications:  See after visit summary for reconciled discharge medications provided to patient and family.      Discharge Statement:  I have spent a total time of 15 minutes in caring for this patient on the day of the visit/encounter.

## 2025-05-09 NOTE — PLAN OF CARE
Problem: PAIN - ADULT  Goal: Verbalizes/displays adequate comfort level or baseline comfort level  Description: Interventions:- Encourage patient to monitor pain and request assistance- Assess pain using appropriate pain scale- Administer analgesics based on type and severity of pain and evaluate response- Implement non-pharmacological measures as appropriate and evaluate response- Consider cultural and social influences on pain and pain management- Notify physician/advanced practitioner if interventions unsuccessful or patient reports new pain  Outcome: Progressing     Problem: INFECTION - ADULT  Goal: Absence or prevention of progression during hospitalization  Description: INTERVENTIONS:- Assess and monitor for signs and symptoms of infection- Monitor lab/diagnostic results- Monitor all insertion sites, i.e. indwelling lines, tubes, and drains- Monitor endotracheal if appropriate and nasal secretions for changes in amount and color- Dragoon appropriate cooling/warming therapies per order- Administer medications as ordered- Instruct and encourage patient and family to use good hand hygiene technique- Identify and instruct in appropriate isolation precautions for identified infection/condition  Outcome: Progressing     Problem: SAFETY ADULT  Goal: Patient will remain free of falls  Description: INTERVENTIONS:- Educate patient/family on patient safety including physical limitations- Instruct patient to call for assistance with activity - Consult OT/PT to assist with strengthening/mobility - Keep Call bell within reach- Keep bed low and locked with side rails adjusted as appropriate- Keep care items and personal belongings within reach- Initiate and maintain comfort rounds- Make Fall Risk Sign visible to staff- Offer Toileting every 2 Hours, in advance of need- Initiate/Maintain bed alarm- Apply yellow socks and bracelet for high fall risk patients- Consider moving patient to room near nurses station  Outcome:  Progressing

## 2025-05-09 NOTE — PROGRESS NOTES
"Progress Note - Urology   Name: Trent Ocasio 80 y.o. male I MRN: [id number]  Unit/Bed#: -01 I Date of Admission: 5/8/2025   Date of Service: 5/9/2025 I Hospital Day: 0     Assessment & Plan  Malignant 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 (HCC)  POD1 TURBT by Dr. Perdue  Vital signs stable, afebrile  Negron catheter maria victoria urine, removed for trial of void  No oxycodone today, patient understands  As long as passes trial of void stable for discharge this afternoon  Outpatient follow-up for pathology review in the coming weeks          Subjective:   HPI: Seen at bedside.  Reports some bladder spasms with Negron catheter overnight.  Negron catheter was removed without difficulty, urine is maria victoria-colored.  He is for trial of void today.  Discharge later this afternoon.    Review of Systems   Constitutional:  Negative for chills and fever.   Respiratory:  Negative for cough and shortness of breath.    Cardiovascular:  Negative for chest pain and palpitations.   Gastrointestinal:  Negative for abdominal pain and vomiting.   Genitourinary:  Negative for dysuria and hematuria.   Musculoskeletal:  Negative for arthralgias and back pain.   Skin:  Negative for color change and rash.   Neurological:  Negative for seizures and syncope.   All other systems reviewed and are negative.      Objective:    Vitals: Blood pressure 170/80, pulse 84, temperature 99.4 °F (37.4 °C), resp. rate 18, height 5' 10\" (1.778 m), weight 105 kg (232 lb 5.8 oz), SpO2 (!) 89%.,Body mass index is 33.34 kg/m².    Physical Exam  Constitutional:       General: He is not in acute distress.     Appearance: Normal appearance. He is obese. He is not ill-appearing or toxic-appearing.   HENT:      Head: Normocephalic and atraumatic.      Right Ear: External ear normal.      Left Ear: External ear normal.      Nose: Nose normal.      Mouth/Throat:      Mouth: Mucous membranes are moist.   Eyes:      General: No scleral icterus.     " "Conjunctiva/sclera: Conjunctivae normal.   Cardiovascular:      Rate and Rhythm: Normal rate.      Pulses: Normal pulses.   Pulmonary:      Effort: Pulmonary effort is normal.   Abdominal:      General: There is no distension.      Tenderness: There is no abdominal tenderness. There is no guarding.   Genitourinary:     Comments: Urine maria victoria colored, no clots  Neurological:      General: No focal deficit present.      Mental Status: He is alert and oriented to person, place, and time. Mental status is at baseline.   Psychiatric:         Mood and Affect: Mood normal.         Behavior: Behavior normal.         Thought Content: Thought content normal.         Judgment: Judgment normal.             Labs:  No results for input(s): \"WBC\" in the last 72 hours.  No results for input(s): \"HGB\" in the last 72 hours.  No results for input(s): \"HCT\" in the last 72 hours.  No results for input(s): \"CREATININE\" in the last 72 hours.      History:    Past Medical History:   Diagnosis Date    Acute respiratory failure with hypoxia (HCC) 03/28/2024    Chronic kidney disease     Diabetes mellitus (HCC)     GERD (gastroesophageal reflux disease)     Hypertension     Respiratory acidosis 03/31/2024     Social History     Socioeconomic History    Marital status: /Civil Union     Spouse name: None    Number of children: None    Years of education: None    Highest education level: None   Occupational History    None   Tobacco Use    Smoking status: Former     Passive exposure: Past    Smokeless tobacco: Never   Vaping Use    Vaping status: Never Used   Substance and Sexual Activity    Alcohol use: Yes     Comment: rarely    Drug use: Never    Sexual activity: Yes     Partners: Female   Other Topics Concern    None   Social History Narrative    None     Social Drivers of Health     Financial Resource Strain: Low Risk  (8/20/2024)    Received from [facility]    Overall Financial Resource Strain (CARDIA)     " Difficulty of Paying Living Expenses: Not hard at all   Food Insecurity: No Food Insecurity (2025)    Nursing - Inadequate Food Risk Classification     Worried About Running Out of Food in the Last Year: Never true     Ran Out of Food in the Last Year: Never true     Ran Out of Food in the Last Year: Never true   Transportation Needs: No Transportation Needs (2025)    Nursing - Transportation Risk Classification     Lack of Transportation: Not on file     Lack of Transportation: No   Physical Activity: Not on file   Stress: No Stress Concern Present (2024)    Received from [facility] Occupational [facility] - Occupational Stress Questionnaire     Feeling of Stress : Only a little   Social Connections: Feeling Socially Integrated (2024)    Received [facility]    OASIS : Social Isolation     How often do you feel lonely or isolated from those around you?: Never   Intimate Partner Violence: Unknown (2025)    Nursing IPS     Feels Physically and Emotionally Safe: Not on file     Physically Hurt by Someone: Not on file     Humiliated or Emotionally Abused by Someone: Not on file     Physically Hurt by Someone: No     Hurt or Threatened by Someone: No   Housing Stability: Unknown (2025)    Nursing: Inadequate Housing Risk Classification     Has Housing: Not on file     Worried About Losing Housing: Not on file     Unable to Get Utilities: Not on file     Unable to Pay for Housing in the Last Year: No     Has Housin     Past Surgical History:   Procedure Laterality Date    APPENDECTOMY      CARDIAC CATHETERIZATION Left 2024    Procedure: Cardiac Left Heart Cath;  Surgeon: Qiana Torres MD;  [facility];  Service: Cardiology    CARDIAC CATHETERIZATION N/A 2024    Procedure: Cardiac Coronary Angiogram;  Surgeon: Qiana Torres MD;  [facility];  Service: Cardiology    CARDIAC  CATHETERIZATION  05/24/2024    Procedure: Cardiac catheterization;  Surgeon: Qiana Torres MD;  [facility];  Service: Cardiology    CARDIAC ELECTROPHYSIOLOGY PROCEDURE N/A 6/21/2024    Procedure: Cardiac pacer implant;  Surgeon: Solomon Quintero MD;  [facility];  Service: Cardiology    FL RETROGRADE PYELOGRAM  5/8/2025    ME CYSTO W/REMOVAL OF LESIONS SMALL N/A 6/20/2024    Procedure: TRANSURETHRAL RESECTION OF BLADDER TUMOR (TURBT);  Surgeon: Nick Patel DO;  Location: [facility];  Service: Urology    ME CYSTO W/REMOVAL OF LESIONS SMALL N/A 7/31/2024    Procedure: TRANSURETHRAL RESECTION OF BLADDER TUMOR (TURBT), intravesical instillation of gemcitabine;  Surgeon: Nick Patel DO;  [facility];  Service: Urology    ME CYSTO W/REMOVAL OF LESIONS SMALL N/A 5/8/2025    Procedure: TRANSURETHRAL RESECTION OF BLADDER TUMOR (TURBT);  Surgeon: Nick Patel DO;  [facility];  Service: Urology     History reviewed. No pertinent family history.    Catie High PA-C  Date: 5/9/2025 Time: 10:54 AM

## 2025-05-09 NOTE — TELEPHONE ENCOUNTER
Patient has been scheduled for a 2 week Path review with Dr. Patel on 5/21/25 1:45 pm. Appointment is scheduled as a VIRTUAL due to availability.

## 2025-05-09 NOTE — NURSING NOTE
Patient discharged home with no complains of pain, IV and Masimo removed off patient, AVS reviewed with patient and spouse, both verbalized understanding of discharge instructions and follow up appointments. Patient walked off the floor with a cane accompanied by wife.

## 2025-05-12 ENCOUNTER — PROCEDURE VISIT (OUTPATIENT)
Dept: UROLOGY | Facility: CLINIC | Age: 80
End: 2025-05-12
Payer: COMMERCIAL

## 2025-05-12 ENCOUNTER — TELEPHONE (OUTPATIENT)
Dept: NEPHROLOGY | Facility: CLINIC | Age: 80
End: 2025-05-12

## 2025-05-12 VITALS
TEMPERATURE: 97.6 F | SYSTOLIC BLOOD PRESSURE: 160 MMHG | HEART RATE: 91 BPM | OXYGEN SATURATION: 98 % | DIASTOLIC BLOOD PRESSURE: 84 MMHG | BODY MASS INDEX: 33.21 KG/M2 | WEIGHT: 232 LBS | HEIGHT: 70 IN

## 2025-05-12 DIAGNOSIS — R33.9 URINARY RETENTION: Primary | ICD-10-CM

## 2025-05-12 LAB — POST-VOID RESIDUAL VOLUME, ML POC: 55 ML

## 2025-05-12 PROCEDURE — 51798 US URINE CAPACITY MEASURE: CPT

## 2025-05-12 PROCEDURE — 88307 TISSUE EXAM BY PATHOLOGIST: CPT | Performed by: PATHOLOGY

## 2025-05-12 NOTE — TELEPHONE ENCOUNTER
Patient s/p TURBT with Dr. Patel 5/8/25, No bowel movement since 2 days prior to surgery.Patient is not taking stool softener. He also is not able to urinate more then a few drops for relief. No pain at this time. Patient is not staying hydrated due to difficulty urinating.    Advised patient to take stool softener, Miralax and stay hydrated.     Reached out to office nurses. Ok to Add patient on to nurse schedule for 2:30 pm. Dr. Patel in office, no appointment.       Reviewed ED precautions.

## 2025-05-12 NOTE — TELEPHONE ENCOUNTER
Dr Gilbert, pt came to the office bc was confused about medications. Please clarify if he should continue taking metoprolol 50 mg daily, losartan 75 mg daily and if its ok for him to take oxycodone with tylenol. Please advise.

## 2025-05-12 NOTE — TELEPHONE ENCOUNTER
Patient called stating he is having urinary retention .  He had surgery on 05/08/25 and he was discharged and the ayon was removed at time. He is having issues since.  He is not able to urinate much. Warm transfer to [facility] nurse for further assessment.

## 2025-05-12 NOTE — PROGRESS NOTES
"5/12/2025  Trent Ocasio is a 80 y.o. male  [id number]    Diagnosis:  Chief Complaint    Post-op; Urinary Retention         Patient presents for follow up post void residual s/p TURBT done on 5/8/25 managed by Dr. Patel    Plan:  Post Void residual will be measured in office  Patient will follow advised bowel protocol to relieve his constipation.   Patient is scheduled for 2 week Path review with Dr. Patel on 5/21/25      Assessment:      Vitals:    05/12/25 1432   BP: 160/84   Patient Position: Sitting   Cuff Size: Standard   Pulse: 91   Temp: 97.6 °F (36.4 °C)   TempSrc: Temporal   SpO2: 98%   Weight: 105 kg (232 lb)   Height: 5' 10\" (1.778 m)       Post void residual measured via bladder scan to be 55 mL    Recent Results (from the past 6 hours)   POCT Measure PVR    Collection Time: 05/12/25  2:52 PM   Result Value Ref Range    POST-VOID RESIDUAL VOLUME, ML POC 55 mL     Patient makes c/o constipation as he has not had a bowel since one day before his surgery. Patient is educated on bowel regimen using OTC stools softeners, Miralax, and drinking prune juice for bowel regularity as this is obstructing the bladder from releasing urine with ease. Patient verbalizes understanding to teachings given.       Marleen Mercer LPN      " Home

## 2025-05-12 NOTE — TELEPHONE ENCOUNTER
Agree with bringing in for RN visit today for PVR. Agree with bowel regimen recommendations for constipation

## 2025-05-13 ENCOUNTER — TELEPHONE (OUTPATIENT)
Dept: NEPHROLOGY | Facility: CLINIC | Age: 80
End: 2025-05-13

## 2025-05-13 NOTE — TELEPHONE ENCOUNTER
Called and advised that it is ok to take Losartan 75 mg and Metoprolol 50 mg daily, and Oxycodone with Tylenol as well, per Dr Gilbert. Pt understood and was ok with that.

## 2025-05-14 ENCOUNTER — HOSPITAL ENCOUNTER (EMERGENCY)
Facility: HOSPITAL | Age: 80
Discharge: HOME | End: 2025-05-14
Attending: EMERGENCY MEDICINE
Payer: COMMERCIAL

## 2025-05-14 VITALS
RESPIRATION RATE: 19 BRPM | SYSTOLIC BLOOD PRESSURE: 142 MMHG | OXYGEN SATURATION: 97 % | TEMPERATURE: 98.4 F | DIASTOLIC BLOOD PRESSURE: 78 MMHG | HEART RATE: 104 BPM

## 2025-05-14 DIAGNOSIS — K59.00 CONSTIPATION: Primary | ICD-10-CM

## 2025-05-14 PROCEDURE — 99284 EMERGENCY DEPT VISIT MOD MDM: CPT | Performed by: EMERGENCY MEDICINE

## 2025-05-14 PROCEDURE — 99283 EMERGENCY DEPT VISIT LOW MDM: CPT

## 2025-05-14 RX ORDER — LACTULOSE 10 G/15ML
30 SOLUTION ORAL ONCE
Status: COMPLETED | OUTPATIENT
Start: 2025-05-14 | End: 2025-05-14

## 2025-05-14 RX ORDER — LACTULOSE 10 G/15ML
20 SOLUTION ORAL DAILY PRN
Qty: 473 ML | Refills: 0 | Status: SHIPPED | OUTPATIENT
Start: 2025-05-14 | End: 2025-05-30

## 2025-05-14 RX ADMIN — LACTULOSE 30 G: 20 SOLUTION ORAL at 11:42

## 2025-05-14 NOTE — DISCHARGE INSTRUCTIONS
Lactulose daily until your stools are soft  Return for increasing pain fever vomiting or any problems  Follow-up with your provider

## 2025-05-14 NOTE — ED PROVIDER NOTES
Time reflects when diagnosis was documented in both MDM as applicable and the Disposition within this note       Time User Action Codes Description Comment    5/14/2025 11:37 AM Bernardo Negrete Add [K59.00] Constipation           ED Disposition       ED Disposition   Discharge    Condition   Stable    Date/Time   Wed May 14, 2025 11:37 AM    Comment   Trent Ocasio discharge to home/self care.                   Assessment & Plan       Medical Decision Making  Risk  Prescription drug management.         Medical decision making 80-year-old male presents emergency department with constipation.  Seen this morning at urgent care apparently had a urinalysis at that time which did not show infection.  Patient reports he is now able to urinate but reports that at times he feels very full and feels the need to move his bowels but is unable to.  He reports very hard stool.  We discussed stool softeners we discussed follow-up we discussed indications to return.    Medications   lactulose (CHRONULAC) oral solution 30 g (30 g Oral Given 5/14/25 1142)       ED Risk Strat Scores                    No data recorded                            History of Present Illness       Chief Complaint   Patient presents with    Constipation     States no bm in over a week, tried fleet enema last night without relief.     Urinary Retention     States unable to pee , recent bladder surgery        Past Medical History:   Diagnosis Date    Acute respiratory failure with hypoxia (HCC) 03/28/2024    Chronic kidney disease     Diabetes mellitus (HCC)     GERD (gastroesophageal reflux disease)     Hypertension     Respiratory acidosis 03/31/2024      Past Surgical History:   Procedure Laterality Date    APPENDECTOMY      CARDIAC CATHETERIZATION Left 05/24/2024    Procedure: Cardiac Left Heart Cath;  Surgeon: Qiana Torres MD;  [facility];  Service: Cardiology    CARDIAC CATHETERIZATION N/A 05/24/2024    Procedure: Cardiac  Coronary Angiogram;  Surgeon: Qiana Torres MD;  [facility];  Service: Cardiology    CARDIAC CATHETERIZATION  05/24/2024    Procedure: Cardiac catheterization;  Surgeon: Qiana Torres MD;  [facility];  Service: Cardiology    CARDIAC ELECTROPHYSIOLOGY PROCEDURE N/A 6/21/2024    Procedure: Cardiac pacer implant;  Surgeon: Solomon Quintero MD;  [facility];  Service: Cardiology    FL RETROGRADE PYELOGRAM  5/8/2025    CO CYSTO W/REMOVAL OF LESIONS SMALL N/A 6/20/2024    Procedure: TRANSURETHRAL RESECTION OF BLADDER TUMOR (TURBT);  Surgeon: Nick Patel DO;  [facility];  Service: Urology    CO CYSTO W/REMOVAL OF LESIONS SMALL N/A 7/31/2024    Procedure: TRANSURETHRAL RESECTION OF BLADDER TUMOR (TURBT), intravesical instillation of gemcitabine;  Surgeon: Nick Patel DO;  [facility];  Service: Urology    CO CYSTO W/REMOVAL OF LESIONS SMALL N/A 5/8/2025    Procedure: TRANSURETHRAL RESECTION OF BLADDER TUMOR (TURBT);  Surgeon: Nick Patel DO;  [facility];  Service: Urology      No family history on file.   Social History[1]   E-Cigarette/Vaping    E-Cigarette Use Never User       E-Cigarette/Vaping Substances    Nicotine No     THC No     CBD No     Flavoring No     Other No     Unknown No       I have reviewed and agree with the history as documented.     HPI patient is an 80-year-old male history of recent bladder surgery.  Apparently had been using some oxycodone recently for his pain.  Patient reports he discontinued but reportedly noticed difficulty moving his bowels.  Patient reports he did take some mag citrate and he did produce some very hard rocks of stool.  He reports that with the feeling of having to move his bowels he has some fullness and had some difficulty passing his urine.  Patient reports he can urinate now.  Apparently was at urgent care this morning and had a urinalysis performed  that patient reports did not show infection.  Patient presents here complaining primarily of constipation.  Denies any abdominal pain.  Past medical history of chronic kidney disease diabetes, prostate issues, bladder cancer  Family hist noncontributory  Social history, non-smoker no history of drug abuse    Review of Systems   Constitutional:  Negative for fever.   HENT:  Negative for congestion.    Eyes:  Negative for pain and redness.   Respiratory:  Negative for cough and shortness of breath.    Cardiovascular:  Negative for chest pain.   Gastrointestinal:  Positive for constipation. Negative for abdominal pain and vomiting.           Objective       ED Triage Vitals [05/14/25 1118]   Temperature Pulse Blood Pressure Respirations SpO2 Patient Position - Orthostatic VS   98.4 °F (36.9 °C) 104 142/78 19 97 % Sitting      Temp Source Heart Rate Source BP Location FiO2 (%) Pain Score    Temporal Monitor Left arm -- --      Vitals      Date and Time Temp Pulse SpO2 Resp BP Pain Score FACES Pain Rating User   05/14/25 1118 98.4 °F (36.9 °C) 104 97 % 19 142/78 -- -- GP            Physical Exam  Vitals and nursing note reviewed.   Constitutional:       Appearance: He is well-developed.   HENT:      Head: Normocephalic.      Right Ear: External ear normal.      Left Ear: External ear normal.      Nose: Nose normal.      Mouth/Throat:      Mouth: Mucous membranes are moist.      Pharynx: Oropharynx is clear.     Eyes:      General: Lids are normal.      Extraocular Movements: Extraocular movements intact.      Pupils: Pupils are equal, round, and reactive to light.       Cardiovascular:      Rate and Rhythm: Normal rate and regular rhythm.      Pulses: Normal pulses.      Heart sounds: Normal heart sounds.   Pulmonary:      Effort: Pulmonary effort is normal. No respiratory distress.   Abdominal:      General: Abdomen is flat.      Palpations: Abdomen is soft.      Tenderness: There is no abdominal tenderness.       Comments: Rectal exam shows no impacted stool, no masses     Musculoskeletal:         General: No deformity. Normal range of motion.      Cervical back: Normal range of motion and neck supple.     Skin:     General: Skin is warm and dry.     Neurological:      Mental Status: He is alert and oriented to person, place, and time.     Psychiatric:         Mood and Affect: Mood normal.         Results Reviewed       None            No orders to display       Procedures    ED Medication and Procedure Management   Prior to Admission Medications   Prescriptions Last Dose Informant Patient Reported? Taking?   apixaban (Eliquis) 2.5 mg  Self No No   Sig: Take 1 tablet (2.5 mg total) by mouth 2 (two) times a day   aspirin 81 mg chewable tablet  Self Yes No   Sig: Chew 81 mg daily   calcitriol (ROCALTROL) 0.25 mcg capsule  Self No No   Sig: Take 1 capsule (0.25 mcg total) by mouth daily   cholecalciferol 25 MCG (1000 UT) tablet  Self Yes No   Sig: Take 1,000 Units by mouth daily   ezetimibe (ZETIA) 10 mg tablet  Self Yes No   Sig: Take 10 mg by mouth daily   glipiZIDE (GLUCOTROL) 10 mg tablet  Self Yes No   Sig: Take 10 mg by mouth 2 (two) times a day before meals   insulin degludec (Tresiba FlexTouch) 100 units/mL injection pen  Self Yes No   Sig: Inject 20 Units under the skin daily at bedtime   isosorbide mononitrate (IMDUR) 60 mg 24 hr tablet  Self No No   Sig: Take 1 tablet (60 mg total) by mouth daily   latanoprost (XALATAN) 0.005 % ophthalmic solution  Self Yes No   Sig: Administer 1 drop to both eyes daily at bedtime   levothyroxine 50 mcg tablet  Self Yes No   Sig: Take 50 mcg by mouth daily   losartan (COZAAR) 25 mg tablet  Self No No   Sig: Take 1 tablet (25 mg total) by mouth daily   metFORMIN (GLUCOPHAGE) 500 mg tablet  Self Yes No   Sig: Take 500 mg by mouth 2 (two) times a day with meals   methocarbamol (ROBAXIN) 500 mg tablet  Self No No   Sig: Take 1 tablet (500 mg total) by mouth 2 (two) times a day as needed  for muscle spasms   metoprolol succinate (TOPROL-XL) 50 mg 24 hr tablet  Self No No   Sig: Take 1.5 tablets (75 mg total) by mouth daily   pantoprazole (PROTONIX) 40 mg tablet  Self Yes No   Sig: Take 40 mg by mouth daily at bedtime   simvastatin (ZOCOR) 40 mg tablet  Self Yes No   Sig: Take 40 mg by mouth daily at bedtime      Facility-Administered Medications: None     Discharge Medication List as of 5/14/2025 11:39 AM        START taking these medications    Details   lactulose (CHRONULAC) 10 g/15 mL solution Take 30 mL (20 g total) by mouth daily as needed (Constipation) for up to 16 days, Starting Wed 5/14/2025, Until Fri 5/30/2025 at 2359, Normal           CONTINUE these medications which have NOT CHANGED    Details   apixaban (Eliquis) 2.5 mg Take 1 tablet (2.5 mg total) by mouth 2 (two) times a day, Starting Mon 1/27/2025, Normal      aspirin 81 mg chewable tablet Chew 81 mg daily, Historical Med      calcitriol (ROCALTROL) 0.25 mcg capsule Take 1 capsule (0.25 mcg total) by mouth daily, Starting Mon 4/21/2025, Normal      cholecalciferol 25 MCG (1000 UT) tablet Take 1,000 Units by mouth daily, Historical Med      ezetimibe (ZETIA) 10 mg tablet Take 10 mg by mouth daily, Historical Med      glipiZIDE (GLUCOTROL) 10 mg tablet Take 10 mg by mouth 2 (two) times a day before meals, Historical Med      insulin degludec (Tresiba FlexTouch) 100 units/mL injection pen Inject 20 Units under the skin daily at bedtime, Historical Med      isosorbide mononitrate (IMDUR) 60 mg 24 hr tablet Take 1 tablet (60 mg total) by mouth daily, Starting Thu 2/27/2025, Normal      latanoprost (XALATAN) 0.005 % ophthalmic solution Administer 1 drop to both eyes daily at bedtime, Historical Med      levothyroxine 50 mcg tablet Take 50 mcg by mouth daily, Historical Med      losartan (COZAAR) 25 mg tablet Take 1 tablet (25 mg total) by mouth daily, Starting Fri 9/20/2024, Normal      metFORMIN (GLUCOPHAGE) 500 mg tablet Take 500 mg by  mouth 2 (two) times a day with meals, Historical Med      methocarbamol (ROBAXIN) 500 mg tablet Take 1 tablet (500 mg total) by mouth 2 (two) times a day as needed for muscle spasms, Starting Tue 4/15/2025, Normal      metoprolol succinate (TOPROL-XL) 50 mg 24 hr tablet Take 1.5 tablets (75 mg total) by mouth daily, Starting Mon 1/27/2025, Normal      pantoprazole (PROTONIX) 40 mg tablet Take 40 mg by mouth daily at bedtime, Historical Med      simvastatin (ZOCOR) 40 mg tablet Take 40 mg by mouth daily at bedtime, Historical Med           No discharge procedures on file.  ED SEPSIS DOCUMENTATION   Time reflects when diagnosis was documented in both MDM as applicable and the Disposition within this note       Time User Action Codes Description Comment    5/14/2025 11:37 AM Bernardo Negrete Add [K59.00] Constipation                      [1]   Social History  Tobacco Use    Smoking status: Former     Passive exposure: Past    Smokeless tobacco: Never   Vaping Use    Vaping status: Never Used   Substance Use Topics    Alcohol use: Yes     Comment: rarely    Drug use: Never        Bernardo Negrete MD  05/14/25 3160

## 2025-05-20 ENCOUNTER — TELEPHONE (OUTPATIENT)
Dept: CARDIOLOGY CLINIC | Facility: CLINIC | Age: 80
End: 2025-05-20

## 2025-05-20 NOTE — TELEPHONE ENCOUNTER
Copy and pasted       Patient comes for preop cardiac risk assessment prior to urological surgery on 5/8/25 followed by bilateral cataract surgeries in June 2025. States he is doing okay from cardiac stand point and denies chest pain / pressure, SOB, palpitations, lightheadedness, syncope, swelling feet, orthopnea, PND, claudication.

## 2025-05-20 NOTE — PROGRESS NOTES
Virtual Regular VisitName: Trent Ocasio      : 1945      MRN: [id number]  Encounter Provider: Nick Patel DO  Encounter Date: 2025   Encounter department: [facility] [place]  :  Assessment & Plan  Malignant 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 (HCC)           We reviewed the patient's pathologic diagnosis of HGT1 bladder cancer.  I explained that this meant that the cancer was not muscle invasive and but did invade a cell layer called the lamina propria.    I reviewed the AUA risk stratification table.             I explained that based on cystoscopic and pathologic findings, patient has high risk disease.                 We reviewed the treatment options for HGT1 disease. Understaging of such tumors is a great and primary concern, especially if no muscle is present in the original TUR specimen.  Even if muscularis propria is present in the original specimen re-resection (i.e. 2nd TURBT approximately 4 weeks following initial resection) is currently the standard of care for patients with T1 disease.  Data clearly show that at re-resection 45-75% of patients will exhibit residual tumor cells, while 30 to 40% will be upstaged to muscle-invasive disease. Repeat resection may also serve a prognostic purpose, since those that have residual tumor on re-resection have been shown to have a much higher rate of disease progression than patients in whom no residual cancer is found (14 vs 76%).  I explained that even  technically successful resection and re-resection, can understage patients with T1 bladder cancer.  As such, management of patients with T1 bladder cancer is nuanced, since historical data suggest that roughly 30% of patients will never recur, 30% will require cystectomy, and 30% will succumb to metastatic urothelial carcinoma. I therefore explained to the patient that typical standard of care for this scenario would be repeat TURBT to obtain more  "tissue for pathology review.     Due to the aggressive nature of HGT1 disease, the concept of \"early cystectomy\" has been proposed, and five-year disease specific survival in these \"early cystectomy\" cohorts are reported to be between 80 to 90%. Nevertheless, although retrospective data suggest a significant survival benefit to early cystectomy (e.g. 76.7% vs 64.5% 10-year cancer specific survival in one study), the risk- benefit balances in these particular situations are extremely complex and require tailoring to each patient's circumstances, wishes, and priorities.  Risks and quality of life compromises of cystectomy must be balanced against oncologic risks, since 50% to 70% of patients are overtreated with early cystectomy.  An initial trial for BCG in the setting of newly identified T1 disease is another option, since progression within the first 6th months of diagnosis is extremely rare (on the order of 4%).  Nevertheless, in certain situations, immediate cystectomy in the setting of T1 disease is strongly recommended. These include: (1) extensive unresectable tumor (2) T1 disease in a bladder diverticulum (3) presence of hydronephrosis (4) non-urothelial carcinoma histology and (5) carcinoma in the prostatic urethra and (5) as some experts argue presence of lymphovascular invasion.  Multifocality, presence of CIS, presence of T1 disease on re-resection, carcinoma that abuts the muscle of the bladder, and large tumor size are also risk factors that can tip the scales toward cystectomy.         If an immediate cystectomy is considered, I explained that there are 3 basic categories of modern urinary diversions: (1) incontinent bowel conduit (e.g. ileal conduit) (2) continent cutaneous stomal reservoire (e.g. [place] pouch) (3) continent orthotopic urethral diversion (ileal neopbladder).      I explained that an ileal conduit is the simplest, most time-tested urinary diversion that requires the least operative " time and arguably is associated with the fewest complications. I described that a short piece of ileum is anastamosed to the ureters and brought onto the skin.  A life-long urostomy appliance to collect urine is required.   Although time-tested, short-term and long-term issues with ileal conduit diversions are not uncommon.  I quoted the best data available, which in my view is the 2003 [facility] series that included long-term bladder cancer survivors who had at least 5 years of follow-up.  In this series, issues that were seen with ileal conduits were as follows: ureteral stricture/obstruction (~15%), recurrent pyelonephritis (~ 25%), urinary calculi (this complication often occured beyond 5 to 10 years after surgery, ~10% ), impaired kidney function due to obstruction or ureteral reflux (~27%, only 2% required dialysis all of whom had compromised kidney function prior to surgery), and stomal complications (~25%) such as parastomal hernia, stensosis, and bleeding/skin irritation.  Furthermore up to 25% of patients had bowel complications, which included small bowel obstruction, fistula formation, and diarrhea.  I explained that although many issues that arise can be handled conservatively, some do require re-operation.      Then we discussed the ileal neobladder.  I explained that the neobladder is constructed from detubularized bowel, which is then sewn into a spherical reservoir for urine storage.  The neobladder is anastamosed to the ureters and to the urethra, to mimic functions of the native bladder. The goal is for the patient to void spontaneously using the Valsalva menuver.  Choice of proceeding with the neobladder vs. ileal conduit must be balanced against additional issues and risks.  Along with with risks listed for ileal conduit diversion, I explained the additional potential problems that can arise with the ileal neobladder.   I described risks of urinary leaks from a number of suture lines  and explained that in general these can be managed conservatively.  I stressed that a life-long risk of pouch perforation exists.  We discussed risks of incontinence and hypercontinence.  I explained that it is critical to integrate these risks into the decision to proceed with the neobladder.  I quoted the risks of day-time incontinence to be on the order of 10%, while the risk of nighttime incontinence to be approximately 20%.  With regard to hypercontinence and poor pouch emptying, I explained that approximately 5% of male patients will require life-long intermittent catheterization.  Such risks can reach 30% in female patients with neobladders.  Furthemore, neobladder-vaginal fistula formation is a great concern when a neobladder is constructed in women (~5%).  I explained that continent urinary diversions are contra-indicated in patients with renal insufficiency, but did explain that metabolic disturbances are possible even in patients with intact renal function.    With regard to [place] pouch, we discussed that this diversion consists of a pouch constructed from detubularized right colon.  The pouch is catheterized through the native ileocecal valve via a segment of the ileum that is brought onto the skin.  I explained that all risks that pertain to ileal conduits and neobladders generally apply to patients who undergo a cutaneous continent diversion.  These include risks of stomal stenosis, pouch stomal formation, incontinence, and metabolic disturbances. Furthermore, because, the ileocecal valve is resected, some post-operative diarrhea can be expected.  Such symptoms usually can be controlled with over-the-counter medications.  We reviewed the life-long catheterization and irrigation regimen that is required with this diversion.    Other general risks of the surgery were discussed in detail including medical and anesthetic complications (e.g. heart attack, stroke, deep vein thrombosis, pulmonary embolism,  infection (pneumonia, etc), bleeding with possible need for transfusion, adjacent organ involvement or injury, wound complications, reaction to medications).  We reviewed what to expect with regard to incisions, post-operative pain, and risks of subsequent hernias.         I explained that if we chose to proceed with intravesical therapy, BCG with a maintenance protocol is the optimal strategy for patients with non-muscle invasive, high risk disease. Generally, BCG is administered as a 6 week induction course approximately 3-4 weeks after TUR followed by 3 week maintenance boosters at 3, 6, 12, 18, 24, 30, 36 months.  Strict cystoscopic and upper tract surveillance must be maintained throughout this period.  We discussed the fact that this is a rather grueling regimen and that we will need to approach this undertaking one step at a time.  For patients who are unable to tolerate BCG, intravesical therapy with Mitomycin remains an option.  Although Mitomycin is better tolerated than BCG, its efficacy - even with a maintenance protocol - is inferior to that of BCG.    We reviewed the side effects of BCG.  I explained the side effects are including but not limited to:  Inflammatory cystitis, severe BCG cystitis, flu-like symptoms, fevers, granulomatous prostatitis, BCG sepsis.  I explained that if patient did develop BCG sepsis, that hospitalization with IV antibiotics would be required.  I explained that BCG had superior efficacy compared to intravesical chemotherapy such as gemcitabine for patients with high risk nonmuscle invasive bladder cancer.    I also explained that BCG should not be administered in the setting of active urinary tract infection, traumatic catheterization, or active gross hematuria in order to minimize the risk of systemic BCG infection.    We reviewed that BCG usually needs to sit in the bladder for about 2 hours to reach peak efficacy.  We also reviewed that patients are asked not to drink 4-6  hours prior to treatment.    I explained that studies have demonstrated that this regimen has been shown to reduce recurrence and progression of bladder cancer.          Reviewed with the patient that unfortunately he had recurrence of T1 bladder cancer.  Explained that per guidelines as mentioned above, options for therapy could be going to cystectomy versus repeat induction therapy.  I explained that typically this would mean repeat BCG therapy.  However, given the national shortage, and given the fact that he did not fully respond to BCG last time, I was planning on doing an induction course of gemcitabine docetaxel at the [facility].  We went over the steps of this process.  I told him that he would need to be ready for a call to get this scheduled.  I also explained that I would scope him in the office a few weeks later to see if his bladder properly responded.          PLAN  -Will arrange for gemcitabine docetaxel induction therapy.  Will scope him in the office a few weeks later.            History of Present Illness       80 y.o. old male with bladder cancer     Anticoagulation: Aspirin 81 mg     He was admitted around May 2024 with CHF.  He was found to have an elevated creatinine of 2.76.  He had a workup with nephrology which included a kidney bladder ultrasound.     Kidney bladder ultrasound 5/14/2024: New ovoid structure projecting from the posterior bladder with central hypoechoic area measuring about 3.2 cm     He had office cystoscopy with Dr. Oliveira on 5/22/2024 which demonstrated large posterior wall bladder mass        OR TURBT on 6/20/24:  Cellules/diverticuli: large dome chimney, very hard to reach with scope     Bladder lesion #1  Location: Posterior aspect of the dome chimney  Size: 4 cm  Appearance: Papillary  Predicted depth: Ta  -Tumor extremely difficult to reach with the resectoscope due to the fact that it was in the dome chimney.  I was only able to resect about 50% of the tumor  with maximal downward pressure on the lower abdomen and pushing all the way in with the resectoscope  -Repeat case must be done with extra long scope next time     Due to incomplete resection of the tumor, gemcitabine was not given  --Path:  A. Urinary Bladder, Transurethral Resection of Bladder Tumor:  - Fragments of papillary urothelial carcinoma, high-grade, with foci suspicious for superficial invasion  - Muscularis propria (detrusor muscle) present and not involved        Patient got bradycardic in PACU.  Cardiology team assessed him.  He was admitted to medicine.  During admission he had pacemaker placed on 6/21/2024.     He was cleared by cardiology for repeat trip to the operating room.        OR 8/8/24:  Bladder lesion #1  Location: Posterior aspect of the dome chimney  Size: 4 cm  --Path:  A. Urinary Bladder, Bladder tumor, Transurethral Resection:  - High-grade papillary urothelial carcinoma with focal superficial invasion.  - Muscularis propria (detrusor muscle) is present, negative for tumor.     Extra long resectoscope was reserved for the case.  Unfortunately, the extra long lens was not included in the set.  We therefore had to use standard bipolar resectoscope.  2 assistants pushed down very hard on the abdomen and I pushed very hard into the dome with the scope.  Eventually able to resect the entire tumor.     2000 mg of gemcitabine were administered intravesically at the end of the case        BCG induction October through November 2024        PVR 17 cc on 1/13/2025     Urine dip 1/13/2025: Negative leukocyte esterase, negative nitrite, positive blood     Cysto 1/13/25:  Urethra:                      Normal  Prostate:                    Mod BL lateral lobe hypertrophy, mod median lobe, no lesions  Bladder:                     Mod to severe trabeculations; no stones; large dome chimney; 2 cm R posterior lateral wall papillary lesion; 2 cm posterior dome papillary lesion; about 5 separate papillary  "lesions in dome chimney ranging from 5-10 mm  Ureteral orifices:       orthotopic  Other findings:          None, retroflexed view confirms        CT abdomen pelvis without contrast 1/20/2025: Fat stranding around the bladder, nonspecific; no findings of metastatic disease      OR 5/8/25:  Area of urethral narrowing to about 12 Mongolian, focal, at the bulbar urethra.  This area was able to be traversed with the scope with slow steady pressure.  Prostate: Moderately enlarged, fibrotic  Bilateral retrograde pyelogram demonstrated no significant hydronephrosis bilaterally. No filling defects in ureters or kidneys.   Bladder lesion #1  Location: Right posterior dome chimney  Size: 2 cm  Appearance: Papillary  Bladder lesion #2  Location: Left posterior dome chimney  Size: 3.5 cm  Appearance: Papillary  --Path:  A. Urinary Bladder, Bladder tumor:  -High grade papillary urothelial carcinoma with tumor necrosis  Focal superficial lamina propria invasion.   -Fragments of unremarkable detrusor muscle, not involved by tumor      Passed TOV POD1 and was discharged home      Visit 5/21/25: freq sensation of voiding, had neg UTI testing at urgent clinic          Review of Systems   Constitutional:  Negative for chills and fever.   Respiratory:  Negative for cough and shortness of breath.    Genitourinary:  Negative for flank pain.   Neurological:  Negative for dizziness and headaches.   Psychiatric/Behavioral:  Negative for agitation and behavioral problems.        Objective   Ht 5' 10\" (1.778 m) Comment: PT VV  Wt 105 kg (232 lb) Comment: PT VV  BMI 33.29 kg/m²     Physical Exam  Constitutional:       General: He is not in acute distress.  Pulmonary:      Effort: Pulmonary effort is normal.     Neurological:      Mental Status: He is alert and oriented to person, place, and time.     Psychiatric:         Mood and Affect: Mood normal.         Behavior: Behavior normal.         Administrative Statements   Encounter provider Nick " Francisco J Patel DO    The Patient is located at Home and in the following state in which I hold an active license [place].    The patient was identified by name and date of birth. Trent Ocasio was informed that this is a telemedicine visit and that the visit is being conducted through Telephone.  My office door was closed. No one else was in the room.  He acknowledged consent and understanding of privacy and security of the video platform. The patient has agreed to participate and understands they can discontinue the visit at any time.    Attempts were made for patient to connect to the video platform.  Patient was unsuccessful making connection.  Telephone visit therefore had to be conducted.    I have spent a total time of 40 minutes in caring for this patient on the day of the visit/encounter including Diagnostic results, Prognosis, Risks and benefits of tx options, Instructions for management, Patient and family education, Importance of tx compliance, Impressions, Counseling / Coordination of care, Documenting in the medical record, Reviewing/placing orders in the medical record (including tests, medications, and/or procedures), and Obtaining or reviewing history  , not including the time spent for establishing the audio/video connection.

## 2025-05-20 NOTE — TELEPHONE ENCOUNTER
Patient was last seen on 4/28/2025 by Dr Taylor look like patient was cleared     Last EKG 4/28/2025    Last echo was 3/25/25      Patient is having cataract surgery on 6/3/2025 and 6/17/2025         Please advise if patient is cleared

## 2025-05-20 NOTE — TELEPHONE ENCOUNTER
Ok thank you       Patient need an EKG 30 day within CAT surgery     Do we tell patient to come in for nurse visit for EKG ?    Please advise

## 2025-05-21 ENCOUNTER — TELEMEDICINE (OUTPATIENT)
Dept: UROLOGY | Facility: CLINIC | Age: 80
End: 2025-05-21
Payer: COMMERCIAL

## 2025-05-21 ENCOUNTER — TELEPHONE (OUTPATIENT)
Dept: UROLOGY | Facility: CLINIC | Age: 80
End: 2025-05-21

## 2025-05-21 VITALS — BODY MASS INDEX: 33.21 KG/M2 | HEIGHT: 70 IN | WEIGHT: 232 LBS

## 2025-05-21 DIAGNOSIS — C67.4 MALIGNANT NEOPLASM OF POSTERIOR WALL OF URINARY BLADDER (HCC): Primary | ICD-10-CM

## 2025-05-21 PROCEDURE — 99215 OFFICE O/P EST HI 40 MIN: CPT | Performed by: UROLOGY

## 2025-05-21 RX ORDER — ISOSORBIDE MONONITRATE 30 MG/1
TABLET, EXTENDED RELEASE ORAL
COMMUNITY
Start: 2025-04-26

## 2025-05-21 RX ORDER — LOSARTAN POTASSIUM 50 MG/1
TABLET ORAL
COMMUNITY
Start: 2025-05-16

## 2025-05-21 NOTE — TELEPHONE ENCOUNTER
Called and spoke to the PT. PT has a VV with Amanda today. Rooming process, PT not able to do BP, Pulse, Ox or temp. Weight, height, medication and medical history was gone over with the PT. Let the PT know Provider was running behind and will call the PT between 2 pm and 3 pm. PT verbalized understanding and was ok with the time frame.

## 2025-05-21 NOTE — Clinical Note
Patient status post telehealth visit today.  Unfortunately has recurrent T1 bladder cancer.  I would like him to be set up for gemcitabine docetaxel induction therapy.  Once a week for 6 weeks.  He also needs to be arranged for a cystoscopy with me a few weeks after he completes his gemcitabine docetaxel.

## 2025-06-02 ENCOUNTER — TELEPHONE (OUTPATIENT)
Dept: UROLOGY | Facility: CLINIC | Age: 80
End: 2025-06-02

## 2025-06-02 DIAGNOSIS — C67.4 MALIGNANT NEOPLASM OF POSTERIOR WALL OF URINARY BLADDER (HCC): Primary | ICD-10-CM

## 2025-06-02 NOTE — TELEPHONE ENCOUNTER
This nurse received call back from patient, thoroughly went over the rationale of patient receiving gemcitabine docetaxel induction therapy, the process of which gem/doce is performed at the infusion center, and the aftercare post each treatment. Patient is agreeable with abiding by the 6 week regimen and cysto follow up six weeks later with Dr. Patel. All questions and concerns addressed at the time of call but patient is encouraged to call office with any additional questions/concerns.

## 2025-06-02 NOTE — TELEPHONE ENCOUNTER
This nurse attempted to call patient on both provided phone numbers to begin the process of scheduling him for weekly gemcitabine docetaxel induction therapy. Detailed message left providing office phone number, my name and instructions to call the office back at his earliest convenience. I will make another attempt to call patient in a few days.     If patient calls back, please see if I'm available to that I may speak with directly. Thank you!!

## 2025-06-04 NOTE — TELEPHONE ENCOUNTER
Attempted to reach patient to discuss appointments made by [facility]fusion [facility] that will begin 7/1/25 10 am. Detailed voicemail left providing office number, my name and instructions for patient to call back at his earliest convenience.     If patient calls back, please see if I am available so I can speak with him directly. Thank you!!

## 2025-06-04 NOTE — TELEPHONE ENCOUNTER
Received call back from patient.  Patient has been scheduled to begin induction therapy on 7/1/25 10 am at the Infusion Center. Patient has also been scheduled for his 6 week post therapy cysto with Dr. Patel on 9/17/25 10:15 am at the [facility] office. Patient is instructed to come into office prior to first appt to sign consent for treatment so it can scanned into chart. Patient appointment calendar and aftercare instructions have been mailed to patient as well.     Patient verbalizes full understanding of education given at this time.

## 2025-06-04 NOTE — TELEPHONE ENCOUNTER
This nurse made contact with Julia from the Infusion Center to schedule patient' 6 week course of gemcitabine docetaxel induction therapy. Julia was able to give the patient a start date of 7/1/25 10 am as this is the next available based on the patient's preferred days of either Monday or Tuesday. Once Julia has built patient's 6 week schedule, I will contact patient to make him aware.

## 2025-06-04 NOTE — TELEPHONE ENCOUNTER
Pt returning missed call from nurse    Warm transferred to nurse who was attempting to contact patient

## 2025-06-05 NOTE — PROGRESS NOTES
Name: Trent Ocasio      : 1945      MRN: [id number]  Encounter Provider: ANNY Mcclain  Encounter Date: 6/10/2025   Encounter department: [facility]  :  Assessment & Plan  Drug-induced constipation    Orders:  •  senna (SENOKOT) 8.6 mg; Take 1 tablet (8.6 mg total) by mouth daily at bedtime Once regular take as needed    Chronic right-sided low back pain, unspecified whether sciatica present             Patient returns to the office following up after an initiating oxycodone/acetaminophen 5/325 mg tablet patient was taking 1 tablet up to 3 times a day as needed for severe pain.  Patient states 1 day he noticed his pain was not severe at all and he just stopped taking the pain medication.  He recently had bladder surgery and was provided pain medication again but he tried to use as little as possible since he noticed it was causing constipation.  Patient does not wish to continue oxycodone/acetaminophen.  Patient states he still suffering with constipation I advised him to try senna daily at bedtime until he is more regular.  Also advised him that many people do not consume a regular diet to produce daily stools, patient states that he has not been consuming a lot of calories that his appetite has been poor.  Wife and patient stated that they will work on this and try the senna.  Asking for an as needed nonopioid medication.  Advised the patient that he is prescribed methocarbamol which could be very helpful he may continue to take that as needed.  If his pain becomes more persistent and needs to be prescribed pain medication again the patient understands to contact the office.    My impressions and treatment recommendations were discussed in detail with the patient who verbalized understanding and had no further questions.  Discharge instructions were provided. I personally saw and examined the patient and I agree with the above discussed plan of care.    History  "of Present Illness {?Quick Links Encounters * My Last Note * Last Note in Specialty * Snapshot * Since Last Visit * History :22512}    Trent Ocasio is a 80 y.o. male who presents for a follow up office visit in regards to Back Pain (Rt sided lower back pain follow up). At today's visit patient states that their pain symptoms are better with a pain score of 1/10 on the verbal numeric pain scale.  The patient's pain is worse at no specific time.  The patient's pain is occasional in nature.  And the quality of the patient's pain is described as dull-aching.  The patient's pain is located in the right low back/flank area.      Review of Systems   Respiratory:  Negative for shortness of breath.    Cardiovascular:  Negative for chest pain.   Gastrointestinal:  Positive for constipation. Negative for diarrhea, nausea and vomiting.   Musculoskeletal:  Negative for arthralgias, gait problem, joint swelling and myalgias.   Skin:  Negative for rash.   Neurological:  Negative for dizziness, seizures and weakness.   All other systems reviewed and are negative.      Medical History Reviewed by provider this encounter:  Tobacco  Allergies  Meds  Problems  Med Hx  Surg Hx  Fam Hx     .     Objective {?Quick Links Trend Vitals * Enter New Vitals * Results Review * Timeline (Adult) * Labs * Imaging * Cardiology * Procedures * Lung Cancer Screening * Surgical eConsent :95105}  Ht 5' 10\" (1.778 m)   Wt 103 kg (227 lb)   BMI 32.57 kg/m²      Pain Score:   1    Constitutional:normal, well developed, well nourished, alert, in no distress and non-toxic and no overt pain behavior. and obese  Eyes:anicteric  HEENT:grossly intact  Neck:supple, symmetric, trachea midline and no masses   Pulmonary:even and unlabored  Cardiovascular:No edema or pitting edema present  Skin:Normal without rashes or lesions and well hydrated  Psychiatric:Mood and affect appropriate  Neurologic:Cranial Nerves II-XII grossly intact  Musculoskeletal: " antalgic gait and ambulates with cane    This document was created using speech voice recognition software.   Grammatical errors, random word insertions, pronoun errors, and incomplete sentences are an occasional consequence of this system due to software limitations, ambient noise, and hardware issues.   Any formal questions or concerns about content, text, or information contained within the body of this dictation should be directly addressed to the provider for clarification.

## 2025-06-10 ENCOUNTER — OFFICE VISIT (OUTPATIENT)
Dept: PAIN MEDICINE | Facility: CLINIC | Age: 80
End: 2025-06-10
Payer: COMMERCIAL

## 2025-06-10 VITALS — HEIGHT: 70 IN | WEIGHT: 227 LBS | BODY MASS INDEX: 32.5 KG/M2

## 2025-06-10 DIAGNOSIS — M54.50 CHRONIC RIGHT-SIDED LOW BACK PAIN, UNSPECIFIED WHETHER SCIATICA PRESENT: ICD-10-CM

## 2025-06-10 DIAGNOSIS — K59.03 DRUG-INDUCED CONSTIPATION: Primary | ICD-10-CM

## 2025-06-10 DIAGNOSIS — G89.29 CHRONIC RIGHT-SIDED LOW BACK PAIN, UNSPECIFIED WHETHER SCIATICA PRESENT: ICD-10-CM

## 2025-06-10 PROCEDURE — 99213 OFFICE O/P EST LOW 20 MIN: CPT

## 2025-06-10 RX ORDER — SENNOSIDES 8.6 MG
8.6 TABLET ORAL
Qty: 90 TABLET | Refills: 1 | Status: SHIPPED | OUTPATIENT
Start: 2025-06-10

## 2025-06-10 NOTE — ASSESSMENT & PLAN NOTE
Department of Obstetrics and Gynecology   Obstetrics History and Physical  [facility]  H&P Admission Inpatient  Note        CHIEF COMPLAINT:  contractions, leakage of amniotic fluid    HISTORY OF PRESENT ILLNESS:      The patient is a 22 y.o. female at 39w5d.  OB History          1    Para        Term                AB        Living             SAB        IAB        Ectopic        Molar        Multiple        Live Births                Patient presents with a chief complaint as above and is being admitted for active phase labor    Estimated Due Date: Estimated Date of Delivery: 25    PRENATAL CARE:    Complicated by: none    PAST OB HISTORY:  OB History          1    Para        Term                AB        Living             SAB        IAB        Ectopic        Molar        Multiple        Live Births                    Past Medical History:        Diagnosis Date    Migraine     Migraines      Past Surgical History:        Procedure Laterality Date    TONSILLECTOMY      WISDOM TOOTH EXTRACTION       Allergies:  Patient has no known allergies.    Social History:    Social History     Socioeconomic History    Marital status: Single     Spouse name: Not on file    Number of children: Not on file    Years of education: Not on file    Highest education level: Not on file   Occupational History    Not on file   Tobacco Use    Smoking status: Never    Smokeless tobacco: Never   Vaping Use    Vaping status: Never Used   Substance and Sexual Activity    Alcohol use: Not Currently    Drug use: Never    Sexual activity: Yes     Partners: Male   Other Topics Concern    Not on file   Social History Narrative    Not on file     Social Drivers of Health     Financial Resource Strain: Not on file   Food Insecurity: No Food Insecurity (6/10/2025)    Hunger Vital Sign     Worried About Running Out of Food in the Last Year: Never true     Ran Out of Food in the Last Year: Never true  POD1 TURBT by Dr. Perdue  Vital signs stable, afebrile  Negron catheter maria victoria urine, removed for trial of void  No oxycodone today, patient understands  As long as passes trial of void stable for discharge this afternoon  Outpatient follow-up for pathology review in the coming weeks

## 2025-06-18 ENCOUNTER — APPOINTMENT (OUTPATIENT)
Dept: LAB | Facility: CLINIC | Age: 80
End: 2025-06-18
Payer: COMMERCIAL

## 2025-06-18 DIAGNOSIS — R39.9 UTI SYMPTOMS: ICD-10-CM

## 2025-06-18 LAB
BACTERIA UR QL AUTO: ABNORMAL /HPF
BILIRUB UR QL STRIP: NEGATIVE
CLARITY UR: ABNORMAL
COLOR UR: ABNORMAL
GLUCOSE UR STRIP-MCNC: ABNORMAL MG/DL
HGB UR QL STRIP.AUTO: ABNORMAL
KETONES UR STRIP-MCNC: NEGATIVE MG/DL
LEUKOCYTE ESTERASE UR QL STRIP: ABNORMAL
MUCOUS THREADS UR QL AUTO: ABNORMAL
NITRITE UR QL STRIP: NEGATIVE
NON-SQ EPI CELLS URNS QL MICRO: ABNORMAL /HPF
PH UR STRIP.AUTO: 5.5 [PH]
PROT UR STRIP-MCNC: ABNORMAL MG/DL
RBC #/AREA URNS AUTO: ABNORMAL /HPF
SP GR UR STRIP.AUTO: 1.02 (ref 1–1.03)
UROBILINOGEN UR STRIP-ACNC: <2 MG/DL
WBC #/AREA URNS AUTO: ABNORMAL /HPF
WBC CLUMPS # UR AUTO: PRESENT /UL

## 2025-06-18 PROCEDURE — 87077 CULTURE AEROBIC IDENTIFY: CPT

## 2025-06-18 PROCEDURE — 81001 URINALYSIS AUTO W/SCOPE: CPT

## 2025-06-18 PROCEDURE — 87086 URINE CULTURE/COLONY COUNT: CPT

## 2025-06-19 LAB
BACTERIA UR CULT: ABNORMAL
BACTERIA UR CULT: ABNORMAL

## 2025-06-20 ENCOUNTER — RESULTS FOLLOW-UP (OUTPATIENT)
Dept: UROLOGY | Facility: CLINIC | Age: 80
End: 2025-06-20

## 2025-06-20 DIAGNOSIS — R39.9 UTI SYMPTOMS: Primary | ICD-10-CM

## 2025-06-20 NOTE — TELEPHONE ENCOUNTER
Called and spoke to the PT with results and Jennifer SPEARS recommendations. PT verbalized understanding.    ----- Message from Jennifer Dsouza PA-C sent at 6/20/2025  7:46 AM EDT -----  Antibiotics sent to pharmacy  ----- Message -----  From: Lab, Background User  Sent: 6/18/2025  10:05 PM EDT  To: Jennifer Dsouza PA-C

## 2025-06-27 ENCOUNTER — TELEPHONE (OUTPATIENT)
Dept: INFUSION CENTER | Facility: CLINIC | Age: 80
End: 2025-06-27

## 2025-06-27 NOTE — TELEPHONE ENCOUNTER
Patient called and reminded of appointment on 7/1/2025 at 10 AM.  Spoke with Trent and advised that he can take his daily medication as normal.  Patient advised that we offer light snacks but he can bring a breakfast/lunch as needed and items to keep him occupied.  The clinic location, visitor policy, and no-show policy was reviewed and I answered any questions Trent had.  Patient verbally confirmed appointment.

## 2025-07-01 ENCOUNTER — TELEPHONE (OUTPATIENT)
Dept: UROLOGY | Facility: CLINIC | Age: 80
End: 2025-07-01

## 2025-07-01 ENCOUNTER — HOSPITAL ENCOUNTER (OUTPATIENT)
Dept: INFUSION CENTER | Facility: CLINIC | Age: 80
Discharge: HOME | End: 2025-07-01
Attending: UROLOGY
Payer: COMMERCIAL

## 2025-07-01 VITALS
TEMPERATURE: 97.9 F | RESPIRATION RATE: 18 BRPM | DIASTOLIC BLOOD PRESSURE: 79 MMHG | SYSTOLIC BLOOD PRESSURE: 160 MMHG | HEART RATE: 80 BPM

## 2025-07-01 DIAGNOSIS — C67.4 MALIGNANT NEOPLASM OF POSTERIOR WALL OF URINARY BLADDER (HCC): Primary | ICD-10-CM

## 2025-07-01 PROCEDURE — 51720 TREATMENT OF BLADDER LESION: CPT

## 2025-07-01 RX ADMIN — GEMCITABINE 1000 MG: 38 INJECTION, SOLUTION INTRAVENOUS at 11:04

## 2025-07-01 RX ADMIN — DOCETAXEL 37.5 MG: 20 INJECTION, SOLUTION, CONCENTRATE INTRAVENOUS at 12:48

## 2025-07-01 NOTE — PROCEDURES
Trent Ocasio presents for bladder chemo, 14 fr cath inserted, with 100ml output, pt tolerated 90 minutes instillation of gemzar, ayon unclamped after 90 minutes with 150 ml output, taxotere instilled and ayon cath d/c,pt instructed to reposition every 15 minutes for 2 hours and to not void for 2hours, pt acknowledges,  pt tolerated treatment well with no complications.      Trent Ocasio is aware of future appt on 7/8 at 1000.     AVS No (Declined by Trent Ocasio) d/c from unit stable

## 2025-07-01 NOTE — TELEPHONE ENCOUNTER
This nurse placed call to patient to remind him to come into office this morning prior to first gemcitabine instillation to sign consent so that it can be scanned into his chart. Patient is agreeable.

## 2025-07-08 ENCOUNTER — HOSPITAL ENCOUNTER (OUTPATIENT)
Dept: INFUSION CENTER | Facility: CLINIC | Age: 80
Discharge: HOME | End: 2025-07-08
Attending: UROLOGY
Payer: COMMERCIAL

## 2025-07-08 VITALS
RESPIRATION RATE: 18 BRPM | TEMPERATURE: 97.7 F | HEART RATE: 81 BPM | SYSTOLIC BLOOD PRESSURE: 147 MMHG | DIASTOLIC BLOOD PRESSURE: 84 MMHG

## 2025-07-08 DIAGNOSIS — C67.4 MALIGNANT NEOPLASM OF POSTERIOR WALL OF URINARY BLADDER (HCC): Primary | ICD-10-CM

## 2025-07-08 PROCEDURE — 51720 TREATMENT OF BLADDER LESION: CPT

## 2025-07-08 RX ADMIN — DOCETAXEL 37.5 MG: 20 INJECTION, SOLUTION, CONCENTRATE INTRAVENOUS at 12:35

## 2025-07-08 RX ADMIN — GEMCITABINE 1000 MG: 38 INJECTION, SOLUTION INTRAVENOUS at 10:49

## 2025-07-08 NOTE — PROGRESS NOTES
Trent Ocasio  presents for bladder chemo, gemzar instillaed and tolerated 90 minute instillation, taxtotere instilled and ayon removed, instructed on repositioning every 15 minutes, pt  tolerated treatment well with no complications.      Trent Ocasio is aware of future appt on 7/15 at 1000.     AVS  No (Declined by Trent Ocasio) d/c from unit stable

## 2025-07-15 ENCOUNTER — HOSPITAL ENCOUNTER (OUTPATIENT)
Dept: INFUSION CENTER | Facility: CLINIC | Age: 80
Discharge: HOME | End: 2025-07-15
Attending: UROLOGY
Payer: COMMERCIAL

## 2025-07-15 VITALS
DIASTOLIC BLOOD PRESSURE: 72 MMHG | SYSTOLIC BLOOD PRESSURE: 150 MMHG | TEMPERATURE: 97.6 F | HEART RATE: 88 BPM | RESPIRATION RATE: 18 BRPM

## 2025-07-15 DIAGNOSIS — C67.4 MALIGNANT NEOPLASM OF POSTERIOR WALL OF URINARY BLADDER (HCC): Primary | ICD-10-CM

## 2025-07-15 DIAGNOSIS — R39.9 UTI SYMPTOMS: Primary | ICD-10-CM

## 2025-07-15 DIAGNOSIS — R39.9 UTI SYMPTOMS: ICD-10-CM

## 2025-07-15 DIAGNOSIS — N39.0 URINARY TRACT INFECTION WITHOUT HEMATURIA, SITE UNSPECIFIED: Primary | ICD-10-CM

## 2025-07-15 LAB
BACTERIA UR QL AUTO: ABNORMAL /HPF
BILIRUB UR QL STRIP: NEGATIVE
CLARITY UR: ABNORMAL
COLOR UR: YELLOW
GLUCOSE UR STRIP-MCNC: ABNORMAL MG/DL
HGB UR QL STRIP.AUTO: ABNORMAL
KETONES UR STRIP-MCNC: NEGATIVE MG/DL
LEUKOCYTE ESTERASE UR QL STRIP: ABNORMAL
NITRITE UR QL STRIP: POSITIVE
NON-SQ EPI CELLS URNS QL MICRO: ABNORMAL /HPF
PH UR STRIP.AUTO: 6.5 [PH]
PROT UR STRIP-MCNC: ABNORMAL MG/DL
RBC #/AREA URNS AUTO: ABNORMAL /HPF
SP GR UR STRIP.AUTO: 1.02 (ref 1–1.03)
UROBILINOGEN UR STRIP-ACNC: <2 MG/DL
WBC #/AREA URNS AUTO: ABNORMAL /HPF
WBC CLUMPS # UR AUTO: PRESENT /UL

## 2025-07-15 PROCEDURE — 51720 TREATMENT OF BLADDER LESION: CPT

## 2025-07-15 PROCEDURE — 81001 URINALYSIS AUTO W/SCOPE: CPT

## 2025-07-15 RX ORDER — NITROFURANTOIN 25; 75 MG/1; MG/1
100 CAPSULE ORAL 2 TIMES DAILY
Qty: 10 CAPSULE | Refills: 0 | Status: SHIPPED | OUTPATIENT
Start: 2025-07-15 | End: 2025-07-20

## 2025-07-15 RX ADMIN — DOCETAXEL 37.5 MG: 20 INJECTION, SOLUTION, CONCENTRATE INTRAVENOUS at 12:45

## 2025-07-15 RX ADMIN — GEMCITABINE 1000 MG: 38 INJECTION, SOLUTION INTRAVENOUS at 11:08

## 2025-07-18 ENCOUNTER — TELEPHONE (OUTPATIENT)
Dept: UROLOGY | Facility: CLINIC | Age: 80
End: 2025-07-18

## 2025-07-18 DIAGNOSIS — R39.9 UTI SYMPTOMS: Primary | ICD-10-CM

## 2025-07-18 NOTE — TELEPHONE ENCOUNTER
PT came into the Office today. PT stated having a Urine testing done on 7/15/25 and given a medication the same day. PT was prescribed macrobid. PT stated has been taking the medication for 3 days and has not felt any improvement. PT stated still having Burning and frequency. Asked the PT if the PT was drinking any water. PT stated is not drinking a lot of water but feels even if the PT increases water intake will not work. PT would like to know what to do about PT's UTI and will this affect next infusion appt on Tuesday. PT stated last Tuesday PT stated having the UTI symptoms and they said it was OK to proceed with infusion. PT stated was in extreme pain after wards and wanted to know will feel pain again on Tuesday since the PT has a UTI. Please advise.    Please send encounter directly to me. Thank you.

## 2025-07-18 NOTE — TELEPHONE ENCOUNTER
Called and spoke to the PT with Raysa SPEARS recommendations. PT verbalized understanding and would like orders placed in the PT's chart. Order placed.

## 2025-07-18 NOTE — TELEPHONE ENCOUNTER
If symptoms persist despite completion of Macrobid, should obtain a urine culture. I recommend increased water and avoiding bladder irritants. Okay to proceed with infusion as he will have completed abx course.

## 2025-07-21 ENCOUNTER — APPOINTMENT (OUTPATIENT)
Dept: LAB | Facility: CLINIC | Age: 80
End: 2025-07-21
Payer: COMMERCIAL

## 2025-07-21 ENCOUNTER — REMOTE DEVICE CLINIC VISIT (OUTPATIENT)
Dept: CARDIOLOGY CLINIC | Facility: CLINIC | Age: 80
End: 2025-07-21
Payer: COMMERCIAL

## 2025-07-21 DIAGNOSIS — I44.30 AVB (ATRIOVENTRICULAR BLOCK): Primary | ICD-10-CM

## 2025-07-21 DIAGNOSIS — C67.4 MALIGNANT NEOPLASM OF POSTERIOR WALL OF URINARY BLADDER (HCC): Primary | ICD-10-CM

## 2025-07-21 PROCEDURE — 81001 URINALYSIS AUTO W/SCOPE: CPT

## 2025-07-21 PROCEDURE — 87077 CULTURE AEROBIC IDENTIFY: CPT

## 2025-07-21 PROCEDURE — 87186 SC STD MICRODIL/AGAR DIL: CPT

## 2025-07-21 PROCEDURE — 93294 REM INTERROG EVL PM/LDLS PM: CPT | Performed by: STUDENT IN AN ORGANIZED HEALTH CARE EDUCATION/TRAINING PROGRAM

## 2025-07-21 PROCEDURE — 87086 URINE CULTURE/COLONY COUNT: CPT

## 2025-07-21 PROCEDURE — 87147 CULTURE TYPE IMMUNOLOGIC: CPT

## 2025-07-21 PROCEDURE — 93296 REM INTERROG EVL PM/IDS: CPT | Performed by: STUDENT IN AN ORGANIZED HEALTH CARE EDUCATION/TRAINING PROGRAM

## 2025-07-21 NOTE — TELEPHONE ENCOUNTER
Patient called back today and stated his symptoms are not improving. He reports he also now has hematuria that started on Saturday. He is asking what he should do moving forward. He states he has his infusion on Tuesday.  Advised him that urine testing is ordered. Patient stated he was told to wait until Tuesday to complete testing.  Patient is concerned that the infusion is going to be extremely uncomfortable if he is to go through with it tomorrow.    Please advise and call back #[phone]

## 2025-07-21 NOTE — TELEPHONE ENCOUNTER
Please let patient know that his repeat urine testing is still in process.  Due to concern for possible ongoing infection as well as reports of gross hematuria I would recommend postponing his infusion tomorrow.  I have already updated his therapy plan to reflect this.  He can resume bladder installations starting 7/28 as long as urine testing is improved and his hematuria has resolved.  We will call him with the results of his urine testing once they are finalized.  Continue to monitor symptoms.  If he has any difficulty urinating or is passing large blood clots would recommend ER evaluation or be seen in the office for possible Negron catheter placement and irrigation.

## 2025-07-21 NOTE — PROGRESS NOTES
Results for orders placed or performed in visit on 07/21/25   Cardiac EP device report    Narrative    MDT DC PM/ACTIVE SYSTEM IS MRI CONDITIONAL  CARELINK TRANSMISSION: BATTERY VOLTAGE ADEQUATE (12 YRS). AP: 28%. : 88.7% (>40%~MVP-ON/60). ALL AVAILABLE LEAD PARAMETERS WITHIN NORMAL LIMITS. 1 VT EPISODE W/ EGM SHOWING NSVT 11 BEATS @ 166 BPM. 1 AT/AF EPISODE W/ EGM SHOWING PAT, DURATION 57 SECS. AF BURDEN: <0.1%. PT TAKES ELIQUIS, IMDUR, METOPROLOL SUCC, ASA 81MG. EF: 52% (ECHO 3/25/25). NORMAL DEVICE FUNCTION. CH

## 2025-07-21 NOTE — TELEPHONE ENCOUNTER
Patient needs to have repeat urine testing done today due to persistent symptoms.  We may need to consider postponing his infusion tomorrow due to unresolved UTI and now reports of gross hematuria.  I will include Dr. Patel as an FYI for additional input.

## 2025-07-21 NOTE — TELEPHONE ENCOUNTER
I called and spoke with patient, advised him to get urine testing done today, patient expressed understanding

## 2025-07-21 NOTE — TELEPHONE ENCOUNTER
This nurse called patient and thoroughly reviewed Sahil MCCORMICK note and recommendations as seen below. Patient states he has been experiencing decreased urinary output since yesterday and when he does urine it is indeed bloody. Patient states he has been maintaining adequate hydration to encourage urination. Patient is urged to go to the ER for evaluation ASAP as this could be a sign of potential clot retention. Patient is advised on the methods of which clot retention is resolved in a controlled environment. Unfortunately there is no availability on office nurse schedule for this week. Patient is assured he will be contacted with final urine culture results. Patient is provided with phone number to Infusion Center to postpone treatment for tomorrow and verbalizes understanding in doing this.

## 2025-07-22 ENCOUNTER — HOSPITAL ENCOUNTER (OUTPATIENT)
Dept: INFUSION CENTER | Facility: CLINIC | Age: 80
Discharge: HOME | End: 2025-07-22
Attending: UROLOGY

## 2025-07-22 LAB
BACTERIA UR QL AUTO: ABNORMAL /HPF
BILIRUB UR QL STRIP: ABNORMAL
CLARITY UR: ABNORMAL
COLOR UR: ABNORMAL
GLUCOSE UR STRIP-MCNC: NEGATIVE MG/DL
HGB UR QL STRIP.AUTO: ABNORMAL
KETONES UR STRIP-MCNC: ABNORMAL MG/DL
LEUKOCYTE ESTERASE UR QL STRIP: ABNORMAL
NITRITE UR QL STRIP: POSITIVE
NON-SQ EPI CELLS URNS QL MICRO: ABNORMAL /HPF
PH UR STRIP.AUTO: 7.5 [PH]
PROT UR STRIP-MCNC: ABNORMAL MG/DL
RBC #/AREA URNS AUTO: ABNORMAL /HPF
SP GR UR STRIP.AUTO: >=1.03 (ref 1–1.03)
UROBILINOGEN UR STRIP-ACNC: 2 MG/DL
WBC #/AREA URNS AUTO: ABNORMAL /HPF

## 2025-07-23 DIAGNOSIS — N39.0 URINARY TRACT INFECTION WITHOUT HEMATURIA, SITE UNSPECIFIED: Primary | ICD-10-CM

## 2025-07-23 LAB — BACTERIA UR CULT: ABNORMAL

## 2025-07-23 RX ORDER — CEPHALEXIN 500 MG/1
500 CAPSULE ORAL EVERY 12 HOURS SCHEDULED
Qty: 14 CAPSULE | Refills: 0 | Status: ON HOLD | OUTPATIENT
Start: 2025-07-23 | End: 2025-07-30

## 2025-07-23 NOTE — TELEPHONE ENCOUNTER
Abx sent to pharmacy. May need to adjust based on final culture   zofran prn improving   hypokalemia -- replaced improving   hypokalemia -- replaced k 3.6 resolved zofran prn improving   hypokalemia -- replaced zofran prn zofran prn k 3.6 resolved

## 2025-07-23 NOTE — TELEPHONE ENCOUNTER
Patient called for urine results.    Please advise and call back #[phone]    ntains abnormal data Urinalysis with microscopic  Order: [id number]   Status: Final result                   Component  Ref Range & Units (hover) 7/21/25 1022 7/15/25 1029 7/15/25 1029 6/18/25 1129 3/27/25 1104 1/13/25 1410 11/19/24 1047   Color, UA Dark Red  Yellow Light Orange Light Yellow dark yellow   Clarity, UA Extra Turbid  Extra Turbid Extra Turbid Clear yellow clear   Specific Gravity, UA >=1.030  1.016 R 1.018 R 1.015 R 1.015 R 1.015 R   pH, UA 7.5  6.5 5.5 6.5 5.0 R 5.0 R   Leukocytes, UA Large Abnormal   Large Abnormal  Large Abnormal  Negative - R - R   Nitrite, UA Positive Abnormal   Positive Abnormal  Negative Negative - R - R   Protein,  (2+) Abnormal   200 (2+) Abnormal  100 (2+) Abnormal  70 (1+) Abnormal  + R 30 R   Glucose, UA Negative  Trace Abnormal  150 (3/20%) Abnormal  70 (7/100%) Abnormal  - R - R   Ketones, UA 10 (1+) Abnormal   Negative Negative Negative - R - R   Urobilinogen, UA 2.0 Abnormal   <2.0 <2.0 <2.0     Bilirubin, UA Small Abnormal   Negative Negative Negative     Occult Blood, UA Large Abnormal   Moderate Abnormal  Moderate Abnormal  Large Abnormal  ++ R 5-10 R   RBC, UA Innumerable Abnormal  Innumerable Abnormal   Innumerable Abnormal  Innumerable Abnormal      WBC, UA Innumerable Abnormal  Innumerable Abnormal   Innumerable Abnormal  2-4 Abnormal      Epithelial Cells None Seen Occasional  Occasional Occasional     Bacteria, UA Moderate Abnormal  Moderate Abnormal   None Seen Occasional     WBC Clumps  Present Abnormal  R  Present      MUCUS THREADS    Moderate Abnormal  R Occasional Abnormal  R     SL AMB POCT UROBILINOGEN      0.2 0.2   BILIRUBIN,UA      - -             Specimen Collected: 07/21/25 10:22 Last Resulted: 07/22/25 11:59     ntains abnormal data Urine culture  Order: [id number]   Status: Preliminary result          Dx: UTI symptoms    Test Result Released: No    Specimen  Information: Urine, Clean Catch   0 Result Notes  Urine Culture >100,000 cfu/ml Staphylococcus aureus Abnormal              Specimen Collected: 07/21/25 10:22 Last Resulted: 07/22/25 12:06

## 2025-07-23 NOTE — TELEPHONE ENCOUNTER
Patient calling to follow up on Rx for UTI. Advised Keflex sent over earlier today. Patient appreciative.

## 2025-07-26 ENCOUNTER — HOSPITAL ENCOUNTER (INPATIENT)
Facility: HOSPITAL | Age: 80
LOS: 2 days | Discharge: HOME | End: 2025-07-28
Attending: EMERGENCY MEDICINE | Admitting: HOSPITALIST
Payer: COMMERCIAL

## 2025-07-26 ENCOUNTER — APPOINTMENT (EMERGENCY)
Dept: CT IMAGING | Facility: HOSPITAL | Age: 80
End: 2025-07-26
Payer: COMMERCIAL

## 2025-07-26 PROBLEM — E11.9 TYPE 2 DIABETES MELLITUS (HCC): Status: ACTIVE | Noted: 2025-07-26

## 2025-07-26 PROBLEM — R82.90 ABNORMAL URINALYSIS: Status: ACTIVE | Noted: 2025-07-26

## 2025-07-26 PROBLEM — N30.00 ACUTE CYSTITIS: Status: ACTIVE | Noted: 2025-07-26

## 2025-07-26 PROBLEM — N17.9 AKI (ACUTE KIDNEY INJURY) (HCC): Status: ACTIVE | Noted: 2025-07-26

## 2025-07-26 PROBLEM — R31.9 HEMATURIA: Status: ACTIVE | Noted: 2025-07-26

## 2025-07-26 PROBLEM — R10.2 SUPRAPUBIC ABDOMINAL PAIN: Status: ACTIVE | Noted: 2025-07-26

## 2025-07-27 PROBLEM — I48.0 PAROXYSMAL ATRIAL FIBRILLATION (HCC): Status: ACTIVE | Noted: 2025-07-27

## 2025-07-28 ENCOUNTER — TELEPHONE (OUTPATIENT)
Dept: OTHER | Facility: HOSPITAL | Age: 80
End: 2025-07-28

## 2025-07-28 PROBLEM — C67.4 MALIGNANT NEOPLASM OF POSTERIOR WALL OF BLADDER (HCC): Chronic | Status: ACTIVE | Noted: 2024-05-22

## 2025-07-29 ENCOUNTER — HOSPITAL ENCOUNTER (OUTPATIENT)
Dept: INFUSION CENTER | Facility: CLINIC | Age: 80
Discharge: HOME | End: 2025-07-29
Attending: UROLOGY
Payer: COMMERCIAL

## 2025-07-29 VITALS
SYSTOLIC BLOOD PRESSURE: 122 MMHG | TEMPERATURE: 96.9 F | RESPIRATION RATE: 18 BRPM | HEART RATE: 84 BPM | DIASTOLIC BLOOD PRESSURE: 76 MMHG

## 2025-07-29 DIAGNOSIS — I25.10 CORONARY ARTERY DISEASE INVOLVING NATIVE CORONARY ARTERY OF NATIVE HEART WITHOUT ANGINA PECTORIS: ICD-10-CM

## 2025-07-29 DIAGNOSIS — C67.4 MALIGNANT NEOPLASM OF POSTERIOR WALL OF BLADDER (HCC): Primary | ICD-10-CM

## 2025-07-29 PROCEDURE — 51720 TREATMENT OF BLADDER LESION: CPT

## 2025-07-29 RX ADMIN — GEMCITABINE 1000 MG: 38 INJECTION, SOLUTION INTRAVENOUS at 10:43

## 2025-07-29 RX ADMIN — DOCETAXEL 37.5 MG: 20 INJECTION, SOLUTION, CONCENTRATE INTRAVENOUS at 12:42

## 2025-07-30 ENCOUNTER — OFFICE VISIT (OUTPATIENT)
Dept: UROLOGY | Facility: CLINIC | Age: 80
End: 2025-07-30
Payer: COMMERCIAL

## 2025-07-30 VITALS
HEART RATE: 84 BPM | SYSTOLIC BLOOD PRESSURE: 140 MMHG | DIASTOLIC BLOOD PRESSURE: 84 MMHG | WEIGHT: 218 LBS | TEMPERATURE: 97.8 F | HEIGHT: 70 IN | OXYGEN SATURATION: 98 % | BODY MASS INDEX: 31.21 KG/M2

## 2025-07-30 DIAGNOSIS — R31.0 GROSS HEMATURIA: ICD-10-CM

## 2025-07-30 DIAGNOSIS — C67.4 MALIGNANT NEOPLASM OF POSTERIOR WALL OF BLADDER (HCC): Primary | Chronic | ICD-10-CM

## 2025-07-30 DIAGNOSIS — N32.89 BLADDER SPASM: ICD-10-CM

## 2025-07-30 LAB
POST-VOID RESIDUAL VOLUME, ML POC: 11 ML
SL AMB  POCT GLUCOSE, UA: NORMAL
SL AMB LEUKOCYTE ESTERASE,UA: NORMAL
SL AMB POCT BILIRUBIN,UA: NORMAL
SL AMB POCT BLOOD,UA: NORMAL
SL AMB POCT CLARITY,UA: NORMAL
SL AMB POCT COLOR,UA: NORMAL
SL AMB POCT KETONES,UA: NORMAL
SL AMB POCT NITRITE,UA: NORMAL
SL AMB POCT PH,UA: 5
SL AMB POCT SPECIFIC GRAVITY,UA: 1.01
SL AMB POCT URINE PROTEIN: NORMAL
SL AMB POCT UROBILINOGEN: 0.2

## 2025-07-30 PROCEDURE — 99213 OFFICE O/P EST LOW 20 MIN: CPT | Performed by: PHYSICIAN ASSISTANT

## 2025-07-30 PROCEDURE — 51798 US URINE CAPACITY MEASURE: CPT | Performed by: PHYSICIAN ASSISTANT

## 2025-07-30 PROCEDURE — 81002 URINALYSIS NONAUTO W/O SCOPE: CPT | Performed by: PHYSICIAN ASSISTANT

## 2025-07-30 RX ORDER — OXYBUTYNIN CHLORIDE 10 MG/1
TABLET, EXTENDED RELEASE ORAL
COMMUNITY
Start: 2025-07-28 | End: 2025-07-30

## 2025-07-30 RX ORDER — FLAVOXATE HYDROCHLORIDE 100 MG/1
100 TABLET ORAL 3 TIMES DAILY PRN
Qty: 30 TABLET | Refills: 0 | Status: SHIPPED | OUTPATIENT
Start: 2025-07-30 | End: 2025-08-29

## 2025-07-30 RX ORDER — ISOSORBIDE MONONITRATE 60 MG/1
60 TABLET, EXTENDED RELEASE ORAL DAILY
Qty: 90 TABLET | Refills: 0 | Status: SHIPPED | OUTPATIENT
Start: 2025-07-30

## 2025-07-30 RX ORDER — OXYBUTYNIN CHLORIDE 15 MG/1
15 TABLET, EXTENDED RELEASE ORAL
Qty: 90 TABLET | Refills: 3 | Status: SHIPPED | OUTPATIENT
Start: 2025-07-30

## 2025-07-30 RX ORDER — OXYBUTYNIN CHLORIDE 5 MG/1
TABLET ORAL
COMMUNITY
Start: 2025-07-28 | End: 2025-07-30

## 2025-08-05 ENCOUNTER — HOSPITAL ENCOUNTER (OUTPATIENT)
Dept: INFUSION CENTER | Facility: CLINIC | Age: 80
Discharge: HOME | End: 2025-08-05
Attending: UROLOGY
Payer: COMMERCIAL

## 2025-08-05 ENCOUNTER — TELEPHONE (OUTPATIENT)
Dept: UROLOGY | Facility: CLINIC | Age: 80
End: 2025-08-05

## 2025-08-08 ENCOUNTER — HOSPITAL ENCOUNTER (EMERGENCY)
Facility: HOSPITAL | Age: 80
Discharge: HOME | End: 2025-08-09
Attending: STUDENT IN AN ORGANIZED HEALTH CARE EDUCATION/TRAINING PROGRAM | Admitting: STUDENT IN AN ORGANIZED HEALTH CARE EDUCATION/TRAINING PROGRAM
Payer: COMMERCIAL

## 2025-08-08 ENCOUNTER — APPOINTMENT (EMERGENCY)
Dept: CT IMAGING | Facility: HOSPITAL | Age: 80
End: 2025-08-08
Payer: COMMERCIAL

## 2025-08-12 ENCOUNTER — HOSPITAL ENCOUNTER (OUTPATIENT)
Dept: INFUSION CENTER | Facility: CLINIC | Age: 80
Discharge: HOME | End: 2025-08-12
Attending: UROLOGY

## 2025-08-12 ENCOUNTER — TELEPHONE (OUTPATIENT)
Dept: INFUSION CENTER | Facility: CLINIC | Age: 80
End: 2025-08-12

## 2025-08-13 ENCOUNTER — HOSPITAL ENCOUNTER (OUTPATIENT)
Facility: HOSPITAL | Age: 80
Setting detail: OBSERVATION
Discharge: HOME | End: 2025-08-14
Attending: EMERGENCY MEDICINE | Admitting: INTERNAL MEDICINE
Payer: COMMERCIAL

## 2025-08-13 ENCOUNTER — APPOINTMENT (EMERGENCY)
Dept: CT IMAGING | Facility: HOSPITAL | Age: 80
End: 2025-08-13
Payer: COMMERCIAL

## (undated) DEVICE — [CUTTING LOOP ELECTRODE, .012 WIRE, 24 FR ¿ 90 ¿,  ONLY FOR USE WITH STRYKER RESECTOSCOPES 0502-880-401, 0502-880-402, DO NOT USE IF PACKAGE IS DAMAGED]

## (undated) DEVICE — SUT VICRYL 2-0 CT-1 27 IN J259H

## (undated) DEVICE — TR BAND RADIAL ARTERY COMPRESSION DEVICE: Brand: TR BAND

## (undated) DEVICE — HF-RESECTION ELECTRODE PLASMA-OVALBUTTON BUTTON, OVAL, LONG, 24 FR., 12°-30°, PK TURIS: Brand: OLYMPUS

## (undated) DEVICE — CHEMOTHERAPY CONTAINER,HINGED LID, YELLOW: Brand: SHARPSAFETY

## (undated) DEVICE — CATH DIAG 5FR IMPULSE 100CM FL3.5

## (undated) DEVICE — INTRO SHEATH PEEL AWAY 7FR

## (undated) DEVICE — SYRINGE CATH TIP 50ML

## (undated) DEVICE — SUT VICRYL 4-0 KS 27 IN J662H

## (undated) DEVICE — HF-RESECTION ELECTRODE PLASMALOOP LOOP, MEDIUM, LONG, 24 FR., 12°, PK TURIS: Brand: OLYMPUS

## (undated) DEVICE — BAG URINE DRAINAGE 2000ML ANTI RFLX LF

## (undated) DEVICE — Device

## (undated) DEVICE — DGW .035 FC J3MM 260CM TEF: Brand: EMERALD

## (undated) DEVICE — SPECIMEN CONTAINER STERILE PEEL PACK

## (undated) DEVICE — PACK TUR

## (undated) DEVICE — CATH GUIDING FIXED SHAPE 43CM

## (undated) DEVICE — GUIDEWIRE WHOLEY HI TORQUE INTERM MOD J.035 260CM

## (undated) DEVICE — INVIEW CLEAR LEGGINGS: Brand: CONVERTORS

## (undated) DEVICE — UROLOGIC DRAIN BAG: Brand: UNBRANDED

## (undated) DEVICE — 4-PORT MANIFOLD: Brand: NEPTUNE 2

## (undated) DEVICE — SCD SEQUENTIAL COMPRESSION COMFORT SLEEVE MEDIUM KNEE LENGTH: Brand: KENDALL SCD

## (undated) DEVICE — CATH DIAG 5FR IMPULSE 100CM FR4

## (undated) DEVICE — CATH FOLEY 20FR 5ML 2WAY LUBRICATH

## (undated) DEVICE — PAD GROUNDING DUAL ADULT

## (undated) DEVICE — GLOVE SRG BIOGEL 7

## (undated) DEVICE — RADIFOCUS OPTITORQUE ANGIOGRAPHIC CATHETER: Brand: OPTITORQUE

## (undated) DEVICE — CATH DIAG 5FR IMPULSE 100CM FL4

## (undated) DEVICE — CHLORHEXIDINE 4PCT 4 OZ

## (undated) DEVICE — GLIDESHEATH SLENDER STAINLESS STEEL KIT: Brand: GLIDESHEATH SLENDER

## (undated) DEVICE — HF-RESECTION ELECTRODE PLASMALOOP LOOP, MEDIUM, 24 FR., 12°-30°, ESG TURIS: Brand: OLYMPUS

## (undated) DEVICE — HF-RESECTION ELECTRODE PLASMABUTTON BUTTON, 24 FR., 12°-30°, ESG TURIS: Brand: OLYMPUS

## (undated) DEVICE — PINNACLE INTRODUCER SHEATH: Brand: PINNACLE

## (undated) DEVICE — DGW .035 FC J3MM 150CM TEF: Brand: EMERALD

## (undated) DEVICE — HF-RESECTION ELECTRODE PLASMALOOP LOOP, LARGE, 24 FR., 12°/16°, ESG TURIS: Brand: OLYMPUS

## (undated) DEVICE — CLOSURE DEVICE ANGIO-SEAL 6FR: Type: IMPLANTABLE DEVICE | Site: GROIN | Status: NON-FUNCTIONAL

## (undated) DEVICE — MICROPUNCTURE INTRODUCER SET SILHOUETTE TRANSITIONLESS PUSH-PLUS DESIGN - STIFFENED CANNULA WITH NITINOL WIRE GUIDE: Brand: MICROPUNCTURE